# Patient Record
Sex: FEMALE | Race: WHITE | NOT HISPANIC OR LATINO | Employment: FULL TIME | URBAN - METROPOLITAN AREA
[De-identification: names, ages, dates, MRNs, and addresses within clinical notes are randomized per-mention and may not be internally consistent; named-entity substitution may affect disease eponyms.]

---

## 2018-02-14 ENCOUNTER — NURSE TRIAGE (OUTPATIENT)
Dept: ADMINISTRATIVE | Facility: CLINIC | Age: 58
End: 2018-02-14

## 2018-02-14 NOTE — TELEPHONE ENCOUNTER
Reason for Disposition   Chest pain present when not coughing    Protocols used: [facility] COUGH-A-[facility]    Pt states that she has a cough for approx 3 weeks that worsened yesterday morning.  Pt states cough is now productive and has nasal congestion and nasal drainage.  Pt states she had temp yesterday 100-101.  Pt advised to ED per protocol, verbalizes understanding, and refuses disposition.

## 2019-05-19 ENCOUNTER — HOSPITAL ENCOUNTER (EMERGENCY)
Facility: HOSPITAL | Age: 59
Discharge: HOME | End: 2019-05-19
Attending: EMERGENCY MEDICINE
Payer: COMMERCIAL

## 2019-05-19 VITALS
TEMPERATURE: 99 F | RESPIRATION RATE: 20 BRPM | DIASTOLIC BLOOD PRESSURE: 59 MMHG | HEART RATE: 72 BPM | SYSTOLIC BLOOD PRESSURE: 124 MMHG | WEIGHT: 200 LBS | BODY MASS INDEX: 33.32 KG/M2 | HEIGHT: 65 IN | OXYGEN SATURATION: 98 %

## 2019-05-19 DIAGNOSIS — M54.42 ACUTE RIGHT-SIDED LOW BACK PAIN WITH BILATERAL SCIATICA: Primary | ICD-10-CM

## 2019-05-19 DIAGNOSIS — M54.41 ACUTE RIGHT-SIDED LOW BACK PAIN WITH BILATERAL SCIATICA: Primary | ICD-10-CM

## 2019-05-19 DIAGNOSIS — S39.012A BACK STRAIN, INITIAL ENCOUNTER: ICD-10-CM

## 2019-05-19 PROCEDURE — 99284 EMERGENCY DEPT VISIT MOD MDM: CPT | Mod: 25

## 2019-05-19 PROCEDURE — 96372 THER/PROPH/DIAG INJ SC/IM: CPT

## 2019-05-19 PROCEDURE — 25000003 PHARM REV CODE 250: Performed by: PHYSICIAN ASSISTANT

## 2019-05-19 PROCEDURE — 63600175 PHARM REV CODE 636 W HCPCS: Performed by: PHYSICIAN ASSISTANT

## 2019-05-19 RX ORDER — METHOCARBAMOL 500 MG/1
1000 TABLET, FILM COATED ORAL
Status: COMPLETED | OUTPATIENT
Start: 2019-05-19 | End: 2019-05-19

## 2019-05-19 RX ORDER — MELOXICAM 15 MG/1
15 TABLET ORAL DAILY
Qty: 7 TABLET | Refills: 0 | Status: SHIPPED | OUTPATIENT
Start: 2019-05-19 | End: 2019-05-19 | Stop reason: SDUPTHER

## 2019-05-19 RX ORDER — KETOROLAC TROMETHAMINE 30 MG/ML
15 INJECTION, SOLUTION INTRAMUSCULAR; INTRAVENOUS
Status: COMPLETED | OUTPATIENT
Start: 2019-05-19 | End: 2019-05-19

## 2019-05-19 RX ORDER — MELOXICAM 15 MG/1
15 TABLET ORAL DAILY
Qty: 7 TABLET | Refills: 0 | Status: SHIPPED | OUTPATIENT
Start: 2019-05-19 | End: 2019-05-26

## 2019-05-19 RX ORDER — METHOCARBAMOL 500 MG/1
1000 TABLET, FILM COATED ORAL EVERY 8 HOURS PRN
Qty: 30 TABLET | Refills: 0 | Status: SHIPPED | OUTPATIENT
Start: 2019-05-19 | End: 2019-05-19 | Stop reason: SDUPTHER

## 2019-05-19 RX ORDER — ACETAMINOPHEN 500 MG
1000 TABLET ORAL
Status: COMPLETED | OUTPATIENT
Start: 2019-05-19 | End: 2019-05-19

## 2019-05-19 RX ORDER — METHOCARBAMOL 500 MG/1
1000 TABLET, FILM COATED ORAL EVERY 8 HOURS PRN
Qty: 30 TABLET | Refills: 0 | Status: SHIPPED | OUTPATIENT
Start: 2019-05-19 | End: 2019-05-24

## 2019-05-19 RX ADMIN — ACETAMINOPHEN 1000 MG: 500 TABLET ORAL at 09:05

## 2019-05-19 RX ADMIN — KETOROLAC TROMETHAMINE 15 MG: 30 INJECTION, SOLUTION INTRAMUSCULAR at 09:05

## 2019-05-19 RX ADMIN — METHOCARBAMOL TABLETS 1000 MG: 500 TABLET, COATED ORAL at 09:05

## 2019-05-19 NOTE — ED TRIAGE NOTES
Pt presents to ED with C/O 8/10 lower back pain with onset yesterday morning. 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 Pt denies any loss of bowel or bladder and ambulates with steady gait. Pt states she has taken Aleve with no relief. Comfort and safety measures provided. Will continue to monitor.

## 2019-05-19 NOTE — ED PROVIDER NOTES
Encounter Date: 5/19/2019       History     Chief Complaint   Patient presents with    Back Pain     pt states she picked up box at work and hurt back      58-year-old female presents to the ER with chief complaint of mid and right sided low back pain x2 days.  Patient says she was moving a box at work when the box fell off the shelf, then she picked it up and placed back on the shelf.  She reports developing mild back pain at that time.  Her pain has been intermittent and progressively worsening since then.  She took Aleve and ibuprofen yesterday, 3 leave and applied icy Hot this morning without significant improvement.  Her pain is rated 8/10.  She reports tingling numbness sensation in the toes of her right foot, but denies any other numbness or saddle paresthesias.  She has pain radiating into the back of her legs bilaterally with certain movements and walking.  She denies weakness of the extremities, bowel or bladder incontinence, fever, chills, nausea, vomiting, abdominal pain, flank pain, dysuria or hematuria.  She denies history of chronic back pain, or any other complaints at this time.        Review of patient's allergies indicates:   Allergen Reactions    Iodine and iodide containing products     Macrodantin [nitrofurantoin macrocrystalline]     Sulfa (sulfonamide antibiotics)      No past medical history on file.  No past surgical history on file.  No family history on file.  Social History     Tobacco Use    Smoking status: Not on file   Substance Use Topics    Alcohol use: Not on file    Drug use: Not on file     Review of Systems   Constitutional: Negative for chills and fever.   HENT: Negative for sore throat.    Respiratory: Negative for shortness of breath.    Cardiovascular: Negative for chest pain.   Gastrointestinal: Negative for abdominal pain, nausea and vomiting.   Genitourinary: Negative for dysuria.   Musculoskeletal: Positive for back pain and myalgias. Negative for neck pain.    Skin: Negative for rash.   Neurological: Negative for syncope and weakness.   Hematological: Does not bruise/bleed easily.       Physical Exam     Initial Vitals [05/19/19 0907]   BP Pulse Resp Temp SpO2   137/67 81 20 98.5 °F (36.9 °C) 97 %      MAP       --         Physical Exam    Nursing note and vitals reviewed.  Constitutional: She appears well-developed and well-nourished. No distress.   HENT:   Head: Atraumatic.   Eyes: Conjunctivae and EOM are normal. Pupils are equal, round, and reactive to light.   Neck: Normal range of motion. Neck supple. No spinous process tenderness present.   Cardiovascular: Normal rate and regular rhythm.   Pulmonary/Chest: Breath sounds normal. No respiratory distress. She has no wheezes. She has no rhonchi. She has no rales.   Abdominal: Soft. Bowel sounds are normal. She exhibits no distension and no pulsatile midline mass. There is no tenderness.   Musculoskeletal:        Lumbar back: She exhibits tenderness (right paraspinous) and pain. She exhibits normal range of motion, no bony tenderness, no swelling and no spasm.        Back:    Positive straight leg raise on right side.   Neurological: She is alert and oriented to person, place, and time. She has normal strength and normal reflexes. No cranial nerve deficit or sensory deficit. Gait normal.   Normal Gait.  Normal sensation, strength and reflexes of extremities.   Skin: No rash noted.   Psychiatric: She has a normal mood and affect.         ED Course   Procedures  Labs Reviewed - No data to display       Imaging Results    None                APC / Resident Notes:   The patient's back pain is likely a musculoskeletal strain.  There are no signs of saddle anesthesia, incontinence, neurologic deficits, fevers, trauma or midline tenderness on history or physical to suggest cauda equina, infectious process, fracture or subluxation.  I will treat with anti-inflammatories and muscle relaxers for relief.  Patient feels improved  with Toradol, Tylenol and Robaxin given in the ED.  I will send home with prescription for Robaxin, Mobic and plan to continue Tylenol as needed for pain. She is stable for discharge and is given ER return precautions.  I have advised close follow-up with her PCP within 1 week for ER follow-up exam.                   Clinical Impression:       ICD-10-CM ICD-9-CM   1. Acute right-sided low back pain with bilateral sciatica M54.42 724.2    M54.41 724.3   2. Back strain, initial encounter S39.012A 847.9                                KATELYN Posadas  05/19/19 1113

## 2021-03-29 ENCOUNTER — IMMUNIZATION (OUTPATIENT)
Dept: PRIMARY CARE CLINIC | Facility: CLINIC | Age: 61
End: 2021-03-29
Payer: COMMERCIAL

## 2021-03-29 DIAGNOSIS — Z23 NEED FOR VACCINATION: Primary | ICD-10-CM

## 2021-03-29 PROCEDURE — 91301 PR SARS-COV-2 COVID-19 VACCINE, NO PRSV, 100MCG/0.5ML, IM: CPT | Mod: S$GLB,,, | Performed by: INTERNAL MEDICINE

## 2021-03-29 PROCEDURE — 0011A PR IMMUNIZ ADMIN, SARS-COV-2 COVID-19 VACC, 100MCG/0.5ML, 1ST DOSE: CPT | Mod: CV19,S$GLB,, | Performed by: INTERNAL MEDICINE

## 2021-03-29 PROCEDURE — 0011A PR IMMUNIZ ADMIN, SARS-COV-2 COVID-19 VACC, 100MCG/0.5ML, 1ST DOSE: ICD-10-PCS | Mod: CV19,S$GLB,, | Performed by: INTERNAL MEDICINE

## 2021-03-29 PROCEDURE — 91301 PR SARS-COV-2 COVID-19 VACCINE, NO PRSV, 100MCG/0.5ML, IM: ICD-10-PCS | Mod: S$GLB,,, | Performed by: INTERNAL MEDICINE

## 2021-03-29 RX ADMIN — Medication 0.5 ML: at 06:03

## 2021-04-28 ENCOUNTER — IMMUNIZATION (OUTPATIENT)
Dept: PRIMARY CARE CLINIC | Facility: CLINIC | Age: 61
End: 2021-04-28
Payer: COMMERCIAL

## 2021-04-28 DIAGNOSIS — Z23 NEED FOR VACCINATION: Primary | ICD-10-CM

## 2021-04-28 PROCEDURE — 91301 PR SARS-COV-2 COVID-19 VACCINE, NO PRSV, 100MCG/0.5ML, IM: CPT | Mod: S$GLB,,, | Performed by: INTERNAL MEDICINE

## 2021-04-28 PROCEDURE — 91301 PR SARS-COV-2 COVID-19 VACCINE, NO PRSV, 100MCG/0.5ML, IM: ICD-10-PCS | Mod: S$GLB,,, | Performed by: INTERNAL MEDICINE

## 2021-04-28 PROCEDURE — 0012A PR IMMUNIZ ADMIN, SARS-COV-2 COVID-19 VACC, 100MCG/0.5ML, 2ND DOSE: ICD-10-PCS | Mod: CV19,S$GLB,, | Performed by: INTERNAL MEDICINE

## 2021-04-28 PROCEDURE — 0012A PR IMMUNIZ ADMIN, SARS-COV-2 COVID-19 VACC, 100MCG/0.5ML, 2ND DOSE: CPT | Mod: CV19,S$GLB,, | Performed by: INTERNAL MEDICINE

## 2021-04-28 RX ADMIN — Medication 0.5 ML: at 08:04

## 2021-11-12 ENCOUNTER — OFFICE VISIT (OUTPATIENT)
Dept: INTERNAL MEDICINE | Facility: CLINIC | Age: 61
End: 2021-11-12
Payer: COMMERCIAL

## 2021-11-12 ENCOUNTER — LAB VISIT (OUTPATIENT)
Dept: LAB | Facility: HOSPITAL | Age: 61
End: 2021-11-12
Attending: NURSE PRACTITIONER
Payer: COMMERCIAL

## 2021-11-12 VITALS
TEMPERATURE: 98 F | WEIGHT: 229.25 LBS | SYSTOLIC BLOOD PRESSURE: 120 MMHG | BODY MASS INDEX: 38.2 KG/M2 | HEART RATE: 88 BPM | HEIGHT: 65 IN | OXYGEN SATURATION: 97 % | DIASTOLIC BLOOD PRESSURE: 80 MMHG

## 2021-11-12 DIAGNOSIS — Z13.220 ENCOUNTER FOR LIPID SCREENING FOR CARDIOVASCULAR DISEASE: ICD-10-CM

## 2021-11-12 DIAGNOSIS — I10 PRIMARY HYPERTENSION: ICD-10-CM

## 2021-11-12 DIAGNOSIS — Z13.21 ENCOUNTER FOR VITAMIN DEFICIENCY SCREENING: ICD-10-CM

## 2021-11-12 DIAGNOSIS — Z13.6 ENCOUNTER FOR LIPID SCREENING FOR CARDIOVASCULAR DISEASE: ICD-10-CM

## 2021-11-12 DIAGNOSIS — K59.00 CONSTIPATION, UNSPECIFIED CONSTIPATION TYPE: ICD-10-CM

## 2021-11-12 DIAGNOSIS — Z00.00 ENCOUNTER FOR MEDICAL EXAMINATION TO ESTABLISH CARE: ICD-10-CM

## 2021-11-12 DIAGNOSIS — Z13.1 SCREENING FOR DIABETES MELLITUS: ICD-10-CM

## 2021-11-12 DIAGNOSIS — Z00.00 ENCOUNTER FOR MEDICAL EXAMINATION TO ESTABLISH CARE: Primary | ICD-10-CM

## 2021-11-12 DIAGNOSIS — Z12.31 ENCOUNTER FOR SCREENING MAMMOGRAM FOR MALIGNANT NEOPLASM OF BREAST: ICD-10-CM

## 2021-11-12 DIAGNOSIS — F41.9 ANXIETY: ICD-10-CM

## 2021-11-12 LAB
25(OH)D3+25(OH)D2 SERPL-MCNC: 15 NG/ML (ref 30–96)
ALBUMIN SERPL BCP-MCNC: 3.9 G/DL (ref 3.5–5.2)
ALP SERPL-CCNC: 133 U/L (ref 55–135)
ALT SERPL W/O P-5'-P-CCNC: 20 U/L (ref 10–44)
ANION GAP SERPL CALC-SCNC: 9 MMOL/L (ref 8–16)
AST SERPL-CCNC: 28 U/L (ref 10–40)
BACTERIA #/AREA URNS AUTO: ABNORMAL /HPF
BASOPHILS # BLD AUTO: 0.04 K/UL (ref 0–0.2)
BASOPHILS NFR BLD: 0.8 % (ref 0–1.9)
BILIRUB SERPL-MCNC: 0.5 MG/DL (ref 0.1–1)
BILIRUB UR QL STRIP: NEGATIVE
BUN SERPL-MCNC: 20 MG/DL (ref 6–20)
CALCIUM SERPL-MCNC: 10.2 MG/DL (ref 8.7–10.5)
CHLORIDE SERPL-SCNC: 103 MMOL/L (ref 95–110)
CHOLEST SERPL-MCNC: 246 MG/DL (ref 120–199)
CHOLEST/HDLC SERPL: 4 {RATIO} (ref 2–5)
CLARITY UR REFRACT.AUTO: ABNORMAL
CO2 SERPL-SCNC: 29 MMOL/L (ref 23–29)
COLOR UR AUTO: YELLOW
CREAT SERPL-MCNC: 1.1 MG/DL (ref 0.5–1.4)
DIFFERENTIAL METHOD: NORMAL
EOSINOPHIL # BLD AUTO: 0.2 K/UL (ref 0–0.5)
EOSINOPHIL NFR BLD: 3 % (ref 0–8)
ERYTHROCYTE [DISTWIDTH] IN BLOOD BY AUTOMATED COUNT: 13.7 % (ref 11.5–14.5)
EST. GFR  (AFRICAN AMERICAN): >60 ML/MIN/1.73 M^2
EST. GFR  (NON AFRICAN AMERICAN): 55.1 ML/MIN/1.73 M^2
ESTIMATED AVG GLUCOSE: 128 MG/DL (ref 68–131)
GLUCOSE SERPL-MCNC: 116 MG/DL (ref 70–110)
GLUCOSE UR QL STRIP: NEGATIVE
HBA1C MFR BLD: 6.1 % (ref 4–5.6)
HCT VFR BLD AUTO: 47.2 % (ref 37–48.5)
HDLC SERPL-MCNC: 62 MG/DL (ref 40–75)
HDLC SERPL: 25.2 % (ref 20–50)
HGB BLD-MCNC: 15.5 G/DL (ref 12–16)
HGB UR QL STRIP: ABNORMAL
IMM GRANULOCYTES # BLD AUTO: 0.01 K/UL (ref 0–0.04)
IMM GRANULOCYTES NFR BLD AUTO: 0.2 % (ref 0–0.5)
KETONES UR QL STRIP: NEGATIVE
LDLC SERPL CALC-MCNC: 163.8 MG/DL (ref 63–159)
LEUKOCYTE ESTERASE UR QL STRIP: NEGATIVE
LYMPHOCYTES # BLD AUTO: 1.6 K/UL (ref 1–4.8)
LYMPHOCYTES NFR BLD: 30.7 % (ref 18–48)
MCH RBC QN AUTO: 30.6 PG (ref 27–31)
MCHC RBC AUTO-ENTMCNC: 32.8 G/DL (ref 32–36)
MCV RBC AUTO: 93 FL (ref 82–98)
MICROSCOPIC COMMENT: ABNORMAL
MONOCYTES # BLD AUTO: 0.4 K/UL (ref 0.3–1)
MONOCYTES NFR BLD: 7 % (ref 4–15)
NEUTROPHILS # BLD AUTO: 3.1 K/UL (ref 1.8–7.7)
NEUTROPHILS NFR BLD: 58.3 % (ref 38–73)
NITRITE UR QL STRIP: NEGATIVE
NONHDLC SERPL-MCNC: 184 MG/DL
NRBC BLD-RTO: 0 /100 WBC
PH UR STRIP: 6 [PH] (ref 5–8)
PLATELET # BLD AUTO: 266 K/UL (ref 150–450)
PMV BLD AUTO: 11.2 FL (ref 9.2–12.9)
POTASSIUM SERPL-SCNC: 4 MMOL/L (ref 3.5–5.1)
PROT SERPL-MCNC: 8.2 G/DL (ref 6–8.4)
PROT UR QL STRIP: NEGATIVE
RBC # BLD AUTO: 5.06 M/UL (ref 4–5.4)
RBC #/AREA URNS AUTO: 6 /HPF (ref 0–4)
SODIUM SERPL-SCNC: 141 MMOL/L (ref 136–145)
SP GR UR STRIP: 1.01 (ref 1–1.03)
SQUAMOUS #/AREA URNS AUTO: 14 /HPF
T4 FREE SERPL-MCNC: 0.79 NG/DL (ref 0.71–1.51)
TRIGL SERPL-MCNC: 101 MG/DL (ref 30–150)
TSH SERPL DL<=0.005 MIU/L-ACNC: 2.28 UIU/ML (ref 0.4–4)
URN SPEC COLLECT METH UR: ABNORMAL
WBC # BLD AUTO: 5.25 K/UL (ref 3.9–12.7)
WBC #/AREA URNS AUTO: 12 /HPF (ref 0–5)

## 2021-11-12 PROCEDURE — 80061 LIPID PANEL: CPT | Performed by: NURSE PRACTITIONER

## 2021-11-12 PROCEDURE — 3079F PR MOST RECENT DIASTOLIC BLOOD PRESSURE 80-89 MM HG: ICD-10-PCS | Mod: CPTII,S$GLB,, | Performed by: NURSE PRACTITIONER

## 2021-11-12 PROCEDURE — 1159F MED LIST DOCD IN RCRD: CPT | Mod: CPTII,S$GLB,, | Performed by: NURSE PRACTITIONER

## 2021-11-12 PROCEDURE — 1160F RVW MEDS BY RX/DR IN RCRD: CPT | Mod: CPTII,S$GLB,, | Performed by: NURSE PRACTITIONER

## 2021-11-12 PROCEDURE — 3008F BODY MASS INDEX DOCD: CPT | Mod: CPTII,S$GLB,, | Performed by: NURSE PRACTITIONER

## 2021-11-12 PROCEDURE — 3079F DIAST BP 80-89 MM HG: CPT | Mod: CPTII,S$GLB,, | Performed by: NURSE PRACTITIONER

## 2021-11-12 PROCEDURE — 84439 ASSAY OF FREE THYROXINE: CPT | Performed by: NURSE PRACTITIONER

## 2021-11-12 PROCEDURE — 3074F PR MOST RECENT SYSTOLIC BLOOD PRESSURE < 130 MM HG: ICD-10-PCS | Mod: CPTII,S$GLB,, | Performed by: NURSE PRACTITIONER

## 2021-11-12 PROCEDURE — 3074F SYST BP LT 130 MM HG: CPT | Mod: CPTII,S$GLB,, | Performed by: NURSE PRACTITIONER

## 2021-11-12 PROCEDURE — 81001 URINALYSIS AUTO W/SCOPE: CPT | Performed by: NURSE PRACTITIONER

## 2021-11-12 PROCEDURE — 99386 PR PREVENTIVE VISIT,NEW,40-64: ICD-10-PCS | Mod: S$GLB,,, | Performed by: NURSE PRACTITIONER

## 2021-11-12 PROCEDURE — 36415 COLL VENOUS BLD VENIPUNCTURE: CPT | Mod: PO | Performed by: NURSE PRACTITIONER

## 2021-11-12 PROCEDURE — 82306 VITAMIN D 25 HYDROXY: CPT | Performed by: NURSE PRACTITIONER

## 2021-11-12 PROCEDURE — 99386 PREV VISIT NEW AGE 40-64: CPT | Mod: S$GLB,,, | Performed by: NURSE PRACTITIONER

## 2021-11-12 PROCEDURE — 85025 COMPLETE CBC W/AUTO DIFF WBC: CPT | Performed by: NURSE PRACTITIONER

## 2021-11-12 PROCEDURE — 1160F PR REVIEW ALL MEDS BY PRESCRIBER/CLIN PHARMACIST DOCUMENTED: ICD-10-PCS | Mod: CPTII,S$GLB,, | Performed by: NURSE PRACTITIONER

## 2021-11-12 PROCEDURE — 3008F PR BODY MASS INDEX (BMI) DOCUMENTED: ICD-10-PCS | Mod: CPTII,S$GLB,, | Performed by: NURSE PRACTITIONER

## 2021-11-12 PROCEDURE — 99999 PR PBB SHADOW E&M-EST. PATIENT-LVL III: ICD-10-PCS | Mod: PBBFAC,,, | Performed by: NURSE PRACTITIONER

## 2021-11-12 PROCEDURE — 83036 HEMOGLOBIN GLYCOSYLATED A1C: CPT | Performed by: NURSE PRACTITIONER

## 2021-11-12 PROCEDURE — 1159F PR MEDICATION LIST DOCUMENTED IN MEDICAL RECORD: ICD-10-PCS | Mod: CPTII,S$GLB,, | Performed by: NURSE PRACTITIONER

## 2021-11-12 PROCEDURE — 84443 ASSAY THYROID STIM HORMONE: CPT | Performed by: NURSE PRACTITIONER

## 2021-11-12 PROCEDURE — 99999 PR PBB SHADOW E&M-EST. PATIENT-LVL III: CPT | Mod: PBBFAC,,, | Performed by: NURSE PRACTITIONER

## 2021-11-12 PROCEDURE — 80053 COMPREHEN METABOLIC PANEL: CPT | Performed by: NURSE PRACTITIONER

## 2021-11-12 RX ORDER — DULOXETIN HYDROCHLORIDE 60 MG/1
60 CAPSULE, DELAYED RELEASE ORAL DAILY
COMMUNITY
Start: 2021-08-14 | End: 2021-11-12 | Stop reason: SDUPTHER

## 2021-11-12 RX ORDER — DULOXETIN HYDROCHLORIDE 60 MG/1
60 CAPSULE, DELAYED RELEASE ORAL DAILY
Qty: 90 CAPSULE | Refills: 1 | Status: SHIPPED | OUTPATIENT
Start: 2021-11-12 | End: 2022-05-24 | Stop reason: SDUPTHER

## 2021-11-12 RX ORDER — AMLODIPINE BESYLATE 10 MG/1
10 TABLET ORAL DAILY
Qty: 90 TABLET | Refills: 1 | Status: SHIPPED | OUTPATIENT
Start: 2021-11-12 | End: 2022-05-24 | Stop reason: SDUPTHER

## 2021-11-12 RX ORDER — HYDROCHLOROTHIAZIDE 12.5 MG/1
CAPSULE ORAL
Qty: 90 CAPSULE | Refills: 0 | Status: SHIPPED | OUTPATIENT
Start: 2021-11-12 | End: 2022-02-09

## 2021-11-12 RX ORDER — HYDROCHLOROTHIAZIDE 12.5 MG/1
CAPSULE ORAL
COMMUNITY
End: 2021-11-12 | Stop reason: SDUPTHER

## 2021-11-15 ENCOUNTER — HOSPITAL ENCOUNTER (OUTPATIENT)
Dept: RADIOLOGY | Facility: HOSPITAL | Age: 61
Discharge: HOME | End: 2021-11-15
Attending: NURSE PRACTITIONER
Payer: COMMERCIAL

## 2021-11-15 VITALS — BODY MASS INDEX: 37.44 KG/M2 | WEIGHT: 225 LBS

## 2021-11-15 DIAGNOSIS — Z12.31 ENCOUNTER FOR SCREENING MAMMOGRAM FOR MALIGNANT NEOPLASM OF BREAST: ICD-10-CM

## 2021-11-15 PROCEDURE — 77067 SCR MAMMO BI INCL CAD: CPT | Mod: TC,PO

## 2021-11-15 PROCEDURE — 77067 MAMMO DIGITAL SCREENING BILAT WITH TOMO: ICD-10-PCS | Mod: 26,,, | Performed by: RADIOLOGY

## 2021-11-15 PROCEDURE — 77063 BREAST TOMOSYNTHESIS BI: CPT | Mod: 26,,, | Performed by: RADIOLOGY

## 2021-11-15 PROCEDURE — 77063 MAMMO DIGITAL SCREENING BILAT WITH TOMO: ICD-10-PCS | Mod: 26,,, | Performed by: RADIOLOGY

## 2021-11-15 PROCEDURE — 77067 SCR MAMMO BI INCL CAD: CPT | Mod: 26,,, | Performed by: RADIOLOGY

## 2021-11-17 DIAGNOSIS — E55.9 VITAMIN D DEFICIENCY: Primary | ICD-10-CM

## 2021-11-17 RX ORDER — ERGOCALCIFEROL 1.25 MG/1
50000 CAPSULE ORAL
Qty: 12 CAPSULE | Refills: 0 | Status: SHIPPED | OUTPATIENT
Start: 2021-11-17 | End: 2022-02-09

## 2021-11-19 ENCOUNTER — PATIENT MESSAGE (OUTPATIENT)
Dept: INTERNAL MEDICINE | Facility: CLINIC | Age: 61
End: 2021-11-19
Payer: COMMERCIAL

## 2021-11-23 RX ORDER — CIPROFLOXACIN 500 MG/1
500 TABLET ORAL EVERY 12 HOURS
Qty: 10 TABLET | Refills: 0 | Status: SHIPPED | OUTPATIENT
Start: 2021-11-23 | End: 2021-11-28

## 2022-05-24 DIAGNOSIS — E55.9 VITAMIN D DEFICIENCY: ICD-10-CM

## 2022-05-24 RX ORDER — ERGOCALCIFEROL 1.25 MG/1
50000 CAPSULE ORAL
Qty: 12 CAPSULE | Refills: 0 | Status: SHIPPED | OUTPATIENT
Start: 2022-05-24 | End: 2022-12-08 | Stop reason: SDUPTHER

## 2022-05-24 RX ORDER — AMLODIPINE BESYLATE 10 MG/1
10 TABLET ORAL DAILY
Qty: 90 TABLET | Refills: 1 | Status: SHIPPED | OUTPATIENT
Start: 2022-05-24 | End: 2022-12-08 | Stop reason: SDUPTHER

## 2022-05-24 RX ORDER — DULOXETIN HYDROCHLORIDE 60 MG/1
60 CAPSULE, DELAYED RELEASE ORAL DAILY
Qty: 90 CAPSULE | Refills: 1 | Status: SHIPPED | OUTPATIENT
Start: 2022-05-24 | End: 2022-12-08 | Stop reason: SDUPTHER

## 2022-11-21 ENCOUNTER — OFFICE VISIT (OUTPATIENT)
Dept: INTERNAL MEDICINE | Facility: CLINIC | Age: 62
End: 2022-11-21
Payer: COMMERCIAL

## 2022-11-21 VITALS
OXYGEN SATURATION: 96 % | HEART RATE: 98 BPM | DIASTOLIC BLOOD PRESSURE: 80 MMHG | TEMPERATURE: 99 F | BODY MASS INDEX: 38.19 KG/M2 | WEIGHT: 229.5 LBS | SYSTOLIC BLOOD PRESSURE: 140 MMHG

## 2022-11-21 DIAGNOSIS — Z13.21 ENCOUNTER FOR VITAMIN DEFICIENCY SCREENING: ICD-10-CM

## 2022-11-21 DIAGNOSIS — Z00.00 ANNUAL PHYSICAL EXAM: Primary | ICD-10-CM

## 2022-11-21 DIAGNOSIS — Z13.6 ENCOUNTER FOR LIPID SCREENING FOR CARDIOVASCULAR DISEASE: ICD-10-CM

## 2022-11-21 DIAGNOSIS — I10 PRIMARY HYPERTENSION: ICD-10-CM

## 2022-11-21 DIAGNOSIS — E55.9 VITAMIN D DEFICIENCY: ICD-10-CM

## 2022-11-21 DIAGNOSIS — Z11.4 SCREENING FOR HIV (HUMAN IMMUNODEFICIENCY VIRUS): ICD-10-CM

## 2022-11-21 DIAGNOSIS — Z13.220 ENCOUNTER FOR LIPID SCREENING FOR CARDIOVASCULAR DISEASE: ICD-10-CM

## 2022-11-21 DIAGNOSIS — Z12.31 ENCOUNTER FOR SCREENING MAMMOGRAM FOR MALIGNANT NEOPLASM OF BREAST: ICD-10-CM

## 2022-11-21 DIAGNOSIS — F41.9 ANXIETY: ICD-10-CM

## 2022-11-21 DIAGNOSIS — Z13.1 SCREENING FOR DIABETES MELLITUS: ICD-10-CM

## 2022-11-21 DIAGNOSIS — Z11.59 ENCOUNTER FOR HEPATITIS C SCREENING TEST FOR LOW RISK PATIENT: ICD-10-CM

## 2022-11-21 DIAGNOSIS — K59.00 CONSTIPATION, UNSPECIFIED CONSTIPATION TYPE: ICD-10-CM

## 2022-11-21 PROCEDURE — 3079F DIAST BP 80-89 MM HG: CPT | Mod: CPTII,S$GLB,, | Performed by: NURSE PRACTITIONER

## 2022-11-21 PROCEDURE — 3077F SYST BP >= 140 MM HG: CPT | Mod: CPTII,S$GLB,, | Performed by: NURSE PRACTITIONER

## 2022-11-21 PROCEDURE — 3008F BODY MASS INDEX DOCD: CPT | Mod: CPTII,S$GLB,, | Performed by: NURSE PRACTITIONER

## 2022-11-21 PROCEDURE — 3077F PR MOST RECENT SYSTOLIC BLOOD PRESSURE >= 140 MM HG: ICD-10-PCS | Mod: CPTII,S$GLB,, | Performed by: NURSE PRACTITIONER

## 2022-11-21 PROCEDURE — 99999 PR PBB SHADOW E&M-EST. PATIENT-LVL III: ICD-10-PCS | Mod: PBBFAC,,, | Performed by: NURSE PRACTITIONER

## 2022-11-21 PROCEDURE — 3008F PR BODY MASS INDEX (BMI) DOCUMENTED: ICD-10-PCS | Mod: CPTII,S$GLB,, | Performed by: NURSE PRACTITIONER

## 2022-11-21 PROCEDURE — 99396 PR PREVENTIVE VISIT,EST,40-64: ICD-10-PCS | Mod: S$GLB,,, | Performed by: NURSE PRACTITIONER

## 2022-11-21 PROCEDURE — 1159F PR MEDICATION LIST DOCUMENTED IN MEDICAL RECORD: ICD-10-PCS | Mod: CPTII,S$GLB,, | Performed by: NURSE PRACTITIONER

## 2022-11-21 PROCEDURE — 99999 PR PBB SHADOW E&M-EST. PATIENT-LVL III: CPT | Mod: PBBFAC,,, | Performed by: NURSE PRACTITIONER

## 2022-11-21 PROCEDURE — 3079F PR MOST RECENT DIASTOLIC BLOOD PRESSURE 80-89 MM HG: ICD-10-PCS | Mod: CPTII,S$GLB,, | Performed by: NURSE PRACTITIONER

## 2022-11-21 PROCEDURE — 1159F MED LIST DOCD IN RCRD: CPT | Mod: CPTII,S$GLB,, | Performed by: NURSE PRACTITIONER

## 2022-11-21 PROCEDURE — 99396 PREV VISIT EST AGE 40-64: CPT | Mod: S$GLB,,, | Performed by: NURSE PRACTITIONER

## 2022-11-21 NOTE — PROGRESS NOTES
[facility] Note    Chief Complaint      Chief Complaint   Patient presents with    Annual Exam     History of Present Illness      Lisandra Toussaint is a 62 y.o. female patient with chronic conditions of anxiety, TMJ, HTN who presents today for annual visit exam.  Patient feeling well no complaints, denies shortness of breath or chest pain, reviewed meds and history patient.  No concerns of bladder function, still deals with constipation taking Benefiber and Dulcolax.  Tries to stay hydrated, and active, regular diet.    Does suffer with constipation, uses benefiber and ducolax as needed  Comes to appointment alone  Practices safety measures such as wearing her seatbelt    Has chronic right knee pain and swelling, has seen Dr. Mckinley and past, wants to be referred to an [facility] after the 1st of the year.      Labs- ordered  Eye exams- glasses  Gyn- Dr. Knowles- [facility]  Mammogram-will scheduled to do mammogram after the 1st of the year  Colonoscopy- Dr. Francois, done 5 years ago-1 polyp removed-will contact Dr. Francois for colonoscopy    Vaccines:  Covid- moderna x 2, had covid twice  Tdap- due  Flu shot- due      Health Maintenance   Topic Date Due    Hepatitis C Screening  Never done    TETANUS VACCINE  Never done    Mammogram  11/15/2022    Lipid Panel  11/12/2026       Past Medical History:   Diagnosis Date    Anxiety     HTN (hypertension)     TMJ (dislocation of temporomandibular joint)        Past Surgical History:   Procedure Laterality Date    EYE SURGERY      tarsel tunnel         family history includes Cancer in her maternal grandfather and mother; Heart disease in her maternal grandmother; Hypertension in her maternal grandfather and mother; Learning disabilities in her son and son; Stroke in her maternal grandfather.    Social History     Tobacco Use    Smoking status: Never    Smokeless tobacco: Never   Substance Use Topics    Alcohol use: Not Currently     Comment: rarely     Drug use: Never       Review of Systems   Constitutional:  Negative for chills and fever.   HENT:  Negative for congestion, sinus pain and sore throat.    Eyes:  Negative for blurred vision.   Respiratory:  Negative for cough, shortness of breath and wheezing.    Cardiovascular:  Negative for chest pain, palpitations and leg swelling.   Gastrointestinal:  Negative for abdominal pain, constipation, diarrhea, nausea and vomiting.   Genitourinary:  Negative for dysuria.   Musculoskeletal:  Negative for myalgias.   Skin:  Negative for rash.   Neurological:  Negative for dizziness, weakness and headaches.   Psychiatric/Behavioral:  Negative for depression. The patient is not nervous/anxious.       Outpatient Encounter Medications as of 11/21/2022   Medication Sig Dispense Refill    amLODIPine (NORVASC) 10 MG tablet Take 1 tablet (10 mg total) by mouth once daily. 90 tablet 1    DULoxetine (CYMBALTA) 60 MG capsule Take 1 capsule (60 mg total) by mouth once daily. 90 capsule 1    ergocalciferol (ERGOCALCIFEROL) 50,000 unit Cap Take 1 capsule (50,000 Units total) by mouth every 7 days. 12 capsule 0    hydroCHLOROthiazide (MICROZIDE) 12.5 mg capsule Take 1 capsule by mouth once daily 90 capsule 0     No facility-administered encounter medications on file as of 11/21/2022.        Review of patient's allergies indicates:   Allergen Reactions    Cinnamon Anaphylaxis    Iodine and iodide containing products     Macrodantin [nitrofurantoin macrocrystalline]     Nitrofurantoin macrocrystal     Povidone-iodine      Other reaction(s): Blister    Sulfa (sulfonamide antibiotics)        Physical Exam      Vital Signs  Temp: 98.5 °F (36.9 °C)  Pulse: 98  SpO2: 96 %  BP: (!) 140/80  Height and Weight  Weight: 104.1 kg (229 lb 8 oz)    Physical Exam  Vitals and nursing note reviewed.   Constitutional:       General: She is not in acute distress.     Appearance: Normal appearance. She is well-developed. She is not ill-appearing.   HENT:       Head: Normocephalic and atraumatic.      Right Ear: External ear normal.      Left Ear: External ear normal.   Eyes:      Conjunctiva/sclera: Conjunctivae normal.      Pupils: Pupils are equal, round, and reactive to light.   Neck:      Thyroid: No thyromegaly.      Vascular: No JVD.      Trachea: No tracheal deviation.   Cardiovascular:      Rate and Rhythm: Normal rate and regular rhythm.      Heart sounds: Normal heart sounds. No murmur heard.  Pulmonary:      Effort: Pulmonary effort is normal.      Breath sounds: Normal breath sounds.   Chest:      Chest wall: No tenderness.   Abdominal:      General: Bowel sounds are normal.      Palpations: Abdomen is soft.      Tenderness: There is no abdominal tenderness. There is no guarding.   Musculoskeletal:         General: Normal range of motion.      Cervical back: Normal range of motion and neck supple.   Lymphadenopathy:      Cervical: No cervical adenopathy.   Skin:     General: Skin is warm and dry.   Neurological:      General: No focal deficit present.      Mental Status: She is alert and oriented to person, place, and time.   Psychiatric:         Mood and Affect: Mood normal.         Behavior: Behavior normal.         Thought Content: Thought content normal.         Judgment: Judgment normal.        Laboratory:  CBC:  Lab Results   Component Value Date    WBC 5.25 11/12/2021    RBC 5.06 11/12/2021    HGB 15.5 11/12/2021    HCT 47.2 11/12/2021     11/12/2021    MCV 93 11/12/2021    MCH 30.6 11/12/2021    MCHC 32.8 11/12/2021     CMP:  Lab Results   Component Value Date     (H) 11/12/2021    CALCIUM 10.2 11/12/2021    ALBUMIN 3.9 11/12/2021    PROT 8.2 11/12/2021     11/12/2021    K 4.0 11/12/2021    CO2 29 11/12/2021     11/12/2021    BUN 20 11/12/2021    ALKPHOS 133 11/12/2021    ALT 20 11/12/2021    AST 28 11/12/2021    BILITOT 0.5 11/12/2021     URINALYSIS:  Lab Results   Component Value Date    COLORU Yellow 11/12/2021     SPECGRAV 1.015 11/12/2021    PHUR 6.0 11/12/2021    PROTEINUA Negative 11/12/2021    BACTERIA Rare 11/12/2021    NITRITE Negative 11/12/2021    LEUKOCYTESUR Negative 11/12/2021      LIPIDS:  Lab Results   Component Value Date    TSH 2.284 11/12/2021    HDL 62 11/12/2021    CHOL 246 (H) 11/12/2021    TRIG 101 11/12/2021    LDLCALC 163.8 (H) 11/12/2021    CHOLHDL 25.2 11/12/2021    NONHDLCHOL 184 11/12/2021    TOTALCHOLEST 4.0 11/12/2021     TSH:  Lab Results   Component Value Date    TSH 2.284 11/12/2021     A1C:  Lab Results   Component Value Date    HGBA1C 6.1 (H) 11/12/2021         Assessment/Plan     Lisandra Toussaint is a 62 y.o.female with:    Annual physical exam  -     CBC Auto Differential; Future; Expected date: 11/21/2022  -     Comprehensive Metabolic Panel; Future; Expected date: 11/21/2022  -     Hemoglobin A1C; Future; Expected date: 11/21/2022  -     Hepatitis C Antibody; Future; Expected date: 11/21/2022  -     HIV 1/2 Ag/Ab (4th Gen); Future; Expected date: 11/21/2022  -     Lipid Panel; Future; Expected date: 11/21/2022  -     T4, Free; Future; Expected date: 11/21/2022  -     TSH; Future; Expected date: 11/21/2022  -     Vitamin D; Future; Expected date: 11/21/2022    Constipation, unspecified constipation type  -     CBC Auto Differential; Future; Expected date: 11/21/2022  -     Comprehensive Metabolic Panel; Future; Expected date: 11/21/2022  -     Hemoglobin A1C; Future; Expected date: 11/21/2022  -     Hepatitis C Antibody; Future; Expected date: 11/21/2022  -     HIV 1/2 Ag/Ab (4th Gen); Future; Expected date: 11/21/2022  -     Lipid Panel; Future; Expected date: 11/21/2022  -     T4, Free; Future; Expected date: 11/21/2022  -     TSH; Future; Expected date: 11/21/2022  -     Vitamin D; Future; Expected date: 11/21/2022    Anxiety  -     CBC Auto Differential; Future; Expected date: 11/21/2022  -     Comprehensive Metabolic Panel; Future; Expected date: 11/21/2022  -     Hemoglobin A1C;  Future; Expected date: 11/21/2022  -     Hepatitis C Antibody; Future; Expected date: 11/21/2022  -     HIV 1/2 Ag/Ab (4th Gen); Future; Expected date: 11/21/2022  -     Lipid Panel; Future; Expected date: 11/21/2022  -     T4, Free; Future; Expected date: 11/21/2022  -     TSH; Future; Expected date: 11/21/2022  -     Vitamin D; Future; Expected date: 11/21/2022    Primary hypertension  -     CBC Auto Differential; Future; Expected date: 11/21/2022  -     Comprehensive Metabolic Panel; Future; Expected date: 11/21/2022  -     Hemoglobin A1C; Future; Expected date: 11/21/2022  -     Hepatitis C Antibody; Future; Expected date: 11/21/2022  -     HIV 1/2 Ag/Ab (4th Gen); Future; Expected date: 11/21/2022  -     Lipid Panel; Future; Expected date: 11/21/2022  -     T4, Free; Future; Expected date: 11/21/2022  -     TSH; Future; Expected date: 11/21/2022  -     Vitamin D; Future; Expected date: 11/21/2022    Screening for diabetes mellitus  -     CBC Auto Differential; Future; Expected date: 11/21/2022  -     Comprehensive Metabolic Panel; Future; Expected date: 11/21/2022  -     Hemoglobin A1C; Future; Expected date: 11/21/2022  -     Hepatitis C Antibody; Future; Expected date: 11/21/2022  -     HIV 1/2 Ag/Ab (4th Gen); Future; Expected date: 11/21/2022  -     Lipid Panel; Future; Expected date: 11/21/2022  -     T4, Free; Future; Expected date: 11/21/2022  -     TSH; Future; Expected date: 11/21/2022  -     Vitamin D; Future; Expected date: 11/21/2022    Encounter for lipid screening for cardiovascular disease  -     CBC Auto Differential; Future; Expected date: 11/21/2022  -     Comprehensive Metabolic Panel; Future; Expected date: 11/21/2022  -     Hemoglobin A1C; Future; Expected date: 11/21/2022  -     Hepatitis C Antibody; Future; Expected date: 11/21/2022  -     HIV 1/2 Ag/Ab (4th Gen); Future; Expected date: 11/21/2022  -     Lipid Panel; Future; Expected date: 11/21/2022  -     T4, Free; Future; Expected  date: 11/21/2022  -     TSH; Future; Expected date: 11/21/2022  -     Vitamin D; Future; Expected date: 11/21/2022    Encounter for vitamin deficiency screening  -     CBC Auto Differential; Future; Expected date: 11/21/2022  -     Comprehensive Metabolic Panel; Future; Expected date: 11/21/2022  -     Hemoglobin A1C; Future; Expected date: 11/21/2022  -     Hepatitis C Antibody; Future; Expected date: 11/21/2022  -     HIV 1/2 Ag/Ab (4th Gen); Future; Expected date: 11/21/2022  -     Lipid Panel; Future; Expected date: 11/21/2022  -     T4, Free; Future; Expected date: 11/21/2022  -     TSH; Future; Expected date: 11/21/2022  -     Vitamin D; Future; Expected date: 11/21/2022    Encounter for screening mammogram for malignant neoplasm of breast  -     CBC Auto Differential; Future; Expected date: 11/21/2022  -     Comprehensive Metabolic Panel; Future; Expected date: 11/21/2022  -     Hemoglobin A1C; Future; Expected date: 11/21/2022  -     Hepatitis C Antibody; Future; Expected date: 11/21/2022  -     HIV 1/2 Ag/Ab (4th Gen); Future; Expected date: 11/21/2022  -     Lipid Panel; Future; Expected date: 11/21/2022  -     T4, Free; Future; Expected date: 11/21/2022  -     TSH; Future; Expected date: 11/21/2022  -     Vitamin D; Future; Expected date: 11/21/2022    Encounter for hepatitis C screening test for low risk patient  -     CBC Auto Differential; Future; Expected date: 11/21/2022  -     Comprehensive Metabolic Panel; Future; Expected date: 11/21/2022  -     Hemoglobin A1C; Future; Expected date: 11/21/2022  -     Hepatitis C Antibody; Future; Expected date: 11/21/2022  -     HIV 1/2 Ag/Ab (4th Gen); Future; Expected date: 11/21/2022  -     Lipid Panel; Future; Expected date: 11/21/2022  -     T4, Free; Future; Expected date: 11/21/2022  -     TSH; Future; Expected date: 11/21/2022  -     Vitamin D; Future; Expected date: 11/21/2022    Screening for HIV (human immunodeficiency virus)  -     CBC Auto  Differential; Future; Expected date: 11/21/2022  -     Comprehensive Metabolic Panel; Future; Expected date: 11/21/2022  -     Hemoglobin A1C; Future; Expected date: 11/21/2022  -     Hepatitis C Antibody; Future; Expected date: 11/21/2022  -     HIV 1/2 Ag/Ab (4th Gen); Future; Expected date: 11/21/2022  -     Lipid Panel; Future; Expected date: 11/21/2022  -     T4, Free; Future; Expected date: 11/21/2022  -     TSH; Future; Expected date: 11/21/2022  -     Vitamin D; Future; Expected date: 11/21/2022    Vitamin D deficiency  -     CBC Auto Differential; Future; Expected date: 11/21/2022  -     Comprehensive Metabolic Panel; Future; Expected date: 11/21/2022  -     Hemoglobin A1C; Future; Expected date: 11/21/2022  -     Hepatitis C Antibody; Future; Expected date: 11/21/2022  -     HIV 1/2 Ag/Ab (4th Gen); Future; Expected date: 11/21/2022  -     Lipid Panel; Future; Expected date: 11/21/2022  -     T4, Free; Future; Expected date: 11/21/2022  -     TSH; Future; Expected date: 11/21/2022  -     Vitamin D; Future; Expected date: 11/21/2022      All stable  Continue current regimen    Health Maintenance Due   Topic Date Due    Hepatitis C Screening  Never done    Cervical Cancer Screening  Never done    HIV Screening  Never done    TETANUS VACCINE  Never done    Colorectal Cancer Screening  Never done    Shingles Vaccine (1 of 2) Never done    COVID-19 Vaccine (3 - Booster for Moderna series) 06/23/2021    Influenza Vaccine (1) 09/01/2022    Mammogram  11/15/2022        I spent 40 minutes on the day of this encounter for preparing for, evaluating, treating, and managing this patient.      -Continue current medications and maintain follow up with specialists.  Return to clinic in 1 year or sooner for any concerns   No follow-ups on file.       AGUS Kulkarni  [facility] [place]

## 2022-12-07 DIAGNOSIS — E55.9 VITAMIN D DEFICIENCY: ICD-10-CM

## 2022-12-07 RX ORDER — AMLODIPINE BESYLATE 10 MG/1
TABLET ORAL
Qty: 90 TABLET | Refills: 0 | OUTPATIENT
Start: 2022-12-07

## 2022-12-07 RX ORDER — ERGOCALCIFEROL 1.25 MG/1
CAPSULE ORAL
Qty: 12 CAPSULE | Refills: 0 | OUTPATIENT
Start: 2022-12-07

## 2022-12-07 RX ORDER — DULOXETIN HYDROCHLORIDE 60 MG/1
CAPSULE, DELAYED RELEASE ORAL
Qty: 90 CAPSULE | Refills: 0 | OUTPATIENT
Start: 2022-12-07

## 2022-12-07 RX ORDER — DULOXETIN HYDROCHLORIDE 60 MG/1
60 CAPSULE, DELAYED RELEASE ORAL DAILY
Qty: 90 CAPSULE | Refills: 1 | OUTPATIENT
Start: 2022-12-07

## 2022-12-07 RX ORDER — ERGOCALCIFEROL 1.25 MG/1
50000 CAPSULE ORAL
Qty: 12 CAPSULE | Refills: 0 | OUTPATIENT
Start: 2022-12-07

## 2022-12-07 RX ORDER — AMLODIPINE BESYLATE 10 MG/1
10 TABLET ORAL DAILY
Qty: 90 TABLET | Refills: 1 | OUTPATIENT
Start: 2022-12-07

## 2022-12-08 ENCOUNTER — PATIENT MESSAGE (OUTPATIENT)
Dept: INTERNAL MEDICINE | Facility: CLINIC | Age: 62
End: 2022-12-08
Payer: COMMERCIAL

## 2022-12-08 ENCOUNTER — PATIENT MESSAGE (OUTPATIENT)
Dept: ADMINISTRATIVE | Facility: HOSPITAL | Age: 62
End: 2022-12-08
Payer: COMMERCIAL

## 2022-12-08 ENCOUNTER — LAB VISIT (OUTPATIENT)
Dept: LAB | Facility: HOSPITAL | Age: 62
End: 2022-12-08
Attending: NURSE PRACTITIONER
Payer: COMMERCIAL

## 2022-12-08 DIAGNOSIS — E55.9 VITAMIN D DEFICIENCY: ICD-10-CM

## 2022-12-08 DIAGNOSIS — Z13.220 ENCOUNTER FOR LIPID SCREENING FOR CARDIOVASCULAR DISEASE: ICD-10-CM

## 2022-12-08 DIAGNOSIS — K59.00 CONSTIPATION, UNSPECIFIED CONSTIPATION TYPE: ICD-10-CM

## 2022-12-08 DIAGNOSIS — Z13.21 ENCOUNTER FOR VITAMIN DEFICIENCY SCREENING: ICD-10-CM

## 2022-12-08 DIAGNOSIS — Z13.6 ENCOUNTER FOR LIPID SCREENING FOR CARDIOVASCULAR DISEASE: ICD-10-CM

## 2022-12-08 DIAGNOSIS — Z11.4 SCREENING FOR HIV (HUMAN IMMUNODEFICIENCY VIRUS): ICD-10-CM

## 2022-12-08 DIAGNOSIS — Z13.1 SCREENING FOR DIABETES MELLITUS: ICD-10-CM

## 2022-12-08 DIAGNOSIS — Z00.00 ANNUAL PHYSICAL EXAM: ICD-10-CM

## 2022-12-08 DIAGNOSIS — Z12.31 ENCOUNTER FOR SCREENING MAMMOGRAM FOR MALIGNANT NEOPLASM OF BREAST: ICD-10-CM

## 2022-12-08 DIAGNOSIS — F41.9 ANXIETY: ICD-10-CM

## 2022-12-08 DIAGNOSIS — Z11.59 ENCOUNTER FOR HEPATITIS C SCREENING TEST FOR LOW RISK PATIENT: ICD-10-CM

## 2022-12-08 DIAGNOSIS — I10 PRIMARY HYPERTENSION: ICD-10-CM

## 2022-12-08 LAB
25(OH)D3+25(OH)D2 SERPL-MCNC: 22 NG/ML (ref 30–96)
ALBUMIN SERPL BCP-MCNC: 4 G/DL (ref 3.5–5.2)
ALP SERPL-CCNC: 126 U/L (ref 55–135)
ALT SERPL W/O P-5'-P-CCNC: 15 U/L (ref 10–44)
ANION GAP SERPL CALC-SCNC: 11 MMOL/L (ref 8–16)
AST SERPL-CCNC: 24 U/L (ref 10–40)
BASOPHILS # BLD AUTO: 0.06 K/UL (ref 0–0.2)
BASOPHILS NFR BLD: 1 % (ref 0–1.9)
BILIRUB SERPL-MCNC: 0.6 MG/DL (ref 0.1–1)
BUN SERPL-MCNC: 20 MG/DL (ref 6–20)
CALCIUM SERPL-MCNC: 10.3 MG/DL (ref 8.7–10.5)
CHLORIDE SERPL-SCNC: 104 MMOL/L (ref 95–110)
CHOLEST SERPL-MCNC: 223 MG/DL (ref 120–199)
CHOLEST/HDLC SERPL: 4 {RATIO} (ref 2–5)
CO2 SERPL-SCNC: 26 MMOL/L (ref 23–29)
CREAT SERPL-MCNC: 0.9 MG/DL (ref 0.5–1.4)
DIFFERENTIAL METHOD: NORMAL
EOSINOPHIL # BLD AUTO: 0.2 K/UL (ref 0–0.5)
EOSINOPHIL NFR BLD: 3 % (ref 0–8)
ERYTHROCYTE [DISTWIDTH] IN BLOOD BY AUTOMATED COUNT: 13.2 % (ref 11.5–14.5)
EST. GFR  (NO RACE VARIABLE): >60 ML/MIN/1.73 M^2
ESTIMATED AVG GLUCOSE: 123 MG/DL (ref 68–131)
GLUCOSE SERPL-MCNC: 113 MG/DL (ref 70–110)
HBA1C MFR BLD: 5.9 % (ref 4–5.6)
HCT VFR BLD AUTO: 47.2 % (ref 37–48.5)
HCV AB SERPL QL IA: NORMAL
HDLC SERPL-MCNC: 56 MG/DL (ref 40–75)
HDLC SERPL: 25.1 % (ref 20–50)
HGB BLD-MCNC: 15.4 G/DL (ref 12–16)
HIV 1+2 AB+HIV1 P24 AG SERPL QL IA: NORMAL
IMM GRANULOCYTES # BLD AUTO: 0.01 K/UL (ref 0–0.04)
IMM GRANULOCYTES NFR BLD AUTO: 0.2 % (ref 0–0.5)
LDLC SERPL CALC-MCNC: 147.4 MG/DL (ref 63–159)
LYMPHOCYTES # BLD AUTO: 1.9 K/UL (ref 1–4.8)
LYMPHOCYTES NFR BLD: 30.8 % (ref 18–48)
MCH RBC QN AUTO: 30.4 PG (ref 27–31)
MCHC RBC AUTO-ENTMCNC: 32.6 G/DL (ref 32–36)
MCV RBC AUTO: 93 FL (ref 82–98)
MONOCYTES # BLD AUTO: 0.5 K/UL (ref 0.3–1)
MONOCYTES NFR BLD: 7.6 % (ref 4–15)
NEUTROPHILS # BLD AUTO: 3.6 K/UL (ref 1.8–7.7)
NEUTROPHILS NFR BLD: 57.4 % (ref 38–73)
NONHDLC SERPL-MCNC: 167 MG/DL
NRBC BLD-RTO: 0 /100 WBC
PLATELET # BLD AUTO: 256 K/UL (ref 150–450)
PMV BLD AUTO: 12.1 FL (ref 9.2–12.9)
POTASSIUM SERPL-SCNC: 3.9 MMOL/L (ref 3.5–5.1)
PROT SERPL-MCNC: 7.8 G/DL (ref 6–8.4)
RBC # BLD AUTO: 5.06 M/UL (ref 4–5.4)
SODIUM SERPL-SCNC: 141 MMOL/L (ref 136–145)
T4 FREE SERPL-MCNC: 0.84 NG/DL (ref 0.71–1.51)
TRIGL SERPL-MCNC: 98 MG/DL (ref 30–150)
TSH SERPL DL<=0.005 MIU/L-ACNC: 4.17 UIU/ML (ref 0.4–4)
WBC # BLD AUTO: 6.29 K/UL (ref 3.9–12.7)

## 2022-12-08 PROCEDURE — 80053 COMPREHEN METABOLIC PANEL: CPT | Performed by: NURSE PRACTITIONER

## 2022-12-08 PROCEDURE — 80061 LIPID PANEL: CPT | Performed by: NURSE PRACTITIONER

## 2022-12-08 PROCEDURE — 87389 HIV-1 AG W/HIV-1&-2 AB AG IA: CPT | Performed by: NURSE PRACTITIONER

## 2022-12-08 PROCEDURE — 84439 ASSAY OF FREE THYROXINE: CPT | Performed by: NURSE PRACTITIONER

## 2022-12-08 PROCEDURE — 36415 COLL VENOUS BLD VENIPUNCTURE: CPT | Performed by: NURSE PRACTITIONER

## 2022-12-08 PROCEDURE — 84443 ASSAY THYROID STIM HORMONE: CPT | Performed by: NURSE PRACTITIONER

## 2022-12-08 PROCEDURE — 85025 COMPLETE CBC W/AUTO DIFF WBC: CPT | Performed by: NURSE PRACTITIONER

## 2022-12-08 PROCEDURE — 83036 HEMOGLOBIN GLYCOSYLATED A1C: CPT | Performed by: NURSE PRACTITIONER

## 2022-12-08 PROCEDURE — 86803 HEPATITIS C AB TEST: CPT | Performed by: NURSE PRACTITIONER

## 2022-12-08 PROCEDURE — 82306 VITAMIN D 25 HYDROXY: CPT | Performed by: NURSE PRACTITIONER

## 2022-12-08 RX ORDER — AMLODIPINE BESYLATE 10 MG/1
10 TABLET ORAL DAILY
Qty: 90 TABLET | Refills: 1 | OUTPATIENT
Start: 2022-12-08

## 2022-12-08 RX ORDER — AMLODIPINE BESYLATE 10 MG/1
10 TABLET ORAL DAILY
Qty: 90 TABLET | Refills: 1 | Status: SHIPPED | OUTPATIENT
Start: 2022-12-08 | End: 2023-07-31

## 2022-12-08 RX ORDER — DULOXETIN HYDROCHLORIDE 60 MG/1
60 CAPSULE, DELAYED RELEASE ORAL DAILY
Qty: 90 CAPSULE | Refills: 1 | OUTPATIENT
Start: 2022-12-08

## 2022-12-08 RX ORDER — DULOXETIN HYDROCHLORIDE 60 MG/1
60 CAPSULE, DELAYED RELEASE ORAL DAILY
Qty: 90 CAPSULE | Refills: 1 | Status: SHIPPED | OUTPATIENT
Start: 2022-12-08 | End: 2023-06-05

## 2022-12-08 RX ORDER — ERGOCALCIFEROL 1.25 MG/1
50000 CAPSULE ORAL
Qty: 12 CAPSULE | Refills: 0 | Status: SHIPPED | OUTPATIENT
Start: 2022-12-08 | End: 2022-12-13 | Stop reason: SDUPTHER

## 2022-12-08 NOTE — TELEPHONE ENCOUNTER
----- Message from Yadi Blandon sent at 12/8/2022  7:47 AM CST -----  Patient needs a refill on amLODIPine (NORVASC) 10 MG tablet and DULoxetine (CYMBALTA) 60 MG capsule.She stated the refill request was denied and she never got the rx.

## 2022-12-13 ENCOUNTER — PATIENT MESSAGE (OUTPATIENT)
Dept: INTERNAL MEDICINE | Facility: CLINIC | Age: 62
End: 2022-12-13
Payer: COMMERCIAL

## 2022-12-13 DIAGNOSIS — E55.9 VITAMIN D DEFICIENCY: ICD-10-CM

## 2022-12-13 DIAGNOSIS — R79.89 ABNORMAL TSH: Primary | ICD-10-CM

## 2022-12-13 DIAGNOSIS — K59.00 CONSTIPATION, UNSPECIFIED CONSTIPATION TYPE: ICD-10-CM

## 2022-12-13 RX ORDER — ERGOCALCIFEROL 1.25 MG/1
50000 CAPSULE ORAL
Qty: 12 CAPSULE | Refills: 0 | Status: SHIPPED | OUTPATIENT
Start: 2022-12-13 | End: 2023-11-14

## 2023-03-05 ENCOUNTER — PATIENT MESSAGE (OUTPATIENT)
Dept: PRIMARY CARE CLINIC | Facility: CLINIC | Age: 63
End: 2023-03-05
Payer: COMMERCIAL

## 2023-03-06 ENCOUNTER — PATIENT MESSAGE (OUTPATIENT)
Dept: PRIMARY CARE CLINIC | Facility: CLINIC | Age: 63
End: 2023-03-06
Payer: COMMERCIAL

## 2023-03-06 DIAGNOSIS — G89.29 CHRONIC PAIN OF RIGHT KNEE: Primary | ICD-10-CM

## 2023-03-06 DIAGNOSIS — M25.561 CHRONIC PAIN OF RIGHT KNEE: Primary | ICD-10-CM

## 2023-03-08 ENCOUNTER — HOSPITAL ENCOUNTER (OUTPATIENT)
Dept: RADIOLOGY | Facility: HOSPITAL | Age: 63
Discharge: HOME | End: 2023-03-08
Attending: PHYSICIAN ASSISTANT
Payer: COMMERCIAL

## 2023-03-08 ENCOUNTER — OFFICE VISIT (OUTPATIENT)
Dept: SPORTS MEDICINE | Facility: CLINIC | Age: 63
End: 2023-03-08
Payer: COMMERCIAL

## 2023-03-08 VITALS
HEIGHT: 65 IN | HEART RATE: 86 BPM | WEIGHT: 224 LBS | SYSTOLIC BLOOD PRESSURE: 127 MMHG | BODY MASS INDEX: 37.32 KG/M2 | DIASTOLIC BLOOD PRESSURE: 83 MMHG

## 2023-03-08 DIAGNOSIS — G89.29 CHRONIC PAIN OF LEFT KNEE: ICD-10-CM

## 2023-03-08 DIAGNOSIS — M25.562 PAIN IN BOTH KNEES, UNSPECIFIED CHRONICITY: ICD-10-CM

## 2023-03-08 DIAGNOSIS — M25.562 CHRONIC PAIN OF BOTH KNEES: Primary | ICD-10-CM

## 2023-03-08 DIAGNOSIS — M25.562 CHRONIC PAIN OF LEFT KNEE: ICD-10-CM

## 2023-03-08 DIAGNOSIS — M25.561 CHRONIC PAIN OF RIGHT KNEE: ICD-10-CM

## 2023-03-08 DIAGNOSIS — G89.29 CHRONIC PAIN OF RIGHT KNEE: ICD-10-CM

## 2023-03-08 DIAGNOSIS — G89.29 CHRONIC PAIN OF BOTH KNEES: Primary | ICD-10-CM

## 2023-03-08 DIAGNOSIS — M25.561 CHRONIC PAIN OF BOTH KNEES: Primary | ICD-10-CM

## 2023-03-08 DIAGNOSIS — M25.561 PAIN IN BOTH KNEES, UNSPECIFIED CHRONICITY: ICD-10-CM

## 2023-03-08 PROCEDURE — 3079F PR MOST RECENT DIASTOLIC BLOOD PRESSURE 80-89 MM HG: ICD-10-PCS | Mod: CPTII,S$GLB,, | Performed by: PHYSICIAN ASSISTANT

## 2023-03-08 PROCEDURE — 1160F PR REVIEW ALL MEDS BY PRESCRIBER/CLIN PHARMACIST DOCUMENTED: ICD-10-PCS | Mod: CPTII,S$GLB,, | Performed by: PHYSICIAN ASSISTANT

## 2023-03-08 PROCEDURE — 99999 PR PBB SHADOW E&M-EST. PATIENT-LVL IV: CPT | Mod: PBBFAC,,, | Performed by: PHYSICIAN ASSISTANT

## 2023-03-08 PROCEDURE — 73564 XR KNEE ORTHO BILAT WITH FLEXION: ICD-10-PCS | Mod: 26,,, | Performed by: RADIOLOGY

## 2023-03-08 PROCEDURE — 3008F PR BODY MASS INDEX (BMI) DOCUMENTED: ICD-10-PCS | Mod: CPTII,S$GLB,, | Performed by: PHYSICIAN ASSISTANT

## 2023-03-08 PROCEDURE — 3008F BODY MASS INDEX DOCD: CPT | Mod: CPTII,S$GLB,, | Performed by: PHYSICIAN ASSISTANT

## 2023-03-08 PROCEDURE — 73564 X-RAY EXAM KNEE 4 OR MORE: CPT | Mod: TC,50

## 2023-03-08 PROCEDURE — 3074F SYST BP LT 130 MM HG: CPT | Mod: CPTII,S$GLB,, | Performed by: PHYSICIAN ASSISTANT

## 2023-03-08 PROCEDURE — 3074F PR MOST RECENT SYSTOLIC BLOOD PRESSURE < 130 MM HG: ICD-10-PCS | Mod: CPTII,S$GLB,, | Performed by: PHYSICIAN ASSISTANT

## 2023-03-08 PROCEDURE — 3079F DIAST BP 80-89 MM HG: CPT | Mod: CPTII,S$GLB,, | Performed by: PHYSICIAN ASSISTANT

## 2023-03-08 PROCEDURE — 20610 DRAIN/INJ JOINT/BURSA W/O US: CPT | Mod: 50,S$GLB,, | Performed by: PHYSICIAN ASSISTANT

## 2023-03-08 PROCEDURE — 1159F MED LIST DOCD IN RCRD: CPT | Mod: CPTII,S$GLB,, | Performed by: PHYSICIAN ASSISTANT

## 2023-03-08 PROCEDURE — 1160F RVW MEDS BY RX/DR IN RCRD: CPT | Mod: CPTII,S$GLB,, | Performed by: PHYSICIAN ASSISTANT

## 2023-03-08 PROCEDURE — 99999 PR PBB SHADOW E&M-EST. PATIENT-LVL IV: ICD-10-PCS | Mod: PBBFAC,,, | Performed by: PHYSICIAN ASSISTANT

## 2023-03-08 PROCEDURE — 1159F PR MEDICATION LIST DOCUMENTED IN MEDICAL RECORD: ICD-10-PCS | Mod: CPTII,S$GLB,, | Performed by: PHYSICIAN ASSISTANT

## 2023-03-08 PROCEDURE — 99204 OFFICE O/P NEW MOD 45 MIN: CPT | Mod: 25,S$GLB,, | Performed by: PHYSICIAN ASSISTANT

## 2023-03-08 PROCEDURE — 99204 PR OFFICE/OUTPT VISIT, NEW, LEVL IV, 45-59 MIN: ICD-10-PCS | Mod: 25,S$GLB,, | Performed by: PHYSICIAN ASSISTANT

## 2023-03-08 PROCEDURE — 20610 PR DRAIN/INJECT LARGE JOINT/BURSA: ICD-10-PCS | Mod: 50,S$GLB,, | Performed by: PHYSICIAN ASSISTANT

## 2023-03-08 PROCEDURE — 73564 X-RAY EXAM KNEE 4 OR MORE: CPT | Mod: 26,,, | Performed by: RADIOLOGY

## 2023-03-08 RX ORDER — TRIAMCINOLONE ACETONIDE 40 MG/ML
40 INJECTION, SUSPENSION INTRA-ARTICULAR; INTRAMUSCULAR
Status: COMPLETED | OUTPATIENT
Start: 2023-03-08 | End: 2023-03-08

## 2023-03-08 RX ADMIN — TRIAMCINOLONE ACETONIDE 40 MG: 40 INJECTION, SUSPENSION INTRA-ARTICULAR; INTRAMUSCULAR at 11:03

## 2023-03-08 NOTE — PROGRESS NOTES
CC: bilateral knee pain    62 y.o. Female who reports anterior and medial knee pain refractory to conservative mgmt.    She reports years of knee pain. Pain is constant and radiates up and down from her knees. Right knee pain > left knee. She has had her knees drained and injected over the years.   She reports being told she had a meniscus tear in her right knee in 2009.     She reports that the pain is worse with steps.  It also bothers her at night.    Is affecting ADLs.     No mechanical symptoms, no instability    Review of Systems   Constitution: Negative. Negative for chills, fever and night sweats.   HENT: Negative for congestion and headaches.    Eyes: Negative for blurred vision, left vision loss and right vision loss.   Cardiovascular: Negative for chest pain and syncope.   Respiratory: Negative for cough and shortness of breath.    Endocrine: Negative for polydipsia, polyphagia and polyuria.   Hematologic/Lymphatic: Negative for bleeding problem. Does not bruise/bleed easily.   Skin: Negative for dry skin, itching and rash.   Musculoskeletal: Negative for falls and muscle weakness.   Gastrointestinal: Negative for abdominal pain and bowel incontinence.   Genitourinary: Negative for bladder incontinence and nocturia.   Neurological: Negative for disturbances in coordination, loss of balance and seizures.   Psychiatric/Behavioral: Negative for depression. The patient does not have insomnia.    Allergic/Immunologic: Negative for hives and persistent infections.   All other systems negative      PAST MEDICAL HISTORY:   Past Medical History:   Diagnosis Date    Anxiety     HTN (hypertension)     TMJ (dislocation of temporomandibular joint)      PAST SURGICAL HISTORY:   Past Surgical History:   Procedure Laterality Date    EYE SURGERY      tarsel tunnel       FAMILY HISTORY:   Family History   Problem Relation Age of Onset    Cancer Mother         Squamous Cell Skin Carcinoma on ear, aggressive went to lymph  "nodes facial structure 18mo later    Hypertension Mother     Cancer Maternal Grandfather         Lung cancer    Hypertension Maternal Grandfather     Stroke Maternal Grandfather         Stroke at 80    Heart disease Maternal Grandmother         Dementia and heart attack early 60s    Learning disabilities Son         Dyslexia    Learning disabilities Son         Dyslexia     SOCIAL HISTORY:   Social History     Socioeconomic History    Marital status: Single   Tobacco Use    Smoking status: Never    Smokeless tobacco: Never   Substance and Sexual Activity    Alcohol use: Not Currently     Comment: rarely    Drug use: Never    Sexual activity: Not Currently     Partners: Male     Birth control/protection: Post-menopausal, None     Comment: partner have ED       MEDICATIONS:   Current Outpatient Medications:     amLODIPine (NORVASC) 10 MG tablet, Take 1 tablet (10 mg total) by mouth once daily., Disp: 90 tablet, Rfl: 1    DULoxetine (CYMBALTA) 60 MG capsule, Take 1 capsule (60 mg total) by mouth once daily., Disp: 90 capsule, Rfl: 1    ergocalciferol (ERGOCALCIFEROL) 50,000 unit Cap, Take 1 capsule (50,000 Units total) by mouth every 7 days., Disp: 12 capsule, Rfl: 0    hydroCHLOROthiazide (MICROZIDE) 12.5 mg capsule, Take 1 capsule by mouth once daily, Disp: 90 capsule, Rfl: 0  ALLERGIES:   Review of patient's allergies indicates:   Allergen Reactions    Cinnamon Anaphylaxis    Iodine and iodide containing products     Macrodantin [nitrofurantoin macrocrystalline]     Nitrofurantoin macrocrystal     Povidone-iodine      Other reaction(s): Blister    Sulfa (sulfonamide antibiotics)        VITAL SIGNS: /83   Pulse 86   Ht 5' 5" (1.651 m)   Wt 101.6 kg (224 lb)   BMI 37.28 kg/m²      PHYSICAL EXAMINATION    General:  The patient is alert and oriented x 3.  Mood is pleasant.  Observation of ears, eyes and nose reveal no gross abnormalities.  HEENT: NCAT, sclera nonicteric  Lungs: Respirations are equal and " unlabored.    bilateral  KNEE EXAMINATION     OBSERVATION / INSPECTION   Gait:   Nonantalgic   Alignment:  Neutral   Scars:   None   Muscle atrophy: Mild  Effusion:  None   Warmth:  None   Discoloration:   none     TENDERNESS / CREPITUS (T / C):          T / C      T / C   Patella   - / -   Lateral joint line   + right knee/ -      Peripatellar medial  + left knee  Medial joint line    + bilateral / -   Peripatellar lateral -  Medial plica   - / -   Patellar tendon + bilateral   Popliteal fossa  - / -   Quad tendon   -   Gastrocnemius   -   Prepatellar Bursa - / -   Quadricep   -   Tibial tubercle  -  Thigh/hamstring  -   Pes anserine/HS -  Fibula    -   ITB   - / -  Tibia     -   Tib/fib joint  - / -  LCL    -     MFC   - / -   MCL: Proximal  -    LFC   - / -    Distal   -          ROM: (* = pain)  PASSIVE   ACTIVE    Left :   0 / 0 / 130   0 / 0 / 120   Right :    0 / 0 / 120   0 / 0 / 110    Patellofemoral examination:  See above noted areas of tenderness.   Patella position    Subluxation / dislocation: Centered           Sup. / Inf;   Normal   Crepitus (PF):    Absent   Patellar Mobility:       Medial-lateral:   Normal    Superior-inferior:  Normal    Inferior tilt   Normal    Patellar tendon:  Normal   Lateral tilt:    Normal   J-sign:     None   Patellofemoral grind:   +       MENISCAL SIGNS:     Pain on terminal extension:  -  Pain on terminal flexion:  +  Jesikas maneuver:  + left  Squat     NT    LIGAMENT EXAMINATION:  ACL / Lachman:  normal (-1 to 2mm)    PCL-Post.  drawer: normal 0 to 2mm  MCL- Valgus:  normal 0 to 2mm  LCL- Varus:  normal 0 to 2mm  Pivot shift: normal (Equal)   Dial Test: difference c/w other side   At 30° flexion: normal (< 5°)    At 90° flexion: normal (< 5°)   Reverse Pivot Shift:   normal (Equal)     STRENGTH: (* = with pain) PAINFUL SIDE   Quadricep   5/5   Hamstrin/5    EXTREMITY NEURO-VASCULAR EXAMINATION:   Sensation:  Grossly intact to light touch all dermatomal  regions.   Motor Function:  Fully intact motor function at hip, knee, foot and ankle    DTRs;  quadriceps and  achilles 2+.  No clonus and downgoing Babinski.    Vascular status:  DP and PT pulses 2+, brisk capillary refill, symmetric.     Other Findings:       Xrays:  Xrays of the bilateral knees with flexion were ordered and reviewed by me today. No fracture, subluxation, Bilateral knee DJD noted mostly to anterior and medial compartments to moderate degree on right knee and mild on left knee. Equivalent medial joint space narrowing noted bilaterally.       ASSESSMENT:    1. bilateral Knee pain, chronic  2. Bilateral knee osteoarthritis   Bilateral hip abd/core weakness    PLAN:    PROCEDURE NOTE: bilateral KNEE INJECTION  After time out was performed, including verification of patient ID, procedure, site and side, availability of information and equipment, review of safety issues, and agreement with consent, the procedure site was marked and the patient was prepped aseptically. A diagnostic and therapeutic injection of 1cc 40 mg kenalog and 2cc of 1% lidocaine and 2cc of 0.25% bupivacaine was given under sterile technique using a 22g x 1.5 needle into the anterior lateral aspect of the bilateral knees in seated position.   The patient had no adverse reactions to the medication. Pain decreased. The patient was instructed to apply ice to the joint for 20 minutes and avoid strenuous activities for 24-36 hours following the injection. Patient was warned of possible blood sugar and/or blood pressure changes during that time. Following that time, patient can resume regular activities.    1. PT for bilateral knee medial DJD. Hip, core, abd stretching and strengthening. With HEP. Eval and treat. Can do some at home if she wants  2. Ice compresses prn pain  RTC as needed.      All questions were answered, pt will contact us for questions or concerns in the interim.    I made the decision to obtain old records of the  patient including previous notes and imaging. New imaging was ordered today of the extremity or extremities evaluated. I independently reviewed and interpreted the radiographs and/or MRIs today as well as prior imaging.

## 2023-03-09 ENCOUNTER — CLINICAL SUPPORT (OUTPATIENT)
Dept: REHABILITATION | Facility: HOSPITAL | Age: 63
End: 2023-03-09
Payer: COMMERCIAL

## 2023-03-09 DIAGNOSIS — G89.29 CHRONIC PAIN OF RIGHT KNEE: ICD-10-CM

## 2023-03-09 DIAGNOSIS — M25.561 CHRONIC PAIN OF RIGHT KNEE: ICD-10-CM

## 2023-03-09 PROCEDURE — 97161 PT EVAL LOW COMPLEX 20 MIN: CPT

## 2023-03-15 PROBLEM — M25.561 CHRONIC PAIN OF RIGHT KNEE: Status: ACTIVE | Noted: 2023-03-15

## 2023-03-15 PROBLEM — G89.29 CHRONIC PAIN OF RIGHT KNEE: Status: ACTIVE | Noted: 2023-03-15

## 2023-03-16 NOTE — PLAN OF CARE
[facility]  Physical Therapy Initial Evaluation    Name: Lisandra Toussaint  Clinic Number: [id number]    Therapy Diagnosis:   Encounter Diagnosis   Name Primary?    Chronic pain of right knee      Physician: Trav Bland III, *    Physician Orders: PT Eval and Treat  Medical Diagnosis from Referral: Chronic pain of right knee [M25.561, G89.29  Evaluation Date: 3/9/2023  Authorization Period Expiration: 3/7/2024  Plan of Care Expiration: 5/9/2023  Visit # / Visits authorized: 1/ 1  FOTO: 1/10      Time In: 01:00  Time Out: 01:45  Total Billable Time: 45 minutes     Precautions: Standard, Standard    Subjective   Date of onset: Chronic  History of current condition - Lisandra reports:    62 y.o. Female who reports anterior and medial knee pain refractory to conservative mgmt.     She reports years of knee pain. Pain is constant and radiates up and down from her knees. Right knee pain > left knee. She has had her knees drained and injected over the years.   She reports being told she had a meniscus tear in her right knee in 2009.      She reports that the pain is worse with steps.  It also bothers her at night.     Is affecting ADLs.      No mechanical symptoms, no instability  Medical History:   Past Medical History:   Diagnosis Date    Anxiety     HTN (hypertension)     TMJ (dislocation of temporomandibular joint)        Surgical History:   Lisandra Toussaint  has a past surgical history that includes tarsel tunnel and Eye surgery.    Medications:   Lisandra has a current medication list which includes the following prescription(s): amlodipine, duloxetine, ergocalciferol, and hydrochlorothiazide.    Allergies:   Review of patient's allergies indicates:   Allergen Reactions    Cinnamon Anaphylaxis    Iodine and iodide containing products     Macrodantin [nitrofurantoin macrocrystalline]     Nitrofurantoin macrocrystal     Povidone-iodine      Other reaction(s): Blister    Sulfa (sulfonamide  antibiotics)         Imaging, none    Prior Therapy: No  Social History:  lives with their family  Occupation: Retired  Prior Level of Function: INDP  Current Level of Function: INDP    Pain:   Current 3/10, worst 6/10, best 3/10   Location: bilateral knee   Description: Aching, Dull, Throbbing, and Sharp  Aggravating Factors: Standing and Activity  Easing Factors: relaxation, lying down, and rest    Pts goals: To relieve my knee pain and become more active    Objective     Range of Motion/Strength:      Hip Right Left Pain/Dysfunction with Movement   AROM/PROM      flexion WNL WNL    extension WNL WNL    abduction WNL WNL    adduction WNL WNL    Internal rotation WNL WNL    External rotation WNL WNL      Knee Right Left Pain/Dysfunction with Movement   AROM/PROM      flexion WNL WNL    extension WNL WNL      Ankle Right Left Pain/Dysfunction with Movement   AROM/PROM      dorsiflexion WNL WNL    plantarflexion WNL WNL    inversion WNL WNL    eversion WNL WNL      L/E MMT Right Left Pain/Dysfunction with Movement   Hip Flexion 4/5 4/5    Hip Extension 4/5 4/5    Hip Abduction 4/5 4/5    Hip Adduction 4/5 4/5    Hip IR 4/5 4/5    Hip ER 4/5 4/5    Knee Flexion 4/5 4/5    Knee Extension 4/5 4/5    Ankle DF 4/5 4/5    Ankle PF 4/5 4/5    Ankle Inversion 4/5 4/5    Ankle Eversion 4/5 4/5    Big Toe Extension 4/5 4/5    Flexibility: Decreased Hamstring Flexibility    Gait Analysis:Without AD     CMS Impairment/Limitation/Restriction for FOTO Survey    Therapist reviewed FOTO scores for Lisandra Toussaint on 3/9/2023.   FOTO documents entered into [employer] - see Media section.    Limitation Score: TBD%  Category: Mobility       TREATMENT     Home Exercises Provided and Patient Education Provided     Education provided:   - HEP    Written Home Exercises Provided: yes.  Exercises were reviewed and Lisandra was able to demonstrate them prior to the end of the session.  Lisandra demonstrated good  understanding of the education provided.      See EMR under Patient Instructions for exercises provided 3/9/2023.      Assessment   Lisandra is a 62 y.o. female referred to outpatient Physical Therapy with a medical diagnosis of Chronic pain of right knee [M25.561, G89.29. Pt presents with decreased LE strength, decreased functional mobility, abnormal gait, and increased pain. Pt was educated on compliance with HEP and role of PT.    Pt prognosis is Good.   Pt will benefit from skilled outpatient Physical Therapy to address the deficits stated above and in the chart below, provide pt/family education, and to maximize pt's level of independence.     Plan of care discussed with patient: Yes  Pt's spiritual, cultural and educational needs considered and patient is agreeable to the plan of care and goals as stated below:     Anticipated Barriers for therapy: None    Medical Necessity is demonstrated by the following  History  Co-morbidities and personal factors that may impact the plan of care Co-morbidities:   See Medical Hx    Personal Factors:   no deficits     low   Examination  Body Structures and Functions, activity limitations and participation restrictions that may impact the plan of care Body Regions:   lower extremities    Body Systems:    gross symmetry  ROM  strength  balance  gait  transfers    Participation Restrictions:   None    Activity limitations:   Learning and applying knowledge  no deficits    General Tasks and Commands  no deficits    Communication  no deficits    Mobility  walking    Self care  no deficits    Domestic Life  no deficits    Interactions/Relationships  no deficits    Life Areas  no deficits    Community and Social Life  community life  recreation and leisure         moderate   Clinical Presentation stable and uncomplicated low   Decision Making/ Complexity Score: low       Goals:  Short Term Goals (4 Weeks):   1. Pt will be compliant with HEP to supplement PT in restoring pain free function.  2. Pt will improve impaired LE MMTs  by 1/2 grade  to improve strength for functional tasks  Long Term Goals (8 Weeks):  1. Pt will improve FOTO score to </= 40% limited to decrease perceived limitation with mobility  2. Pt will improve impaired LE MMTs by 1 grade to improve strength for functional tasks.  3. Pt will report pain </= 2/10 in impaired LE in all planes to improve mobility for normal movement patterns.    4.   Plan   Plan of care Certification: 3/9/2023 to 5/9/2023.    Outpatient Physical Therapy 1-2 times weekly for 8 weeks to include the following interventions: Aquatic Therapy, Cervical/Lumbar Traction, Gait Training, Manual Therapy, Moist Heat/ Ice, Neuromuscular Re-ed, Patient Education, Self Care, Therapeutic Activities, Therapeutic Exercise, and Ultrasound, ASTYM, Kinesiotaping PRN, Functional Dry Needling    Stas Duckworth, PT, DPT

## 2023-03-20 ENCOUNTER — CLINICAL SUPPORT (OUTPATIENT)
Dept: REHABILITATION | Facility: HOSPITAL | Age: 63
End: 2023-03-20
Payer: COMMERCIAL

## 2023-03-20 DIAGNOSIS — M25.561 RIGHT KNEE PAIN, UNSPECIFIED CHRONICITY: Primary | ICD-10-CM

## 2023-03-20 PROCEDURE — 97110 THERAPEUTIC EXERCISES: CPT

## 2023-03-21 NOTE — PROGRESS NOTES
[facility]  Physical Therapy Treatment Note     Name: Lisandra Toussaint  Clinic Number: [id number]     Therapy Diagnosis:        Encounter Diagnosis   Name Primary?    Chronic pain of right knee        Physician: Trav Bland III, *     Physician Orders: PT Eval and Treat  Medical Diagnosis from Referral: Chronic pain of right knee [M25.561, G89.29  Evaluation Date: 3/9/2023  Authorization Period Expiration: 3/7/2024  Plan of Care Expiration: 5/9/2023  Visit # / Visits authorized: 2/ ?  FOTO: 1/10        Time In: 1045  Time Out: 1145  Total Billable Time: 45 minutes      Precautions: Standard, Standard     Subjective   Date of onset: Chronic  Pt stated that th shots have helped with pain, still has mild pinching at the inside of her knee.  Has to help her boyfriend with transfers so not as free to do HEP as she had hoped, knows it is is important.    Objective      Range of Motion/Strength:      L/E MMT Right Left Pain/Dysfunction with Movement   Hip Flexion 4/5 4/5     Hip Extension 4/5 4/5     Hip Abduction 4/5 4/5     Hip Adduction 4/5 4/5     Hip IR 4/5 4/5     Hip ER 4/5 4/5     Knee Flexion 4/5 4/5     Knee Extension 4/5 4/5     Ankle DF 4/5 4/5     Ankle PF 4/5 4/5     Ankle Inversion 4/5 4/5     Ankle Eversion 4/5 4/5     Big Toe Extension 4/5 4/5     Flexibility: Decreased Hamstring Flexibility    TREATMENT      Home Exercises Provided and Patient Education Provided     Pt received TE to increase strength, improve mechanics, and reduce pain in the right LE or 45 min including:  - Nu-Step 5, towel under each foot for increased DF, subjective taken  - Lateral walk/monster walk no resistance, cues fo LE posture: 10 ft 4x ea direction  - Shuttle LE ext 1 red band 2x 10 ea LE  - Standing calf stretch 3x 30 sec ea  -Seated Piriformis stretch 3x30 sec ea  - Standing open books 1x12 ea direction  - standing march with UE support 2x10 ea LE  - Seated LAQ 0 wt with DF 3x 12 ea LE (no  pain reported)  - SLR 2x10 ea LE  - Roller to Quad  - Seated HS stretch 3x 30 sec ea     Education provided:   - HEP     Written Home Exercises Provided: yes.  Exercises were reviewed and Lisandra was able to demonstrate them prior to the end of the session.  Lisandra demonstrated good  understanding of the education provided.      See EMR under Patient Instructions for exercises provided 3/9/2023.        Assessment   Lisandra did well with PT today, did have some knee pain with increased flex of knee past 85 deg.  Cont to demonstrate decreased MMS in the hip, altered gait, and favoring of left LE with activity.  Pt will cont to benefit from skilled PT to address impairments and reduce pain    Goals:  Short Term Goals (4 Weeks):   1. Pt will be compliant with HEP to supplement PT in restoring pain free function.  2. Pt will improve impaired LE MMTs by 1/2 grade  to improve strength for functional tasks  Long Term Goals (8 Weeks):  1. Pt will improve FOTO score to </= 40% limited to decrease perceived limitation with mobility  2. Pt will improve impaired LE MMTs by 1 grade to improve strength for functional tasks.  3. Pt will report pain </= 2/10 in impaired LE in all planes to improve mobility for normal movement patterns.    4.   Plan   Plan of care Certification: 3/9/2023 to 5/9/2023    Cont with strengthening, teach transfer technigues with LE strengthening

## 2023-04-03 ENCOUNTER — CLINICAL SUPPORT (OUTPATIENT)
Dept: REHABILITATION | Facility: HOSPITAL | Age: 63
End: 2023-04-03
Payer: COMMERCIAL

## 2023-04-03 DIAGNOSIS — G89.29 CHRONIC PAIN OF RIGHT KNEE: Primary | ICD-10-CM

## 2023-04-03 DIAGNOSIS — M25.561 CHRONIC PAIN OF RIGHT KNEE: Primary | ICD-10-CM

## 2023-04-03 PROCEDURE — 97530 THERAPEUTIC ACTIVITIES: CPT

## 2023-04-03 PROCEDURE — 97140 MANUAL THERAPY 1/> REGIONS: CPT

## 2023-04-03 PROCEDURE — 97110 THERAPEUTIC EXERCISES: CPT

## 2023-04-03 NOTE — PROGRESS NOTES
[facility]   Physical Therapy Treatment Note     Name: Lisandra Toussaint  Clinic Number: [id number]    Therapy Diagnosis:   Encounter Diagnosis   Name Primary?    Chronic pain of right knee Yes     Physician: Trav Bland III, *    Visit Date: 4/3/2023    Physician Orders: PT Eval and Treat  Medical Diagnosis from Referral: Chronic pain of right knee [M25.561, G89.29]  Evaluation Date: 3/9/2023  Authorization Period Expiration: 3/7/2024  Plan of Care Expiration: 5/9/2023  Visit # / Visits authorized: 1/ 1  FOTO: 1/10       PTA Visit #: 0/5     Time In: 1100  Time Out: 1150  Total Billable Time: 50 minutes    SUBJECTIVE     Pt reports: had to help with her BF after his stroke a lot this weekend and had to get down to the ground that caused some tightness and soreness in the knee.  She  was somewhat  compliant with home exercise program.  Response to previous treatment: sore today  Functional change: in progress    Pain: 5/10  Location: right knee      OBJECTIVE     Objective Measures updated at progress report unless specified.     Treatment     Lisandra received the treatments listed below:      therapeutic exercises to develop strength, endurance, ROM, flexibility, posture, and core stabilization for 25 minutes including:  - Nu-Step 6 min L1   - quad set 20 X 3 second hold - cues to engage quad and not over compensate with the hips  - SLR 2x10 ea LE  - Bridges 2 X 10  - Seated LAQ 0 wt with DF 3x 10 ea LE   - Seated HS stretch 3x 30 sec ea  - Standing calf stretch 3x 30 sec ea  -Seated Piriformis stretch 3x30 sec ea  - Standing open books 1x12 ea direction    therapeutic activities to improve functional performance for 10  minutes, including:  - standing march with UE support 3x10 ea LE  - Lateral walk/monster walk no resistance, cues fo LE posture: 10 ft 4x ea direction  - Shuttle LE ext 1 red band 2x 10 ea LE      manual therapy techniques: Joint mobilizations, Manual traction, Myofacial  release, Soft tissue Mobilization, and Friction Massage were applied to the: R knee for 08 minutes, including:  CFM MCL  Roller to quad muscle 3 minutes  Post tibial glides grade 2-3  Patellar glides all directions    neuromuscular re-education activities to improve: Balance, Coordination, Kinesthetic, Sense, Proprioception, and Posture for 00 minutes. The following activities were included:  TBD      Patient Education and Home Exercises     Home Exercises Provided and Patient Education Provided     Education provided:   - educated on whole body exercise and preventing increased stress through the knees by increasing mobility of the upper body and balance    Written Home Exercises Provided: Patient instructed to cont prior HEP. Exercises were reviewed and Lisandra was able to demonstrate them prior to the end of the session.  Lisandra demonstrated good  understanding of the education provided. See EMR under Patient Instructions for exercises provided during therapy sessions    ASSESSMENT     Lisandra did well with exercises today and will benefit from continued strengthening program focusing on stability and balance training. She reported normal muscle fatigue after session and completed FOTO after session    Lisandra Is progressing well towards her goals.   Pt prognosis is Good.     Pt will continue to benefit from skilled outpatient physical therapy to address the deficits listed in the problem list box on initial evaluation, provide pt/family education and to maximize pt's level of independence in the home and community environment.     Pt's spiritual, cultural and educational needs considered and pt agreeable to plan of care and goals.     Anticipated barriers to physical therapy: none    Goals: Goals:  Short Term Goals (4 Weeks):   1. Pt will be compliant with HEP to supplement PT in restoring pain free function.  2. Pt will improve impaired LE MMTs by 1/2 grade  to improve strength for functional tasks  Long Term Goals (8  Weeks):  1. Pt will improve FOTO score to </= 40% limited to decrease perceived limitation with mobility  2. Pt will improve impaired LE MMTs by 1 grade to improve strength for functional tasks.  3. Pt will report pain </= 2/10 in impaired LE in all planes to improve mobility for normal movement patterns.    PLAN     Cont per POC    Erma Rosario, PT

## 2023-04-05 ENCOUNTER — CLINICAL SUPPORT (OUTPATIENT)
Dept: REHABILITATION | Facility: HOSPITAL | Age: 63
End: 2023-04-05
Payer: COMMERCIAL

## 2023-04-05 DIAGNOSIS — G89.29 CHRONIC PAIN OF RIGHT KNEE: Primary | ICD-10-CM

## 2023-04-05 DIAGNOSIS — M25.561 CHRONIC PAIN OF RIGHT KNEE: Primary | ICD-10-CM

## 2023-04-05 PROCEDURE — 97110 THERAPEUTIC EXERCISES: CPT

## 2023-04-05 PROCEDURE — 97530 THERAPEUTIC ACTIVITIES: CPT

## 2023-04-05 PROCEDURE — 97112 NEUROMUSCULAR REEDUCATION: CPT

## 2023-04-05 NOTE — PROGRESS NOTES
[facility] [place] [facility]   Physical Therapy Treatment Note     Name: Lisandra Toussaint  Clinic Number: [id number]    Therapy Diagnosis:   Encounter Diagnosis   Name Primary?    Chronic pain of right knee Yes     Physician: Trav Bland III, *    Visit Date: 4/5/2023    Physician Orders: PT Eval and Treat  Medical Diagnosis from Referral: Chronic pain of right knee [M25.561, G89.29]  Evaluation Date: 3/9/2023  Authorization Period Expiration: 3/7/2024  Plan of Care Expiration: 5/9/2023  Visit # / Visits authorized: 1/ 1  FOTO: 1/10       PTA Visit #: 0/5     Time In: 1045 am  Time Out: 1130 am  Total Billable Time: 50 minutes    SUBJECTIVE     Pt reports: knee is a little sore today - had to push her BF in his wheelchair to doctors appts and had some soreness afterwards  She  was somewhat  compliant with home exercise program.  Response to previous treatment: sore today  Functional change: in progress    Pain: 5/10  Location: right knee      OBJECTIVE     Objective Measures updated at progress report unless specified.     Treatment     Lisandra received the treatments listed below:      therapeutic exercises to develop strength, endurance, ROM, flexibility, posture, and core stabilization for 25 minutes including:  - Nu-Step 6 min L1.5  - quad set 20 X 3 second hold - cues to engage quad and not over compensate with the hips  - SLR 2x10 ea LE  - Bridges 2 X 10 RTB  - Seated LAQ 0 wt with DF 3x 10 ea LE   - Bent knee fall out 2 X 10 RTB  - standing heel raises X 20  - Seated HS stretch 3x 30 sec ea  - Standing calf stretch 3x 30 sec ea  -Seated Piriformis stretch 3x30 sec ea  - Standing open books 1x12 ea direction    therapeutic activities to improve functional performance for 10  minutes, including:  - standing abduction 2 X 10 each side   - Lateral walk/monster walk no resistance, cues fo LE posture: 10 ft 4x ea direction  - Shuttle LE ext 1 red band 2x 10 ea LE    NMR: 8 minutes  - walking march with UE  support 4 L with break and cues for uperight posture  - tandem balance 2 X 30 seconds      manual therapy techniques: Joint mobilizations, Manual traction, Myofacial release, Soft tissue Mobilization, and Friction Massage were applied to the: R knee for 08 minutes, including:  CFM MCL  Roller to quad muscle 3 minutes  Post tibial glides grade 2-3  Patellar glides all directions    neuromuscular re-education activities to improve: Balance, Coordination, Kinesthetic, Sense, Proprioception, and Posture for 00 minutes. The following activities were included:  TBD      Patient Education and Home Exercises     Home Exercises Provided and Patient Education Provided     Education provided:   - educated on whole body exercise and preventing increased stress through the knees by increasing mobility of the upper body and balance    Written Home Exercises Provided: Patient instructed to cont prior HEP. Exercises were reviewed and Lisandra was able to demonstrate them prior to the end of the session.  Lisandra demonstrated good  understanding of the education provided. See EMR under Patient Instructions for exercises provided during therapy sessions    ASSESSMENT     Lisandra did well with exercises today and reported that she needs the balance challenges as she feels off balance a good bit. She is progressing well with strength but will benefit from progress resistance exercises at next session.     Lisandra Is progressing well towards her goals.   Pt prognosis is Good.     Pt will continue to benefit from skilled outpatient physical therapy to address the deficits listed in the problem list box on initial evaluation, provide pt/family education and to maximize pt's level of independence in the home and community environment.     Pt's spiritual, cultural and educational needs considered and pt agreeable to plan of care and goals.     Anticipated barriers to physical therapy: none    Goals: Goals:  Short Term Goals (4 Weeks):   1. Pt will be  compliant with HEP to supplement PT in restoring pain free function.  2. Pt will improve impaired LE MMTs by 1/2 grade  to improve strength for functional tasks  Long Term Goals (8 Weeks):  1. Pt will improve FOTO score to </= 40% limited to decrease perceived limitation with mobility  2. Pt will improve impaired LE MMTs by 1 grade to improve strength for functional tasks.  3. Pt will report pain </= 2/10 in impaired LE in all planes to improve mobility for normal movement patterns.    PLAN     Cont per POC    Erma Rosario, PT

## 2023-04-10 ENCOUNTER — CLINICAL SUPPORT (OUTPATIENT)
Dept: REHABILITATION | Facility: HOSPITAL | Age: 63
End: 2023-04-10
Payer: COMMERCIAL

## 2023-04-10 DIAGNOSIS — M25.561 CHRONIC PAIN OF RIGHT KNEE: Primary | ICD-10-CM

## 2023-04-10 DIAGNOSIS — G89.29 CHRONIC PAIN OF RIGHT KNEE: Primary | ICD-10-CM

## 2023-04-10 PROCEDURE — 97110 THERAPEUTIC EXERCISES: CPT

## 2023-04-10 PROCEDURE — 97530 THERAPEUTIC ACTIVITIES: CPT

## 2023-04-10 NOTE — PROGRESS NOTES
[facility]   Physical Therapy Treatment Note     Name: Lisandra Toussaint  Clinic Number: [id number]    Therapy Diagnosis:   Encounter Diagnosis   Name Primary?    Chronic pain of right knee Yes     Physician: Trav Bland III, *    Visit Date: 4/10/2023    Physician Orders: PT Eval and Treat  Medical Diagnosis from Referral: Chronic pain of right knee [M25.561, G89.29]  Evaluation Date: 3/9/2023  Authorization Period Expiration: 3/7/2024  Plan of Care Expiration: 5/9/2023  Visit # / Visits authorized: 4/20 + eval  FOTO: 1/10       PTA Visit #: 0/5     Time In: 1100 am  Time Out: 1145 am  Total Billable Time: 45 minutes    SUBJECTIVE     Pt reports: she was moving her feet over and twisted while pushing and felt a sharp pain that lasted a few minutes- this only happened 1 other time.   She  was somewhat  compliant with home exercise program.  Response to previous treatment: sore today  Functional change: in progress    Pain: 2/10  Location: right knee      OBJECTIVE     Objective Measures updated at progress report unless specified.     Treatment     Lisandra received the treatments listed below:      therapeutic exercises to develop strength, endurance, ROM, flexibility, posture, and core stabilization for 35 minutes including:  - Nu-Step 7 min L1.5  - quad set 20 X 3 second hold - cues to engage quad and not over compensate with the hips  - SLR 2x10 ea LE - 1 # AW added today  - Bridges 2 X 10 GTB  - Seated LAQ 0 wt with DF 3x 10 ea LE   - Bent knee fall out 2 X 10 GTB  - standing heel raises X 30  - Seated HS stretch 3x 30 sec ea  - Standing calf stretch 3x 30 sec ea  -Seated Piriformis stretch 3x30 sec ea  - Standing open books 1x12 ea direction    therapeutic activities to improve functional performance for 08  minutes, including:  - standing abduction 2 X 10 each side   - Lateral walk/monster walk no resistance, cues fo LE posture: 10 ft 4x ea direction  - Shuttle LE ext 1 red band 2x  10 ea LE    NMR: 8 minutes  - walking march with UE support 4 L with break and cues for uperight posture  - tandem balance 2 X 30 seconds      manual therapy techniques: Joint mobilizations, Manual traction, Myofacial release, Soft tissue Mobilization, and Friction Massage were applied to the: R knee for 08 minutes, including:  CFM MCL  Roller to quad muscle 3 minutes  Post tibial glides grade 2-3  Patellar glides all directions    neuromuscular re-education activities to improve: Balance, Coordination, Kinesthetic, Sense, Proprioception, and Posture for 00 minutes. The following activities were included:  TBD      Patient Education and Home Exercises     Home Exercises Provided and Patient Education Provided     Education provided:   - educated on whole body exercise and preventing increased stress through the knees by increasing mobility of the upper body and balance    Written Home Exercises Provided: Patient instructed to cont prior HEP. Exercises were reviewed and Lisandra was able to demonstrate them prior to the end of the session.  Lisandra demonstrated good  understanding of the education provided. See EMR under Patient Instructions for exercises provided during therapy sessions    ASSESSMENT     Lisandra had an increase in fatigue after 30 reps today and increased resistance with ankle weight in a straight leg position. She continues to be able to push harder than she thinks she can and she is progressing well with standing exercises without rest needed. Continue to progress as able    Lisandra Is progressing well towards her goals.   Pt prognosis is Good.     Pt will continue to benefit from skilled outpatient physical therapy to address the deficits listed in the problem list box on initial evaluation, provide pt/family education and to maximize pt's level of independence in the home and community environment.     Pt's spiritual, cultural and educational needs considered and pt agreeable to plan of care and goals.      Anticipated barriers to physical therapy: none    Goals: Goals:  Short Term Goals (4 Weeks):   1. Pt will be compliant with HEP to supplement PT in restoring pain free function.  2. Pt will improve impaired LE MMTs by 1/2 grade  to improve strength for functional tasks  Long Term Goals (8 Weeks):  1. Pt will improve FOTO score to </= 40% limited to decrease perceived limitation with mobility  2. Pt will improve impaired LE MMTs by 1 grade to improve strength for functional tasks.  3. Pt will report pain </= 2/10 in impaired LE in all planes to improve mobility for normal movement patterns.    PLAN     Cont per POC    Erma Rosario, PT

## 2023-04-12 ENCOUNTER — CLINICAL SUPPORT (OUTPATIENT)
Dept: REHABILITATION | Facility: HOSPITAL | Age: 63
End: 2023-04-12
Payer: COMMERCIAL

## 2023-04-12 DIAGNOSIS — M25.561 CHRONIC PAIN OF RIGHT KNEE: Primary | ICD-10-CM

## 2023-04-12 DIAGNOSIS — G89.29 CHRONIC PAIN OF RIGHT KNEE: Primary | ICD-10-CM

## 2023-04-12 PROCEDURE — 97112 NEUROMUSCULAR REEDUCATION: CPT

## 2023-04-12 PROCEDURE — 97530 THERAPEUTIC ACTIVITIES: CPT

## 2023-04-12 PROCEDURE — 97110 THERAPEUTIC EXERCISES: CPT

## 2023-04-12 NOTE — PROGRESS NOTES
[facility]   Physical Therapy Treatment Note     Name: Lisandra Toussaint  Clinic Number: [id number]    Therapy Diagnosis:   Encounter Diagnosis   Name Primary?    Chronic pain of right knee Yes     Physician: Trav Bland III, *    Visit Date: 4/12/2023    Physician Orders: PT Eval and Treat  Medical Diagnosis from Referral: Chronic pain of right knee [M25.561, G89.29]  Evaluation Date: 3/9/2023  Authorization Period Expiration: 3/7/2024  Plan of Care Expiration: 5/9/2023  Visit # / Visits authorized: 5/20 + eval  FOTO: 1/3       PTA Visit #: 0/5     Time In: 1100 am  Time Out: 1145 am  Total Billable Time: 45 minutes    SUBJECTIVE     Pt reports: knee is doing much better today, left not bothering her at all, right knee is much more calm, maybe a 1   She  was somewhat  compliant with home exercise program.  Response to previous treatment: sore today  Functional change: in progress    Pain: 1/10  Location: right knee      OBJECTIVE     Objective Measures updated at progress report unless specified.     Treatment     Lisandra received the treatments listed below:      therapeutic exercises to develop strength, endurance, ROM, flexibility, posture, and core stabilization for 25 minutes including:  - Nu-Step 7 min L1.5  - quad set 20 X 3 second hold - cues to engage quad and not over compensate with the hips  - SLR 2x10 ea LE - 1 # AW added today  - Bridges 2 X 10 GTB  - Seated LAQ 0 wt with DF 3x 10 ea LE   - Bent knee fall out 2 X 10 GTB  - standing heel raises X 30  - Seated HS stretch 3x 30 sec ea  - Standing calf stretch 3x 30 sec ea  -Seated Piriformis stretch 3x30 sec ea  - Standing open books 1x12 ea direction    therapeutic activities to improve functional performance for 10  minutes, including:  - standing abduction 2 X 10 each side   - Lateral walk/monster walk no resistance, cues fo LE posture: 10 ft 4x ea direction  - Shuttle LE ext 1 red band 2x 10 ea LE    NMR: 8 minutes  -  walking march with UE support 4 L with break and cues for uperight posture  - tandem balance 2 X 30 seconds      manual therapy techniques: Joint mobilizations, Manual traction, Myofacial release, Soft tissue Mobilization, and Friction Massage were applied to the: R knee for 00 minutes, including:  CFM MCL  Roller to quad muscle 3 minutes  Post tibial glides grade 2-3  Patellar glides all directions    neuromuscular re-education activities to improve: Balance, Coordination, Kinesthetic, Sense, Proprioception, and Posture for 00 minutes. The following activities were included:  TBD      Patient Education and Home Exercises     Home Exercises Provided and Patient Education Provided     Education provided:   - educated on whole body exercise and preventing increased stress through the knees by increasing mobility of the upper body and balance    Written Home Exercises Provided: Patient instructed to cont prior HEP. Exercises were reviewed and Lisandra was able to demonstrate them prior to the end of the session.  Lisnadra demonstrated good  understanding of the education provided. See EMR under Patient Instructions for exercises provided during therapy sessions    ASSESSMENT     Lisandra did well today and was able to perform all activity with better control and she showed less endurance challenges with standing exercises. She is showing good overall progress and will benefit from continued strengthening program in order to maintain progress. Good overall progress today    Lisandra Is progressing well towards her goals.   Pt prognosis is Good.     Pt will continue to benefit from skilled outpatient physical therapy to address the deficits listed in the problem list box on initial evaluation, provide pt/family education and to maximize pt's level of independence in the home and community environment.     Pt's spiritual, cultural and educational needs considered and pt agreeable to plan of care and goals.     Anticipated barriers to  physical therapy: none    Goals: Goals:  Short Term Goals (4 Weeks):   1. Pt will be compliant with HEP to supplement PT in restoring pain free function.  2. Pt will improve impaired LE MMTs by 1/2 grade  to improve strength for functional tasks  Long Term Goals (8 Weeks):  1. Pt will improve FOTO score to </= 40% limited to decrease perceived limitation with mobility  2. Pt will improve impaired LE MMTs by 1 grade to improve strength for functional tasks.  3. Pt will report pain </= 2/10 in impaired LE in all planes to improve mobility for normal movement patterns.    PLAN     Cont per KARMA Rosario, PT

## 2023-04-17 ENCOUNTER — CLINICAL SUPPORT (OUTPATIENT)
Dept: REHABILITATION | Facility: HOSPITAL | Age: 63
End: 2023-04-17
Payer: COMMERCIAL

## 2023-04-17 DIAGNOSIS — G89.29 CHRONIC PAIN OF RIGHT KNEE: Primary | ICD-10-CM

## 2023-04-17 DIAGNOSIS — M25.561 CHRONIC PAIN OF RIGHT KNEE: Primary | ICD-10-CM

## 2023-04-17 PROCEDURE — 97110 THERAPEUTIC EXERCISES: CPT

## 2023-04-17 PROCEDURE — 97112 NEUROMUSCULAR REEDUCATION: CPT

## 2023-04-17 PROCEDURE — 97530 THERAPEUTIC ACTIVITIES: CPT

## 2023-04-17 NOTE — PROGRESS NOTES
[facility]   Physical Therapy Treatment Note     Name: Lisandra Toussaint  Clinic Number: [id number]    Therapy Diagnosis:   Encounter Diagnosis   Name Primary?    Chronic pain of right knee Yes     Physician: Trav Bland III, *    Visit Date: 4/17/2023    Physician Orders: PT Eval and Treat  Medical Diagnosis from Referral: Chronic pain of right knee [M25.561, G89.29]  Evaluation Date: 3/9/2023  Authorization Period Expiration: 3/7/2024  Plan of Care Expiration: 5/9/2023  Visit # / Visits authorized: 6/20 + eval  FOTO: 1/3       PTA Visit #: 0/5     Time In: 1100 am  Time Out: 1145 am  Total Billable Time: 45 minutes    SUBJECTIVE     Pt reports: did a lot of walking and lifting over the weekend so both legs are more sore today, has a lot of walking to do tomorrow also    She  was somewhat  compliant with home exercise program.  Response to previous treatment: sore today  Functional change: in progress    Pain: 1/10  Location: right knee      OBJECTIVE     Objective Measures updated at progress report unless specified.     Treatment     Lisandra received the treatments listed below:      therapeutic exercises to develop strength, endurance, ROM, flexibility, posture, and core stabilization for 25 minutes including:  - Nu-Step 7 min L1.5  - quad set 20 X 3 second hold bilaterally  - SLR 2x10 ea LE - 1 # AW added today  - Bridges 2 X 10 RTB  - Seated LAQ 0 wt with DF 3x 10 ea LE   - Bent knee fall out 3 X 10 RTB  - standing heel raises X 30  - Seated HS stretch 3x 30 sec ea  - Standing calf stretch 3x 30 sec ea  -Seated Piriformis stretch 3x30 sec ea  - Standing open books 1x12 ea direction    therapeutic activities to improve functional performance for 10  minutes, including:  - standing abduction 2 X 10 each side   - Lateral walk/monster walk no resistance, cues fo LE posture: 10 ft 4x ea direction  - Shuttle LE ext 1 red band 2x 10 ea LE    NMR: 8 minutes  - walking march with UE support  4 L with break and cues for upright posture  - tandem balance 2 X 30 seconds      manual therapy techniques: Joint mobilizations, Manual traction, Myofacial release, Soft tissue Mobilization, and Friction Massage were applied to the: R knee for 00 minutes, including:  CFM MCL  Roller to quad muscle 3 minutes  Post tibial glides grade 2-3  Patellar glides all directions    neuromuscular re-education activities to improve: Balance, Coordination, Kinesthetic, Sense, Proprioception, and Posture for 00 minutes. The following activities were included:  TBD      Patient Education and Home Exercises     Home Exercises Provided and Patient Education Provided     Education provided:   - educated on whole body exercise and preventing increased stress through the knees by increasing mobility of the upper body and balance    Written Home Exercises Provided: Patient instructed to cont prior HEP. Exercises were reviewed and Lisandra was able to demonstrate them prior to the end of the session.  Lisandra demonstrated good  understanding of the education provided. See EMR under Patient Instructions for exercises provided during therapy sessions    ASSESSMENT     Pt responded well today to decreased resistance with exercises and increased repetition as she did have an increase in soreness after an increase in activity last weekend. She is making very good overall progress and her stability with balance is improving as well. Continue to focus on strengthening and balance activity    Lisandra Is progressing well towards her goals.   Pt prognosis is Good.     Pt will continue to benefit from skilled outpatient physical therapy to address the deficits listed in the problem list box on initial evaluation, provide pt/family education and to maximize pt's level of independence in the home and community environment.     Pt's spiritual, cultural and educational needs considered and pt agreeable to plan of care and goals.     Anticipated barriers to  physical therapy: none    Goals: Goals:  Short Term Goals (4 Weeks):   1. Pt will be compliant with HEP to supplement PT in restoring pain free function.  2. Pt will improve impaired LE MMTs by 1/2 grade  to improve strength for functional tasks  Long Term Goals (8 Weeks):  1. Pt will improve FOTO score to </= 40% limited to decrease perceived limitation with mobility  2. Pt will improve impaired LE MMTs by 1 grade to improve strength for functional tasks.  3. Pt will report pain </= 2/10 in impaired LE in all planes to improve mobility for normal movement patterns.    PLAN     Cont per KARMA Rosario, PT

## 2023-04-19 ENCOUNTER — CLINICAL SUPPORT (OUTPATIENT)
Dept: REHABILITATION | Facility: HOSPITAL | Age: 63
End: 2023-04-19
Payer: COMMERCIAL

## 2023-04-19 DIAGNOSIS — G89.29 CHRONIC PAIN OF RIGHT KNEE: Primary | ICD-10-CM

## 2023-04-19 DIAGNOSIS — M25.561 CHRONIC PAIN OF RIGHT KNEE: Primary | ICD-10-CM

## 2023-04-19 PROCEDURE — 97112 NEUROMUSCULAR REEDUCATION: CPT | Mod: CQ

## 2023-04-19 PROCEDURE — 97530 THERAPEUTIC ACTIVITIES: CPT | Mod: CQ

## 2023-04-19 PROCEDURE — 97110 THERAPEUTIC EXERCISES: CPT | Mod: CQ

## 2023-04-19 NOTE — PROGRESS NOTES
[facility]   Physical Therapy Treatment Note     Name: Lisandra Toussaint  Clinic Number: [id number]    Therapy Diagnosis:   Encounter Diagnosis   Name Primary?    Chronic pain of right knee Yes       Physician: Trav Bland III, *    Visit Date: 4/19/2023    Physician Orders: PT Eval and Treat  Medical Diagnosis from Referral: Chronic pain of right knee [M25.561, G89.29]  Evaluation Date: 3/9/2023  Authorization Period Expiration: 3/7/2024  Plan of Care Expiration: 5/9/2023  Visit # / Visits authorized: 7/20 + eval  FOTO: 2/3       PTA Visit #: 1/5     Time In: 1030 am  Time Out: 1115 am  Total Billable Time: 45 minutes    SUBJECTIVE     Pt reports: she did great walking and pushing a w[facility] [place] [facility] [place] with no increased knee pain    She  was somewhat  compliant with home exercise program.  Response to previous treatment: sore today  Functional change: in progress    Pain: 1/10  Location: right knee      OBJECTIVE     Objective Measures updated at progress report unless specified.     Treatment     Lisandra received the treatments listed below:      therapeutic exercises to develop strength, endurance, ROM, flexibility, posture, and core stabilization for 25 minutes including:  - Nu-Step 7 min L1.5  - quad set 20 X 3 second hold bilaterally  - SLR 2x10 ea LE - 1 # AW added today  - Bridges 2 X 10 RTB  - Seated LAQ 0 wt with DF 3x 10 ea LE   - Bent knee fall out 3 X 10 RTB  - standing heel raises X 30  - Seated HS stretch 3x 30 sec ea  - Standing calf stretch 3x 30 sec ea  -Seated Piriformis stretch 3x30 sec ea  - Standing open books 1x12 ea direction    therapeutic activities to improve functional performance for 10  minutes, including:  - standing abduction 2 X 10 each side   - Lateral walk/monster walk no resistance, cues fo LE posture: 10 ft 4x ea direction  - Shuttle LE ext 1 red band 2x 10 ea LE    NMR: 8 minutes  - walking march with UE support 4 L with break and cues for  upright posture  - tandem balance 2 X 30 seconds      manual therapy techniques: Joint mobilizations, Manual traction, Myofacial release, Soft tissue Mobilization, and Friction Massage were applied to the: R knee for 00 minutes, including:  CFM MCL  Roller to quad muscle 3 minutes  Post tibial glides grade 2-3  Patellar glides all directions    neuromuscular re-education activities to improve: Balance, Coordination, Kinesthetic, Sense, Proprioception, and Posture for 00 minutes. The following activities were included:  TBD      Patient Education and Home Exercises     Home Exercises Provided and Patient Education Provided     Education provided:   - educated on whole body exercise and preventing increased stress through the knees by increasing mobility of the upper body and balance    Written Home Exercises Provided: Patient instructed to cont prior HEP. Exercises were reviewed and Lisandra was able to demonstrate them prior to the end of the session.  Lisandra demonstrated good  understanding of the education provided. See EMR under Patient Instructions for exercises provided during therapy sessions    ASSESSMENT     Pt tolerated today's treatment well with continued improved quad activation with isolation with quad set and SLR. She presents with good dynamic bal with dynamic marching with challenge to decrease speed. Continue to focus on strengthening and balance activity    Lisandra Is progressing well towards her goals.   Pt prognosis is Good.     Pt will continue to benefit from skilled outpatient physical therapy to address the deficits listed in the problem list box on initial evaluation, provide pt/family education and to maximize pt's level of independence in the home and community environment.     Pt's spiritual, cultural and educational needs considered and pt agreeable to plan of care and goals.     Anticipated barriers to physical therapy: none    Goals: Goals:  Short Term Goals (4 Weeks):   1. Pt will be compliant  with HEP to supplement PT in restoring pain free function.  2. Pt will improve impaired LE MMTs by 1/2 grade  to improve strength for functional tasks  Long Term Goals (8 Weeks):  1. Pt will improve FOTO score to </= 40% limited to decrease perceived limitation with mobility  2. Pt will improve impaired LE MMTs by 1 grade to improve strength for functional tasks.  3. Pt will report pain </= 2/10 in impaired LE in all planes to improve mobility for normal movement patterns.    PLAN     Cont per POC    NEVA MURPHY, PTA

## 2023-05-22 ENCOUNTER — CLINICAL SUPPORT (OUTPATIENT)
Dept: REHABILITATION | Facility: HOSPITAL | Age: 63
End: 2023-05-22
Payer: COMMERCIAL

## 2023-05-22 DIAGNOSIS — G89.29 CHRONIC PAIN OF RIGHT KNEE: Primary | ICD-10-CM

## 2023-05-22 DIAGNOSIS — M25.561 CHRONIC PAIN OF RIGHT KNEE: Primary | ICD-10-CM

## 2023-05-22 PROCEDURE — 97530 THERAPEUTIC ACTIVITIES: CPT

## 2023-05-22 PROCEDURE — 97110 THERAPEUTIC EXERCISES: CPT

## 2023-05-22 PROCEDURE — 97140 MANUAL THERAPY 1/> REGIONS: CPT

## 2023-05-22 NOTE — PROGRESS NOTES
[facility]   Physical Therapy Treatment Note / Reassessment    Name: Lisandra Toussaint  Clinic Number: [id number]    Therapy Diagnosis:   Encounter Diagnosis   Name Primary?    Chronic pain of right knee Yes       Physician: Trav Bland III, *    Visit Date: 5/22/2023    Physician Orders: PT Eval and Treat  Medical Diagnosis from Referral: Chronic pain of right knee [M25.561, G89.29]  Evaluation Date: 3/9/2023  Authorization Period Expiration: 3/7/2024  Plan of Care Expiration: 6/22/2023  Visit # / Visits authorized: 8/20 + eval  FOTO: 2/3       PTA Visit #: 1/5     Time In: 1100 am  Time Out: 1150 am  Total Billable Time: 50 minutes    SUBJECTIVE     Pt reports: she has tried to keep with with activity but has had to help her boyfriend more since hes in therapy. She still feels weak and challenged with weight bearing as he needs more assist at times with transfers into and out of the car    She  was somewhat  compliant with home exercise program.  Response to previous treatment: sore today  Functional change: in progress    Pain: 1/10  Location: right knee      OBJECTIVE     Knee flexion painful at 110 degrees   Full extension  Knee extension 4-/5  Knee flexion 4/5  Hip abd 4-/5    Treatment     Lisandra received the treatments listed below:      therapeutic exercises to develop strength, endurance, ROM, flexibility, posture, and core stabilization for 25 minutes including:  - Nu-Step 6 min L1.5  - quad set 20 X 3 second hold bilaterally with heel prop  - SLR 2x10 ea LE -  - Bridges 2 X 10 RTB  - Seated LAQ 0 wt with DF 3x 10 ea LE   - Bent knee fall out 3 X 10 RTB  - standing heel raises X 30  - Seated HS stretch 3x 30 sec ea  - Standing calf stretch 3x 30 sec ea  -Seated Piriformis stretch 3x30 sec ea  - Standing open books 1x12 ea direction    therapeutic activities to improve functional performance for 10  minutes, including:  - standing abduction 2 X 10 each side  RTB  Standing  extension 2 X 10 RTB  - Lateral walk/monster walk no resistance, cues fo LE posture: 10 ft 4x ea direction  - Shuttle LE ext 1 red band 2x 15 ea LE    manual therapy techniques: Joint mobilizations, Manual traction, Myofacial release, Soft tissue Mobilization, and Friction Massage were applied to the: R knee for 10 minutes, including:  Objective measures taken  CFM MCL  Roller to quad muscle 3 minutes  Post tibial glides grade 2-3  Patellar glides all directions    neuromuscular re-education activities to improve: Balance, Coordination, Kinesthetic, Sense, Proprioception, and Posture for 00 minutes. The following activities were included:  - walking march with UE support 4 L with break and cues for upright posture  - tandem balance 2 X 30 seconds      Patient Education and Home Exercises     Home Exercises Provided and Patient Education Provided     Education provided:   - educated on whole body exercise and preventing increased stress through the knees by increasing mobility of the upper body and balance    Written Home Exercises Provided: Patient instructed to cont prior HEP. Exercises were reviewed and Lisandra was able to demonstrate them prior to the end of the session.  Lisandra demonstrated good  understanding of the education provided. See EMR under Patient Instructions for exercises provided during therapy sessions    ASSESSMENT     Pt returned to therapy for reassessment with a slight loss of strengthen and knee flexion in the knee. She has a slight increase in her limp today and has shown increased left side compensations with an increase in knee pain. We will restart functional activity and stability / balance exercises.     Lisandra Is progressing well towards her goals.   Pt prognosis is Good.     Pt will continue to benefit from skilled outpatient physical therapy to address the deficits listed in the problem list box on initial evaluation, provide pt/family education and to maximize pt's level of independence in  the home and community environment.     Pt's spiritual, cultural and educational needs considered and pt agreeable to plan of care and goals.     Anticipated barriers to physical therapy: none    Goals: Goals:  Short Term Goals (4 Weeks):   1. Pt will be compliant with HEP to supplement PT in restoring pain free function.  2. Pt will improve impaired LE MMTs by 1/2 grade  to improve strength for functional tasks  Long Term Goals (8 Weeks):  1. Pt will improve FOTO score to </= 40% limited to decrease perceived limitation with mobility  2. Pt will improve impaired LE MMTs by 1 grade to improve strength for functional tasks.  3. Pt will report pain </= 2/10 in impaired LE in all planes to improve mobility for normal movement patterns.    PLAN     Cont per POC    Erma Rosario, PT

## 2023-05-24 ENCOUNTER — CLINICAL SUPPORT (OUTPATIENT)
Dept: REHABILITATION | Facility: HOSPITAL | Age: 63
End: 2023-05-24
Payer: COMMERCIAL

## 2023-05-24 DIAGNOSIS — M25.561 CHRONIC PAIN OF RIGHT KNEE: Primary | ICD-10-CM

## 2023-05-24 DIAGNOSIS — G89.29 CHRONIC PAIN OF RIGHT KNEE: Primary | ICD-10-CM

## 2023-05-24 PROCEDURE — 97140 MANUAL THERAPY 1/> REGIONS: CPT

## 2023-05-24 PROCEDURE — 97112 NEUROMUSCULAR REEDUCATION: CPT

## 2023-05-24 PROCEDURE — 97110 THERAPEUTIC EXERCISES: CPT

## 2023-05-24 NOTE — PROGRESS NOTES
[facility]   Physical Therapy Treatment Note   Name: Lisandra Toussaint  Clinic Number: [id number]    Therapy Diagnosis:   Encounter Diagnosis   Name Primary?    Chronic pain of right knee Yes       Physician: Trav Bland III, *    Visit Date: 5/24/2023    Physician Orders: PT Eval and Treat  Medical Diagnosis from Referral: Chronic pain of right knee [M25.561, G89.29]  Evaluation Date: 3/9/2023  Authorization Period Expiration: 3/7/2024  Plan of Care Expiration: 6/22/2023  Visit # / Visits authorized: 9/20 + eval  FOTO: 2/3       PTA Visit #: 1/5     Time In: 1100 am  Time Out: 1150 am  Total Billable Time: 50 minutes    SUBJECTIVE     Pt reports: hurting a little more over the underside than the inside now, still pops and clicks     She  was somewhat  compliant with home exercise program.  Response to previous treatment: sore today  Functional change: in progress    Pain: 4/10  Location: right knee      OBJECTIVE     Knee flexion painful at 110 degrees   Full extension  Knee extension 4-/5  Knee flexion 4/5  Hip abd 4-/5    Treatment     Lisandra received the treatments listed below:      therapeutic exercises to develop strength, endurance, ROM, flexibility, posture, and core stabilization for 25 minutes including:  - Nu-Step 6 min L1.5  - quad set 20 X 3 second hold bilaterally with heel prop  - SLR 2x10 ea LE -  - Bridges 3 X 10 RTB  - Seated LAQ 0 wt with DF 3x 10 ea LE   - Bent knee fall out 3 X 10 RTB  - standing heel raises X 30  - Seated HS stretch 3x 30 sec ea  - Standing calf stretch 3x 30 sec ea  - Seated hip external rotation bilaterally X 20 RTB  - LAQ 10 X each side with 3 second hold with 3 second eccentric lowering  -Seated Piriformis stretch 3x30 sec ea  - Standing open books 1x12 ea direction    therapeutic activities to improve functional performance for 10  minutes, including:  - standing abduction 2 X 10 each side  RTB  Standing extension 2 X 10 RTB  - Lateral  walk/monster walk no resistance, cues fo LE posture: 10 ft 4x ea direction  - Shuttle LE ext 1 red band 2x 15 ea LE    manual therapy techniques: Joint mobilizations, Manual traction, Myofacial release, Soft tissue Mobilization, and Friction Massage were applied to the: R knee for 10 minutes, including:  Objective measures taken  CFM MCL  Roller to quad muscle 3 minutes  Post tibial glides grade 2-3  Patellar glides all directions    neuromuscular re-education activities to improve: Balance, Coordination, Kinesthetic, Sense, Proprioception, and Posture for 00 minutes. The following activities were included:  - walking march with UE support 4 L with break and cues for upright posture  - tandem balance 2 X 30 seconds      Patient Education and Home Exercises     Home Exercises Provided and Patient Education Provided     Education provided:   - educated on whole body exercise and preventing increased stress through the knees by increasing mobility of the upper body and balance    Written Home Exercises Provided: Patient instructed to cont prior HEP. Exercises were reviewed and Lisandra was able to demonstrate them prior to the end of the session.  Lisandra demonstrated good  understanding of the education provided. See EMR under Patient Instructions for exercises provided during therapy sessions    ASSESSMENT     Pt was able to perform all activity with good control today and she showed very good control with resisted exercises. She is showing excellent overall carryover and good reduction in functional valgus stress in a weight bearing position. She is showing a good reduction in tension with her functional activity as well    Lisandra Is progressing well towards her goals.   Pt prognosis is Good.     Pt will continue to benefit from skilled outpatient physical therapy to address the deficits listed in the problem list box on initial evaluation, provide pt/family education and to maximize pt's level of independence in the home  and community environment.     Pt's spiritual, cultural and educational needs considered and pt agreeable to plan of care and goals.     Anticipated barriers to physical therapy: none    Goals: Goals:  Short Term Goals (4 Weeks):   1. Pt will be compliant with HEP to supplement PT in restoring pain free function.  2. Pt will improve impaired LE MMTs by 1/2 grade  to improve strength for functional tasks  Long Term Goals (8 Weeks):  1. Pt will improve FOTO score to </= 40% limited to decrease perceived limitation with mobility  2. Pt will improve impaired LE MMTs by 1 grade to improve strength for functional tasks.  3. Pt will report pain </= 2/10 in impaired LE in all planes to improve mobility for normal movement patterns.    PLAN     Cont per POC    Erma Rosario, PT

## 2023-05-29 ENCOUNTER — CLINICAL SUPPORT (OUTPATIENT)
Dept: REHABILITATION | Facility: HOSPITAL | Age: 63
End: 2023-05-29
Payer: COMMERCIAL

## 2023-05-29 DIAGNOSIS — G89.29 CHRONIC PAIN OF RIGHT KNEE: Primary | ICD-10-CM

## 2023-05-29 DIAGNOSIS — M25.561 CHRONIC PAIN OF RIGHT KNEE: Primary | ICD-10-CM

## 2023-05-29 PROCEDURE — 97110 THERAPEUTIC EXERCISES: CPT

## 2023-05-29 PROCEDURE — 97140 MANUAL THERAPY 1/> REGIONS: CPT

## 2023-05-29 PROCEDURE — 97530 THERAPEUTIC ACTIVITIES: CPT

## 2023-05-29 NOTE — PROGRESS NOTES
[facility]   Physical Therapy Treatment Note   Name: Lisandra Toussaint  Clinic Number: [id number]    Therapy Diagnosis:   Encounter Diagnosis   Name Primary?    Chronic pain of right knee Yes       Physician: Trav Bland III, *    Visit Date: 5/29/2023    Physician Orders: PT Eval and Treat  Medical Diagnosis from Referral: Chronic pain of right knee [M25.561, G89.29]  Evaluation Date: 3/9/2023  Authorization Period Expiration: 3/7/2024  Plan of Care Expiration: 6/22/2023  Visit # / Visits authorized: 10/20 + eval  FOTO: 2/3       PTA Visit #: 1/5     Time In: 1100 am  Time Out: 1150 am  Total Billable Time: 50 minutes    SUBJECTIVE     Pt reports: not hurting as much on the back side of the knee as much, pain is now more over the patellar tendon and the inside of the knee, she has been doing a lot more cleaning and things around the house that includes small lunges and quicker motions so maybe that could be the issue    She  was somewhat  compliant with home exercise program.  Response to previous treatment: sore today  Functional change: in progress    Pain: 5/10  Location: right knee      OBJECTIVE     Knee flexion painful at 110 degrees   Full extension  Knee extension 4-/5  Knee flexion 4/5  Hip abd 4-/5    Treatment     Lisandra received the treatments listed below:      therapeutic exercises to develop strength, endurance, ROM, flexibility, posture, and core stabilization for 25 minutes including:  - Nu-Step 6 min L1.5  - quad set 20 X 3 second hold bilaterally with heel prop  - SLR 2x10 ea LE -  - Bridges 3 X 10 GTB  - Seated LAQ 0 wt with DF 3x 10 ea LE   - Bent knee fall out 3 X 10 GTB  - standing heel raises X 30  - Seated HS stretch 3x 30 sec ea  - Standing calf stretch 3x 30 sec ea   - Seated hip external rotation bilaterally X 20 RTB  - LAQ 10 X each side with 3 second hold with 3 second eccentric lowering  -Seated Piriformis stretch 3x30 sec ea  - Standing open books 1x12  ea direction    therapeutic activities to improve functional performance for 10  minutes, including:  - standing abduction 2 X 10 each side  RTB  Standing extension 2 X 10 RTB  - Lateral walk/monster walk no resistance, cues fo LE posture: 10 ft 4x ea direction  - Shuttle LE ext 1 red band 2x 15 ea LE  Side stepping 2 L    manual therapy techniques: Joint mobilizations, Manual traction, Myofacial release, Soft tissue Mobilization, and Friction Massage were applied to the: R knee for 10 minutes, including:  Objective measures taken  CFM MCL  Roller to quad muscle 3 minutes  Post tibial glides grade 2-3  Patellar glides all directions    neuromuscular re-education activities to improve: Balance, Coordination, Kinesthetic, Sense, Proprioception, and Posture for 00 minutes. The following activities were included:  - walking march with UE support 4 L with break and cues for upright posture  - tandem balance 2 X 30 seconds      Patient Education and Home Exercises     Home Exercises Provided and Patient Education Provided     Education provided:   - educated on whole body exercise and preventing increased stress through the knees by increasing mobility of the upper body and balance    Written Home Exercises Provided: Patient instructed to cont prior HEP. Exercises were reviewed and Lisandra was able to demonstrate them prior to the end of the session.  Lisandra demonstrated good  understanding of the education provided. See EMR under Patient Instructions for exercises provided during therapy sessions    ASSESSMENT     Lisandra is showing good improvement in control with dynamic exercises but continues to have pain and tightness over the anterior and medial knee. She is showing good carryover of hip strength as well.     Lisandra Is progressing well towards her goals.   Pt prognosis is Good.     Pt will continue to benefit from skilled outpatient physical therapy to address the deficits listed in the problem list box on initial  evaluation, provide pt/family education and to maximize pt's level of independence in the home and community environment.     Pt's spiritual, cultural and educational needs considered and pt agreeable to plan of care and goals.     Anticipated barriers to physical therapy: none    Goals: Goals:  Short Term Goals (4 Weeks):   1. Pt will be compliant with HEP to supplement PT in restoring pain free function.  2. Pt will improve impaired LE MMTs by 1/2 grade  to improve strength for functional tasks  Long Term Goals (8 Weeks):  1. Pt will improve FOTO score to </= 40% limited to decrease perceived limitation with mobility  2. Pt will improve impaired LE MMTs by 1 grade to improve strength for functional tasks.  3. Pt will report pain </= 2/10 in impaired LE in all planes to improve mobility for normal movement patterns.    PLAN     Cont per POC    Erma Rosario, PT

## 2023-05-31 ENCOUNTER — CLINICAL SUPPORT (OUTPATIENT)
Dept: REHABILITATION | Facility: HOSPITAL | Age: 63
End: 2023-05-31
Payer: COMMERCIAL

## 2023-05-31 DIAGNOSIS — G89.29 CHRONIC PAIN OF RIGHT KNEE: Primary | ICD-10-CM

## 2023-05-31 DIAGNOSIS — M25.561 CHRONIC PAIN OF RIGHT KNEE: Primary | ICD-10-CM

## 2023-05-31 PROCEDURE — 97140 MANUAL THERAPY 1/> REGIONS: CPT

## 2023-05-31 PROCEDURE — 97530 THERAPEUTIC ACTIVITIES: CPT

## 2023-05-31 PROCEDURE — 97110 THERAPEUTIC EXERCISES: CPT

## 2023-05-31 PROCEDURE — 97112 NEUROMUSCULAR REEDUCATION: CPT

## 2023-05-31 NOTE — PROGRESS NOTES
[facility]   Physical Therapy Treatment Note   Name: Lisandra Toussaint  Clinic Number: [id number]    Therapy Diagnosis:   Encounter Diagnosis   Name Primary?    Chronic pain of right knee Yes       Physician: Trav Bland III, *    Visit Date: 5/31/2023    Physician Orders: PT Eval and Treat  Medical Diagnosis from Referral: Chronic pain of right knee [M25.561, G89.29]  Evaluation Date: 3/9/2023  Authorization Period Expiration: 3/7/2024  Plan of Care Expiration: 6/22/2023  Visit # / Visits authorized: 10/20 + eval  FOTO: 2/3       PTA Visit #: 0/5     Time In: 1010 am  Time Out: 1100 am  Total Billable Time: 50 minutes    SUBJECTIVE     Pt reports: woke up feeling good today, decreased tightness over the back of the knee and still has pain at times over the front of the knee    She  was somewhat  compliant with home exercise program.  Response to previous treatment: sore today  Functional change: in progress    Pain: 5/10  Location: right knee      OBJECTIVE     Knee flexion painful at 110 degrees   Full extension  Knee extension 4-/5  Knee flexion 4/5  Hip abd 4-/5    Treatment     Lisandra received the treatments listed below:      therapeutic exercises to develop strength, endurance, ROM, flexibility, posture, and core stabilization for 20 minutes including:  - Nu-Step 8 min L1.5  - quad set 20 X 3 second hold bilaterally with heel prop  - SLR 2x10 ea LE - added 3 second hold  - Bridges 3 X 10 GTB  - Seated LAQ 0 wt with DF 3x 10 ea LE   - Bent knee fall out 3 X 10 GTB  - standing heel raises X 30  - Seated HS stretch 3x 30 sec ea  - Standing calf stretch 3x 30 sec ea   - Seated hip external rotation bilaterally X 20 RTB  - LAQ 10 X each side with 3 second hold with 3 second eccentric lowering  -Seated Piriformis stretch 3x30 sec ea  - Standing open books 1x12 ea direction    therapeutic activities to improve functional performance for 10  minutes, including:  - standing abduction 2  X 10 each side  RTB  Standing extension 2 X 10 RTB  - Lateral walk/monster walk no resistance, cues fo LE posture: 10 ft 4x ea direction  - Shuttle LE ext 1 red band 2x 15 ea LE  - Side stepping 2 L    manual therapy techniques: Joint mobilizations, Manual traction, Myofacial release, Soft tissue Mobilization, and Friction Massage were applied to the: R knee for 10 minutes, including:  Objective measures taken  CFM MCL  Roller to quad muscle 3 minutes  Post tibial glides grade 2-3  Patellar glides all directions    neuromuscular re-education activities to improve: Balance, Coordination, Kinesthetic, Sense, Proprioception, and Posture for 08 minutes. The following activities were included:  - walking march with UE support 4 L with break and cues for upright posture  - tandem balance 2 X 30 seconds  Tandem walk 2 laps on turn X 5 yards each direction      Patient Education and Home Exercises     Home Exercises Provided and Patient Education Provided     Education provided:   - educated on whole body exercise and preventing increased stress through the knees by increasing mobility of the upper body and balance    Written Home Exercises Provided: Patient instructed to cont prior HEP. Exercises were reviewed and Lisandra was able to demonstrate them prior to the end of the session.  Lisandra demonstrated good  understanding of the education provided. See EMR under Patient Instructions for exercises provided during therapy sessions    ASSESSMENT     Lisandra is progressing well overall with stability exercises although she continues to have challenges with balance and tandem stance and balance. Functionally, she is able to lift and bend better and with less pain after activity.     Lisandra Is progressing well towards her goals.   Pt prognosis is Good.     Pt will continue to benefit from skilled outpatient physical therapy to address the deficits listed in the problem list box on initial evaluation, provide pt/family education and to  maximize pt's level of independence in the home and community environment.     Pt's spiritual, cultural and educational needs considered and pt agreeable to plan of care and goals.     Anticipated barriers to physical therapy: none    Goals: Goals:  Short Term Goals (4 Weeks):   1. Pt will be compliant with HEP to supplement PT in restoring pain free function.  2. Pt will improve impaired LE MMTs by 1/2 grade  to improve strength for functional tasks  Long Term Goals (8 Weeks):  1. Pt will improve FOTO score to </= 40% limited to decrease perceived limitation with mobility  2. Pt will improve impaired LE MMTs by 1 grade to improve strength for functional tasks.  3. Pt will report pain </= 2/10 in impaired LE in all planes to improve mobility for normal movement patterns.    PLAN     Cont per POC    Erma Rosario, PT

## 2023-06-05 ENCOUNTER — CLINICAL SUPPORT (OUTPATIENT)
Dept: REHABILITATION | Facility: HOSPITAL | Age: 63
End: 2023-06-05
Payer: COMMERCIAL

## 2023-06-05 DIAGNOSIS — G89.29 CHRONIC PAIN OF RIGHT KNEE: Primary | ICD-10-CM

## 2023-06-05 DIAGNOSIS — M25.561 CHRONIC PAIN OF RIGHT KNEE: Primary | ICD-10-CM

## 2023-06-05 PROCEDURE — 97110 THERAPEUTIC EXERCISES: CPT

## 2023-06-05 PROCEDURE — 97112 NEUROMUSCULAR REEDUCATION: CPT

## 2023-06-05 PROCEDURE — 97140 MANUAL THERAPY 1/> REGIONS: CPT

## 2023-06-05 PROCEDURE — 97530 THERAPEUTIC ACTIVITIES: CPT

## 2023-06-05 RX ORDER — DULOXETIN HYDROCHLORIDE 60 MG/1
CAPSULE, DELAYED RELEASE ORAL
Qty: 90 CAPSULE | Refills: 0 | Status: SHIPPED | OUTPATIENT
Start: 2023-06-05 | End: 2023-08-29

## 2023-06-05 NOTE — PROGRESS NOTES
[facility]   Physical Therapy Treatment Note   Name: Lisandra Toussaint  Clinic Number: [id number]    Therapy Diagnosis:   Encounter Diagnosis   Name Primary?    Chronic pain of right knee Yes       Physician: Trav Bland III, *    Visit Date: 6/5/2023    Physician Orders: PT Eval and Treat  Medical Diagnosis from Referral: Chronic pain of right knee [M25.561, G89.29]  Evaluation Date: 3/9/2023  Authorization Period Expiration: 3/7/2024  Plan of Care Expiration: 6/22/2023  Visit # / Visits authorized: 12/20 + eval  FOTO: 2/3       PTA Visit #: 0/5     Time In: 1145 am  Time Out: 1230 am  Total Billable Time: 50 minutes    SUBJECTIVE     Pt reports: feeling good this morning, just had to take 1 aleve and the knee is feeling good, did a lot this weekend so glad she did not have any issues    She  was somewhat  compliant with home exercise program.  Response to previous treatment: sore today  Functional change: in progress    Pain: 1/10  Location: right knee      OBJECTIVE     Knee flexion painful at 110 degrees   Full extension  Knee extension 4-/5  Knee flexion 4/5  Hip abd 4-/5    Treatment     Lisandra received the treatments listed below:      therapeutic exercises to develop strength, endurance, ROM, flexibility, posture, and core stabilization for 20 minutes including:  - Nu-Step 8 min L1.5  - quad set 20 X 3 second hold bilaterally   - SLR 2x10 ea LE - added 3 second hold  - Bridges 3 X 10 GTB  - Seated LAQ 0 wt with DF 3x 10 ea LE   - Bent knee fall out 3 X 10 GTB  - standing heel raises X 30  - Seated HS stretch 3x 30 sec ea  - Standing calf stretch 3x 30 sec ea   - Seated hip external rotation bilaterally X 20 RTB  - LAQ 10 X each side with 3 second hold with 3 second eccentric lowering  -Seated Piriformis stretch 3x30 sec ea  - Standing open books 1x12 ea direction    therapeutic activities to improve functional performance for 10  minutes, including:  - standing abduction 2 X  10 each side  RTB  Standing extension 2 X 10 RTB  - Lateral walk/monster walk no resistance, cues fo LE posture: 10 ft 4x ea direction  - Shuttle LE ext 1 red band 2x 15 ea LE  - Side stepping 2 L    manual therapy techniques: Joint mobilizations, Manual traction, Myofacial release, Soft tissue Mobilization, and Friction Massage were applied to the: R knee for 10 minutes, including:  Objective measures taken  CFM MCL  Roller to quad muscle 3 minutes  Post tibial glides grade 2-3  Patellar glides all directions    neuromuscular re-education activities to improve: Balance, Coordination, Kinesthetic, Sense, Proprioception, and Posture for 08 minutes. The following activities were included:  - walking march with UE support 4 L with break and cues for upright posture  - tandem balance 2 X 30 seconds  Tandem walk 2 laps on turn X 5 yards each direction      Patient Education and Home Exercises     Home Exercises Provided and Patient Education Provided     Education provided:   - educated on whole body exercise and preventing increased stress through the knees by increasing mobility of the upper body and balance    Written Home Exercises Provided: Patient instructed to cont prior HEP. Exercises were reviewed and Lisandra was able to demonstrate them prior to the end of the session.  Lisandra demonstrated good  understanding of the education provided. See EMR under Patient Instructions for exercises provided during therapy sessions    ASSESSMENT     Lisandra continues to make very good overall progress in her strength and balance training. She is showing excellent overall carryover and will benefit from increased balance and functional lifting training    Lisandra Is progressing well towards her goals.   Pt prognosis is Good.     Pt will continue to benefit from skilled outpatient physical therapy to address the deficits listed in the problem list box on initial evaluation, provide pt/family education and to maximize pt's level of  independence in the home and community environment.     Pt's spiritual, cultural and educational needs considered and pt agreeable to plan of care and goals.     Anticipated barriers to physical therapy: none    Goals: Goals:  Short Term Goals (4 Weeks):   1. Pt will be compliant with HEP to supplement PT in restoring pain free function.  2. Pt will improve impaired LE MMTs by 1/2 grade  to improve strength for functional tasks  Long Term Goals (8 Weeks):  1. Pt will improve FOTO score to </= 40% limited to decrease perceived limitation with mobility  2. Pt will improve impaired LE MMTs by 1 grade to improve strength for functional tasks.  3. Pt will report pain </= 2/10 in impaired LE in all planes to improve mobility for normal movement patterns.    PLAN     Cont per POC    Erma Rosario, PT

## 2023-06-07 ENCOUNTER — CLINICAL SUPPORT (OUTPATIENT)
Dept: REHABILITATION | Facility: HOSPITAL | Age: 63
End: 2023-06-07
Payer: COMMERCIAL

## 2023-06-07 DIAGNOSIS — G89.29 CHRONIC PAIN OF RIGHT KNEE: Primary | ICD-10-CM

## 2023-06-07 DIAGNOSIS — M25.561 CHRONIC PAIN OF RIGHT KNEE: Primary | ICD-10-CM

## 2023-06-07 PROCEDURE — 97112 NEUROMUSCULAR REEDUCATION: CPT

## 2023-06-07 PROCEDURE — 97530 THERAPEUTIC ACTIVITIES: CPT

## 2023-06-07 PROCEDURE — 97110 THERAPEUTIC EXERCISES: CPT

## 2023-06-07 NOTE — PROGRESS NOTES
[facility]   Physical Therapy Treatment Note   Name: Lisandra Toussaint  Clinic Number: [id number]    Therapy Diagnosis:   No diagnosis found.    Physician: Trav Bland III, *    Visit Date: 6/7/2023    Physician Orders: PT Eval and Treat  Medical Diagnosis from Referral: Chronic pain of right knee [M25.561, G89.29]  Evaluation Date: 3/9/2023  Authorization Period Expiration: 3/7/2024  Plan of Care Expiration: 6/22/2023  Visit # / Visits authorized: 13/20 + eval  FOTO: 2/3       PTA Visit #: 0/5     Time In: 10:20 am  Time Out: 11:00 am  Total Billable Time: 40 minutes    SUBJECTIVE   Pt reports: her knee is a little sore from helping her  transfer from the floor on Monday.     She  was somewhat  compliant with home exercise program.  Response to previous treatment: sore today  Functional change: in progress    Pain: 2/10  Location: right knee      OBJECTIVE     Knee flexion painful at 110 degrees   Full extension  Knee extension 4-/5  Knee flexion 4/5  Hip abd 4-/5    Treatment     Lisandra received the treatments listed below:      therapeutic exercises to develop strength, endurance, ROM, flexibility, posture, and core stabilization for 20 minutes including:  - Nu-Step 8 min L1.5  - quad set 20 X 3 second hold bilaterally   - SLR 2x10 ea LE - 3 second hold  - Bridges 3 X 10 GTB  - Seated LAQ 0 wt with DF 3x 10 ea LE   - Bent knee fall out 3 X 10 GTB  - standing heel raises X 30  - Seated HS stretch 3x 30 sec ea  - Standing calf stretch 3x 30 sec ea   - Seated hip external rotation bilaterally X 20 RTB  - LAQ 10 X each side with 3 second hold with 3 second eccentric lowering  -Seated Piriformis stretch 3x30 sec ea  - Standing open books 1x12 ea direction    therapeutic activities to improve functional performance for 10  minutes, including:  - standing abduction 3 X 10 each side  RTB  Standing extension 3 X 10 RTB  - Lateral walk/monster walk no resistance, cues fo LE posture: 10  ft 4x ea direction  - Shuttle LE ext 1 red band 2x 15 ea LE  - Side stepping 3 L    manual therapy techniques: Joint mobilizations, Manual traction, Myofacial release, Soft tissue Mobilization, and Friction Massage were applied to the: R knee for 00 minutes, including:  Objective measures taken  CFM MCL  Roller to quad muscle 3 minutes  Post tibial glides grade 2-3  Patellar glides all directions    neuromuscular re-education activities to improve: Balance, Coordination, Kinesthetic, Sense, Proprioception, and Posture for 08 minutes. The following activities were included:  - walking march with UE support 4 L with break and cues for upright posture  - tandem balance 2 X 30 seconds  Tandem walk 3 laps on turf X 5 yards each direction      Patient Education and Home Exercises     Home Exercises Provided and Patient Education Provided     Education provided:   - educated on whole body exercise and preventing increased stress through the knees by increasing mobility of the upper body and balance    Written Home Exercises Provided: Patient instructed to cont prior HEP. Exercises were reviewed and Lisandra was able to demonstrate them prior to the end of the session.  Lisandra demonstrated good  understanding of the education provided. See EMR under Patient Instructions for exercises provided during therapy sessions    ASSESSMENT   Lisandra continues to make very good overall progress in her strength and balance training. She tolerated few progressions in therex well without c/o increased knee symptoms. She is showing excellent overall carryover and will benefit from increased balance and functional lifting training.     Lisandra Is progressing well towards her goals.   Pt prognosis is Good.     Pt will continue to benefit from skilled outpatient physical therapy to address the deficits listed in the problem list box on initial evaluation, provide pt/family education and to maximize pt's level of independence in the home and community  environment.     Pt's spiritual, cultural and educational needs considered and pt agreeable to plan of care and goals.     Anticipated barriers to physical therapy: none    Goals: Goals:  Short Term Goals (4 Weeks):   1. Pt will be compliant with HEP to supplement PT in restoring pain free function.  2. Pt will improve impaired LE MMTs by 1/2 grade  to improve strength for functional tasks  Long Term Goals (8 Weeks):  1. Pt will improve FOTO score to </= 40% limited to decrease perceived limitation with mobility  2. Pt will improve impaired LE MMTs by 1 grade to improve strength for functional tasks.  3. Pt will report pain </= 2/10 in impaired LE in all planes to improve mobility for normal movement patterns.    PLAN     Cont per KARMA Vines, PT

## 2023-06-12 ENCOUNTER — CLINICAL SUPPORT (OUTPATIENT)
Dept: REHABILITATION | Facility: HOSPITAL | Age: 63
End: 2023-06-12
Payer: COMMERCIAL

## 2023-06-12 DIAGNOSIS — G89.29 CHRONIC PAIN OF RIGHT KNEE: Primary | ICD-10-CM

## 2023-06-12 DIAGNOSIS — M25.561 CHRONIC PAIN OF RIGHT KNEE: Primary | ICD-10-CM

## 2023-06-12 PROCEDURE — 97110 THERAPEUTIC EXERCISES: CPT

## 2023-06-12 PROCEDURE — 97112 NEUROMUSCULAR REEDUCATION: CPT

## 2023-06-12 PROCEDURE — 97530 THERAPEUTIC ACTIVITIES: CPT

## 2023-06-12 NOTE — PROGRESS NOTES
[facility]   Physical Therapy Treatment Note   Name: Lisandra Toussaint  Clinic Number: [id number]    Therapy Diagnosis:   Encounter Diagnosis   Name Primary?    Chronic pain of right knee Yes       Physician: Trav Bland III, *    Visit Date: 6/12/2023    Physician Orders: PT Eval and Treat  Medical Diagnosis from Referral: Chronic pain of right knee [M25.561, G89.29]  Evaluation Date: 3/9/2023  Authorization Period Expiration: 3/7/2024  Plan of Care Expiration: 6/22/2023  Visit # / Visits authorized: 13/20 + eval  FOTO: 2/3       PTA Visit #: 0/5     Time In: 1100 am  Time Out: 11:50 am  Total Billable Time: 50 minutes    SUBJECTIVE   Pt reports: she was doing a squatting motion and felt some pain this weekend, decreased overall soreness present but after Saturday she feltlike she needed to rest since it was around 7/10    She  was somewhat  compliant with home exercise program.  Response to previous treatment: sore today  Functional change: in progress    Pain: 2/10  Location: right knee      OBJECTIVE     Knee flexion painful at 110 degrees   Full extension  Knee extension 4-/5  Knee flexion 4/5  Hip abd 4-/5    Treatment     Lisandra received the treatments listed below:      therapeutic exercises to develop strength, endurance, ROM, flexibility, posture, and core stabilization for 20 minutes including:  - Nu-Step 6 min L1.5  - quad set 20 X 3 second hold bilaterally   - SLR 2x10 ea LE - 3 second hold  - Bridges 3 X 10 GTB  - Seated LAQ 0 wt with DF 3x 10 ea LE   - Bent knee fall out 3 X 10 GTB  - standing heel raises X 30  - Seated HS stretch 3x 30 sec ea  - Standing calf stretch 3x 30 sec ea   - Seated hip external rotation bilaterally X 20 RTB  - LAQ 10 X each side with 3 second hold with 3 second eccentric lowering  -Seated Piriformis stretch 3x30 sec ea  - Standing open books 1x12 ea direction    therapeutic activities to improve functional performance for 10  minutes,  including:  - standing abduction 3 X 10 each side  GTB  Standing extension 3 X 10 GTB  - Lateral walk/monster walk no resistance, cues fo LE posture: 10 ft 4x ea direction  - Shuttle LE ext 1 red band 2x 15 ea LE  - Side stepping 3 L YTB  Shuttle squats 2.5 cords X 30- double leg    manual therapy techniques: Joint mobilizations, Manual traction, Myofacial release, Soft tissue Mobilization, and Friction Massage were applied to the: R knee for 00 minutes, including:  Objective measures taken  CFM MCL  Roller to quad muscle 3 minutes  Post tibial glides grade 2-3  Patellar glides all directions    neuromuscular re-education activities to improve: Balance, Coordination, Kinesthetic, Sense, Proprioception, and Posture for 08 minutes. The following activities were included:  - walking march with UE support 4 L with break and cues for upright posture  - tandem balance 2 X 30 seconds  Tandem walk 3 laps on turf X 5 yards each direction      Patient Education and Home Exercises     Home Exercises Provided and Patient Education Provided     Education provided:   - educated on whole body exercise and preventing increased stress through the knees by increasing mobility of the upper body and balance    Written Home Exercises Provided: Patient instructed to cont prior HEP. Exercises were reviewed and Lisandra was able to demonstrate them prior to the end of the session.  Lisandra demonstrated good  understanding of the education provided. See EMR under Patient Instructions for exercises provided during therapy sessions    ASSESSMENT   Lisandra responded well to squatting activity today after lateral hip muscle engagement exercises. She continues to show better knee extension at baseline and improvement noted in functional quadriceps muscle strengthening.     Lisandra Is progressing well towards her goals.   Pt prognosis is Good.     Pt will continue to benefit from skilled outpatient physical therapy to address the deficits listed in the  problem list box on initial evaluation, provide pt/family education and to maximize pt's level of independence in the home and community environment.     Pt's spiritual, cultural and educational needs considered and pt agreeable to plan of care and goals.     Anticipated barriers to physical therapy: none    Goals: Goals:  Short Term Goals (4 Weeks):   1. Pt will be compliant with HEP to supplement PT in restoring pain free function.  2. Pt will improve impaired LE MMTs by 1/2 grade  to improve strength for functional tasks  Long Term Goals (8 Weeks):  1. Pt will improve FOTO score to </= 40% limited to decrease perceived limitation with mobility  2. Pt will improve impaired LE MMTs by 1 grade to improve strength for functional tasks.  3. Pt will report pain </= 2/10 in impaired LE in all planes to improve mobility for normal movement patterns.    PLAN     Cont per POC    Erma Rosario, PT

## 2023-06-14 ENCOUNTER — CLINICAL SUPPORT (OUTPATIENT)
Dept: REHABILITATION | Facility: HOSPITAL | Age: 63
End: 2023-06-14
Payer: COMMERCIAL

## 2023-06-14 DIAGNOSIS — G89.29 CHRONIC PAIN OF RIGHT KNEE: Primary | ICD-10-CM

## 2023-06-14 DIAGNOSIS — M25.561 CHRONIC PAIN OF RIGHT KNEE: Primary | ICD-10-CM

## 2023-06-14 PROCEDURE — 97110 THERAPEUTIC EXERCISES: CPT

## 2023-06-14 PROCEDURE — 97112 NEUROMUSCULAR REEDUCATION: CPT

## 2023-06-14 PROCEDURE — 97530 THERAPEUTIC ACTIVITIES: CPT

## 2023-06-14 NOTE — PROGRESS NOTES
[facility]   Physical Therapy Treatment Note   Name: Lisandra Toussaint  Clinic Number: [id number]    Therapy Diagnosis:   Encounter Diagnosis   Name Primary?    Chronic pain of right knee Yes       Physician: Trav Bland III, *    Visit Date: 6/14/2023    Physician Orders: PT Eval and Treat  Medical Diagnosis from Referral: Chronic pain of right knee [M25.561, G89.29]  Evaluation Date: 3/9/2023  Authorization Period Expiration: 3/7/2024  Plan of Care Expiration: 6/22/2023  Visit # / Visits authorized: 13/20 + eval  FOTO: 2/3       PTA Visit #: 0/5     Time In: 1015 am  Time Out: 11:05 am  Total Billable Time: 50 minutes    SUBJECTIVE   Pt reports: improving well with stability and was able to squat without pain increasing as much, feels sore but more like she is just doing something than painful    She  was somewhat  compliant with home exercise program.  Response to previous treatment: sore today  Functional change: in progress    Pain: 2/10  Location: right knee      OBJECTIVE     Knee flexion painful at 110 degrees   Full extension  Knee extension 4-/5  Knee flexion 4/5  Hip abd 4-/5    Treatment     Lisandra received the treatments listed below:      therapeutic exercises to develop strength, endurance, ROM, flexibility, posture, and core stabilization for 20 minutes including:  - Nu-Step 6 min L1.5  - quad set 20 X 3 second hold bilaterally   - SLR 2x10 ea LE - 3 second hold  - Bridges 3 X 10 GTB  - Seated LAQ 0 wt with DF 3x 10 ea LE   - Bent knee fall out 3 X 10 GTB  - standing heel raises X 30  - Seated HS stretch 3x 30 sec ea  - Standing calf stretch 3x 30 sec ea   - Seated hip external rotation bilaterally X 20 RTB  - LAQ 10 X each side with 3 second hold with 3 second eccentric lowering  -Seated Piriformis stretch 3x30 sec ea  - Standing open books 1x12 ea direction    therapeutic activities to improve functional performance for 10  minutes, including:  - standing abduction 3 X  10 each side  GTB  Standing extension 3 X 10 GTB  - Lateral walk/monster walk no resistance, cues fo LE posture: 10 ft 4x ea direction  - Shuttle LE ext 1 red band 2x 15 ea LE  - Side stepping 3 L YTB  Shuttle squats 2.5 cords X 30- double leg    manual therapy techniques: Joint mobilizations, Manual traction, Myofacial release, Soft tissue Mobilization, and Friction Massage were applied to the: R knee for 00 minutes, including:  Objective measures taken  CFM MCL  Roller to quad muscle 3 minutes  Post tibial glides grade 2-3  Patellar glides all directions    neuromuscular re-education activities to improve: Balance, Coordination, Kinesthetic, Sense, Proprioception, and Posture for 08 minutes. The following activities were included:  - walking march with UE support 4 L with break and cues for upright posture  - tandem balance 2 X 30 seconds  Tandem walk 3 laps on turf X 5 yards each direction      Patient Education and Home Exercises     Home Exercises Provided and Patient Education Provided     Education provided:   - educated on whole body exercise and preventing increased stress through the knees by increasing mobility of the upper body and balance    Written Home Exercises Provided: Patient instructed to cont prior HEP. Exercises were reviewed and Lisandra was able to demonstrate them prior to the end of the session.  Lisandra demonstrated good  understanding of the education provided. See EMR under Patient Instructions for exercises provided during therapy sessions    ASSESSMENT   Lisandra continues to show good progress in her strengthening program and will benefit from decreasing to 1 X a week therapy. She is able to perform more functional activity without pain increasing over a normal soreness. Continue to focus on strengthening     Lisandra Is progressing well towards her goals.   Pt prognosis is Good.     Pt will continue to benefit from skilled outpatient physical therapy to address the deficits listed in the problem  list box on initial evaluation, provide pt/family education and to maximize pt's level of independence in the home and community environment.     Pt's spiritual, cultural and educational needs considered and pt agreeable to plan of care and goals.     Anticipated barriers to physical therapy: none    Goals: Goals:  Short Term Goals (4 Weeks):   1. Pt will be compliant with HEP to supplement PT in restoring pain free function.  2. Pt will improve impaired LE MMTs by 1/2 grade  to improve strength for functional tasks  Long Term Goals (8 Weeks):  1. Pt will improve FOTO score to </= 40% limited to decrease perceived limitation with mobility  2. Pt will improve impaired LE MMTs by 1 grade to improve strength for functional tasks.  3. Pt will report pain </= 2/10 in impaired LE in all planes to improve mobility for normal movement patterns.    PLAN     Cont per POC    Erma Rosario, PT

## 2023-06-26 ENCOUNTER — CLINICAL SUPPORT (OUTPATIENT)
Dept: REHABILITATION | Facility: HOSPITAL | Age: 63
End: 2023-06-26
Payer: COMMERCIAL

## 2023-06-26 DIAGNOSIS — M25.561 CHRONIC PAIN OF RIGHT KNEE: Primary | ICD-10-CM

## 2023-06-26 DIAGNOSIS — G89.29 CHRONIC PAIN OF RIGHT KNEE: Primary | ICD-10-CM

## 2023-06-26 PROCEDURE — 97530 THERAPEUTIC ACTIVITIES: CPT

## 2023-06-26 PROCEDURE — 97140 MANUAL THERAPY 1/> REGIONS: CPT

## 2023-06-26 PROCEDURE — 97110 THERAPEUTIC EXERCISES: CPT

## 2023-06-26 NOTE — PROGRESS NOTES
[facility]   Physical Therapy Treatment Note   Name: Lisandra Toussaint  Clinic Number: [id number]    Therapy Diagnosis:   Encounter Diagnosis   Name Primary?    Chronic pain of right knee Yes       Physician: Trav Bland III, *    Visit Date: 6/26/2023    Physician Orders: PT Eval and Treat  Medical Diagnosis from Referral: Chronic pain of right knee [M25.561, G89.29]  Evaluation Date: 3/9/2023  Authorization Period Expiration: 3/7/2024  Plan of Care Expiration: 6/22/2023  Visit # / Visits authorized: 13/20 + eval  FOTO: 2/3       PTA Visit #: 0/5     Time In: 1100 am  Time Out: 11:45 am  Total Billable Time: 45 minutes    SUBJECTIVE   Pt reports: continues to have off and on pain level in the knee, stood a lot and did a lot of bending and the pain got up to an 8 on Saturday, she was able to get some rest in Sunday and feels a litle better, feels swollen today     She  was somewhat  compliant with home exercise program.  Response to previous treatment: sore today  Functional change: in progress    Pain: 3/10  Location: right knee      OBJECTIVE     Knee flexion painful at 110 degrees   Full extension  Knee extension 4-/5  Knee flexion 4/5  Hip abd 4-/5    Treatment     Lisandra received the treatments listed below:      therapeutic exercises to develop strength, endurance, ROM, flexibility, posture, and core stabilization for 25 minutes including:  - Nu-Step 8 min L1.5  - quad set 20 X 3 second hold bilaterally   - SLR 2x10 ea LE - 3 second hold  - Bridges 3 X 10 GTB  - Seated LAQ 0 wt with DF 3x 10 ea LE   - Bent knee fall out 3 X 10 GTB  - standing heel raises X 30  - Seated HS stretch 3x 30 sec ea  - Standing calf stretch 3x 30 sec ea   - Seated hip external rotation bilaterally X 20 RTB  - LAQ 10 X each side with 3 second hold with 3 second eccentric lowering  -Seated Piriformis stretch 3x30 sec ea  - Standing open books 1x12 ea direction    therapeutic activities to improve  functional performance for 10  minutes, including:  - standing abduction 3 X 10 each side  RTB  -Standing extension 3 X 10 RTB  - deadlift with 10# weight to small step height focusing on hip hinge  - Lateral walk/monster walk no resistance, cues fo LE posture: 10 ft 4x ea direction  - Shuttle LE ext 1 red band 2x 15 ea LE  - Side stepping 3 L YTB  Shuttle squats 2.5 cords X 30- double leg    manual therapy techniques: Joint mobilizations, Manual traction, Myofacial release, Soft tissue Mobilization, and Friction Massage were applied to the: R knee for 10 minutes, including:  Objective measures taken  CFM MCL, medial quad and hamstring  Roller to quad muscle 3 minutes  Post tibial glides grade 2-3  Patellar glides all directions    neuromuscular re-education activities to improve: Balance, Coordination, Kinesthetic, Sense, Proprioception, and Posture for 08 minutes. The following activities were included:  - walking march with UE support 4 L with break and cues for upright posture  - tandem balance 2 X 30 seconds  Tandem walk 3 laps on turf X 5 yards each direction      Patient Education and Home Exercises     Home Exercises Provided and Patient Education Provided     Education provided:   - educated on whole body exercise and preventing increased stress through the knees by increasing mobility of the upper body and balance    Written Home Exercises Provided: Patient instructed to cont prior HEP. Exercises were reviewed and Lisandra was able to demonstrate them prior to the end of the session.  Lisandra demonstrated good  understanding of the education provided. See EMR under Patient Instructions for exercises provided during therapy sessions    ASSESSMENT   Lisandra presented today with a medial knee flare up with an increase in tightness and soreness over medial quad and medial HS along with medial joint line tenderness. She responded well to hip hinging activity to prevent overstress of her lumbar spine while reaching and  lifting her partner at home    Lisandra Is progressing well towards her goals.   Pt prognosis is Good.     Pt will continue to benefit from skilled outpatient physical therapy to address the deficits listed in the problem list box on initial evaluation, provide pt/family education and to maximize pt's level of independence in the home and community environment.     Pt's spiritual, cultural and educational needs considered and pt agreeable to plan of care and goals.     Anticipated barriers to physical therapy: none    Goals: Goals:  Short Term Goals (4 Weeks):   1. Pt will be compliant with HEP to supplement PT in restoring pain free function.  2. Pt will improve impaired LE MMTs by 1/2 grade  to improve strength for functional tasks  Long Term Goals (8 Weeks):  1. Pt will improve FOTO score to </= 40% limited to decrease perceived limitation with mobility  2. Pt will improve impaired LE MMTs by 1 grade to improve strength for functional tasks.  3. Pt will report pain </= 2/10 in impaired LE in all planes to improve mobility for normal movement patterns.    PLAN     Cont per POC    Erma Rosario, PT

## 2023-06-29 ENCOUNTER — PATIENT MESSAGE (OUTPATIENT)
Dept: PRIMARY CARE CLINIC | Facility: CLINIC | Age: 63
End: 2023-06-29
Payer: COMMERCIAL

## 2023-06-29 ENCOUNTER — PATIENT MESSAGE (OUTPATIENT)
Dept: SPORTS MEDICINE | Facility: CLINIC | Age: 63
End: 2023-06-29
Payer: COMMERCIAL

## 2023-06-29 DIAGNOSIS — Z63.8 CAREGIVER ROLE STRAIN: Primary | ICD-10-CM

## 2023-06-29 SDOH — SOCIAL DETERMINANTS OF HEALTH (SDOH): OTHER SPECIFIED PROBLEMS RELATED TO PRIMARY SUPPORT GROUP: Z63.8

## 2023-07-05 ENCOUNTER — CLINICAL SUPPORT (OUTPATIENT)
Dept: REHABILITATION | Facility: HOSPITAL | Age: 63
End: 2023-07-05
Payer: COMMERCIAL

## 2023-07-05 ENCOUNTER — OFFICE VISIT (OUTPATIENT)
Dept: SPORTS MEDICINE | Facility: CLINIC | Age: 63
End: 2023-07-05
Payer: COMMERCIAL

## 2023-07-05 VITALS
WEIGHT: 221.31 LBS | SYSTOLIC BLOOD PRESSURE: 150 MMHG | BODY MASS INDEX: 36.83 KG/M2 | DIASTOLIC BLOOD PRESSURE: 86 MMHG | HEART RATE: 99 BPM

## 2023-07-05 DIAGNOSIS — E66.9 CLASS 2 OBESITY WITH BODY MASS INDEX (BMI) OF 36.0 TO 36.9 IN ADULT, UNSPECIFIED OBESITY TYPE, UNSPECIFIED WHETHER SERIOUS COMORBIDITY PRESENT: ICD-10-CM

## 2023-07-05 DIAGNOSIS — G89.29 CHRONIC PAIN OF BOTH KNEES: ICD-10-CM

## 2023-07-05 DIAGNOSIS — M25.562 CHRONIC PAIN OF LEFT KNEE: ICD-10-CM

## 2023-07-05 DIAGNOSIS — G89.29 CHRONIC PAIN OF RIGHT KNEE: Primary | ICD-10-CM

## 2023-07-05 DIAGNOSIS — M25.561 CHRONIC PAIN OF RIGHT KNEE: Primary | ICD-10-CM

## 2023-07-05 DIAGNOSIS — M25.561 CHRONIC PAIN OF BOTH KNEES: ICD-10-CM

## 2023-07-05 DIAGNOSIS — M17.11 OSTEOARTHRITIS OF RIGHT KNEE, UNSPECIFIED OSTEOARTHRITIS TYPE: ICD-10-CM

## 2023-07-05 DIAGNOSIS — M25.562 CHRONIC PAIN OF BOTH KNEES: ICD-10-CM

## 2023-07-05 DIAGNOSIS — M17.12 OSTEOARTHRITIS OF LEFT KNEE, UNSPECIFIED OSTEOARTHRITIS TYPE: ICD-10-CM

## 2023-07-05 DIAGNOSIS — G89.29 CHRONIC PAIN OF LEFT KNEE: ICD-10-CM

## 2023-07-05 PROCEDURE — 99214 PR OFFICE/OUTPT VISIT, EST, LEVL IV, 30-39 MIN: ICD-10-PCS | Mod: 25,S$GLB,, | Performed by: PHYSICIAN ASSISTANT

## 2023-07-05 PROCEDURE — 97110 THERAPEUTIC EXERCISES: CPT

## 2023-07-05 PROCEDURE — 3079F PR MOST RECENT DIASTOLIC BLOOD PRESSURE 80-89 MM HG: ICD-10-PCS | Mod: CPTII,S$GLB,, | Performed by: PHYSICIAN ASSISTANT

## 2023-07-05 PROCEDURE — 1160F PR REVIEW ALL MEDS BY PRESCRIBER/CLIN PHARMACIST DOCUMENTED: ICD-10-PCS | Mod: CPTII,S$GLB,, | Performed by: PHYSICIAN ASSISTANT

## 2023-07-05 PROCEDURE — 99214 OFFICE O/P EST MOD 30 MIN: CPT | Mod: 25,S$GLB,, | Performed by: PHYSICIAN ASSISTANT

## 2023-07-05 PROCEDURE — 3008F BODY MASS INDEX DOCD: CPT | Mod: CPTII,S$GLB,, | Performed by: PHYSICIAN ASSISTANT

## 2023-07-05 PROCEDURE — 3077F SYST BP >= 140 MM HG: CPT | Mod: CPTII,S$GLB,, | Performed by: PHYSICIAN ASSISTANT

## 2023-07-05 PROCEDURE — 1159F MED LIST DOCD IN RCRD: CPT | Mod: CPTII,S$GLB,, | Performed by: PHYSICIAN ASSISTANT

## 2023-07-05 PROCEDURE — 97530 THERAPEUTIC ACTIVITIES: CPT

## 2023-07-05 PROCEDURE — 1159F PR MEDICATION LIST DOCUMENTED IN MEDICAL RECORD: ICD-10-PCS | Mod: CPTII,S$GLB,, | Performed by: PHYSICIAN ASSISTANT

## 2023-07-05 PROCEDURE — 99999 PR PBB SHADOW E&M-EST. PATIENT-LVL III: ICD-10-PCS | Mod: PBBFAC,,, | Performed by: PHYSICIAN ASSISTANT

## 2023-07-05 PROCEDURE — 3077F PR MOST RECENT SYSTOLIC BLOOD PRESSURE >= 140 MM HG: ICD-10-PCS | Mod: CPTII,S$GLB,, | Performed by: PHYSICIAN ASSISTANT

## 2023-07-05 PROCEDURE — 20610 PR DRAIN/INJECT LARGE JOINT/BURSA: ICD-10-PCS | Mod: RT,S$GLB,, | Performed by: PHYSICIAN ASSISTANT

## 2023-07-05 PROCEDURE — 3079F DIAST BP 80-89 MM HG: CPT | Mod: CPTII,S$GLB,, | Performed by: PHYSICIAN ASSISTANT

## 2023-07-05 PROCEDURE — 1160F RVW MEDS BY RX/DR IN RCRD: CPT | Mod: CPTII,S$GLB,, | Performed by: PHYSICIAN ASSISTANT

## 2023-07-05 PROCEDURE — 99999 PR PBB SHADOW E&M-EST. PATIENT-LVL III: CPT | Mod: PBBFAC,,, | Performed by: PHYSICIAN ASSISTANT

## 2023-07-05 PROCEDURE — 20610 DRAIN/INJ JOINT/BURSA W/O US: CPT | Mod: RT,S$GLB,, | Performed by: PHYSICIAN ASSISTANT

## 2023-07-05 PROCEDURE — 3008F PR BODY MASS INDEX (BMI) DOCUMENTED: ICD-10-PCS | Mod: CPTII,S$GLB,, | Performed by: PHYSICIAN ASSISTANT

## 2023-07-05 RX ORDER — TRIAMCINOLONE ACETONIDE 40 MG/ML
40 INJECTION, SUSPENSION INTRA-ARTICULAR; INTRAMUSCULAR
Status: COMPLETED | OUTPATIENT
Start: 2023-07-05 | End: 2023-07-05

## 2023-07-05 RX ADMIN — TRIAMCINOLONE ACETONIDE 40 MG: 40 INJECTION, SUSPENSION INTRA-ARTICULAR; INTRAMUSCULAR at 11:07

## 2023-07-05 NOTE — PROGRESS NOTES
[facility]   Physical Therapy Treatment Note   Name: Lisandra Toussaint  Clinic Number: [id number]    Therapy Diagnosis:   Encounter Diagnosis   Name Primary?    Chronic pain of right knee Yes     Physician: Trav Bland III, *    Visit Date: 7/5/2023    Physician Orders: PT Eval and Treat  Medical Diagnosis from Referral: Chronic pain of right knee [M25.561, G89.29]  Evaluation Date: 3/9/2023  Authorization Period Expiration: 3/7/2024  Plan of Care Expiration: 6/26/2023  Visit # / Visits authorized: 17/20 + eval  FOTO: 2/3    PTA Visit #: 0/5     Time In: 11:45 am  Time Out: 12:30 am  Total Billable Time: 45 minutes    SUBJECTIVE   Pt reports:   That her knee pain has improved since beginning Physical Therapy. Patient reports her knee bothers her the most after transferring her boyfriend who is not able due to recent stroke. Patient reports her knee lock outs and gets stiff.    She  was somewhat  compliant with home exercise program.  Response to previous treatment: sore today  Functional change: in progress    Pain: 3/10  Location: right knee      OBJECTIVE     Knee flexion painful at 110 degrees   Full extension  Knee extension 4-/5  Knee flexion 4/5  Hip abd 4-/5    Treatment     Lisandra received the treatments listed below:      therapeutic exercises to develop strength, endurance, ROM, flexibility, posture, and core stabilization for 25 minutes including:    - Nu-Step 7 min L1.5  - SLR 2x10 3# ea LE - 3 second hold  - Bridges 3 X 10 green thera band  - shuttle squats 3b 3x10    NT:  - Seated LAQ 0 wt with DF 3x 10 ea LE   - Bent knee fall out 3 X 10 GTB  - standing heel raises X 30  - Seated HS stretch 3x 30 sec ea  - Standing calf stretch 3x 30 sec ea   - Seated hip external rotation bilaterally X 20 RTB  - LAQ 10 X each side with 3 second hold with 3 second eccentric lowering  -Seated Piriformis stretch 3x30 sec ea  - Standing open books 1x12 ea direction    therapeutic activities  to improve functional performance for 15 minutes, including:    - standing abduction 3 X 10 each side  BTB  -Standing extension 3 X 10 BTB  - deadlift with cable bar at 1 lock with 10lb weight 1x10, 30lb 3x5 (focusing on hip hinge)    NT:  - Lateral walk/monster walk no resistance, cues fo LE posture: 10 ft 4x ea direction  - Shuttle LE ext 1 red band 2x 15 ea LE  - Side stepping 3 L YTB  Shuttle squats 2.5 cords X 30- double leg    manual therapy techniques: Joint mobilizations, Manual traction, Myofacial release, Soft tissue Mobilization, and Friction Massage were applied to the: R knee for 5 minutes, including:    Post tibial glides grade 2-3  Patellar glides all directions    NT:   Objective measures taken  CFM MCL, medial quad and hamstring  Roller to quad muscle 3 minutes    neuromuscular re-education activities to improve: Balance, Coordination, Kinesthetic, Sense, Proprioception, and Posture for 00 minutes. The following activities were included:    - walking march with UE support 4 L with break and cues for upright posture  - tandem balance 2 X 30 seconds  Tandem walk 3 laps on turf X 5 yards each direction      Patient Education and Home Exercises     Home Exercises Provided and Patient Education Provided     Education provided:   - educated on whole body exercise and preventing increased stress through the knees by increasing mobility of the upper body and balance    Written Home Exercises Provided: Patient instructed to cont prior HEP. Exercises were reviewed and Lisandra was able to demonstrate them prior to the end of the session.  Lisandra demonstrated good  understanding of the education provided. See EMR under Patient Instructions for exercises provided during therapy sessions    ASSESSMENT   Lisandra tolerated progressions today for quad and hip strengthening. Patient responded well to hip hinge education utilizing cable bar deadlift. Patient was able to tolerate increased load with movement and no increase in  symptoms. Plan to continue to progress load of hinge pattern and body mechanics with lifting to reduce stress to knee.    Lisandra Is progressing well towards her goals.   Pt prognosis is Good.     Pt will continue to benefit from skilled outpatient physical therapy to address the deficits listed in the problem list box on initial evaluation, provide pt/family education and to maximize pt's level of independence in the home and community environment.     Pt's spiritual, cultural and educational needs considered and pt agreeable to plan of care and goals.     Anticipated barriers to physical therapy: none    Goals: Goals:  Short Term Goals (4 Weeks):   1. Pt will be compliant with HEP to supplement PT in restoring pain free function.  2. Pt will improve impaired LE MMTs by 1/2 grade  to improve strength for functional tasks  Long Term Goals (8 Weeks):  1. Pt will improve FOTO score to </= 40% limited to decrease perceived limitation with mobility  2. Pt will improve impaired LE MMTs by 1 grade to improve strength for functional tasks.  3. Pt will report pain </= 2/10 in impaired LE in all planes to improve mobility for normal movement patterns.    PLAN     Cont per POC    Jean Cohn, PT

## 2023-07-06 ENCOUNTER — PATIENT MESSAGE (OUTPATIENT)
Dept: ADMINISTRATIVE | Facility: OTHER | Age: 63
End: 2023-07-06
Payer: COMMERCIAL

## 2023-07-06 NOTE — PROGRESS NOTES
CC: bilateral knee pain    62 y.o. Female who reports anterior and medial knee pain refractory to conservative mgmt.    She reports years of knee pain. Pain is constant and radiates up and down from her knees. Right knee pain > left knee. She has had her knees drained and injected over the years.   She reports being told she had a meniscus tear in her right knee in 2009.     She last had bilateral 40mg of kenalog intra-articular knee injections from me 4 months ago with great pain relief for 2 months and then pain gradually returned. She now reports moderate right knee pain and mild left knee pain. She has does a significant amount of PT which is over all helping her knees but she recently had a flare up of knee pain.     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     She reports that the pain is worse with steps.  It also bothers her at night.    Is affecting ADLs.     No mechanical symptoms, no instability    Review of Systems   Constitution: Negative. Negative for chills, fever and night sweats.   HENT: Negative for congestion and headaches.    Eyes: Negative for blurred vision, left vision loss and right vision loss.   Cardiovascular: Negative for chest pain and syncope.   Respiratory: Negative for cough and shortness of breath.    Endocrine: Negative for polydipsia, polyphagia and polyuria.   Hematologic/Lymphatic: Negative for bleeding problem. Does not bruise/bleed easily.   Skin: Negative for dry skin, itching and rash.   Musculoskeletal: Negative for falls and muscle weakness.   Gastrointestinal: Negative for abdominal pain and bowel incontinence.   Genitourinary: Negative for bladder incontinence and nocturia.   Neurological: Negative for disturbances in coordination, loss of balance and seizures.   Psychiatric/Behavioral: Negative for depression. The patient does not have insomnia.    Allergic/Immunologic: Negative for hives and persistent  infections.   All other systems negative      PAST MEDICAL HISTORY:   Past Medical History:   Diagnosis Date    Anxiety     HTN (hypertension)     TMJ (dislocation of temporomandibular joint)      PAST SURGICAL HISTORY:   Past Surgical History:   Procedure Laterality Date    EYE SURGERY      tarsel tunnel       FAMILY HISTORY:   Family History   Problem Relation Age of Onset    Cancer Mother         Squamous Cell Skin Carcinoma on ear, aggressive went to lymph nodes facial structure 18mo later    Hypertension Mother     Cancer Maternal Grandfather         Lung cancer    Hypertension Maternal Grandfather     Stroke Maternal Grandfather         Stroke at 80    Heart disease Maternal Grandmother         Dementia and heart attack early 60s    Learning disabilities Son         Dyslexia    Learning disabilities Son         Dyslexia     SOCIAL HISTORY:   Social History     Socioeconomic History    Marital status: Single   Tobacco Use    Smoking status: Never    Smokeless tobacco: Never   Substance and Sexual Activity    Alcohol use: Not Currently     Comment: rarely    Drug use: Never    Sexual activity: Not Currently     Partners: Male     Birth control/protection: Post-menopausal, None     Comment: partner have ED       MEDICATIONS:   Current Outpatient Medications:     amLODIPine (NORVASC) 10 MG tablet, Take 1 tablet (10 mg total) by mouth once daily., Disp: 90 tablet, Rfl: 1    DULoxetine (CYMBALTA) 60 MG capsule, Take 1 capsule by mouth once daily, Disp: 90 capsule, Rfl: 0    ergocalciferol (ERGOCALCIFEROL) 50,000 unit Cap, Take 1 capsule (50,000 Units total) by mouth every 7 days., Disp: 12 capsule, Rfl: 0    hydroCHLOROthiazide (MICROZIDE) 12.5 mg capsule, Take 1 capsule by mouth once daily, Disp: 90 capsule, Rfl: 0  No current facility-administered medications for this visit.  ALLERGIES:   Review of patient's allergies indicates:   Allergen Reactions    Cinnamon Anaphylaxis    Iodine and iodide containing products      Macrodantin [nitrofurantoin macrocrystalline]     Nitrofurantoin macrocrystal     Povidone-iodine      Other reaction(s): Blister    Sulfa (sulfonamide antibiotics)        VITAL SIGNS: BP (!) 150/86   Pulse 99   Wt 100.4 kg (221 lb 5.5 oz)   BMI 36.83 kg/m²      PHYSICAL EXAMINATION    General:  The patient is alert and oriented x 3.  Mood is pleasant.  Observation of ears, eyes and nose reveal no gross abnormalities.  HEENT: NCAT, sclera nonicteric  Lungs: Respirations are equal and unlabored.    bilateral  KNEE EXAMINATION     OBSERVATION / INSPECTION   Gait:   Nonantalgic   Alignment:  Neutral   Scars:   None   Muscle atrophy: Mild  Effusion:  None   Warmth:  None   Discoloration:   none     TENDERNESS / CREPITUS (T / C):          T / C      T / C   Patella   - / -   Lateral joint line   + right knee/ -      Peripatellar medial  + bilateral  Medial joint line    + bilateral / -   Peripatellar lateral -  Medial plica   - / -   Patellar tendon -  Popliteal fossa  - / -   Quad tendon   -   Gastrocnemius   -   Prepatellar Bursa - / -   Quadricep   -   Tibial tubercle  -  Thigh/hamstring  -   Pes anserine/HS -  Fibula    -   ITB   - / -  Tibia     -   Tib/fib joint  - / -  LCL    -     MFC   - / -   MCL: Proximal  -    LFC   - / -    Distal   -          ROM: (* = pain)  PASSIVE   ACTIVE    Left :   0 / 0 / 130   0 / 0 / 120   Right :    0 / 0 / 115   0 / 0 / 110    Patellofemoral examination:  See above noted areas of tenderness.   Patella position    Subluxation / dislocation: Centered           Sup. / Inf;   Normal   Crepitus (PF):    Absent   Patellar Mobility:       Medial-lateral:   Normal    Superior-inferior:  Normal    Inferior tilt   Normal    Patellar tendon:  Normal   Lateral tilt:    Normal   J-sign:     None   Patellofemoral grind:   + bilateral      MENISCAL SIGNS:     Pain on terminal extension:  -  Pain on terminal flexion:  + bilateral  Jesikas maneuver:  negative  Squat     NT    LIGAMENT  EXAMINATION:  ACL / Lachman:  normal (-1 to 2mm)    PCL-Post.  drawer: normal 0 to 2mm  MCL- Valgus:  normal 0 to 2mm  LCL- Varus:  normal 0 to 2mm  Pivot shift: normal (Equal)   Dial Test: difference c/w other side   At 30° flexion: normal (< 5°)    At 90° flexion: normal (< 5°)       STRENGTH: (* = with pain) PAINFUL SIDE   Quadricep   5/5   Hamstrin/5    EXTREMITY NEURO-VASCULAR EXAMINATION:   Sensation:  Grossly intact to light touch all dermatomal regions.   Motor Function:  Fully intact motor function at hip, knee, foot and ankle    DTRs;  quadriceps and  achilles 2+.  No clonus and downgoing Babinski.    Vascular status:  DP and PT pulses 2+, brisk capillary refill, symmetric.     Other Findings:       Xrays:  Xrays of the bilateral knees with flexion were ordered and reviewed by me today. No fracture, subluxation, Bilateral knee DJD noted mostly to anterior and medial compartments to moderate degree on right knee and mild on left knee. Equivalent medial joint space narrowing noted bilaterally.       ASSESSMENT:    1. bilateral Knee pain, chronic  2. Bilateral knee osteoarthritis   Bilateral hip abd/core weakness    PLAN:    She wishes to hold off on left knee injection at this time.   Euflexxa injections discussed today which I recommended to start 2 months from today.   Continue PT and HEP.   Continue weight loss efforts  Medical fitness program referral placed.     PROCEDURE NOTE: right KNEE INJECTION  After time out was performed, including verification of patient ID, procedure, site and side, availability of information and equipment, review of safety issues, and agreement with consent, the procedure site was marked and the patient was prepped aseptically. A diagnostic and therapeutic injection of 1cc 40 mg kenalog and 2cc of 1% lidocaine and 2cc of 0.25% bupivacaine was given under sterile technique using a 22g x 1.5 needle into the anterior lateral aspect of the right knees in seated position.    The patient had no adverse reactions to the medication. Pain decreased. The patient was instructed to apply ice to the joint for 20 minutes and avoid strenuous activities for 24-36 hours following the injection. Patient was warned of possible blood sugar and/or blood pressure changes during that time. Following that time, patient can resume regular activities.       Ice compresses prn pain  RTC as needed.      All questions were answered, pt will contact us for questions or concerns in the interim.    I made the decision to obtain old records of the patient including previous notes and imaging. New imaging was ordered today of the extremity or extremities evaluated. I independently reviewed and interpreted the radiographs and/or MRIs today as well as prior imaging.

## 2023-07-19 ENCOUNTER — CLINICAL SUPPORT (OUTPATIENT)
Dept: REHABILITATION | Facility: HOSPITAL | Age: 63
End: 2023-07-19
Payer: COMMERCIAL

## 2023-07-19 DIAGNOSIS — M25.561 CHRONIC PAIN OF RIGHT KNEE: Primary | ICD-10-CM

## 2023-07-19 DIAGNOSIS — G89.29 CHRONIC PAIN OF RIGHT KNEE: Primary | ICD-10-CM

## 2023-07-19 PROCEDURE — 97530 THERAPEUTIC ACTIVITIES: CPT

## 2023-07-19 PROCEDURE — 97110 THERAPEUTIC EXERCISES: CPT

## 2023-07-19 NOTE — PROGRESS NOTES
[facility]   Physical Therapy Treatment Note   Name: Lisandra Toussaint  Clinic Number: [id number]    Therapy Diagnosis:   Encounter Diagnosis   Name Primary?    Chronic pain of right knee Yes       Physician: Trav Bland III, *    Visit Date: 7/19/2023    Physician Orders: PT Eval and Treat  Medical Diagnosis from Referral: Chronic pain of right knee [M25.561, G89.29]  Evaluation Date: 3/9/2023  Authorization Period Expiration: 3/7/2024  Plan of Care Expiration: 6/26/2023  Visit # / Visits authorized: 18/20 + eval  FOTO: 2/3    PTA Visit #: 0/5     Time In: 11:50 am  Time Out: 12:30 am  Total Billable Time: 40 minutes    SUBJECTIVE   Pt reports that she received a CSI last week on the 5th. Patient reports her pain has improved since. Patient reports that she began a gym membership and wishes to pursue a gym routine on her own to continue her rehabilitation.    She  was somewhat  compliant with home exercise program.  Response to previous treatment: sore today  Functional change: in progress    Pain: 3/10  Location: right knee      OBJECTIVE     Knee flexion painful at 110 degrees   Full extension  Knee extension 4-/5  Knee flexion 4/5  Hip abd 4-/5    Treatment     Lisandra received the treatments listed below:      therapeutic exercises to develop strength, endurance, ROM, flexibility, posture, and core stabilization for 25 minutes including:    - SLR 2x10 3# - 3 second hold  - Bridges 3 X 10 green thera band  - Leg Press 40lbs 3x8  - Seated Leg extension mach 10lbs 2x10  - Seated Hamstring curl mach 20lbs 2x10    NT:  - Seated LAQ 0 wt with DF 3x 10 ea LE   - Bent knee fall out 3 X 10 GTB  - standing heel raises X 30  - Seated HS stretch 3x 30 sec ea  - Standing calf stretch 3x 30 sec ea   - Seated hip external rotation bilaterally X 20 RTB  - LAQ 10 X each side with 3 second hold with 3 second eccentric lowering  -Seated Piriformis stretch 3x30 sec ea  - Standing open books 1x12 ea  direction    therapeutic activities to improve functional performance for 15 minutes, including:    - deadlift with cable bar at 1 lock with 30lb 5x5 (focusing on hip hinge)    NT:  - Lateral walk/monster walk no resistance, cues fo LE posture: 10 ft 4x ea direction  - Shuttle LE ext 1 red band 2x 15 ea LE  - Side stepping 3 L YTB  Shuttle squats 2.5 cords X 30- double leg    manual therapy techniques: Joint mobilizations, Manual traction, Myofacial release, Soft tissue Mobilization, and Friction Massage were applied to the: R knee for 00 minutes, including:    Post tibial glides grade 2-3  Patellar glides all directions    NT:   Objective measures taken  CFM MCL, medial quad and hamstring  Roller to quad muscle 3 minutes    neuromuscular re-education activities to improve: Balance, Coordination, Kinesthetic, Sense, Proprioception, and Posture for 00 minutes. The following activities were included:    - walking march with UE support 4 L with break and cues for upright posture  - tandem balance 2 X 30 seconds  Tandem walk 3 laps on turf X 5 yards each direction      Patient Education and Home Exercises     Home Exercises Provided and Patient Education Provided     Education provided:   - educated on whole body exercise and preventing increased stress through the knees by increasing mobility of the upper body and balance    Written Home Exercises Provided: Patient instructed to cont prior HEP. Exercises were reviewed and Lisandra was able to demonstrate them prior to the end of the session.  Lisandra demonstrated good  understanding of the education provided. See EMR under Patient Instructions for exercises provided during therapy sessions    ASSESSMENT   Lisandra presenting with improvements in symptoms and questions regarding continuing further strengthening on own at gym. Patient educated today on introduction gym routine to further strengthening on own. Patient tolerated introductory gym routine today that she was instructed  to perform 2x a week. Plan to assess how patient did with routine next session and discharge if Patient symptoms are minimal. Patient demonstrated improvements in hip hinge pattern today with less quad dominant form.    Lisandra Is progressing well towards her goals.   Pt prognosis is Good.     Pt will continue to benefit from skilled outpatient physical therapy to address the deficits listed in the problem list box on initial evaluation, provide pt/family education and to maximize pt's level of independence in the home and community environment.     Pt's spiritual, cultural and educational needs considered and pt agreeable to plan of care and goals.     Anticipated barriers to physical therapy: none    Goals: Goals:  Short Term Goals (4 Weeks):   1. Pt will be compliant with HEP to supplement PT in restoring pain free function.  2. Pt will improve impaired LE MMTs by 1/2 grade  to improve strength for functional tasks  Long Term Goals (8 Weeks):  1. Pt will improve FOTO score to </= 40% limited to decrease perceived limitation with mobility  2. Pt will improve impaired LE MMTs by 1 grade to improve strength for functional tasks.  3. Pt will report pain </= 2/10 in impaired LE in all planes to improve mobility for normal movement patterns.    PLAN     Cont per POC    Jean Cohn, PT

## 2023-07-31 RX ORDER — AMLODIPINE BESYLATE 10 MG/1
10 TABLET ORAL
Qty: 90 TABLET | Refills: 0 | Status: SHIPPED | OUTPATIENT
Start: 2023-07-31 | End: 2023-10-20

## 2023-08-08 ENCOUNTER — OFFICE VISIT (OUTPATIENT)
Dept: PSYCHIATRY | Facility: CLINIC | Age: 63
End: 2023-08-08
Payer: COMMERCIAL

## 2023-08-08 VITALS
HEART RATE: 87 BPM | SYSTOLIC BLOOD PRESSURE: 146 MMHG | BODY MASS INDEX: 36.3 KG/M2 | DIASTOLIC BLOOD PRESSURE: 74 MMHG | WEIGHT: 218.13 LBS

## 2023-08-08 DIAGNOSIS — F43.22 ADJUSTMENT DISORDER WITH ANXIETY: Primary | ICD-10-CM

## 2023-08-08 PROCEDURE — 3008F BODY MASS INDEX DOCD: CPT | Mod: CPTII,S$GLB,,

## 2023-08-08 PROCEDURE — 99999 PR PBB SHADOW E&M-EST. PATIENT-LVL II: CPT | Mod: PBBFAC,,,

## 2023-08-08 PROCEDURE — 3008F PR BODY MASS INDEX (BMI) DOCUMENTED: ICD-10-PCS | Mod: CPTII,S$GLB,,

## 2023-08-08 PROCEDURE — 3078F PR MOST RECENT DIASTOLIC BLOOD PRESSURE < 80 MM HG: ICD-10-PCS | Mod: CPTII,S$GLB,,

## 2023-08-08 PROCEDURE — 3078F DIAST BP <80 MM HG: CPT | Mod: CPTII,S$GLB,,

## 2023-08-08 PROCEDURE — 99999 PR PBB SHADOW E&M-EST. PATIENT-LVL II: ICD-10-PCS | Mod: PBBFAC,,,

## 2023-08-08 PROCEDURE — 99204 PR OFFICE/OUTPT VISIT, NEW, LEVL IV, 45-59 MIN: ICD-10-PCS | Mod: S$GLB,,,

## 2023-08-08 PROCEDURE — 99204 OFFICE O/P NEW MOD 45 MIN: CPT | Mod: S$GLB,,,

## 2023-08-08 PROCEDURE — 3077F SYST BP >= 140 MM HG: CPT | Mod: CPTII,S$GLB,,

## 2023-08-08 PROCEDURE — 3077F PR MOST RECENT SYSTOLIC BLOOD PRESSURE >= 140 MM HG: ICD-10-PCS | Mod: CPTII,S$GLB,,

## 2023-08-08 RX ORDER — BUSPIRONE HYDROCHLORIDE 10 MG/1
10 TABLET ORAL 2 TIMES DAILY
Qty: 60 TABLET | Refills: 0 | Status: SHIPPED | OUTPATIENT
Start: 2023-08-08 | End: 2023-09-13 | Stop reason: SDUPTHER
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
## 2023-08-29 RX ORDER — DULOXETIN HYDROCHLORIDE 60 MG/1
CAPSULE, DELAYED RELEASE ORAL
Qty: 90 CAPSULE | Refills: 0 | Status: SHIPPED | OUTPATIENT
Start: 2023-08-29 | End: 2023-11-27

## 2023-09-13 ENCOUNTER — OFFICE VISIT (OUTPATIENT)
Dept: PSYCHIATRY | Facility: CLINIC | Age: 63
End: 2023-09-13
Payer: COMMERCIAL

## 2023-09-13 VITALS — SYSTOLIC BLOOD PRESSURE: 121 MMHG | HEART RATE: 80 BPM | DIASTOLIC BLOOD PRESSURE: 74 MMHG

## 2023-09-13 DIAGNOSIS — F43.22 ADJUSTMENT DISORDER WITH ANXIETY: Primary | ICD-10-CM

## 2023-09-13 PROCEDURE — 99214 OFFICE O/P EST MOD 30 MIN: CPT | Mod: S$GLB,,,

## 2023-09-13 PROCEDURE — 99999 PR PBB SHADOW E&M-EST. PATIENT-LVL II: ICD-10-PCS | Mod: PBBFAC,,,

## 2023-09-13 PROCEDURE — 3074F SYST BP LT 130 MM HG: CPT | Mod: CPTII,S$GLB,,

## 2023-09-13 PROCEDURE — 3074F PR MOST RECENT SYSTOLIC BLOOD PRESSURE < 130 MM HG: ICD-10-PCS | Mod: CPTII,S$GLB,,

## 2023-09-13 PROCEDURE — 3078F DIAST BP <80 MM HG: CPT | Mod: CPTII,S$GLB,,

## 2023-09-13 PROCEDURE — 3078F PR MOST RECENT DIASTOLIC BLOOD PRESSURE < 80 MM HG: ICD-10-PCS | Mod: CPTII,S$GLB,,

## 2023-09-13 PROCEDURE — 99999 PR PBB SHADOW E&M-EST. PATIENT-LVL II: CPT | Mod: PBBFAC,,,

## 2023-09-13 PROCEDURE — 99214 PR OFFICE/OUTPT VISIT, EST, LEVL IV, 30-39 MIN: ICD-10-PCS | Mod: S$GLB,,,

## 2023-09-13 RX ORDER — BUSPIRONE HYDROCHLORIDE 10 MG/1
10 TABLET ORAL 2 TIMES DAILY
Qty: 60 TABLET | Refills: 2 | Status: SHIPPED | OUTPATIENT
Start: 2023-09-13 | End: 2023-10-25

## 2023-09-13 NOTE — PROGRESS NOTES
"Outpatient Psychiatry Follow-Up Visit ([facility])    09/13/2023    Clinical Status of Patient:  Outpatient (Ambulatory)    Chief Complaint:  Lisandra Toussaint is a 62 y.o. female who presents today for follow-up of anxiety and adjustment problems.  Met with patient.     Current Psych Medications:   Cymbalta 60 mg PO daily  Buspar 10 mg PO BID    Interval History and Content of Current Session:  Patient seen and chart reviewed. Last seen on 8/8/2023    Patient has a psychiatric history of: anxiety and adjustment     Pt reports for follow up today stating that she has been exercising going [facility] [employer]. Patient states that she is still helping out her boyfriend, but no longer feels overwhelmed with him.     Mood: Overall mood is "pretty good", "I think that I have been able to think more clearly"    Anxiety: 6 or 7/10 with 10/10 being the most severe    Pt appears happy and in good spirits    Denies adverse effects from medication - dizziness initially    Sleep: Sleep has been good, more long bouts of sleep, when she wakes up in the middle of the night she is able to go right back to sleep    Appetite: Appetite is normal.    Denies SI/HI/AVH.     Pt reports taking medications as prescribed and behaving appropriately during interview today.      Psychotherapy:  Target symptoms: anxiety   Why chosen therapy is appropriate versus another modality: relevant to diagnosis  Outcome monitoring methods: self-report, observation  Therapeutic intervention type: insight oriented psychotherapy, supportive psychotherapy  Topics discussed/themes: relationships difficulties, illness/death of a loved one  The patient's response to the intervention is accepting. The patient's progress toward treatment goals is good.   Duration of intervention: 10 minutes.    Review of Systems   PSYCHIATRIC: Pertinant items are noted in the narrative.  CONSTITUTIONAL: No weight gain or loss.   MUSCULOSKELETAL: No pain or " stiffness of the joints.  NEUROLOGIC: No weakness, sensory changes, seizures, confusion, memory loss, tremor or other abnormal movements.  ENDOCRINE: No polydipsia or polyuria.  INTEGUMENTARY: No rashes or lacerations.  EYES: No exophthalmos, jaundice or blindness.  ENT: No dizziness, tinnitus or hearing loss.  RESPIRATORY: No shortness of breath.  CARDIOVASCULAR: No tachycardia or chest pain.  GASTROINTESTINAL: No nausea, vomiting, pain, constipation or diarrhea.  GENITOURINARY: No frequency, dysuria or sexual dysfunction.  HEMATOLOGIC/LYMPHATIC: No excessive bleeding, prolonged or excessive bleeding after dental extraction/injury.    Past Medication Trials:  NONE    Past Medical, Family and Social History: The patient's past medical, family and social history have been reviewed and updated as appropriate within the electronic medical record - see encounter notes.    Compliance: yes    Side effects: None    Risk Parameters:  Patient reports no suicidal ideation  Patient reports no homicidal ideation  Patient reports no self-injurious behavior  Patient reports no violent behavior    Exam (detailed: at least 9 elements; comprehensive: all 15 elements)   Constitutional  Vitals:  Most recent vital signs, dated less than 90 days prior to this appointment, were reviewed.   Vitals:    09/13/23 1029   BP: 121/74   Pulse: 80        General:  unremarkable, age appropriate, casually dressed     Musculoskeletal  Muscle Strength/Tone:  no spasicity, no rigidity, no cogwheeling, no flaccidity   Gait & Station:  non-ataxic     Psychiatric  Speech:  no latency; no press   Mood & Affect:  steady, happy  congruent and appropriate   Thought Process:  normal and logical   Associations:  intact   Thought Content:  normal, no suicidality, no homicidality, delusions, or paranoia   Insight:  has awareness of illness   Judgement: behavior is adequate to circumstances   Orientation:  person, place, situation, time/date   Memory: intact for  content of interview   Language: grossly intact   Attention Span & Concentration:  able to focus   Fund of Knowledge:  intact and appropriate to age and level of education     Assessment and Diagnosis   Status/Progress: Based on the examination today, the patient's problem(s) is/are improved.  New problems have not been presented today.   Co-morbidities are not complicating management of the primary condition.  There are no active rule-out diagnoses for this patient at this time.     General Impression: Lisandra Toussaint is a 61 yo female who presents for follow up stating that her anxiety is so much better. Patient will continue current medications.       ICD-10-CM ICD-9-CM    1. Adjustment disorder with anxiety  F43.22 309.24 busPIRone (BUSPAR) 10 MG tablet          Intervention/Counseling/Treatment Plan   Medication Management: Continue current medications.  Continue Cymbalta 60 mg PO daily  Continue Buspar 10 mg PO BID for anxiety  Advised to see therapist soon   Discussed diagnosis, risk and benefits of proposed treatment above vs alternative treatment vs no treatment, and potential side effects of these treatments, and the inherent unpredictability of individual responses to these treatments. The patient expresses understanding and gives informed consent to pursue treatment at this time, believing that the potential benefits outweigh the potential risks. Patient has no other questions. Risks/adverse effects at this time include but are not limited to: GI side effects, sexual dysfunction, activation vs sedation, triggering of suicidal ideation, and serotonin syndrome.   Patient voices understanding and agreement with this plan  Provided crisis numbers  Encouraged patient to keep future appointments  Instruct patient to call or message with questions  In the event of an emergency, including suicidal ideation, patient was advised to go to the emergency room      Return to Clinic: 3 months    Total time: 20 minutes  (which included pts differential diagnosis and prognosis for psychiatric conditions, risks, benefits of treatments, instructions and adherence to treatment plan, risk reduction, reviewing current psychiatric medication regimen, medical problems and social stressors. In addtion to possible discussion with other healthcare provider/s)    Add on Psychotherapy time: 10 minutes    Sabrina Brian PA-C

## 2023-10-20 RX ORDER — AMLODIPINE BESYLATE 10 MG/1
10 TABLET ORAL
Qty: 90 TABLET | Refills: 0 | Status: SHIPPED | OUTPATIENT
Start: 2023-10-20 | End: 2024-01-19

## 2023-10-24 ENCOUNTER — PATIENT MESSAGE (OUTPATIENT)
Dept: PSYCHIATRY | Facility: CLINIC | Age: 63
End: 2023-10-24
Payer: COMMERCIAL

## 2023-10-24 DIAGNOSIS — F43.22 ADJUSTMENT DISORDER WITH ANXIETY: ICD-10-CM

## 2023-10-25 RX ORDER — BUSPIRONE HYDROCHLORIDE 10 MG/1
10 TABLET ORAL 3 TIMES DAILY
Qty: 90 TABLET | Refills: 1 | Status: SHIPPED | OUTPATIENT
Start: 2023-10-25 | End: 2024-02-27

## 2023-11-06 ENCOUNTER — HOSPITAL ENCOUNTER (OUTPATIENT)
Dept: RADIOLOGY | Facility: HOSPITAL | Age: 63
Discharge: HOME | End: 2023-11-06
Attending: NURSE PRACTITIONER
Payer: COMMERCIAL

## 2023-11-06 VITALS — BODY MASS INDEX: 36.32 KG/M2 | WEIGHT: 218 LBS | HEIGHT: 65 IN

## 2023-11-06 DIAGNOSIS — Z12.31 ENCOUNTER FOR SCREENING MAMMOGRAM FOR BREAST CANCER: ICD-10-CM

## 2023-11-06 PROCEDURE — 77067 SCR MAMMO BI INCL CAD: CPT | Mod: TC

## 2023-11-06 PROCEDURE — 77067 SCR MAMMO BI INCL CAD: CPT | Mod: 26,,, | Performed by: RADIOLOGY

## 2023-11-06 PROCEDURE — 77063 MAMMO DIGITAL SCREENING BILAT WITH TOMO: ICD-10-PCS | Mod: 26,,, | Performed by: RADIOLOGY

## 2023-11-06 PROCEDURE — 77063 BREAST TOMOSYNTHESIS BI: CPT | Mod: 26,,, | Performed by: RADIOLOGY

## 2023-11-06 PROCEDURE — 77067 MAMMO DIGITAL SCREENING BILAT WITH TOMO: ICD-10-PCS | Mod: 26,,, | Performed by: RADIOLOGY

## 2023-11-14 ENCOUNTER — OFFICE VISIT (OUTPATIENT)
Dept: PRIMARY CARE CLINIC | Facility: CLINIC | Age: 63
End: 2023-11-14
Payer: COMMERCIAL

## 2023-11-14 VITALS
OXYGEN SATURATION: 97 % | HEART RATE: 78 BPM | DIASTOLIC BLOOD PRESSURE: 74 MMHG | RESPIRATION RATE: 16 BRPM | BODY MASS INDEX: 35.33 KG/M2 | WEIGHT: 212.31 LBS | SYSTOLIC BLOOD PRESSURE: 122 MMHG

## 2023-11-14 DIAGNOSIS — R42 DIZZINESS AND GIDDINESS: ICD-10-CM

## 2023-11-14 DIAGNOSIS — Z91.81 HISTORY OF FALL: ICD-10-CM

## 2023-11-14 DIAGNOSIS — G89.29 CHRONIC NONINTRACTABLE HEADACHE, UNSPECIFIED HEADACHE TYPE: Primary | ICD-10-CM

## 2023-11-14 DIAGNOSIS — E66.01 SEVERE OBESITY (BMI 35.0-39.9) WITH COMORBIDITY: ICD-10-CM

## 2023-11-14 DIAGNOSIS — J06.9 URTI (ACUTE UPPER RESPIRATORY INFECTION): ICD-10-CM

## 2023-11-14 DIAGNOSIS — J32.0 CHRONIC MAXILLARY SINUSITIS: ICD-10-CM

## 2023-11-14 DIAGNOSIS — F33.9 DEPRESSION, RECURRENT: ICD-10-CM

## 2023-11-14 DIAGNOSIS — H93.13 TINNITUS OF BOTH EARS: ICD-10-CM

## 2023-11-14 DIAGNOSIS — R51.9 CHRONIC NONINTRACTABLE HEADACHE, UNSPECIFIED HEADACHE TYPE: Primary | ICD-10-CM

## 2023-11-14 PROCEDURE — 3078F PR MOST RECENT DIASTOLIC BLOOD PRESSURE < 80 MM HG: ICD-10-PCS | Mod: CPTII,S$GLB,, | Performed by: NURSE PRACTITIONER

## 2023-11-14 PROCEDURE — 1160F PR REVIEW ALL MEDS BY PRESCRIBER/CLIN PHARMACIST DOCUMENTED: ICD-10-PCS | Mod: CPTII,S$GLB,, | Performed by: NURSE PRACTITIONER

## 2023-11-14 PROCEDURE — 3078F DIAST BP <80 MM HG: CPT | Mod: CPTII,S$GLB,, | Performed by: NURSE PRACTITIONER

## 2023-11-14 PROCEDURE — 3008F BODY MASS INDEX DOCD: CPT | Mod: CPTII,S$GLB,, | Performed by: NURSE PRACTITIONER

## 2023-11-14 PROCEDURE — 1159F PR MEDICATION LIST DOCUMENTED IN MEDICAL RECORD: ICD-10-PCS | Mod: CPTII,S$GLB,, | Performed by: NURSE PRACTITIONER

## 2023-11-14 PROCEDURE — 99999 PR PBB SHADOW E&M-EST. PATIENT-LVL IV: CPT | Mod: PBBFAC,,, | Performed by: NURSE PRACTITIONER

## 2023-11-14 PROCEDURE — 99214 OFFICE O/P EST MOD 30 MIN: CPT | Mod: S$GLB,,, | Performed by: NURSE PRACTITIONER

## 2023-11-14 PROCEDURE — 3074F SYST BP LT 130 MM HG: CPT | Mod: CPTII,S$GLB,, | Performed by: NURSE PRACTITIONER

## 2023-11-14 PROCEDURE — 1159F MED LIST DOCD IN RCRD: CPT | Mod: CPTII,S$GLB,, | Performed by: NURSE PRACTITIONER

## 2023-11-14 PROCEDURE — 3008F PR BODY MASS INDEX (BMI) DOCUMENTED: ICD-10-PCS | Mod: CPTII,S$GLB,, | Performed by: NURSE PRACTITIONER

## 2023-11-14 PROCEDURE — 3074F PR MOST RECENT SYSTOLIC BLOOD PRESSURE < 130 MM HG: ICD-10-PCS | Mod: CPTII,S$GLB,, | Performed by: NURSE PRACTITIONER

## 2023-11-14 PROCEDURE — 99214 PR OFFICE/OUTPT VISIT, EST, LEVL IV, 30-39 MIN: ICD-10-PCS | Mod: S$GLB,,, | Performed by: NURSE PRACTITIONER

## 2023-11-14 PROCEDURE — 99999 PR PBB SHADOW E&M-EST. PATIENT-LVL IV: ICD-10-PCS | Mod: PBBFAC,,, | Performed by: NURSE PRACTITIONER

## 2023-11-14 PROCEDURE — 1160F RVW MEDS BY RX/DR IN RCRD: CPT | Mod: CPTII,S$GLB,, | Performed by: NURSE PRACTITIONER

## 2023-11-14 RX ORDER — AZELASTINE 1 MG/ML
1 SPRAY, METERED NASAL 2 TIMES DAILY
Qty: 30 ML | Refills: 0 | Status: SHIPPED | OUTPATIENT
Start: 2023-11-14 | End: 2024-01-08

## 2023-11-14 RX ORDER — MUPIROCIN 20 MG/G
OINTMENT TOPICAL 3 TIMES DAILY
Qty: 22 G | Refills: 0 | Status: SHIPPED | OUTPATIENT
Start: 2023-11-14 | End: 2024-01-08
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
## 2023-11-16 ENCOUNTER — PATIENT MESSAGE (OUTPATIENT)
Dept: PRIMARY CARE CLINIC | Facility: CLINIC | Age: 63
End: 2023-11-16
Payer: COMMERCIAL

## 2023-11-20 ENCOUNTER — HOSPITAL ENCOUNTER (OUTPATIENT)
Dept: RADIOLOGY | Facility: HOSPITAL | Age: 63
Discharge: HOME | End: 2023-11-20
Attending: NURSE PRACTITIONER
Payer: COMMERCIAL

## 2023-11-20 DIAGNOSIS — G89.29 CHRONIC NONINTRACTABLE HEADACHE, UNSPECIFIED HEADACHE TYPE: ICD-10-CM

## 2023-11-20 DIAGNOSIS — Z91.81 HISTORY OF FALL: ICD-10-CM

## 2023-11-20 DIAGNOSIS — H93.13 TINNITUS OF BOTH EARS: ICD-10-CM

## 2023-11-20 DIAGNOSIS — R51.9 CHRONIC NONINTRACTABLE HEADACHE, UNSPECIFIED HEADACHE TYPE: ICD-10-CM

## 2023-11-20 DIAGNOSIS — R42 DIZZINESS AND GIDDINESS: ICD-10-CM

## 2023-11-20 PROCEDURE — 70450 CT HEAD/BRAIN W/O DYE: CPT | Mod: TC

## 2023-11-20 PROCEDURE — 70450 CT HEAD WITHOUT CONTRAST: ICD-10-PCS | Mod: 26,,, | Performed by: RADIOLOGY

## 2023-11-20 PROCEDURE — 70450 CT HEAD/BRAIN W/O DYE: CPT | Mod: 26,,, | Performed by: RADIOLOGY

## 2023-11-21 ENCOUNTER — PATIENT MESSAGE (OUTPATIENT)
Dept: PRIMARY CARE CLINIC | Facility: CLINIC | Age: 63
End: 2023-11-21
Payer: COMMERCIAL

## 2023-11-21 DIAGNOSIS — R42 DIZZINESS AND GIDDINESS: ICD-10-CM

## 2023-11-21 DIAGNOSIS — I72.9 ANEURYSM: Primary | ICD-10-CM

## 2023-11-21 DIAGNOSIS — G89.29 CHRONIC NONINTRACTABLE HEADACHE, UNSPECIFIED HEADACHE TYPE: ICD-10-CM

## 2023-11-21 DIAGNOSIS — R51.9 CHRONIC NONINTRACTABLE HEADACHE, UNSPECIFIED HEADACHE TYPE: ICD-10-CM

## 2023-11-21 DIAGNOSIS — I67.1 CEREBRAL ANEURYSM, NONRUPTURED: Primary | ICD-10-CM

## 2023-11-21 DIAGNOSIS — H93.A9 PULSATILE TINNITUS, UNSPECIFIED EAR: ICD-10-CM

## 2023-11-21 DIAGNOSIS — Z91.81 HISTORY OF FALL: ICD-10-CM

## 2023-11-22 ENCOUNTER — HOSPITAL ENCOUNTER (OUTPATIENT)
Dept: RADIOLOGY | Facility: HOSPITAL | Age: 63
Discharge: HOME | End: 2023-11-22
Attending: NURSE PRACTITIONER
Payer: COMMERCIAL

## 2023-11-22 ENCOUNTER — TELEPHONE (OUTPATIENT)
Dept: PRIMARY CARE CLINIC | Facility: CLINIC | Age: 63
End: 2023-11-22
Payer: COMMERCIAL

## 2023-11-22 DIAGNOSIS — R42 DIZZINESS AND GIDDINESS: ICD-10-CM

## 2023-11-22 DIAGNOSIS — Z91.81 HISTORY OF FALL: ICD-10-CM

## 2023-11-22 DIAGNOSIS — H93.A9 PULSATILE TINNITUS, UNSPECIFIED EAR: ICD-10-CM

## 2023-11-22 DIAGNOSIS — G89.29 CHRONIC NONINTRACTABLE HEADACHE, UNSPECIFIED HEADACHE TYPE: ICD-10-CM

## 2023-11-22 DIAGNOSIS — R51.9 CHRONIC NONINTRACTABLE HEADACHE, UNSPECIFIED HEADACHE TYPE: ICD-10-CM

## 2023-11-22 DIAGNOSIS — I67.1 CEREBRAL ANEURYSM, NONRUPTURED: ICD-10-CM

## 2023-11-22 PROCEDURE — A9585 GADOBUTROL INJECTION: HCPCS | Performed by: NURSE PRACTITIONER

## 2023-11-22 PROCEDURE — 70546 MRA BRAIN WITH AND WITHOUT: ICD-10-PCS | Mod: 26,,, | Performed by: RADIOLOGY

## 2023-11-22 PROCEDURE — 25500020 PHARM REV CODE 255: Performed by: NURSE PRACTITIONER

## 2023-11-22 PROCEDURE — 70546 MR ANGIOGRAPH HEAD W/O&W/DYE: CPT | Mod: TC

## 2023-11-22 PROCEDURE — 70546 MR ANGIOGRAPH HEAD W/O&W/DYE: CPT | Mod: 26,,, | Performed by: RADIOLOGY

## 2023-11-22 RX ORDER — GADOBUTROL 604.72 MG/ML
10 INJECTION INTRAVENOUS
Status: COMPLETED | OUTPATIENT
Start: 2023-11-22 | End: 2023-11-22

## 2023-11-22 RX ADMIN — GADOBUTROL 10 ML: 604.72 INJECTION INTRAVENOUS at 04:11

## 2023-11-22 NOTE — TELEPHONE ENCOUNTER
Please try to have pt get her MRA asap.   I discussed with her that she has an aneurysm and this is required to see the severity of this.

## 2023-11-27 RX ORDER — DULOXETIN HYDROCHLORIDE 60 MG/1
CAPSULE, DELAYED RELEASE ORAL
Qty: 90 CAPSULE | Refills: 0 | Status: SHIPPED | OUTPATIENT
Start: 2023-11-27 | End: 2024-02-26

## 2023-11-27 NOTE — TELEPHONE ENCOUNTER
I ordered a referral to neurosurg  Please help her schedule, pt will be back in town on Wednesday evening.

## 2023-12-04 ENCOUNTER — OFFICE VISIT (OUTPATIENT)
Dept: NEUROSURGERY | Facility: CLINIC | Age: 63
End: 2023-12-04
Payer: COMMERCIAL

## 2023-12-04 VITALS — SYSTOLIC BLOOD PRESSURE: 129 MMHG | HEART RATE: 79 BPM | DIASTOLIC BLOOD PRESSURE: 75 MMHG

## 2023-12-04 DIAGNOSIS — I67.1 CEREBRAL ANEURYSM, NONRUPTURED: ICD-10-CM

## 2023-12-04 DIAGNOSIS — I72.9 ANEURYSM: ICD-10-CM

## 2023-12-04 DIAGNOSIS — Q28.2 ARTERIOVENOUS MALFORMATION OF CEREBRAL VESSELS: Primary | ICD-10-CM

## 2023-12-04 PROCEDURE — 3078F DIAST BP <80 MM HG: CPT | Mod: CPTII,S$GLB,, | Performed by: NEUROLOGICAL SURGERY

## 2023-12-04 PROCEDURE — 99205 OFFICE O/P NEW HI 60 MIN: CPT | Mod: S$GLB,,, | Performed by: NEUROLOGICAL SURGERY

## 2023-12-04 PROCEDURE — 99999 PR PBB SHADOW E&M-EST. PATIENT-LVL II: CPT | Mod: PBBFAC,,, | Performed by: NEUROLOGICAL SURGERY

## 2023-12-04 PROCEDURE — 3074F SYST BP LT 130 MM HG: CPT | Mod: CPTII,S$GLB,, | Performed by: NEUROLOGICAL SURGERY

## 2023-12-04 RX ORDER — PREDNISONE 50 MG/1
TABLET ORAL
Qty: 3 TABLET | Refills: 0 | Status: CANCELLED | OUTPATIENT
Start: 2023-12-04

## 2023-12-04 RX ORDER — DIPHENHYDRAMINE HCL 50 MG
CAPSULE ORAL
Qty: 1 CAPSULE | Refills: 0 | Status: CANCELLED | OUTPATIENT
Start: 2023-12-04
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
## 2023-12-04 NOTE — TELEPHONE ENCOUNTER
Consent signed for angiogram.   Reviewed instructions for angiogram   Nothing to eat or drink after 12 midnight   Do not take any medications for sleep or anxiety night before procedure.  Do not take any diabetic oral medications the night before or morning of procedure  Take blood pressure medication with a small sip of water morning of procedure  Labs to be done prior to procedure  Remove any nail polish or gel nail from one finger of each hand  Morning of procedure shower with antibacterial soap (dial, dove). Do not use hair spray, hair pins/clips or other hair products.Do not use perfume, powder, deodorant, after shave, makeup, mascara or false eyelashes or lotions after shower.  May wear glasses, contact lens, dentures or hearing aids but bring case to put them in when procedure started  Do not lift anything heavier than 10 pounds  Avoid strenuous activity for 2 weeks  Will need someone to drive home after procedure and for about 3 days after procedure  Need to be monitor for about 8 hours after home. Observe for shortness of breath, numbness/tingling in any extremity,difficulty speaking (report to nearest emergency room)  Keep incision clian and dry for 24 hrs. Remove bandage after 24 hrs and shower. Do not bathe or submerge in water until site is completely healed. Do not allow force of water to hit site until healed.   Pt verbalized understanding.

## 2023-12-05 RX ORDER — DIPHENHYDRAMINE HCL 50 MG
CAPSULE ORAL
Qty: 1 CAPSULE | Refills: 0 | Status: SHIPPED | OUTPATIENT
Start: 2023-12-05 | End: 2024-01-08

## 2023-12-05 RX ORDER — PREDNISONE 50 MG/1
TABLET ORAL
Qty: 3 TABLET | Refills: 0 | Status: SHIPPED | OUTPATIENT
Start: 2023-12-05 | End: 2024-01-08 | Stop reason: ALTCHOICE

## 2023-12-06 ENCOUNTER — PATIENT MESSAGE (OUTPATIENT)
Dept: NEUROSURGERY | Facility: CLINIC | Age: 63
End: 2023-12-06
Payer: COMMERCIAL

## 2023-12-28 ENCOUNTER — HOSPITAL ENCOUNTER (OUTPATIENT)
Dept: RADIOLOGY | Facility: HOSPITAL | Age: 63
Discharge: HOME | End: 2023-12-28
Attending: NEUROLOGICAL SURGERY
Payer: COMMERCIAL

## 2023-12-28 DIAGNOSIS — I72.9 ANEURYSM: ICD-10-CM

## 2023-12-28 DIAGNOSIS — Q28.2 ARTERIOVENOUS MALFORMATION OF CEREBRAL VESSELS: ICD-10-CM

## 2023-12-28 DIAGNOSIS — I67.1 CEREBRAL ANEURYSM, NONRUPTURED: ICD-10-CM

## 2023-12-28 PROCEDURE — 70553 MRI BRAIN W WO CONTRAST: ICD-10-PCS | Mod: 26,,, | Performed by: RADIOLOGY

## 2023-12-28 PROCEDURE — 70546 MR ANGIOGRAPH HEAD W/O&W/DYE: CPT | Mod: 59,TC

## 2023-12-28 PROCEDURE — 25500020 PHARM REV CODE 255: Performed by: NEUROLOGICAL SURGERY

## 2023-12-28 PROCEDURE — 70496 CTA HEAD AND NECK (XPD): ICD-10-PCS | Mod: 26,,, | Performed by: RADIOLOGY

## 2023-12-28 PROCEDURE — 70498 CTA HEAD AND NECK (XPD): ICD-10-PCS | Mod: 26,,, | Performed by: RADIOLOGY

## 2023-12-28 PROCEDURE — 70553 MRI BRAIN STEM W/O & W/DYE: CPT | Mod: 26,,, | Performed by: RADIOLOGY

## 2023-12-28 PROCEDURE — 70496 CT ANGIOGRAPHY HEAD: CPT | Mod: 26,,, | Performed by: RADIOLOGY

## 2023-12-28 PROCEDURE — 70498 CT ANGIOGRAPHY NECK: CPT | Mod: 26,,, | Performed by: RADIOLOGY

## 2023-12-28 PROCEDURE — 70496 CT ANGIOGRAPHY HEAD: CPT | Mod: TC

## 2023-12-28 PROCEDURE — A9585 GADOBUTROL INJECTION: HCPCS | Performed by: NEUROLOGICAL SURGERY

## 2023-12-28 PROCEDURE — 70553 MRI BRAIN STEM W/O & W/DYE: CPT | Mod: TC

## 2023-12-28 RX ORDER — GADOBUTROL 604.72 MG/ML
10 INJECTION INTRAVENOUS
Status: COMPLETED | OUTPATIENT
Start: 2023-12-28 | End: 2023-12-28

## 2023-12-28 RX ADMIN — IOHEXOL 100 ML: 350 INJECTION, SOLUTION INTRAVENOUS at 06:12

## 2023-12-28 RX ADMIN — GADOBUTROL 10 ML: 604.72 INJECTION INTRAVENOUS at 07:12

## 2023-12-29 NOTE — PLAN OF CARE
Pt evaluated for contrast extravasation in the right arm, almost 75 cc.     Pt feels some pressure in the injection site but not pain. Pt is able to move all her fingers.    On examination:  Almost 10 cm swelling  mild tenderness. No erythema  Pulses intact   Motor intact  Sensation intact       Recommendation:  -Pt informed that it would take few days for the swelling to resolve. Arm raising, alternating cold and warm pads, might help.  -Pt instructed to come to the ED for any change in hand sensation or pain to evaluate for compartment syndrome.      Dion Abdalla MD  Radiology Resident PGY- 4  [facility] [address]   Pager: [phone]

## 2024-01-07 ENCOUNTER — PATIENT MESSAGE (OUTPATIENT)
Dept: NEUROSURGERY | Facility: CLINIC | Age: 64
End: 2024-01-07
Payer: COMMERCIAL

## 2024-01-07 DIAGNOSIS — I72.9 ANEURYSM: Primary | ICD-10-CM

## 2024-01-08 ENCOUNTER — OFFICE VISIT (OUTPATIENT)
Dept: OTOLARYNGOLOGY | Facility: CLINIC | Age: 64
End: 2024-01-08
Payer: COMMERCIAL

## 2024-01-08 ENCOUNTER — CLINICAL SUPPORT (OUTPATIENT)
Dept: AUDIOLOGY | Facility: CLINIC | Age: 64
End: 2024-01-08
Payer: COMMERCIAL

## 2024-01-08 DIAGNOSIS — H61.22 EXCESSIVE CERUMEN IN EAR CANAL, LEFT: Primary | ICD-10-CM

## 2024-01-08 DIAGNOSIS — R42 DIZZINESS AND GIDDINESS: ICD-10-CM

## 2024-01-08 DIAGNOSIS — H93.13 TINNITUS, BILATERAL: Primary | ICD-10-CM

## 2024-01-08 DIAGNOSIS — H93.13 TINNITUS OF BOTH EARS: ICD-10-CM

## 2024-01-08 DIAGNOSIS — J32.0 CHRONIC MAXILLARY SINUSITIS: ICD-10-CM

## 2024-01-08 PROCEDURE — 1160F RVW MEDS BY RX/DR IN RCRD: CPT | Mod: CPTII,S$GLB,, | Performed by: OTOLARYNGOLOGY

## 2024-01-08 PROCEDURE — 99204 OFFICE O/P NEW MOD 45 MIN: CPT | Mod: 25,S$GLB,, | Performed by: OTOLARYNGOLOGY

## 2024-01-08 PROCEDURE — 92567 TYMPANOMETRY: CPT | Mod: S$GLB,,, | Performed by: AUDIOLOGIST

## 2024-01-08 PROCEDURE — 69210 REMOVE IMPACTED EAR WAX UNI: CPT | Mod: S$GLB,,, | Performed by: OTOLARYNGOLOGY

## 2024-01-08 PROCEDURE — 99999 PR PBB SHADOW E&M-EST. PATIENT-LVL III: CPT | Mod: PBBFAC,,, | Performed by: OTOLARYNGOLOGY

## 2024-01-08 PROCEDURE — 99999 PR PBB SHADOW E&M-EST. PATIENT-LVL I: CPT | Mod: PBBFAC,,, | Performed by: AUDIOLOGIST

## 2024-01-08 PROCEDURE — 92557 COMPREHENSIVE HEARING TEST: CPT | Mod: S$GLB,,, | Performed by: AUDIOLOGIST

## 2024-01-08 PROCEDURE — 1159F MED LIST DOCD IN RCRD: CPT | Mod: CPTII,S$GLB,, | Performed by: OTOLARYNGOLOGY

## 2024-01-08 NOTE — PROGRESS NOTES
[facility]    Subjective:      Patient: Lisandra Toussaint Patient PCP: Sandra Shetty NP         :  1960     Sex:  female      MRN:  [id number]          Date of Visit: 2024      Chief Complaint: Sinus Problem (PND, chest congestion. Started in 2023. Had a CT scan. MRI, MRA in 23. Sees vascular neurosurgeon. ) and Tinnitus (Bilateral, but mainly Left ear. )      Patient ID: Lisandra Toussaint is a 63 y.o. female     Patient is a  lifelong NON-smoker with a past medical history of hypertension on amlodipine and hydrochlorothiazide as well as depression on Cymbalta referred to me by Sandra Shetty in consultation for postnasal drip and chest congestion beginning 10 months ago.  CT angiogram head and neck 2023 images reviewed shows significant left-sided deviation, non opacifying significant mucosal thickening of bilateral ethmoids and a small fluid level of the left maxillary sinus with obstruction of the ostiomeatal complex bilaterally and right-greater-than-left mucoperiosteal thickening of the maxillary sinuses.  Patient has been treated with Astelin without benefit which she no longer takes.  Treated with prednisone orally in December.  MRI completed 2023 shows similar findings in the ethmoid sinuses and left maxillary sinus.      Labs:  WBC   Date Value Ref Range Status   2022 6.29 3.90 - 12.70 K/uL Final     Hemoglobin   Date Value Ref Range Status   2022 15.4 12.0 - 16.0 g/dL Final     Platelets   Date Value Ref Range Status   2022 256 150 - 450 K/uL Final     Creatinine   Date Value Ref Range Status   2022 0.9 0.5 - 1.4 mg/dL Final     TSH   Date Value Ref Range Status   2022 4.173 (H) 0.400 - 4.000 uIU/mL Final     Hemoglobin A1C   Date Value Ref Range Status   2022 5.9 (H) 4.0 - 5.6 % Final     Comment:     ADA Screening Guidelines:  5.7-6.4%  Consistent with prediabetes  >or=6.5%  Consistent with diabetes    High levels of fetal  hemoglobin interfere with the HbA1C  assay. Heterozygous hemoglobin variants (HbS, HgC, etc)do  not significantly interfere with this assay.   However, presence of multiple variants may affect accuracy.         Past Medical History  She has a past medical history of Anxiety, HTN (hypertension), and TMJ (dislocation of temporomandibular joint).    Family / Surgical / Social History  Her family history includes Cancer in her maternal grandfather and mother; Heart disease in her maternal grandmother; Hypertension in her maternal grandfather and mother; Learning disabilities in her son and son; Stroke in her maternal grandfather.    Past Surgical History:   Procedure Laterality Date    EYE SURGERY      tarsel tunnel         Social History     Tobacco Use    Smoking status: Never    Smokeless tobacco: Never   Substance and Sexual Activity    Alcohol use: Not Currently     Comment: rarely    Drug use: Never    Sexual activity: Not Currently     Partners: Male     Birth control/protection: Post-menopausal, None     Comment: partner have ED       Medications  She has a current medication list which includes the following prescription(s): amlodipine, multivitamin, duloxetine, hydrochlorothiazide, and buspirone.      Allergies  Review of patient's allergies indicates:   Allergen Reactions    Cinnamon Anaphylaxis    Iodine and iodide containing products     Macrodantin [nitrofurantoin macrocrystalline]     Povidone-iodine      Other reaction(s): Blister    Sulfa (sulfonamide antibiotics)        All medications, allergies, and past history have been reviewed.    Objective:      Vitals:      11/14/2023     8:35 AM 12/4/2023     1:33 PM 1/8/2024    10:15 AM   Vitals - 1 value per visit   SYSTOLIC 122 129    DIASTOLIC 74 75    Pulse 78 79    Resp 16     SPO2 97 %     Weight (lb) 212.3     Weight (kg) 96.3     Pain Score  Five Zero       There is no height or weight on file to calculate BSA.    Physical Exam:    GENERAL  APPEARANCE  -  alert, appears stated age, cooperative, and mildly obese  BARRIER(S) TO COMMUNICATION -  none VOICE - appropriate for age and gender    INTEGUMENTARY  no suspicious head and neck lesions    HEENT  HEAD: Normocephalic, without obvious abnormality, atraumatic  FACE: INSPECTION - Symmetric, no signs of trauma, no suspicious lesion(s)      STRENGTH - facial symmetry intact     PALPATION -  No masses     SALIVARY GLANDS - non-tender with no appreciable mass    NECK/THYROID: normal atraumatic, no neck masses, normal thyroid, no jvd    EYES  Normal occular alignment and mobility with no visible nystagmus at rest    EARS/NOSE/MOUTH/THROAT  EARS  PINNAE AND EXTERNAL EARS - no suspicious lesion OTOSCOPIC EXAM (surgical microscopy was not used for visualization/instrumentation): EAR EXAM - excessive wax preventing exam cleared with portable micro and instruments without trauma to reveal a normal EAC, TM and ME exam bilaterally.  HEARING - grossly intact to voice/finger rub    NOSE AND SINUSES  EXTERNAL NOSE - Grossly normal for age/sex  SEPTUM - Deviated left > 75%, dry TURBINATES - within normal limits MUCOSA - dry,no drainage or edeam     MOUTH AND THROAT   ORAL CAVITY, LIPS, TEETH, GUMS & TONGUE - moist, no suspicious lesions  OROPHARYNX /TONSILS/PHARYNGEAL WALLS/HYPOPHARYNX - no erythema or exudates  NASOPHARYNX - limited mirror exam - unable to visualize due to anatomy/gag  LARYNX -  - limited mirror exam - unable to visualize due to anatomy/gag      CHEST AND LUNG   INSPECTION & AUSCULTATION - normal effort, no stridor    CARDIOVASCULAR  AUSCULTATION & PERIPHERAL VASCULAR - regular rate and rhythm.    NEUROLOGIC  MENTAL STATUS - alert, interactive CRANIAL NERVES - normal    LYMPHATIC  HEAD AND NECK - non-palpable; SUPRACLAVICULAR - deferred      Procedure(s):  Cerumen removal performed.  See procedure note.          Assessment:      Problem List Items Addressed This Visit    None  Visit Diagnoses       Tinnitus of  both ears        Chronic maxillary sinusitis                     Plan:      Management of sinusitis saline rinses and regular nasal steroids protecting the septum which is quite deviated with mupirocin ointment especially at nighttime is recommended as discussed.  General information on the management of sinusitis and rinses and how they are used is outlined.      With respect to tinnitus.  We had a conversation today regarding tinnitus which was quite frustrating for the patient.  I do not have a specific cure for tinnitus.  I did recommend neuropsychiatric evaluation to discover if the Cymbalta maybe related or if alternate therapy may provide better benefit.  The use of antidepressants for tinnitus is not recommended but if there is worsening anxiety or stress or poor sleep associated with the tinnitus these medications should be considered by an appropriate provider.  Information on neuropsychiatric consultation with Dr. Griffiths who specializes in tinnitus retraining therapy at [facility] provided.    General information on tinnitus and its management including masking and hearing aids provided.      Excess wax on the left side which is not contributing to tinnitus cleared to a normal exam.      Patient to work with Neurosurgery regarding the discovery of the aneurysm whether this requires intervention.      The limits of sinus surgery with respect to ongoing ethmoid and sinus symptoms discussed.  Patient is not interested in considering surgical treatment which is specifically not recommended at this time.      Return with any worsening of symptoms, failure to improve, or any other concerns for further evaluation and treatment.          Voice recognition software was used in the creation of this note/communication and any sound-alike errors which may have occurred from its use should be taken in context when interpreting.  If such errors prevent a clear understanding of the note/communication, please contact the office  for clarification.

## 2024-01-08 NOTE — PATIENT INSTRUCTIONS
Management of sinusitis saline rinses and regular nasal steroids protecting the septum which is quite deviated with mupirocin ointment especially at nighttime is recommended as discussed.  General information on the management of sinusitis and rinses and how they are used is outlined.      With respect to tinnitus.  We had a conversation today regarding tinnitus which was quite frustrating for the patient.  I do not have a specific cure for tinnitus.  I did recommend neuropsychiatric evaluation to discover if the Cymbalta maybe related or if alternate therapy may provide better benefit.  The use of antidepressants for tinnitus is not recommended but if there is worsening anxiety or stress or poor sleep associated with the tinnitus these medications should be considered by an appropriate provider.  Information on neuropsychiatric consultation with Dr. Griffiths who specializes in tinnitus retraining therapy at [facility] provided.    General information on tinnitus and its management including masking and hearing aids provided.      Excess wax on the left side which is not contributing to tinnitus cleared to a normal exam.      Patient to work with Neurosurgery regarding the discovery of the aneurysm whether this requires intervention.      The limits of sinus surgery with respect to ongoing ethmoid and sinus symptoms discussed.  Patient is not interested in considering surgical treatment which is specifically not recommended at this time.      Return with any worsening of symptoms, failure to improve, or any other concerns for further evaluation and treatment.          Voice recognition software was used in the creation of this note/communication and any sound-alike errors which may have occurred from its use should be taken in context when interpreting.  If such errors prevent a clear understanding of the note/communication, please contact the office for clarification.      Consider neuropsychiatric consultation regarding  tinnitus with Dr. Tunde Griffiths M.D. -  of Clinical Psychiatry   [facility]   [facility]   [address] [place] [address]   Telephone and Fax: [phone]      ROUTINE EAR CARE    Keep the ears dry in general.  Water and soap dry the ears increases scaling by stripping away the natural oils of the ears.    A twisted single ply of facial tissue can be used to wick out moisture on the rare occasion when water gets stuck in the ear; or the water can be displaced with concentrated alcohol like OTC SwimEar or a few drops of 72-95% isopropyl alcohol to fill the ear canal.  Regular use will cause excess drying of the ears.    The ear should be kept moist in general with mineral oil. Three to four drops into the ear canal 2 to 3 times a week or even every night.    If the ears need to be irrigated use either a 50 50 mixture of white vinegar and distilled water or 50 50 mixture of alcohol and white vinegar.    Painful draining ears that do not resolve with conservative care could represent infection and may need microscopic office debridement/clearing of the wax, and topical antibiotic drops with or without steroids may need to be prescribed.        NASAL SALINE    Still saline comes in many preparations including sprays/mists, gels, and rinses.  Different preparations served different purposes.  Saline spray helps to briefly moisturize the nose and help clear mucus.  Saline gels coat the nose for longer protective benefit of keeping the linings the nose moist.  Saline rinses clear the nose and sinuses and a more thorough way in her best used for significant postnasal drip and sinus complaints.  A combination of saline sprays/mists, gels and rinses should be used to address routine nasal clearing and dryness issues as well as flushing for better control of  allergy and postnasal drip symptoms.  There is no real risk of over use of nasal saline products.  Saline sprays do not have any of the potential rebound or addiction of nasal decongestant sprays.  Nasal saline sprays and rinses should be used prior to the application of any medicated nasal sprays such as nasal steroids or nasal antihistamine sprays.        INTRANASAL STEROID SPRAYS      Intranasal steroid sprays are available both by prescription and over-the-counter both in generic and brand name preparations.  They are all very similar in efficacy and side effect profiles.  Over-the-counter and prescription intranasal steroids include fluticasone propionate (Flonase), fluticasone furoate (Sensimist), triamcinolone (Nasacort), and budesonide (Rhinocort).  While these medications are available as prescriptions as well there are few nasal steroids in her available by prescription only and include mometasone (Nasonex), flunisolide (Nasarel), and beclomethasone (QNASL).    Nasal steroids or the foundation of treatment of both allergy and other inflammatory conditions of the nose and sinuses.  They are safe for regular use and while there are many side effects listed most of these are steroid class effects and not typically encountered.  Typical side effects include dryness and even ulceration and bleeding of the nose.  These side effects can be minimized by proper application and proper moisturization with saline and saline gel.    Sometimes changing between 1 brand of nasal steroid and another can result in improved control of symptoms especially after long term use of one particular nasal steroid.    Proper application of the nasal steroid spray is accomplished by spraying towards the I/ear on the same side with the tip of the superior just barely inside the nostril with the chin slightly downward.  Any dripping should be gently inhaled not sniff test backwards into the throat.  Labeled instructions should be  followed.        ASTELIN (Azelastine) nasal spray    Astelin is a topical nasal antihistamine which can be of additional benefit in controlling nasal allergy and postnasal drip.  Typically is recommended on an as-needed basis 1-2 sprays each nostril twice daily.  People often find it beneficial at night.  This typically added to her regimen of saline and nasal steroids as a 3rd line agent for as needed use.  Excessive use can cause excessive dryness and even nose bleeds.  It has a very strong taste which many people find intolerable.  Astelin needs to be stopped 5-7 days prior to any skin allergy testing just like oral antihistamines as it will inhibit the skin response.      SINUS RINSE INSTRUCTIONS    Nasal Saline Irrigation Instructions  You can wash your nasal and sinus passages using nasal saline (salt water) irrigation. This   is simple and effective. Follow the instructions below, as well as the ones provided by your   physician.  Supplies  First, you will need a nasal saline irrigation bottle and rinse solution.   You can purchase nasal rinse kits that include these items (such as   NeilMed®, Ayr®, Simply Saline®, Ocean Complete®) at most drug   stores. You can also make your own saline irrigation solution by   adding kosher (non-iodine) salt and baking soda to distilled water.   Your physician may tell you to add medications like a steroid or   antibiotic to the rinse as needed.  Steps for nasal irrigation  Step 1. Fill the bottle  ? Wash your hands.  ? Fill the irrigation bottle with lukewarm distilled water or boiled water that has cooled.  Step 2. Mix the solution  ? Put the saline and salt packet contents into the bottle.  ? Tighten the top of the bottle and shake it gently to dissolve the mixture.  ? If you are making your own solution:   - Add 1/4 to 1/2 teaspoon of baking soda and 1/8 teaspoon of kosher (non-iodine) salt   into the bottle.   - Tighten the top of the bottle.   - Shake the bottle  gently to dissolve the mixture.  Step 3. Get into position  ?  front of the sink.  ? Unless you were instructed to use another position, bend forward.   Then tilt your face down about 45 degrees so that you are looking   down into the sink.  ? Gently place the spout of the saline bottle against 1 of your nostrils.  [facility]  CARE AND TREATMENT  Patient Education  ©2018 [employer]  ©2018 [employer]  Step 4. Rinse  ? Breathing through your mouth, gently squeeze the   bottle. This will squirt the solution into your nostril. The   solution will start to drain from the other nostril. Some   may drain from your mouth. This is normal.  ? Use 2 ounces (half of the bottle) on each nostril.  ? Afterwards, you may need to blow your nose gently to   help drain any solution that is left behind.  Step 5. Repeat  ? Repeat steps 3 and 4 with the other nostril.  You can watch a video to learn how to do nasal saline irrigation. Go to [email or web address] and   search for NeilMed Sinus Rinse.  Step 6.  Clean the bottle and cap. Air dry the Sinus Rinse bottle, cap, and tube on a clean paper towel, a lint free towel, or use NeilMed® NasaDOCK® or NasaDOCK plus (sold separately) to store the bottle, cap and tube.  Please read Warnings before using.  Our recommendation is to replace the bottle every three months.      NEILMED EDILBERTO INSTRUCTIONS    It is very important to keep these devices clean and free from any contamination. Replace the bottle every 3 months.  NasaDock Plus  NasaDock NeilMed® SINUS RINSE Squeeze Bottle: Please perform routine inspections of the bottle and tube for any discolorations and cracks. If there are any visual signs of deterioration or permanent color changes, please clean thoroughly. If the discolorations remain after cleansing, discard the items and purchase new ones. Please follow these instructions after each use of the product. Be sure to  replace your product after three months.  Step 1: Rinse the cap, tube and bottle using running water. Fill the bottle with distilled, micro-filtered (through 0.2 micron), reverse osmosis filtered, commercially bottled or previously boiled and cooled down water at lukewarm or body temperature..  Step 2: Add a few drops of dish washing liquid or baby shampoo.  Step 3: Attach the cap and tube to the bottle; hold your finger over the opening in the cap and shake the bottle vigorously.  Step 4: Squeeze the bottle hard to allow the soapy solution to clean the interior of the tube and the cap. Empty out the bottle completely.  Step 5: Rinse the soap from the bottle, cap and tube thoroughly and place the items on a clean paper towel to dry or use the preferred NasaDOCK® or NasaDOCK plus.    The NasaDOCK® is a simple, hygienic way to dry and store the SINUS RINSE bottle, cap and tube. NasaDOCK® comes with various hanging options and is available in different colors. Our newest model also offers storage for our SINUS RINSE mixture packets. We strongly suggest using NasaDOCK® as an inexpensive, easy way to dry the cap, tube and SINUS RINSE bottle.        Cleaning:  Do not use a  to clean the inside of a bottle. While our bottle is  safe, a  will not adequately clean the SINUS RINSE bottle. The water jets in dishwashers cannot enter the narrow neck of the bottle, and portions of the bottles interior will not be cleaned thoroughly. Additional methods of cleaning the bottle include the use of concentrated white vinegar or isopropyl alcohol (70% concentration), followed by scrubbing and rinsing as described above.       Microwave Disinfection  Clean the device with soap and water as mentioned above and shake off the excess water. Now place the bottle, cap and tube in the microwave for 40 seconds. This will disinfect the bottle, cap and tube. If the microwave has been used recently,  please make sure that the inside of the microwave has cooled back down to room temperature before using it to disinfect the bottle.    NeilMed NasaFlo® Neti Pot Users:  Use the same procedure as above.    Sinugator® Cleaning Directions:  Clean the Sinugator® by running plain water and dry with a clean lint free towel and then air dry the unit by keeping it open to the air. The nasal  tip, blue reservoir and white soft tube can be disinfected by cleaning with soap and water and shaking off the excess water before placing in the home microwave for 60 seconds. Clean the entire unit with a few drops of dishwashing liquid and water every three days to keep the unit clean. As a fi nal rinse to wash off any residual soap or tap water, use either distilled, micro-filtered (through 0.2 micron filter), commercially bottled or previously boiled & cooled down water. Please make sure to rinse thoroughly during each wash so no soap is left behind. DO NOT place the white motor unit in microwave for disinfection. Because of the units stainless steel components, this can cause damage or fire hazards.    General Principles of Maintenance & Storage:  When permissible use a microwave periodically to disinfect devices. Always store NeilMed® products in a cool and dry place with adequate ventilation. NasaDOCK® or NasaDOCK plus offer a simple hygienic way to air dry & neatly store the bottle, cap, tube and NasaFlo. Do not store the bottle with the cap screwed on, unless both are dry. Do not store the wet parts in a sealed plastic bag. If you travel before they are dry, wrap parts separately in paper towels. Hand soap or shampoo can be used for cleaning parts while away from home.        USE ONLY AS DIRECTED, IF SYMPTOMS PERSIST SEE YOUR DOCTOR/HEALTHCARE PROFESSIONAL. ALWAYS READ THE LABEL.

## 2024-01-08 NOTE — PROGRESS NOTES
Audiologic Evaluation 1/8/2024:       Lisandra Toussaint, a 63 y.o. female, was seen today in the clinic for an audiologic evaluation for bilateral tinnitus.  Ms. Toussaint reported she has been experiencing tinnitus for approximately the last 5 years, but has noticed an increase in tinnitus loudness since about September. She noted she now has to play sounds at night when going to sleep. Ms. Toussaint reported occasional difficulty hearing and left-sided otalgia. She also noted dizziness and imbalance.       Tympanometry revealed Type A tympanogram in the right ear and Type A tympanogram in the left ear. Audiogram results revealed normal hearing sensitivity in the right ear and normal hearing to slight sensorineural hearing loss in the left ear.  Speech reception thresholds were noted at 15 dB in the right ear and 10 dB in the left ear.  Speech discrimination scores were 96% in the right ear and 100% in the left ear.    Recommendations:  Otologic evaluation  Annual audiogram  Hearing protection when in noise

## 2024-01-19 RX ORDER — AMLODIPINE BESYLATE 10 MG/1
10 TABLET ORAL
Qty: 90 TABLET | Refills: 0 | Status: SHIPPED | OUTPATIENT
Start: 2024-01-19 | End: 2024-04-17

## 2024-01-23 ENCOUNTER — PATIENT MESSAGE (OUTPATIENT)
Dept: NEUROLOGY | Facility: CLINIC | Age: 64
End: 2024-01-23

## 2024-01-23 ENCOUNTER — LAB VISIT (OUTPATIENT)
Dept: LAB | Facility: HOSPITAL | Age: 64
End: 2024-01-23
Payer: COMMERCIAL

## 2024-01-23 ENCOUNTER — OFFICE VISIT (OUTPATIENT)
Dept: NEUROLOGY | Facility: CLINIC | Age: 64
End: 2024-01-23
Payer: COMMERCIAL

## 2024-01-23 VITALS — DIASTOLIC BLOOD PRESSURE: 84 MMHG | HEART RATE: 89 BPM | SYSTOLIC BLOOD PRESSURE: 157 MMHG

## 2024-01-23 DIAGNOSIS — I67.1 UNRUPTURED CEREBRAL ANEURYSM: ICD-10-CM

## 2024-01-23 DIAGNOSIS — E66.01 SEVERE OBESITY (BMI 35.0-39.9) WITH COMORBIDITY: ICD-10-CM

## 2024-01-23 DIAGNOSIS — G44.86 HEADACHE, CERVICOGENIC: Primary | ICD-10-CM

## 2024-01-23 DIAGNOSIS — I72.9 ANEURYSM: ICD-10-CM

## 2024-01-23 DIAGNOSIS — M79.18 CERVICAL MYOFASCIAL PAIN SYNDROME: ICD-10-CM

## 2024-01-23 DIAGNOSIS — G44.86 HEADACHE, CERVICOGENIC: ICD-10-CM

## 2024-01-23 LAB
CRP SERPL-MCNC: 2.8 MG/L (ref 0–8.2)
ERYTHROCYTE [SEDIMENTATION RATE] IN BLOOD BY PHOTOMETRIC METHOD: 6 MM/HR (ref 0–36)

## 2024-01-23 PROCEDURE — 85652 RBC SED RATE AUTOMATED: CPT | Performed by: PSYCHIATRY & NEUROLOGY

## 2024-01-23 PROCEDURE — 86140 C-REACTIVE PROTEIN: CPT | Performed by: PSYCHIATRY & NEUROLOGY

## 2024-01-23 PROCEDURE — 3079F DIAST BP 80-89 MM HG: CPT | Mod: CPTII,S$GLB,, | Performed by: PSYCHIATRY & NEUROLOGY

## 2024-01-23 PROCEDURE — 99205 OFFICE O/P NEW HI 60 MIN: CPT | Mod: S$GLB,,, | Performed by: PSYCHIATRY & NEUROLOGY

## 2024-01-23 PROCEDURE — 3077F SYST BP >= 140 MM HG: CPT | Mod: CPTII,S$GLB,, | Performed by: PSYCHIATRY & NEUROLOGY

## 2024-01-23 PROCEDURE — 99999 PR PBB SHADOW E&M-EST. PATIENT-LVL III: CPT | Mod: PBBFAC,,, | Performed by: PSYCHIATRY & NEUROLOGY

## 2024-01-23 PROCEDURE — 1160F RVW MEDS BY RX/DR IN RCRD: CPT | Mod: CPTII,S$GLB,, | Performed by: PSYCHIATRY & NEUROLOGY

## 2024-01-23 PROCEDURE — 1159F MED LIST DOCD IN RCRD: CPT | Mod: CPTII,S$GLB,, | Performed by: PSYCHIATRY & NEUROLOGY

## 2024-01-23 PROCEDURE — 36415 COLL VENOUS BLD VENIPUNCTURE: CPT | Performed by: PSYCHIATRY & NEUROLOGY

## 2024-01-23 RX ORDER — TIZANIDINE 4 MG/1
4 TABLET ORAL NIGHTLY
Qty: 30 TABLET | Refills: 3 | Status: SHIPPED | OUTPATIENT
Start: 2024-01-23 | End: 2024-04-02

## 2024-01-23 NOTE — PROGRESS NOTES
Outpatient Neurology Consultation    Requesting physician: Dr. Green    Impression:  L cervico-occipital headaches:  I suspect cervicogenic due to cervical myofascial syndrome.  Unlikely GCA but will screen with ESR and cRP.  Phenotype not typical of L occipital neuralgia but this is in the differential.   Incidental, unruptured R A1 aneurysm  Obesity  HTN: above target (reports nl home BPs)    Plan:  cRP and ESR  Tizanidine 4mg qhs  Cervical PT  Exercise/weight loss discussed  Consider L GONB pending course  F/U with Dr. Green re aneurysm  Home BPs and f/u with Dr. Shetty  RTC 6-8 weeks.  She will update me via [employer] prior, as needed      Problem List Items Addressed This Visit          Unprioritized    Severe obesity (BMI 35.0-39.9) with comorbidity     Other Visit Diagnoses       Headache, cervicogenic    -  Primary    Relevant Medications    tiZANidine (ZANAFLEX) 4 MG tablet    Other Relevant Orders    Sedimentation rate    C-REACTIVE PROTEIN    Ambulatory referral/consult to Physical/Occupational Therapy    Cervical myofascial pain syndrome        Relevant Medications    tiZANidine (ZANAFLEX) 4 MG tablet    Other Relevant Orders    Ambulatory referral/consult to Physical/Occupational Therapy    Unruptured cerebral aneurysm                CC:  headache    HPI:  62 y/o WF referred by Dr. Green for evaluation of headaches.  She developed headaches in Oct.  Pain is L cervico-occipital with radiation anteriorly.  Pain is constant and waxes and wanes.  Described as blunt pain. When bad, chronic tinnitus increases.  Tylenol is partially effective.  Changing pillows ineffective. Sleeping sitting up helps.  No fever. Has chronic TMJ with no change. + occ scalp tenderness. No visual loss. + associated cervical pain.    Dr. Shetty ordered CT 11/20/23 suggesting an aneurysm that was confirmed with MRI and subsequent vascular imaging.  Imaging was reviewed personally.    She saw Dr. Sterling (ENT) on 1/8/24.   Sinus irrigation helps minimally.  She has reduced buspirone with only mild effect.     Abortives   Tylenol (taking daily)    Prophylactics   None    Past Medical History:   Diagnosis Date    Anxiety     HTN (hypertension)     TMJ (dislocation of temporomandibular joint)       Past Surgical History:   Procedure Laterality Date    EYE SURGERY      tarsel tunnel        No outpatient medications have been marked as taking for the 1/23/24 encounter (Office Visit) with Demetrius Shukla MD.      Review of patient's allergies indicates:   Allergen Reactions    Cinnamon Anaphylaxis    Iodine and iodide containing products     Macrodantin [nitrofurantoin macrocrystalline]     Povidone-iodine      Other reaction(s): Blister    Sulfa (sulfonamide antibiotics)       Family History   Problem Relation Age of Onset    Cancer Mother         Squamous Cell Skin Carcinoma on ear, aggressive went to lymph nodes facial structure 18mo later    Hypertension Mother     Cancer Maternal Grandfather         Lung cancer    Hypertension Maternal Grandfather     Stroke Maternal Grandfather         Stroke at 80    Heart disease Maternal Grandmother         Dementia and heart attack early 60s    Learning disabilities Son         Dyslexia    Learning disabilities Son         Dyslexia      Social History     Socioeconomic History    Marital status: Single   Tobacco Use    Smoking status: Never    Smokeless tobacco: Never   Substance and Sexual Activity    Alcohol use: Not Currently     Comment: rarely    Drug use: Never    Sexual activity: Not Currently     Partners: Male     Birth control/protection: Post-menopausal, None     Comment: partner have ED     Social Determinants of Health     Financial Resource Strain: High Risk (1/7/2024)    Overall Financial Resource Strain (CARDIA)     Difficulty of Paying Living Expenses: Hard   Food Insecurity: Food Insecurity Present (1/7/2024)    Hunger Vital Sign     Worried About Running Out of Food in the  Last Year: Sometimes true     Ran Out of Food in the Last Year: Sometimes true   Transportation Needs: No Transportation Needs (1/7/2024)    PRAPARE - Transportation     Lack of Transportation (Medical): No     Lack of Transportation (Non-Medical): No   Physical Activity: Insufficiently Active (1/7/2024)    Exercise Vital Sign     Days of Exercise per Week: 2 days     Minutes of Exercise per Session: 40 min   Stress: No Stress Concern Present (1/7/2024)    [employer] [place] [employer] - Occupational Stress Questionnaire     Feeling of Stress : Only a little   Social Connections: Unknown (1/7/2024)    Social Connection and Isolation Panel [NHANES]     Frequency of Communication with Friends and Family: More than three times a week     Frequency of Social Gatherings with Friends and Family: Twice a week     Active Member of Clubs or Organizations: Yes     Attends Club or Organization Meetings: More than 4 times per year     Marital Status: Living with partner   Housing Stability: Low Risk  (1/7/2024)    Housing Stability Vital Sign     Unable to Pay for Housing in the Last Year: No     Number of Places Lived in the Last Year: 1     Unstable Housing in the Last Year: No       BP (!) 157/84   Pulse 89    Overweight female  Extremities: no edema  Cervical: decreased lat flex;  tender B traps and L C-O junction  Nl TA pulses    Mental status:   Awake, alert and appropriately oriented   Normal recent and remote memory   Normal attention and concentration   Normal speech and language   Normal fund of knowledge   No extinction  Cranial nerves:   Normal funduscopic - discs sharp   PERRLA   EOMF without nystagmus; mild L exo (hx strabismus)   VFF   Normal facial sensation   Normal facial movements; mildly widened L palpebral fissure   Intact hearing bilaterally   Palate elevates symmetrically   Normal SCM and trapezius strength   Tongue midline  Motor:   No pronator drift   Normal FF movements bilaterally   Normal  muscle tone, bulk and power   No abnormal movements  Sensory   Intact to LT, temperature, position; no gradient  DTRs   2+ and symmetric   Plantar responses are flexor bilaterally  Coordination   Intact to FNF, RAH, and HTS  Gait   Normal base and gait   No Romberg    Data Reviewed:  CMP  Sodium   Date Value Ref Range Status   12/08/2022 141 136 - 145 mmol/L Final     Potassium   Date Value Ref Range Status   12/08/2022 3.9 3.5 - 5.1 mmol/L Final     Chloride   Date Value Ref Range Status   12/08/2022 104 95 - 110 mmol/L Final     CO2   Date Value Ref Range Status   12/08/2022 26 23 - 29 mmol/L Final     Glucose   Date Value Ref Range Status   12/08/2022 113 (H) 70 - 110 mg/dL Final     BUN   Date Value Ref Range Status   12/08/2022 20 6 - 20 mg/dL Final     Creatinine   Date Value Ref Range Status   12/08/2022 0.9 0.5 - 1.4 mg/dL Final     Calcium   Date Value Ref Range Status   12/08/2022 10.3 8.7 - 10.5 mg/dL Final     Total Protein   Date Value Ref Range Status   12/08/2022 7.8 6.0 - 8.4 g/dL Final     Albumin   Date Value Ref Range Status   12/08/2022 4.0 3.5 - 5.2 g/dL Final     Total Bilirubin   Date Value Ref Range Status   12/08/2022 0.6 0.1 - 1.0 mg/dL Final     Comment:     For infants and newborns, interpretation of results should be based  on gestational age, weight and in agreement with clinical  observations.    Premature Infant recommended reference ranges:  Up to 24 hours.............<8.0 mg/dL  Up to 48 hours............<12.0 mg/dL  3-5 days..................<15.0 mg/dL  6-29 days.................<15.0 mg/dL       Alkaline Phosphatase   Date Value Ref Range Status   12/08/2022 126 55 - 135 U/L Final     AST   Date Value Ref Range Status   12/08/2022 24 10 - 40 U/L Final     ALT   Date Value Ref Range Status   12/08/2022 15 10 - 44 U/L Final     Anion Gap   Date Value Ref Range Status   12/08/2022 11 8 - 16 mmol/L Final     eGFR   Date Value Ref Range Status   12/08/2022 >60.0 >60 mL/min/1.73 m^2  "Final     Lab Results   Component Value Date    WBC 6.29 12/08/2022    HGB 15.4 12/08/2022    HCT 47.2 12/08/2022    MCV 93 12/08/2022     12/08/2022       No results found for: "CRP"  No results found for: "SEDRATE"      Demetrius Shukla MD    "

## 2024-01-26 ENCOUNTER — CLINICAL SUPPORT (OUTPATIENT)
Dept: REHABILITATION | Facility: HOSPITAL | Age: 64
End: 2024-01-26
Attending: PSYCHIATRY & NEUROLOGY
Payer: COMMERCIAL

## 2024-01-26 DIAGNOSIS — R29.898 DECREASED ROM OF NECK: Primary | ICD-10-CM

## 2024-01-26 DIAGNOSIS — G44.86 HEADACHE, CERVICOGENIC: ICD-10-CM

## 2024-01-26 DIAGNOSIS — M79.18 CERVICAL MYOFASCIAL PAIN SYNDROME: ICD-10-CM

## 2024-01-26 DIAGNOSIS — G44.86 CERVICOGENIC HEADACHE: ICD-10-CM

## 2024-01-26 PROCEDURE — 97162 PT EVAL MOD COMPLEX 30 MIN: CPT | Mod: PN

## 2024-01-26 PROCEDURE — 97110 THERAPEUTIC EXERCISES: CPT | Mod: PN

## 2024-01-26 NOTE — PLAN OF CARE
[facility]  Physical Therapy Neurological Rehabilitation Initial Evaluation     Name: Lisandra Toussaint  Clinic Number: [id number]    Therapy Diagnosis:   Encounter Diagnoses   Name Primary?    Headache, cervicogenic     Cervical myofascial pain syndrome     Decreased ROM of neck Yes    Cervicogenic headache      Physician: Demetrius Shukla MD    Physician Orders: PT Eval and Treat   Medical Diagnosis from Referral: Headache, cervicogenic [G44.86], Cervical myofascial pain syndrome [M79.18]   Evaluation Date: 1/26/2024  Authorization Period Expiration: 1/22/2025  Plan of Care Expiration: 3/22/2024  Progress Note Due: 2/26/2024  Date of Surgery: N/A  Visit # / Visits authorized: 1/1  FOTO: 1/3    Precautions: Standard    Time In: 8:05AM  Time Out: 8:45AM  Total Billable Time: 40 minutes (1 mod eval; 1 TE)    Subjective      Date of onset: October 2023    History of current condition - Lisandra reports: She was having sinus issues since March of 2023 and then headaches began in October 2023. Headaches accompanied by blurred vision. Imaging performed at the end of last year and discovered aneurysm. Neurology still suspecting headaches are cervicogenic in nature however as palpation during exam recreated symptoms. Symptoms are all left sided. Dizziness will only occasionally accompany headaches but blurriness is constant. Cleared for any opthalamic issues. Headaches are constant in nature but will wax and wane in terms of intensity; Tylenol will blunt the pain. Follows nisa's horn distribution when headaches are full blown, but often pain will stay isolated to occipital and suboccipital regions. 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     Imaging  - CT Head (11/20/2023): 7 mm rounded soft tissue fullness at the level of the anterior communicating artery, highly suggestive of an aneurysm.  Suggest MRA with vessel wall imaging for  further characterization. Mild chronic small vessel ischemic change and generalized cerebral volume loss. This report was flagged in Epic as abnormal.  - MRA Brain (11/22/2023): MRA confirms a  0.9 x 0.7 cm anterior communicating artery aneurysm with small excrescence.  No abnormal vessel wall enhancement. This report was flagged in Epic as abnormal.  - CTA Head and Neck (12/28/2023): No acute intracranial process. Limited CTA due to contrast extravasation at the time of the study.  The 8 mm aneurysm at the junction of the right A1 and bilateral A2 segments with a an aplastic left A1 segment is again identified, better characterized on the recent MRA. Nonspecific mildly enlarged scattered lymph nodes within the jugular/spinal accessory chains bilaterally.  Follow-up as clinically warranted.  - MRI Brain (12/28/2023): No acute intracranial process. 8 mm aneurysm at the junction of a dominant right A1 segment (developmentally aplastic left A1 segment) and bilateral DERIAN with small excrescences protruding inferiorly and inferiorly to the left adjacent to areas of vessel wall enhancement on vessel wall imaging.  - MRA Brain (12/28/2023): No acute intracranial process. 8 mm aneurysm at the junction of a dominant right A1 segment (developmentally aplastic left A1 segment) and bilateral DERIAN with small excrescences protruding inferiorly and inferiorly to the left adjacent to areas of vessel wall enhancement on vessel wall imaging.     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  Prior Level of Function: independent  Current Level of Function:  "independent but with daily headaches impacting quality of life    Pain:  Current 5/10, worst 8/10, best 4/10   Location: left posterior cervical, occipital, and suboccipital regions and occasionally into temporal-frontal region of head  Description: Aching and Shooting occasionally  Aggravating Factors: extreme neck flexion will aggravate but symptoms wax and wane independently of this as well  Easing Factors: Tylenol will blunt pain    Patient's goals: "to get rid of my headache and to be able to think"    Medical History:   Past Medical History:   Diagnosis Date    Anxiety     HTN (hypertension)     TMJ (dislocation of temporomandibular joint)      Surgical History:   Lisandra Toussaint  has a past surgical history that includes tarsel tunnel and Eye surgery.    Medications:   Lisandra has a current medication list which includes the following prescription(s): amlodipine, buspirone, multivitamin, duloxetine, hydrochlorothiazide, and tizanidine.    Allergies:   Review of patient's allergies indicates:   Allergen Reactions    Cinnamon Anaphylaxis    Iodine and iodide containing products     Macrodantin [nitrofurantoin macrocrystalline]     Povidone-iodine      Other reaction(s): Blister    Sulfa (sulfonamide antibiotics)         Objective      Headache Pattern: most often nisa's horn, left     Posture: increased thoracic kyphosis; forward head; rounded shoulders bilaterally    Dominant Hand: right     Cervical Range of Motion:    Degrees Pain/Dysfunction   Flexion 60 Recreated suboccipital pain     Extension 48 Reports difficulty returning to neutral position from extended position and also suboccipital pain      Right Rotation 55 Greater pull on left lateral neck      Left Rotation 62 No issues     Right Side Bending 23 Greater pull on left lateral neck   Left Side Bending 30 No issues       Special Tests:  Cervical Flexion-Rotation Test: + left for limited rotation; no pain in nisa's horn but recreated pain radiation into " occipital region  Deep Neck Flexor Endurance Test: loses chin tuck at 8 seconds and complete failure at 12 seconds    Oculomotor Screen  - Smooth Pursuits: Mild dizziness; mild saccadic intrusion  - Saccades: Increased dizziness; mild decrease in speed (likely age-related)   - Eye surgery history: Strabismus correction in childhood  - Head Impulse: Corrective saccade bilaterally     Joint Mobility:  Unilateral Cervical PA: hypomobile left > right with pain radiation into occipital region   Central Cervical PA: hypomobile in upper cervical spine  Cervical Downglides: NT  Thoracic Mobility: NT    Palpation: Increased tenderness to palpation + increased tissue density in L suboccipital region as well as bilateral upper trap and cervical paraspinals L > R      FOTO not collected today; please collect at follow up       Treatment     Total Treatment time separate from Evaluation: 9 minutes    Lisandra received the treatments listed below:      therapeutic exercises to develop strength, endurance, ROM, flexibility, and posture for 9 minutes including:  - Upper trap and levator scap stretches with return demonstration bilaterally  - Scapular retraction and chin tucks with return demonstration    Patient Education and Home Exercises     Education provided:   - PT plan of care  - HEP instruction    Written Home Exercises Provided: yes.  Exercises were reviewed and Lisandra was able to demonstrate them prior to the end of the session.  Lisandra demonstrated good  understanding of the education provided.     See EMR under Patient Instructions for exercises provided 1/26/2024.    Assessment     Lisandra is a 63 y.o. female referred to outpatient Physical Therapy with a medical diagnosis of Headache, cervicogenic [G44.86], Cervical myofascial pain syndrome [M79.18] . Patient presents with decreased cervical range of motion; decreased upper cervical mobility; pain recreation will palpation of upper cervical region; decreased postural muscle  endurance; and decreased quality of life secondary to impairments. She also presents with mild oculomotor dysfunction, but cervicogenic symptoms are chief complaint. Clinical presentation and headache pattern is consistent with medical diagnosis. She would benefit from skilled physical therapy services to address listed impairments and improve overall quality of life.    Patient prognosis is Good.   Patient will benefit from skilled outpatient Physical Therapy to address the deficits stated above and in the chart below, provide patient /family education, and to maximize patient's level of independence.     Plan of care discussed with patient: Yes  Patient's spiritual, cultural and educational needs considered and patient is agreeable to the plan of care and goals as stated below:     Anticipated Barriers for therapy: chronicity    Medical Necessity is demonstrated by the following  History  Co-morbidities and personal factors that may impact the plan of care [] LOW: no personal factors / co-morbidities  [] MODERATE: 1-2 personal factors / co-morbidities  [] HIGH: 3+ personal factors / co-morbidities    Moderate / High Support Documentation:   Co-morbidities affecting plan of care:   Anxiety   HTN (hypertension)   TMJ (dislocation of temporomandibular joint)     Personal Factors:   no deficits     Examination  Body Structures and Functions, activity limitations and participation restrictions that may impact the plan of care [] LOW: addressing 1-2 elements  [] MODERATE: 3+ elements  [x] HIGH: 4+ elements (please support below)    Moderate / High Support Documentation: see above     Clinical Presentation [] LOW: stable  [x] MODERATE: Evolving  [] HIGH: Unstable     Decision Making/ Complexity Score: moderate       Goals:  Short Term Goals: 4 weeks   Patient will be 100% compliant with original HEP in order to maximize PT benefits  Patient will improve cervical rotation AROM to >/=65 degrees bilaterally in order to  improve functional mobility for tasks such as driving   Patient will report headache at worst over 3-week period as </=6/10 in order to improve quality of life and activity tolerance  Patient will score >/=15 seconds on DNF Endurance Test in order to improve postural endurance for standing ADLs    Long Term Goals: 8 weeks   Patient will be 100% compliant with updated HEP in order to sustain PT benefits post discharge  Patient will improve cervical rotation AROM to >/=75 degrees bilaterally in order to improve functional mobility for tasks such as driving   Patient will report headache at worst over 3-week period as </=3/10 in order to improve quality of life and activity tolerance  Patient will score >/=20 seconds on DNF Endurance Test in order to improve postural endurance for standing ADLs  Plan     Plan of care Certification: 1/26/2024 to 3/22/2024.    Outpatient Physical Therapy 2 times weekly for 8 weeks to include the following interventions: Manual Therapy, Moist Heat/ Ice, Neuromuscular Re-ed, Patient Education, Self Care, Therapeutic Activities, Therapeutic Exercise, and Modalities PRN; E-Stim PRN; Functional Dry Needling PRN .     ROXANNE MONSALVE, PT    Physician's Signature: _________________________________________ Date: ________________

## 2024-01-29 PROBLEM — R29.898 DECREASED ROM OF NECK: Status: ACTIVE | Noted: 2024-01-29

## 2024-01-29 PROBLEM — G44.86 CERVICOGENIC HEADACHE: Status: ACTIVE | Noted: 2024-01-29

## 2024-02-02 ENCOUNTER — CLINICAL SUPPORT (OUTPATIENT)
Dept: REHABILITATION | Facility: HOSPITAL | Age: 64
End: 2024-02-02
Payer: COMMERCIAL

## 2024-02-02 DIAGNOSIS — R29.898 DECREASED ROM OF NECK: Primary | ICD-10-CM

## 2024-02-02 DIAGNOSIS — G44.86 CERVICOGENIC HEADACHE: ICD-10-CM

## 2024-02-02 PROBLEM — G89.29 CHRONIC PAIN OF RIGHT KNEE: Status: RESOLVED | Noted: 2023-03-15 | Resolved: 2024-02-02

## 2024-02-02 PROBLEM — M25.561 CHRONIC PAIN OF RIGHT KNEE: Status: RESOLVED | Noted: 2023-03-15 | Resolved: 2024-02-02

## 2024-02-02 PROCEDURE — 97140 MANUAL THERAPY 1/> REGIONS: CPT | Mod: PN

## 2024-02-02 PROCEDURE — 97112 NEUROMUSCULAR REEDUCATION: CPT | Mod: PN

## 2024-02-02 NOTE — PROGRESS NOTES
"[facility]   Physical Therapy Treatment Note      Name: Lisandra Toussaint  Clinic Number: [id number]    Therapy Diagnosis:   Encounter Diagnoses   Name Primary?    Decreased ROM of neck Yes    Cervicogenic headache      Physician: Demetrius Shukla MD    Visit Date: 2/2/2024    Physician Orders: PT Eval and Treat   Medical Diagnosis from Referral: Headache, cervicogenic [G44.86], Cervical myofascial pain syndrome [M79.18]   Evaluation Date: 1/26/2024  Authorization Period Expiration: 1/22/2025  Plan of Care Expiration: 3/22/2024  Progress Note Due: 2/26/2024  Date of Surgery: N/A  Visit # / Visits authorized: 1/20 + 1   FOTO: 2/5  PTA Visit #: 0/5      Precautions: Standard     Time In: 0800  Time Out: 0849  Total Billable Time: 49 minutes     Subjective     Patient reports: her headache is bad this morning. Patient is interested in dry needling today.   She was compliant with home exercise program.  Response to previous treatment: similar headaches  Functional change: none - patient reports she has to take medication before bed and in the middle of the night to ensure AM headaches are not intense    Pain: 8/10  Location: Bilateral suboccipital area.     Objective      2/2/2024:  Palpation: Tenderness to bilateral upper trapezius, rectus capitus posterior major and minor, and obliquus capitus superior and inferior, splenius and semispinalis capitus   Cervical joint mobility: Central posterior to anterior mobilizations and side and down glides to articular pillars, grade II: guarded    Treatment     Lisandra received the treatments listed below:      therapeutic exercises to develop ROM and flexibility for 1 minutes including:    Upper trapezius stretch: Attempted bilateral for ~10" with increased headache    manual therapy techniques: were applied to the: neck for 38 minutes, including:    Objective  Suboccipital release - terminated secondary to increased pain  Functional dry needling to " bilateral upper trapezius and rectus capitus posterior major and minor and obliquus capitus superior    neuromuscular re-education activities to improve: Kinesthetic and Posture for 10 minutes. The following activities were included:    Straight arm pull downs: red theraband 2x10  Rows: green theraband 2x10    Patient Education and Home Exercises       Education provided:   - continue home exercise program    Written Home Exercises Provided: Continue home exercise program from 1/26/24. Exercises were reviewed and Lisandra was able to demonstrate them prior to the end of the session.  Lisandra demonstrated good  understanding of the education provided. See Electronic Medical Record under Patient Instructions for exercises provided during therapy sessions    Assessment     Lisandra is a 63 y.o. female referred to outpatient Physical Therapy with a medical diagnosis of cervicogenic headache and myofascial pain. Patient with high intensity headache today that completely resolved following dry needling. Palpation and attempted suboccipital release prior to needling with increased pain. Initiated low level postural kinesthetic after needling without symptom provocation.     Lisandra Is progressing well towards her goals.   Patient prognosis is Good.   Patient will continue to benefit from skilled outpatient physical therapy to address the deficits listed in the problem list box on initial evaluation, provide pt/family education and to maximize pt's level of independence in the home and community environment.      Patient's spiritual, cultural and educational needs considered and pt agreeable to plan of care and goals.  Anticipated barriers to physical therapy: Chronicity and intensity of headaches     Short Term Goals: 4 weeks   Patient will be 100% compliant with original HEP in order to maximize PT benefits. Met 2/2  Patient will improve cervical rotation AROM to >/=65 degrees bilaterally in order to improve functional mobility for  tasks such as driving. Progressing, not met   Patient will report headache at worst over 3-week period as </=6/10 in order to improve quality of life and activity tolerance. Progressing, not met   Patient will score >/=15 seconds on DNF Endurance Test in order to improve postural endurance for standing ADLs. Progressing, not met      Long Term Goals: 8 weeks   Patient will be 100% compliant with updated HEP in order to sustain PT benefits post discharge. Progressing, not met   Patient will improve cervical rotation AROM to >/=75 degrees bilaterally in order to improve functional mobility for tasks such as driving. Progressing, not met   Patient will report headache at worst over 3-week period as </=3/10 in order to improve quality of life and activity tolerance. Progressing, not met   Patient will score >/=20 seconds on DNF Endurance Test in order to improve postural endurance for standing ADLs. Progressing, not met     Plan     Monitor response to Functional Dry Needling. Continue with Functional Dry Needling in POC as appropriate.    Postural kinesthetic.    Becca Griffiths, PT, DPT, OCS

## 2024-02-05 ENCOUNTER — CLINICAL SUPPORT (OUTPATIENT)
Dept: REHABILITATION | Facility: HOSPITAL | Age: 64
End: 2024-02-05
Payer: COMMERCIAL

## 2024-02-05 DIAGNOSIS — G44.86 CERVICOGENIC HEADACHE: ICD-10-CM

## 2024-02-05 DIAGNOSIS — R29.898 DECREASED ROM OF NECK: Primary | ICD-10-CM

## 2024-02-05 PROCEDURE — 97140 MANUAL THERAPY 1/> REGIONS: CPT | Mod: PN

## 2024-02-05 PROCEDURE — 97112 NEUROMUSCULAR REEDUCATION: CPT | Mod: PN

## 2024-02-05 PROCEDURE — 97110 THERAPEUTIC EXERCISES: CPT | Mod: PN

## 2024-02-05 NOTE — PROGRESS NOTES
[facility]   Physical Therapy Treatment Note      Name: Lisandra Toussaint  Clinic Number: [id number]    Therapy Diagnosis:   Encounter Diagnoses   Name Primary?    Decreased ROM of neck Yes    Cervicogenic headache      Physician: Demetrius Shukla MD    Visit Date: 2/5/2024    Physician Orders: PT Eval and Treat   Medical Diagnosis from Referral: Headache, cervicogenic [G44.86], Cervical myofascial pain syndrome [M79.18]   Evaluation Date: 1/26/2024  Authorization Period Expiration: 1/22/2025  Plan of Care Expiration: 3/22/2024  Progress Note Due: 2/26/2024  Date of Surgery: N/A  Visit # / Visits authorized: 2/20 + 1   FOTO: 3/5  PTA Visit #: 0/5      Precautions: Standard     Time In: 0805  Time Out: 0900  Total Billable Time: 55 minutes     Subjective     Patient reports: her headache improved. Patient notices dizziness with supine>sit transfers and with head turns. Some times patient feels lightheadedness as well.   She was compliant with home exercise program.  Response to previous treatment: decreased headache intensity (8/10 -> 6/10 at worst) and vision  Functional change: decreased medication consumption (skipped 2/3 AM NSAID)    Pain: 4/10 headache; 2/10 at end of session  Location: Bilateral suboccipital area    Objective      2/5/2024:  Cervical active range of motion:   Flexion: 36 degrees   Extension: 50 degrees   Side bend: 45 degrees right, 39 degrees left    Rotation: 68 degrees right, 69 degrees left     Treatment     Lisandra received the treatments listed below:      therapeutic exercises to develop ROM and flexibility for 14 minutes including objective:    Seated:  Cervical extension SNAG: x5  Cervical rotation SNAG: x5 bilateral     Supine:   Shoulder flexion: 2 lb wand, 2x10 *    manual therapy techniques: were applied to the: neck for 18 minutes, including:    Functional dry needling to bilateral rectus capitus posterior major and minor, obliquus capitus superior and  "upper trapezius    neuromuscular re-education activities to improve: Coordination, Kinesthetic, and Posture for 23 minutes. The following activities were included:    Cervical extension isometric: 5"x10  VOR 1: 2x30" horizontal and vertical  Rows: green theraband x30 *   Straight arm pull downs: green theraband 2x10 *    *=verbal cues to relax neck    Patient Education and Home Exercises       Education provided:   - Continue home exercise program     Written Home Exercises Provided: Continue those from 1/26/2024. Exercises were reviewed and Lisandra was able to demonstrate them prior to the end of the session.  Lisandra demonstrated good  understanding of the education provided. See Electronic Medical Record under Patient Instructions for exercises provided during therapy sessions    Assessment     Lisandra is a 63 y.o. female referred to outpatient Physical Therapy with a medical diagnosis of cervicogenic headache and myofascial pain syndrome. Reduction in headache intensity since needling performed last visit and immediately after performance today. Cues throughout visit to relax neck. Initiated vestibular exercises secondary to impairment noted at evaluation and dizziness noted today with head turns in standing. Cervical rotation short term goal met.     Lisandra Is progressing well towards her goals.   Patient prognosis is Good.     Patient will continue to benefit from skilled outpatient physical therapy to address the deficits listed in the problem list box on initial evaluation, provide pt/family education and to maximize pt's level of independence in the home and community environment.     Patient's spiritual, cultural and educational needs considered and pt agreeable to plan of care and goals.  Anticipated barriers to physical therapy: Chronicity and intensity of headaches    Short Term Goals: 4 weeks   Patient will be 100% compliant with original HEP in order to maximize PT benefits. Met 2/5  Patient will improve " cervical rotation AROM to >/=65 degrees bilaterally in order to improve functional mobility for tasks such as driving. Met 2/5  Patient will report headache at worst over 3-week period as </=6/10 in order to improve quality of life and activity tolerance. Progressing, not met   Patient will score >/=15 seconds on DNF Endurance Test in order to improve postural endurance for standing ADLs. Progressing, not met      Long Term Goals: 8 weeks   Patient will be 100% compliant with updated HEP in order to sustain PT benefits post discharge. Progressing, not met   Patient will improve cervical rotation AROM to >/=75 degrees bilaterally in order to improve functional mobility for tasks such as driving. Progressing, not met   Patient will report headache at worst over 3-week period as </=3/10 in order to improve quality of life and activity tolerance. Progressing, not met   Patient will score >/=20 seconds on DNF Endurance Test in order to improve postural endurance for standing ADLs. Progressing, not met     Plan     Monitor headaches - functional dry needling as appropriate.   Progress cervical and periscapular strengthening/kinesthetic as able.    Becca Griffiths, PT, DPT, OCS

## 2024-02-07 ENCOUNTER — CLINICAL SUPPORT (OUTPATIENT)
Dept: REHABILITATION | Facility: HOSPITAL | Age: 64
End: 2024-02-07
Payer: COMMERCIAL

## 2024-02-07 DIAGNOSIS — G44.86 CERVICOGENIC HEADACHE: ICD-10-CM

## 2024-02-07 DIAGNOSIS — R29.898 DECREASED ROM OF NECK: Primary | ICD-10-CM

## 2024-02-07 PROCEDURE — 97530 THERAPEUTIC ACTIVITIES: CPT | Mod: PN

## 2024-02-07 PROCEDURE — 97110 THERAPEUTIC EXERCISES: CPT | Mod: PN

## 2024-02-07 PROCEDURE — 97112 NEUROMUSCULAR REEDUCATION: CPT | Mod: PN

## 2024-02-07 NOTE — PROGRESS NOTES
[facility]   Physical Therapy Treatment Note      Name: Lisandra Toussaint  Clinic Number: [id number]    Therapy Diagnosis:   Encounter Diagnoses   Name Primary?    Decreased ROM of neck Yes    Cervicogenic headache      Physician: Demetrius Shukla MD    Visit Date: 2/7/2024    Physician Orders: PT Eval and Treat   Medical Diagnosis from Referral: Headache, cervicogenic [G44.86], Cervical myofascial pain syndrome [M79.18]   Evaluation Date: 1/26/2024  Authorization Period Expiration: 1/22/2025  Plan of Care Expiration: 3/22/2024  Progress Note Due: 2/26/2024  Date of Surgery: N/A  Visit # / Visits authorized: 3/20 + 1   FOTO: 4/5  PTA Visit #: 0/5      Precautions: Standard     Time In: 0701  Time Out: 0803  Total Billable Time: 62 minutes     Subjective     Patient reports: increased left sided neck pain from leaning onto left upper extremity while looking at co-worker's computer. Patient requests dry needling at end of session.  She was compliant with home exercise program.  Response to previous treatment: fatigue  Functional change: decreased headache     Pain: 3/10 at arrival, 0/10 at end of session  Location: Left side of neck    Objective      Objective Measures updated at progress report unless specified.      Treatment     Lisandra received the treatments listed below:      therapeutic exercises to develop ROM and flexibility for 8 minutes including objective:    Shoulder flexion/thoracic extension wall slides: x10. Additional 3 reps   Shoulder flexion/thoracic extension walkouts: x10    manual therapy techniques: were applied to the: neck for 12 minutes, including:    Functional dry needling to bilateral rectus capitus posterior major and minor, obliquus capitus superior and upper trapezius    neuromuscular re-education activities to improve: Coordination, Kinesthetic, and Posture for 42 minutes. The following activities were included:    Seated:  VOR 1 - horizontal and vertical:  "3x30"   No money: red theraband 2x15  Internal rotation isometric against pilates ball: 5"x10    Standing:   Rows: green theraband 2x15   Straight arm pull downs: green theraband 2x10   Serratus wall slides with pillowcase: x10. Verbal cues to pull hands apart *    *=verbal cues to relax neck    Patient Education and Home Exercises       Education provided:   - Continue home exercise program     Written Home Exercises Provided: Continue those from 1/26/2024. Exercises were reviewed and Lisandra was able to demonstrate them prior to the end of the session.  Lisandra demonstrated good  understanding of the education provided. See Electronic Medical Record under Patient Instructions for exercises provided during therapy sessions    Assessment     Lisandra is a 63 y.o. female referred to outpatient Physical Therapy with a medical diagnosis of cervicogenic headache and myofascial pain syndrome. Decreased cues for postural kinesthetic and cervical relaxation today. Relief of pain after needling; repeated wall slides also alleviating to pain.     Lisandra Is progressing well towards her goals.   Patient prognosis is Good.     Patient will continue to benefit from skilled outpatient physical therapy to address the deficits listed in the problem list box on initial evaluation, provide pt/family education and to maximize pt's level of independence in the home and community environment.     Patient's spiritual, cultural and educational needs considered and pt agreeable to plan of care and goals.  Anticipated barriers to physical therapy: Chronicity and intensity of headaches    Short Term Goals: 4 weeks   Patient will be 100% compliant with original HEP in order to maximize PT benefits. Met 2/5  Patient will improve cervical rotation AROM to >/=65 degrees bilaterally in order to improve functional mobility for tasks such as driving. Met 2/5  Patient will report headache at worst over 3-week period as </=6/10 in order to improve quality of " life and activity tolerance. Progressing, not met   Patient will score >/=15 seconds on DNF Endurance Test in order to improve postural endurance for standing ADLs. Progressing, not met      Long Term Goals: 8 weeks   Patient will be 100% compliant with updated HEP in order to sustain PT benefits post discharge. Progressing, not met   Patient will improve cervical rotation AROM to >/=75 degrees bilaterally in order to improve functional mobility for tasks such as driving. Progressing, not met   Patient will report headache at worst over 3-week period as </=3/10 in order to improve quality of life and activity tolerance. Progressing, not met   Patient will score >/=20 seconds on DNF Endurance Test in order to improve postural endurance for standing ADLs. Progressing, not met     Plan     Monitor headaches - functional dry needling as appropriate.   Progress cervical and periscapular strengthening/kinesthetic as able.    Becca Griffiths, PT, DPT, OCS

## 2024-02-14 ENCOUNTER — CLINICAL SUPPORT (OUTPATIENT)
Dept: REHABILITATION | Facility: HOSPITAL | Age: 64
End: 2024-02-14
Payer: COMMERCIAL

## 2024-02-14 DIAGNOSIS — R29.898 DECREASED ROM OF NECK: Primary | ICD-10-CM

## 2024-02-14 DIAGNOSIS — G44.86 CERVICOGENIC HEADACHE: ICD-10-CM

## 2024-02-14 PROCEDURE — 97140 MANUAL THERAPY 1/> REGIONS: CPT | Mod: PN

## 2024-02-14 PROCEDURE — 97112 NEUROMUSCULAR REEDUCATION: CPT | Mod: PN

## 2024-02-14 NOTE — PROGRESS NOTES
[facility]   Physical Therapy Treatment Note      Name: Lisandra Toussaint  Clinic Number: [id number]    Therapy Diagnosis:   Encounter Diagnoses   Name Primary?    Decreased ROM of neck Yes    Cervicogenic headache        Physician: Demetrius Shukla MD    Visit Date: 2/14/2024    Physician Orders: PT Eval and Treat   Medical Diagnosis from Referral: Headache, cervicogenic [G44.86], Cervical myofascial pain syndrome [M79.18]   Evaluation Date: 1/26/2024  Authorization Period Expiration: 1/22/2025  Plan of Care Expiration: 3/22/2024  Date of Surgery: N/A  Visit # / Visits authorized: 5/20 + 1   FOTO: 4/5  PTA Visit #: 0/5      Precautions: Standard     Time In: 8:05 am  Time Out: 9:00 am   Total Billable Time: 55 minutes     Subjective     Patient reports: her neck is feeling stiff this morning and rates her pain level as a 7/10. Patient reports relief following physical therapy and dry needling sessions.   She was compliant with home exercise program.  Response to previous treatment: fatigue  Functional change: decreased headache     Pain: 7/10 at arrival, 0/10 at end of session  Location: Left side of neck    Objective      Objective Measures updated at progress report unless specified.      Treatment     Lisandra received the treatments listed below:      manual therapy techniques: were applied to the: neck for 15 minutes, including:  Functional dry needling to bilateral rectus capitus posterior major and minor, obliquus capitus superior and upper trapezius    neuromuscular re-education activities to improve: Coordination, Kinesthetic, and Posture for 40 minutes. The following activities were included:  Patient education on postural awareness and chin tucks  Seated cervical extension SNAGs: 20x   Supine chin tuck with rotation into 1/2 bolster: 20x   Supine shoulder flexion AAROM: 2x10   Straight arm pull downs: green theraband 2x10   Serratus wall slides with pillowcase: x10. Verbal cues  "to pull hands apart  Seated thoracic extension: 15x     Home exercise program: VOR 1 - horizontal and vertical: 3x30"   No money: red theraband 2x15  Internal rotation isometric against pilates ball: 5"x10      Patient Education and Home Exercises       Education provided:   - Continue home exercise program     Written Home Exercises Provided: Continue those from 1/26/2024. Exercises were reviewed and Lisandra was able to demonstrate them prior to the end of the session.  Lisandra demonstrated good  understanding of the education provided. See Electronic Medical Record under Patient Instructions for exercises provided during therapy sessions    Assessment     Lisandra is a 63 y.o. female referred to outpatient Physical Therapy with a medical diagnosis of cervicogenic headache and myofascial pain syndrome. Decreased cues for postural kinesthetic and cervical relaxation today. Relief of pain after needling; repeated wall slides also alleviating to pain.     Lisandra Is progressing well towards her goals.   Patient prognosis is Good.     Patient will continue to benefit from skilled outpatient physical therapy to address the deficits listed in the problem list box on initial evaluation, provide pt/family education and to maximize pt's level of independence in the home and community environment.     Patient's spiritual, cultural and educational needs considered and pt agreeable to plan of care and goals.  Anticipated barriers to physical therapy: Chronicity and intensity of headaches    Short Term Goals: 4 weeks   Patient will be 100% compliant with original HEP in order to maximize PT benefits. Met 2/5  Patient will improve cervical rotation AROM to >/=65 degrees bilaterally in order to improve functional mobility for tasks such as driving. Met 2/5  Patient will report headache at worst over 3-week period as </=6/10 in order to improve quality of life and activity tolerance. Progressing, not met   Patient will score >/=15 seconds on " DNF Endurance Test in order to improve postural endurance for standing ADLs. Progressing, not met      Long Term Goals: 8 weeks   Patient will be 100% compliant with updated HEP in order to sustain PT benefits post discharge. Progressing, not met   Patient will improve cervical rotation AROM to >/=75 degrees bilaterally in order to improve functional mobility for tasks such as driving. Progressing, not met   Patient will report headache at worst over 3-week period as </=3/10 in order to improve quality of life and activity tolerance. Progressing, not met   Patient will score >/=20 seconds on DNF Endurance Test in order to improve postural endurance for standing ADLs. Progressing, not met     Plan     Monitor headaches - functional dry needling as appropriate.   Progress cervical and periscapular strengthening/kinesthetic as able.    Ziggy Mcdonald, PT, DPT

## 2024-02-19 ENCOUNTER — CLINICAL SUPPORT (OUTPATIENT)
Dept: REHABILITATION | Facility: HOSPITAL | Age: 64
End: 2024-02-19
Payer: COMMERCIAL

## 2024-02-19 DIAGNOSIS — R29.898 DECREASED ROM OF NECK: Primary | ICD-10-CM

## 2024-02-19 DIAGNOSIS — G44.86 CERVICOGENIC HEADACHE: ICD-10-CM

## 2024-02-19 PROCEDURE — 97112 NEUROMUSCULAR REEDUCATION: CPT | Mod: PN

## 2024-02-19 PROCEDURE — 97140 MANUAL THERAPY 1/> REGIONS: CPT | Mod: PN

## 2024-02-19 PROCEDURE — 97110 THERAPEUTIC EXERCISES: CPT | Mod: PN

## 2024-02-19 NOTE — PROGRESS NOTES
"[facility]   Physical Therapy Treatment Note      Name: Lisandra Toussaint  Clinic Number: [id number]    Therapy Diagnosis:   Encounter Diagnoses   Name Primary?    Decreased ROM of neck Yes    Cervicogenic headache        Physician: Demetrius Shukla MD    Visit Date: 2/19/2024    Physician Orders: PT Eval and Treat   Medical Diagnosis from Referral: Headache, cervicogenic [G44.86], Cervical myofascial pain syndrome [M79.18]   Evaluation Date: 1/26/2024  Authorization Period Expiration: 1/22/2025  Plan of Care Expiration: 3/22/2024  Date of Surgery: N/A  Visit # / Visits authorized: 5/20 + 1   FOTO: 5/6  PTA Visit #: 0/5      Precautions: Standard     Time In: 0810 (late arrival)  Time Out: 0855  Total Billable Time: 45 minutes     Subjective     Patient reports: swam on Thursday and noticed difficulty coordinating breast stroke vs back stroke  She was compliant with home exercise program.  Response to previous treatment: decreased pain  Functional change: understands cervical retraction/chin tuck exercise. Reports relief with this    Pain: 5/10 at arrival, 0/10 at end of session  Location: Left side of neck    Objective      Objective Measures updated at progress report unless specified.      Treatment     Lisandra received the treatments listed below:      therapeutic exercises to develop ROM and flexibility for 10 minutes including:    Seated cervical extension SNAGs: 2x10  Sink stretch for thoracic extension: 5"x10    manual therapy techniques: were applied to the: neck for 12 minutes, including:    Soft tissue mobilization to bilateral rectus capitus posterior major and minor, obliquus capitus superior and inferior, spenius capitus and cervicis    neuromuscular re-education activities to improve: Coordination, Kinesthetic, and Posture for 23 minutes. The following activities were included:    Supine chin tuck: 5"x10 with therapist -> patient tactile cues for deep neck flexor " "isolation    Seated:  Cervical extension/retraction isometric into towel: 5"x10  No money: red theraband 3x10    + flexion: red theraband 2x10    Straight arm pull downs: Out of time, next  Serratus wall slides with pillowcase: Out of time, next    Patient Education and Home Exercises       Education provided:   - Continue home exercise program   - Will see how pain fares before resuming needling    Written Home Exercises Provided: Continue those from 1/26/2024. Exercises were reviewed and Lisandra was able to demonstrate them prior to the end of the session.  Lisandra demonstrated good  understanding of the education provided. See Electronic Medical Record under Patient Instructions for exercises provided during therapy sessions    Assessment     Lisandra is a 63 y.o. female referred to outpatient Physical Therapy with a medical diagnosis of cervicogenic headache and myofascial pain syndrome. Increased awareness of body positions to decrease cervical tension. No pain with mobility exercises or suboccipital release which were provocative earlier in plan of care. Progressed periscapular kinesthetic with decreased cues and less fatigue. Absent pain at the end of session without the use of needling.     Lisandra Is progressing well towards her goals.   Patient prognosis is Good.   Patient will continue to benefit from skilled outpatient physical therapy to address the deficits listed in the problem list box on initial evaluation, provide pt/family education and to maximize pt's level of independence in the home and community environment.     Patient's spiritual, cultural and educational needs considered and pt agreeable to plan of care and goals.  Anticipated barriers to physical therapy: Chronicity and intensity of headaches    Short Term Goals: 4 weeks   Patient will be 100% compliant with original HEP in order to maximize PT benefits. Met 2/5  Patient will improve cervical rotation AROM to >/=65 degrees bilaterally in order to " improve functional mobility for tasks such as driving. Met 2/5  Patient will report headache at worst over 3-week period as </=6/10 in order to improve quality of life and activity tolerance. Progressing, not met   Patient will score >/=15 seconds on DNF Endurance Test in order to improve postural endurance for standing ADLs. Progressing, not met      Long Term Goals: 8 weeks   Patient will be 100% compliant with updated HEP in order to sustain PT benefits post discharge. Progressing, not met   Patient will improve cervical rotation AROM to >/=75 degrees bilaterally in order to improve functional mobility for tasks such as driving. Progressing, not met   Patient will report headache at worst over 3-week period as </=3/10 in order to improve quality of life and activity tolerance. Progressing, not met   Patient will score >/=20 seconds on DNF Endurance Test in order to improve postural endurance for standing ADLs. Progressing, not met     Plan     Monitor headaches - functional dry needling as appropriate.   Progress cervical and periscapular strengthening/kinesthetic as able.    Becca Griffiths, PT, DPT, OCS

## 2024-02-21 ENCOUNTER — CLINICAL SUPPORT (OUTPATIENT)
Dept: REHABILITATION | Facility: HOSPITAL | Age: 64
End: 2024-02-21
Payer: COMMERCIAL

## 2024-02-21 DIAGNOSIS — R29.898 DECREASED ROM OF NECK: Primary | ICD-10-CM

## 2024-02-21 DIAGNOSIS — G44.86 CERVICOGENIC HEADACHE: ICD-10-CM

## 2024-02-21 PROCEDURE — 97110 THERAPEUTIC EXERCISES: CPT | Mod: PN

## 2024-02-21 PROCEDURE — 97140 MANUAL THERAPY 1/> REGIONS: CPT | Mod: PN

## 2024-02-21 PROCEDURE — 97112 NEUROMUSCULAR REEDUCATION: CPT | Mod: PN

## 2024-02-21 NOTE — PROGRESS NOTES
"[facility]   Physical Therapy Treatment Note      Name: Lisandra Toussaint  Clinic Number: [id number]    Therapy Diagnosis:   Encounter Diagnoses   Name Primary?    Decreased ROM of neck Yes    Cervicogenic headache        Physician: Demetrius Shukla MD    Visit Date: 2/21/2024    Physician Orders: PT Eval and Treat   Medical Diagnosis from Referral: Headache, cervicogenic [G44.86], Cervical myofascial pain syndrome [M79.18]   Evaluation Date: 1/26/2024  Authorization Period Expiration: 1/22/2025  Plan of Care Expiration: 3/22/2024  Date of Surgery: N/A  Visit # / Visits authorized: 6/20 + 1   FOTO: 2nd performed 2/21  PTA Visit #: 0/5      Precautions: Standard     Time In: 0804  Time Out: 0857  Total Billable Time: 53 minutes     Subjective     Patient reports: increased pain 1-2 hours after last session. Patient wants to hold off needling this week.  She was compliant with home exercise program.  Response to previous treatment: short term pain relief  Functional change: takes tylenol at 3/4 AM half of the time (at evaluation was every day)    Pain: 4/10 at arrival, <3/10 at end of session  7/10 at worst since last visit    Location: Superior/inferior nuchal line    Objective      Objective Measures updated at progress report unless specified.      Treatment     Lisandra received the treatments listed below:      therapeutic exercises to develop ROM and flexibility for 8 minutes including:    Seated cervical extension SNAGs: 2x10  Sink stretch for thoracic extension: 5"x5    manual therapy techniques: were applied to the: neck for 10 minutes, including:    Soft tissue mobilization to bilateral rectus capitus posterior major and minor, obliquus capitus superior and inferior, spenius capitus and cervicis  **Patient reports 6-7/10 pain after performance of this and supine exercises    neuromuscular re-education activities to improve: Coordination, Kinesthetic, and Posture for 35 minutes. The " "following activities were included:    Supine chin tuck: 5"x10 with tactile cues for deep neck flexor isolation   + lift: x10    Seated:  Cervical extension/retraction isometric into towel: 5"x20. 2 rounds  No money: red theraband 3x10    + flexion: red theraband 2x10    Free Motion straight arm pull downs: 3 lbs 2x5. Tactile cues to prevent scapular elevation  Serratus wall slides with pillowcase: 2x10. Verbal cues to pull pillow case apart with hands   Cervical flexion and side bend walkouts: yellow theraband x10 each. Pillow case around head to prevent hair pulling    Patient Education and Home Exercises       Education provided:   - Continue home exercise program   - Will see how pain fares before resuming needling    Written Home Exercises Provided: Continue those from 1/26/2024. Exercises were reviewed and Lisandra was able to demonstrate them prior to the end of the session.  Lisandra demonstrated good  understanding of the education provided. See Electronic Medical Record under Patient Instructions for exercises provided during therapy sessions    Assessment     Lisandra is a 63 y.o. female referred to outpatient Physical Therapy with a medical diagnosis of cervicogenic headache and myofascial pain syndrome. Increased pain with suboccipital soft tissue and supine positioning today. Pain continues to reduce with seated cervical retraction and gradually throughout remainder of session. Increased fatigue with periscapular strengthening. Progressed cervical kinesthetic/endurance training with maintenance of neutral positioning throughout.     Lisandra Is progressing well towards her goals.   Patient prognosis is Good.   Patient will continue to benefit from skilled outpatient physical therapy to address the deficits listed in the problem list box on initial evaluation, provide pt/family education and to maximize pt's level of independence in the home and community environment.     Patient's spiritual, cultural and educational " needs considered and pt agreeable to plan of care and goals.  Anticipated barriers to physical therapy: Chronicity and intensity of headaches    Short Term Goals: 4 weeks   Patient will be 100% compliant with original HEP in order to maximize PT benefits. Met 2/5  Patient will improve cervical rotation AROM to >/=65 degrees bilaterally in order to improve functional mobility for tasks such as driving. Met 2/5  Patient will report headache at worst over 3-week period as </=6/10 in order to improve quality of life and activity tolerance. Progressing, not met   Patient will score >/=15 seconds on DNF Endurance Test in order to improve postural endurance for standing ADLs. Progressing, not met      Long Term Goals: 8 weeks   Patient will be 100% compliant with updated HEP in order to sustain PT benefits post discharge. Progressing, not met   Patient will improve cervical rotation AROM to >/=75 degrees bilaterally in order to improve functional mobility for tasks such as driving. Progressing, not met   Patient will report headache at worst over 3-week period as </=3/10 in order to improve quality of life and activity tolerance. Progressing, not met   Patient will score >/=20 seconds on DNF Endurance Test in order to improve postural endurance for standing ADLs. Progressing, not met     Plan     Monitor headaches - functional dry needling as appropriate.   Hold soft tissue mobilization/suboccipital release  Progress cervical and periscapular strengthening/kinesthetic in upright positions as able.     Becca Griffiths, PT, DPT, OCS

## 2024-02-25 DIAGNOSIS — F43.22 ADJUSTMENT DISORDER WITH ANXIETY: ICD-10-CM

## 2024-02-26 ENCOUNTER — CLINICAL SUPPORT (OUTPATIENT)
Dept: REHABILITATION | Facility: HOSPITAL | Age: 64
End: 2024-02-26
Payer: COMMERCIAL

## 2024-02-26 DIAGNOSIS — R29.898 DECREASED ROM OF NECK: Primary | ICD-10-CM

## 2024-02-26 DIAGNOSIS — G44.86 CERVICOGENIC HEADACHE: ICD-10-CM

## 2024-02-26 DIAGNOSIS — F33.9 DEPRESSION, RECURRENT: Primary | ICD-10-CM

## 2024-02-26 PROCEDURE — 97110 THERAPEUTIC EXERCISES: CPT | Mod: PN

## 2024-02-26 PROCEDURE — 97112 NEUROMUSCULAR REEDUCATION: CPT | Mod: PN

## 2024-02-26 PROCEDURE — 97140 MANUAL THERAPY 1/> REGIONS: CPT | Mod: PN

## 2024-02-26 RX ORDER — DULOXETIN HYDROCHLORIDE 30 MG/1
30 CAPSULE, DELAYED RELEASE ORAL DAILY
Qty: 30 CAPSULE | Refills: 11 | Status: SHIPPED | OUTPATIENT
Start: 2024-02-26 | End: 2024-03-13 | Stop reason: CLARIF

## 2024-02-26 NOTE — PROGRESS NOTES
"[facility]   Physical Therapy Treatment Note      Name: Lisandra Toussaint  Clinic Number: [id number]    Therapy Diagnosis:   Encounter Diagnoses   Name Primary?    Decreased ROM of neck Yes    Cervicogenic headache      Physician: Demetrius Shukla MD    Visit Date: 2/26/2024    Physician Orders: PT Eval and Treat   Medical Diagnosis from Referral: Headache, cervicogenic [G44.86], Cervical myofascial pain syndrome [M79.18]   Evaluation Date: 1/26/2024  Authorization Period Expiration: 1/22/2025  Plan of Care Expiration: 3/22/2024  Date of Surgery: N/A  Visit # / Visits authorized: 7/20 + 1   FOTO: 2nd performed 2/21  PTA Visit #: 0/5      Precautions: Standard     Time In: 0810  Time Out: 0905  Total Billable Time: 55 minutes     Subjective     Patient reports: she might want to hold off with needling but later agrees. Patient feels like her shoulders are tight.  She was compliant with home exercise program.  Response to previous treatment: decreased pain  Functional change: notes standing flexion and side bending walkouts reduce pain    Pain: 5/10 at arrival, 2-3/10 at end of session    Location: Superior/inferior nuchal line    Objective      2/26:  Palpation: Tenderness rectus capitus posterior major and minor, obliquus capitus superior    Treatment     Lisandra received the treatments listed below:      therapeutic exercises to develop ROM and flexibility for 16 minutes including:    Seated cervical extension SNAGs: 5" 2x10  Pulley shoulder flexion: 5'    Sink stretch for thoracic extension: 5"x10    manual therapy techniques: were applied to the: neck for 10 minutes, including:     Functional dry needling to bilateral rectus capitus posterior major and minor, obliquus capitus superior    neuromuscular re-education activities to improve: Coordination, Kinesthetic, and Posture for 29 minutes. The following activities were included:    Seated:  Cervical extension/retraction isometric into " "towel: 5"x20. 2 rounds  No money: red theraband 3x10    Standing:  Serratus wall slides with pillowcase: 2x10. Verbal cues to pull pillow case apart with hands   Cervical flexion and side bend walkouts: Red sport's cord x20 each. Pillow case around head to prevent hair pulling    Patient Education and Home Exercises       Education provided:   - Continue home exercise program. Provided yellow theraband for cervical walkouts and demonstrated set up in door slit  - Needling as appropriate    Written Home Exercises Provided: Continue those from 1/26/2024. Exercises were reviewed and Lisandra was able to demonstrate them prior to the end of the session.  Lisandra demonstrated good  understanding of the education provided. See Electronic Medical Record under Patient Instructions for exercises provided during therapy sessions    Assessment     Lisandra is a 63 y.o. female referred to outpatient Physical Therapy with a medical diagnosis of cervicogenic headache and myofascial pain syndrome. Mild increase in suboccipital pain after pulley flexion. Reduction in pain with cervical isometrics and needling. Improved cervical tone during periscapular exercises.     Lisandra Is progressing well towards her goals.   Patient prognosis is Good.   Patient will continue to benefit from skilled outpatient physical therapy to address the deficits listed in the problem list box on initial evaluation, provide pt/family education and to maximize pt's level of independence in the home and community environment.     Patient's spiritual, cultural and educational needs considered and pt agreeable to plan of care and goals.  Anticipated barriers to physical therapy: Chronicity and intensity of headaches    Short Term Goals: 4 weeks   Patient will be 100% compliant with original HEP in order to maximize PT benefits. Met 2/5  Patient will improve cervical rotation AROM to >/=65 degrees bilaterally in order to improve functional mobility for tasks such as " driving. Met 2/5  Patient will report headache at worst over 3-week period as </=6/10 in order to improve quality of life and activity tolerance. Progressing, not met   Patient will score >/=15 seconds on DNF Endurance Test in order to improve postural endurance for standing ADLs. Progressing, not met      Long Term Goals: 8 weeks   Patient will be 100% compliant with updated HEP in order to sustain PT benefits post discharge. Progressing, not met   Patient will improve cervical rotation AROM to >/=75 degrees bilaterally in order to improve functional mobility for tasks such as driving. Progressing, not met   Patient will report headache at worst over 3-week period as </=3/10 in order to improve quality of life and activity tolerance. Progressing, not met   Patient will score >/=20 seconds on DNF Endurance Test in order to improve postural endurance for standing ADLs. Progressing, not met     Plan     Monitor headaches - functional dry needling as appropriate.   Progress cervical and periscapular strengthening/kinesthetic in upright positions as able.     Becca Griffiths, PT, DPT, OCS

## 2024-02-27 RX ORDER — BUSPIRONE HYDROCHLORIDE 10 MG/1
10 TABLET ORAL 3 TIMES DAILY
Qty: 90 TABLET | Refills: 0 | Status: SHIPPED | OUTPATIENT
Start: 2024-02-27 | End: 2024-06-03 | Stop reason: DRUGHIGH

## 2024-02-28 ENCOUNTER — CLINICAL SUPPORT (OUTPATIENT)
Dept: REHABILITATION | Facility: HOSPITAL | Age: 64
End: 2024-02-28
Payer: COMMERCIAL

## 2024-02-28 DIAGNOSIS — G44.86 CERVICOGENIC HEADACHE: ICD-10-CM

## 2024-02-28 DIAGNOSIS — R29.898 DECREASED ROM OF NECK: Primary | ICD-10-CM

## 2024-02-28 PROCEDURE — 97112 NEUROMUSCULAR REEDUCATION: CPT | Mod: PN

## 2024-02-28 PROCEDURE — 97140 MANUAL THERAPY 1/> REGIONS: CPT | Mod: PN

## 2024-02-28 PROCEDURE — 97110 THERAPEUTIC EXERCISES: CPT | Mod: PN

## 2024-02-28 NOTE — PROGRESS NOTES
"[facility]   Physical Therapy Treatment Note      Name: Lisandra Toussaint  Clinic Number: [id number]    Therapy Diagnosis:   Encounter Diagnoses   Name Primary?    Decreased ROM of neck Yes    Cervicogenic headache      Physician: Demetrius Shukla MD    Visit Date: 2/28/2024    Physician Orders: PT Eval and Treat   Medical Diagnosis from Referral: Headache, cervicogenic [G44.86], Cervical myofascial pain syndrome [M79.18]   Evaluation Date: 1/26/2024  Authorization Period Expiration: 1/22/2025  Plan of Care Expiration: 3/22/2024  Date of Surgery: N/A  Visit # / Visits authorized: 8/20 + 1   FOTO: 2nd performed 2/21  PTA Visit #: 0/5      Precautions: Standard     Time In: 0806  Time Out: 0902  Total Billable Time: 56 minutes     Subjective     Patient reports: she feels that needling at the end vs beginning of session makes a bigger difference in her pain. Patient woke up at 2/3 am on Tuesday and took medicine secondary to pain, but did not have to do this this morning. Patient took Tylenol at 7 am this morning  She was compliant with home exercise program.  Response to previous treatment: decreased pain  Functional change: slept through the night last night      Pain: 3/10 at arrival, 2-3/10 after exercise, 0/10 after needling    Location: Global suboccipital area    Objective      2/28:  Palpation: Tenderness rectus capitus posterior major and minor, obliquus capitus superior    Treatment     Lisandra received the treatments listed below:      therapeutic exercises to develop ROM and flexibility for 9 minutes including:    Seated cervical extension SNAGs: 5"x10  Seated cervical rotation SNAGs: 5"x10 bilateral     Sink stretch for thoracic extension: 5"x10    manual therapy techniques: were applied to the: neck for 12 minutes, including:     Functional dry needling to bilateral rectus capitus posterior major and minor, obliquus capitus superior    neuromuscular re-education activities to " "improve: Coordination, Kinesthetic, and Posture for 34 minutes. The following activities were included:    Seated:  Cervical extension/retraction isometric into towel: 5"x20  No money: green theraband 2x10   + shoulder flexion: green theraband 3x10    Standing:  Serratus wall slides with pillowcase: 2x10, x4.   Cervical flexion, extension, and side bend walkouts: Red sport's cord x20 each. Pillow case around head to prevent hair pulling    Patient Education and Home Exercises       Education provided:   - Continue home exercise program  - Needling as appropriate at end of sessions    Written Home Exercises Provided: Continue those from 1/26/2024. Exercises were reviewed and Lisandra was able to demonstrate them prior to the end of the session.  Lisandra demonstrated good  understanding of the education provided. See Electronic Medical Record under Patient Instructions for exercises provided during therapy sessions    Assessment     Lisandra is a 63 y.o. female referred to outpatient Physical Therapy with a medical diagnosis of cervicogenic headache and myofascial pain syndrome. One of the few sessions with pain reduction after exercise, despite some progressions; full resolution after needling.     Lisandra Is progressing well towards her goals.   Patient prognosis is Good.   Patient will continue to benefit from skilled outpatient physical therapy to address the deficits listed in the problem list box on initial evaluation, provide pt/family education and to maximize pt's level of independence in the home and community environment.     Patient's spiritual, cultural and educational needs considered and pt agreeable to plan of care and goals.  Anticipated barriers to physical therapy: Chronicity and intensity of headaches    Short Term Goals: 4 weeks   Patient will be 100% compliant with original HEP in order to maximize PT benefits. Met 2/5  Patient will improve cervical rotation AROM to >/=65 degrees bilaterally in order to " improve functional mobility for tasks such as driving. Met 2/5  Patient will report headache at worst over 3-week period as </=6/10 in order to improve quality of life and activity tolerance. Progressing, not met   Patient will score >/=15 seconds on DNF Endurance Test in order to improve postural endurance for standing ADLs. Progressing, not met      Long Term Goals: 8 weeks   Patient will be 100% compliant with updated HEP in order to sustain PT benefits post discharge. Progressing, not met   Patient will improve cervical rotation AROM to >/=75 degrees bilaterally in order to improve functional mobility for tasks such as driving. Progressing, not met   Patient will report headache at worst over 3-week period as </=3/10 in order to improve quality of life and activity tolerance. Progressing, not met   Patient will score >/=20 seconds on DNF Endurance Test in order to improve postural endurance for standing ADLs. Progressing, not met     Plan     Monitor headaches - functional dry needling as appropriate.   Progress cervical and periscapular strengthening/kinesthetic in upright positions as able.     Becca Griffiths, PT, DPT, OCS

## 2024-02-29 ENCOUNTER — PATIENT MESSAGE (OUTPATIENT)
Dept: NEUROSURGERY | Facility: CLINIC | Age: 64
End: 2024-02-29
Payer: COMMERCIAL

## 2024-02-29 ENCOUNTER — TELEPHONE (OUTPATIENT)
Dept: NEUROSURGERY | Facility: CLINIC | Age: 64
End: 2024-02-29
Payer: COMMERCIAL

## 2024-02-29 DIAGNOSIS — I72.9 ANEURYSM: Primary | ICD-10-CM

## 2024-03-01 ENCOUNTER — PATIENT MESSAGE (OUTPATIENT)
Dept: NEUROSURGERY | Facility: CLINIC | Age: 64
End: 2024-03-01
Payer: COMMERCIAL

## 2024-03-01 RX ORDER — PREDNISONE 10 MG/1
TABLET ORAL
Qty: 10 TABLET | Refills: 0 | Status: SHIPPED | OUTPATIENT
Start: 2024-03-01 | End: 2024-03-08 | Stop reason: SDUPTHER

## 2024-03-04 ENCOUNTER — LAB VISIT (OUTPATIENT)
Dept: LAB | Facility: HOSPITAL | Age: 64
End: 2024-03-04
Attending: NEUROLOGICAL SURGERY
Payer: COMMERCIAL

## 2024-03-04 ENCOUNTER — CLINICAL SUPPORT (OUTPATIENT)
Dept: REHABILITATION | Facility: HOSPITAL | Age: 64
End: 2024-03-04
Payer: COMMERCIAL

## 2024-03-04 DIAGNOSIS — I72.9 ANEURYSM: ICD-10-CM

## 2024-03-04 DIAGNOSIS — R29.898 DECREASED ROM OF NECK: Primary | ICD-10-CM

## 2024-03-04 DIAGNOSIS — G44.86 CERVICOGENIC HEADACHE: ICD-10-CM

## 2024-03-04 LAB
ANION GAP SERPL CALC-SCNC: 10 MMOL/L (ref 8–16)
BUN SERPL-MCNC: 25 MG/DL (ref 8–23)
CALCIUM SERPL-MCNC: 10.1 MG/DL (ref 8.7–10.5)
CHLORIDE SERPL-SCNC: 105 MMOL/L (ref 95–110)
CO2 SERPL-SCNC: 26 MMOL/L (ref 23–29)
CREAT SERPL-MCNC: 1 MG/DL (ref 0.5–1.4)
EST. GFR  (NO RACE VARIABLE): >60 ML/MIN/1.73 M^2
GLUCOSE SERPL-MCNC: 106 MG/DL (ref 70–110)
POTASSIUM SERPL-SCNC: 4.4 MMOL/L (ref 3.5–5.1)
SODIUM SERPL-SCNC: 141 MMOL/L (ref 136–145)

## 2024-03-04 PROCEDURE — 36415 COLL VENOUS BLD VENIPUNCTURE: CPT | Performed by: NEUROLOGICAL SURGERY

## 2024-03-04 PROCEDURE — 80048 BASIC METABOLIC PNL TOTAL CA: CPT | Performed by: NEUROLOGICAL SURGERY

## 2024-03-04 PROCEDURE — 97112 NEUROMUSCULAR REEDUCATION: CPT | Mod: PN

## 2024-03-04 PROCEDURE — 97140 MANUAL THERAPY 1/> REGIONS: CPT | Mod: PN

## 2024-03-04 PROCEDURE — 97110 THERAPEUTIC EXERCISES: CPT | Mod: PN

## 2024-03-04 NOTE — PROGRESS NOTES
"[facility]   Physical Therapy Treatment Note      Name: Lisandra Toussaint  Clinic Number: [id number]    Therapy Diagnosis:   Encounter Diagnoses   Name Primary?    Decreased ROM of neck Yes    Cervicogenic headache      Physician: Demetrius Shukla MD    Visit Date: 3/4/2024    Physician Orders: PT Eval and Treat   Medical Diagnosis from Referral: Headache, cervicogenic [G44.86], Cervical myofascial pain syndrome [M79.18]   Evaluation Date: 1/26/2024  Authorization Period Expiration: 1/22/2025  Plan of Care Expiration: 3/22/2024  Date of Surgery: N/A  Visit # / Visits authorized: 9/20 + 1   FOTO: 2nd performed 2/21  PTA Visit #: 0/5      Precautions: Standard     Time In: 0808  Time Out: 0908  Total Billable Time: 60 minutes     Subjective     Patient reports: she has an angiogram this Thursday. Patient would like to be needled at the end of today's visit and hold session on Wednesday.  She was compliant with home exercise program.  Response to previous treatment: decreased pain  Functional change: has only taken medicine at 2/3 AM once since last visit    Pain: 3/10 at arrival, 0/10 after exercise, 0/10 after needling    Location: Global suboccipital area    Objective      3/4: Cervical rotation active range of motion: 70 degrees, 72 degrees left     Treatment     Lisandra received the treatments listed below:      therapeutic exercises to develop ROM and flexibility for 11 minutes including objective:    Seated cervical extension SNAGs: 5"x10  Seated cervical rotation SNAGs: 5"x10 bilateral     Sink stretch for thoracic extension: 5"x10    manual therapy techniques: were applied to the: neck for 14 minutes, including:     Functional dry needling to bilateral rectus capitus posterior major and minor, obliquus capitus superior    neuromuscular re-education activities to improve: Coordination, Kinesthetic, and Posture for 35 minutes. The following activities were included:    Seated:  Cervical " "extension/retraction isometric into towel: 5"x20  No money: green theraband x15   + shoulder flexion: green theraband 3x10    Standing:  Serratus wall slides with pillowcase: 3x10  Cervical flexion, extension, and side bend walkouts: Green sport's cord x20 each. Pillow case around head to prevent hair pulling    Patient Education and Home Exercises       Education provided:   - Continue home exercise program  - Prior to angiogram, ask what activities are contraindicated afterwards and for how long    Written Home Exercises Provided: Continue those from 1/26/2024. Exercises were reviewed and Lisandra was able to demonstrate them prior to the end of the session.  Lisandra demonstrated good  understanding of the education provided. See Electronic Medical Record under Patient Instructions for exercises provided during therapy sessions    Assessment     Lisandra is a 63 y.o. female referred to outpatient Physical Therapy with a medical diagnosis of cervicogenic headache and myofascial pain syndrome. Pain and frequency of medication consumption continue to improve. Increased fatigue following serratus wall slides and no money + flexion exercises. Active cervical rotation nearing long term goal set.     Lisandra Is progressing well towards her goals.   Patient prognosis is Good.   Patient will continue to benefit from skilled outpatient physical therapy to address the deficits listed in the problem list box on initial evaluation, provide pt/family education and to maximize pt's level of independence in the home and community environment.     Patient's spiritual, cultural and educational needs considered and pt agreeable to plan of care and goals.  Anticipated barriers to physical therapy: Chronicity and intensity of headaches    Short Term Goals: 4 weeks   Patient will be 100% compliant with original HEP in order to maximize PT benefits. Met 2/5  Patient will improve cervical rotation AROM to >/=65 degrees bilaterally in order to improve " functional mobility for tasks such as driving. Met 2/5  Patient will report headache at worst over 3-week period as </=6/10 in order to improve quality of life and activity tolerance. Adjust to one week period 3/4  Patient will score >/=15 seconds on DNF Endurance Test in order to improve postural endurance for standing ADLs. Discharge 3/4     Long Term Goals: 8 weeks   Patient will be 100% compliant with updated HEP in order to sustain PT benefits post discharge. Progressing, not met   Patient will improve cervical rotation AROM to >/=75 degrees bilaterally in order to improve functional mobility for tasks such as driving. Progressing, not met   Patient will report headache at worst over 3-week period as </=3/10 in order to improve quality of life and activity tolerance. Adjust to one wee period 3/4  Patient will score >/=20 seconds on DNF Endurance Test in order to improve postural endurance for standing ADLs. Discharge 3/4    Plan     Monitor headaches - functional dry needling as appropriate.   Progress cervical and periscapular strengthening/kinesthetic in upright positions as able.     Becca Griffiths, PT, DPT, OCS

## 2024-03-06 ENCOUNTER — TELEPHONE (OUTPATIENT)
Dept: INTERVENTIONAL RADIOLOGY/VASCULAR | Facility: HOSPITAL | Age: 64
End: 2024-03-06
Payer: COMMERCIAL

## 2024-03-06 NOTE — NURSING
Pre-procedure call complete.  Pt instructed not to eat or drink anything after midnight the night before procedure.  Pt aware will need someone to provide transport home and monitor pt 8 hours post procedure.  No driving for at least 24 hours after procedure.   Patient advised to take blood pressure medications with a sip of water morning of procedure.  Patient verbalized aware of which medications to take.  Allergy prep discussed.  Do not take sleep medication (including OTC) the night before procedure.  Arrival time and location given.  Expected length of stay reviewed.  Covid screening completed.  Pt verbalized understanding of all pre-procedure instructions.

## 2024-03-07 ENCOUNTER — HOSPITAL ENCOUNTER (OUTPATIENT)
Dept: INTERVENTIONAL RADIOLOGY/VASCULAR | Facility: HOSPITAL | Age: 64
Discharge: HOME | End: 2024-03-07
Attending: NEUROLOGICAL SURGERY | Admitting: NEUROLOGICAL SURGERY
Payer: COMMERCIAL

## 2024-03-07 VITALS
TEMPERATURE: 99 F | SYSTOLIC BLOOD PRESSURE: 150 MMHG | DIASTOLIC BLOOD PRESSURE: 80 MMHG | BODY MASS INDEX: 34.99 KG/M2 | HEART RATE: 108 BPM | RESPIRATION RATE: 18 BRPM | HEIGHT: 65 IN | OXYGEN SATURATION: 96 % | WEIGHT: 210 LBS

## 2024-03-07 DIAGNOSIS — I72.9 ANEURYSM: ICD-10-CM

## 2024-03-07 PROCEDURE — 25000003 PHARM REV CODE 250: Performed by: STUDENT IN AN ORGANIZED HEALTH CARE EDUCATION/TRAINING PROGRAM

## 2024-03-07 RX ORDER — SODIUM CHLORIDE 9 MG/ML
INJECTION, SOLUTION INTRAVENOUS CONTINUOUS
Status: DISCONTINUED | OUTPATIENT
Start: 2024-03-07 | End: 2024-03-08 | Stop reason: HOSPADM

## 2024-03-07 RX ORDER — LIDOCAINE HYDROCHLORIDE 10 MG/ML
1 INJECTION, SOLUTION EPIDURAL; INFILTRATION; INTRACAUDAL; PERINEURAL ONCE
Status: DISCONTINUED | OUTPATIENT
Start: 2024-03-07 | End: 2024-03-08 | Stop reason: HOSPADM

## 2024-03-07 RX ORDER — LIDOCAINE AND PRILOCAINE 25; 25 MG/G; MG/G
CREAM TOPICAL ONCE
Status: COMPLETED | OUTPATIENT
Start: 2024-03-07 | End: 2024-03-07

## 2024-03-07 RX ADMIN — LIDOCAINE AND PRILOCAINE: 25; 25 CREAM TOPICAL at 12:03

## 2024-03-07 NOTE — H&P
Interventional Neuroradiology Pre-procedure Note    Procedure: Diagnostic cerebral angiogram    History of Present Illness:  Lisandra Toussaint is a 63 y.o. female who presents for diagnotic angiogram for recently discovered unruptured R A1 aneurysm .    ROS:   Hematological: no known coagulopathies  Respiratory: no shortness of breath  Cardiovascular: no chest pain  Gastrointestinal: no abdominal pain  Genito-Urinary: no dysuria  Musculoskeletal: negative  Neurological: no TIA or stroke symptoms     Scheduled Meds:    LIDOcaine (PF) 10 mg/ml (1%)  1 mL Other Once     Current Meds:   Current Outpatient Medications:     amLODIPine (NORVASC) 10 MG tablet, Take 1 tablet by mouth once daily, Disp: 90 tablet, Rfl: 0    busPIRone (BUSPAR) 10 MG tablet, TAKE 1 TABLET BY MOUTH THREE TIMES DAILY, Disp: 90 tablet, Rfl: 0    DULoxetine (CYMBALTA) 30 MG capsule, Take 1 capsule (30 mg total) by mouth once daily., Disp: 30 capsule, Rfl: 11    predniSONE (DELTASONE) 10 MG tablet, Pt to take 5 tabs (50mg total) 13 hours, 7 hours, 1 hour before procedure, Disp: 10 tablet, Rfl: 0    ranitidine (ZANTAC) 75 MG tablet, Take 75 mg by mouth once daily., Disp: , Rfl:     DAILY MULTI-VITAMIN ORAL, Take by mouth., Disp: , Rfl:     hydroCHLOROthiazide (MICROZIDE) 12.5 mg capsule, Take 1 capsule by mouth once daily, Disp: 90 capsule, Rfl: 0    tiZANidine (ZANAFLEX) 4 MG tablet, Take 1 tablet (4 mg total) by mouth every evening., Disp: 30 tablet, Rfl: 3    Current Facility-Administered Medications:     0.9%  NaCl infusion, , Intravenous, Continuous, Judith Berg PA-C    LIDOcaine (PF) 10 mg/ml (1%) injection 10 mg, 1 mL, Other, Once, Judith Berg PA-C   Continuous Infusions:    sodium chloride 0.9%       PRN Meds:    Allergies:   Review of patient's allergies indicates:   Allergen Reactions    Cinnamon Anaphylaxis    Iodine and iodide containing products     Macrodantin [nitrofurantoin macrocrystalline]     Povidone-iodine      Other  "reaction(s): Blister    Sulfa (sulfonamide antibiotics)      Sedation Hx: No adverse events.    Labs:              Objective:  Vitals: Blood pressure (!) 150/80, pulse 108, temperature 99 °F (37.2 °C), temperature source Temporal, resp. rate 18, height 5' 5" (1.651 m), weight 95.3 kg (210 lb), SpO2 96 %, not currently breastfeeding.     Physical Exam:  General: well developed, well nourished, no distress.   Head: normocephalic, atraumatic  Neck: No tracheal deviation. No palpable masses. Full ROM.   Neurologic: Alert and oriented. Thought content appropriate.  GCS: E4 V5 M6; Total: 15  Language: No aphasia  Speech: No dysarthria  Cranial nerves: face symmetric, tongue midline, CN II-XII grossly intact.   Eyes: pupils equal, round, reactive to light with accomodation, EOMI.   Pulmonary: normal respirations, no signs of respiratory distress  Abdomen: soft, non-distended, not tender to palpation  Vascular: Pulses 2+ and symmetric radial and dorsalis pedis. No LE edema.   Skin: Skin is warm, dry and intact.  Sensory: intact to light touch throughout  Motor Strength: Moves all extremities spontaneously with good tone.  Full strength upper and lower extremities. No abnormal movements seen.     ASA: 2  MAL: 2    Plan:  -Plan for cerebral angiogram   -Sedation Plan: moderate sedation   -All diagnostics and imaging reviewed  -Patient NPO since MN  -Risks & benefits of procedure explained in detail; patient consented and all questions answered  -Further reccs to follow procedure          Marcello Taylor MD, MHA  Fellow, NeuroEndovascular Surgery, [facility] [place]  Neurologist, [facility] [place]    "

## 2024-03-08 ENCOUNTER — TELEPHONE (OUTPATIENT)
Dept: NEUROSURGERY | Facility: CLINIC | Age: 64
End: 2024-03-08
Payer: COMMERCIAL

## 2024-03-08 DIAGNOSIS — I72.9 ANEURYSM: Primary | ICD-10-CM

## 2024-03-08 RX ORDER — PREDNISONE 50 MG/1
TABLET ORAL
Qty: 3 TABLET | Refills: 0 | Status: SHIPPED | OUTPATIENT
Start: 2024-03-08 | End: 2024-04-03

## 2024-03-08 NOTE — TELEPHONE ENCOUNTER
Called to follow up angiogram. Pt stated she did not have due to two emergent cases yesterday. Rescheduled for 03/21/24. Pt verbalized understanding.

## 2024-03-11 ENCOUNTER — CLINICAL SUPPORT (OUTPATIENT)
Dept: REHABILITATION | Facility: HOSPITAL | Age: 64
End: 2024-03-11
Payer: COMMERCIAL

## 2024-03-11 DIAGNOSIS — G44.86 CERVICOGENIC HEADACHE: ICD-10-CM

## 2024-03-11 DIAGNOSIS — R29.898 DECREASED ROM OF NECK: Primary | ICD-10-CM

## 2024-03-11 PROCEDURE — 97110 THERAPEUTIC EXERCISES: CPT | Mod: PN

## 2024-03-11 PROCEDURE — 97140 MANUAL THERAPY 1/> REGIONS: CPT | Mod: PN

## 2024-03-11 PROCEDURE — 97112 NEUROMUSCULAR REEDUCATION: CPT | Mod: PN

## 2024-03-11 NOTE — DISCHARGE SUMMARY
Hospital Course: No complications    Admit Date: 3/7/2024  Discharge Date: 03/11/2024     Instructions Given to Patient: Yes  Diet: regular diet and Resume prior diet  Activity: activity as tolerated    Description of Condition on Discharge: Stable  Vital Signs (Most Recent): Temp: 99 °F (37.2 °C) (03/07/24 0840)  Pulse: 108 (03/07/24 0840)  Resp: 18 (03/07/24 0840)  BP: (!) 150/80 (03/07/24 0843)  SpO2: 96 % (03/07/24 0840)    Discharge Disposition: Home    Discharge Diagnosis: Unruptured R A1 aneurysm .      Follow-up: Rescheduling for the DSA      Marcello Taylor MD, MHA  Fellow, NeuroEndovascular Surgery, [facility] [place]  Neurologist, [facility] [place], [place]

## 2024-03-11 NOTE — PROGRESS NOTES
"[facility]   Physical Therapy Treatment Note      Name: Lisandra Toussaint  Clinic Number: [id number]    Therapy Diagnosis:   Encounter Diagnoses   Name Primary?    Decreased ROM of neck Yes    Cervicogenic headache      Physician: Demetrius Shukla MD    Visit Date: 3/11/2024    Physician Orders: PT Eval and Treat   Medical Diagnosis from Referral: Headache, cervicogenic [G44.86], Cervical myofascial pain syndrome [M79.18]   Evaluation Date: 1/26/2024  Authorization Period Expiration: 1/22/2025  Plan of Care Expiration: 3/22/2024  Date of Surgery: N/A  Visit # / Visits authorized: 10/20 + 1   FOTO: 2nd performed 2/21  PTA Visit #: 0/5      Precautions: Standard     Time In: 0806  Time Out: 0900  Total Billable Time: 54 minutes     Subjective     Patient reports: angiogram was rescheduled secondary to emergency stroke patients. Patient reports increased headache that evening secondary to wait time without water and prolonged sitting in chair and hospital bed during wait.  She was compliant with home exercise program.  Response to previous treatment: decreased pain  Functional change: same - only took medicine at 2/3 AM once since last visit    Pain: 4/10 at arrival, 0/10 after needling    Location: Global suboccipital area    Objective      Objective Measures updated at progress report unless specified.      Treatment     Lisandra received the treatments listed below:      therapeutic exercises to develop ROM and flexibility for 8 minutes including objective:    Seated cervical extension SNAGs: 5"x10  Seated cervical rotation SNAGs: 5"x10 bilateral     Sink stretch for thoracic extension: 5"x10    manual therapy techniques: were applied to the: neck for 18 minutes, including:     Functional dry needling to bilateral rectus capitus posterior major and minor, obliquus capitus superior    neuromuscular re-education activities to improve: Coordination, Kinesthetic, and Posture for 30 minutes. The " "following activities were included:    Seated:  Cervical extension/retraction isometric into towel: 5"x20. 2 rounds.  No money + shoulder flexion: green theraband 3x10    Standing:  Serratus wall slides with pillowcase: 3x10  Cervical flexion, extension, and side bend walkouts: Green sport's cord x30 each. Pillow case around head to prevent hair pulling    Patient Education and Home Exercises       Education provided:   - Continue home exercise program    Written Home Exercises Provided: Continue those from 1/26/2024. Exercises were reviewed and Lisnadra was able to demonstrate them prior to the end of the session.  Lisandra demonstrated good  understanding of the education provided. See Electronic Medical Record under Patient Instructions for exercises provided during therapy sessions    Assessment     Lisandra is a 63 y.o. female referred to outpatient Physical Therapy with a medical diagnosis of cervicogenic headache and myofascial pain syndrome. Pain increased today however frequency of medication consumption did not increase. Similar exercise prescription secondary to increased pain. Greater guarding during initial dry needle insertion however pain nearly resolved afterwards.     Lisandra Is progressing well towards her goals.   Patient prognosis is Good.   Patient will continue to benefit from skilled outpatient physical therapy to address the deficits listed in the problem list box on initial evaluation, provide pt/family education and to maximize pt's level of independence in the home and community environment.     Patient's spiritual, cultural and educational needs considered and pt agreeable to plan of care and goals.  Anticipated barriers to physical therapy: Chronicity and intensity of headaches    Short Term Goals: 4 weeks   Patient will be 100% compliant with original HEP in order to maximize PT benefits. Met 2/5  Patient will improve cervical rotation AROM to >/=65 degrees bilaterally in order to improve functional " mobility for tasks such as driving. Met 2/5  Patient will report headache at worst over 3-week period as </=6/10 in order to improve quality of life and activity tolerance. Adjust to one week period 3/4  Patient will score >/=15 seconds on DNF Endurance Test in order to improve postural endurance for standing ADLs. Discharge 3/4     Long Term Goals: 8 weeks   Patient will be 100% compliant with updated HEP in order to sustain PT benefits post discharge. Progressing, not met   Patient will improve cervical rotation AROM to >/=75 degrees bilaterally in order to improve functional mobility for tasks such as driving. Progressing, not met   Patient will report headache at worst over 3-week period as </=3/10 in order to improve quality of life and activity tolerance. Adjust to one wee period 3/4  Patient will score >/=20 seconds on DNF Endurance Test in order to improve postural endurance for standing ADLs. Discharge 3/4    Plan     Monitor headaches - functional dry needling as appropriate.   Progress cervical and periscapular strengthening/kinesthetic in upright positions as able.     Becca Griffiths, PT, DPT, OCS

## 2024-03-13 ENCOUNTER — PATIENT MESSAGE (OUTPATIENT)
Dept: PRIMARY CARE CLINIC | Facility: CLINIC | Age: 64
End: 2024-03-13
Payer: COMMERCIAL

## 2024-03-13 ENCOUNTER — CLINICAL SUPPORT (OUTPATIENT)
Dept: REHABILITATION | Facility: HOSPITAL | Age: 64
End: 2024-03-13
Payer: COMMERCIAL

## 2024-03-13 ENCOUNTER — TELEPHONE (OUTPATIENT)
Dept: NEUROSURGERY | Facility: CLINIC | Age: 64
End: 2024-03-13
Payer: COMMERCIAL

## 2024-03-13 DIAGNOSIS — R29.898 DECREASED ROM OF NECK: Primary | ICD-10-CM

## 2024-03-13 DIAGNOSIS — G44.86 CERVICOGENIC HEADACHE: ICD-10-CM

## 2024-03-13 DIAGNOSIS — F33.9 DEPRESSION, RECURRENT: Primary | ICD-10-CM

## 2024-03-13 PROCEDURE — 97112 NEUROMUSCULAR REEDUCATION: CPT | Mod: PN

## 2024-03-13 PROCEDURE — 97110 THERAPEUTIC EXERCISES: CPT | Mod: PN

## 2024-03-13 PROCEDURE — 97530 THERAPEUTIC ACTIVITIES: CPT | Mod: PN

## 2024-03-13 RX ORDER — DULOXETIN HYDROCHLORIDE 60 MG/1
60 CAPSULE, DELAYED RELEASE ORAL DAILY
Qty: 90 CAPSULE | Refills: 3 | Status: SHIPPED | OUTPATIENT
Start: 2024-03-13 | End: 2025-03-13

## 2024-03-13 NOTE — TELEPHONE ENCOUNTER
Called and spoke with pt. Instructed to arrive to [facility] for 10am on 03/21/24. Pt verbalized understanding.

## 2024-03-13 NOTE — TELEPHONE ENCOUNTER
Spoke with patient about decrease in Cymbalta, do not know why it was decreased.  Did resend the Cymbalta 60 mg dose to her pharmacy.  Also discussed her elevated blood pressure I think this is a combination of the prednisone, only getting half the dose of the Cymbalta and her every day continue with stress with taking care of her fiance.  She will send me some blood pressure readings over the next few days and will decide if we need to add something to her regimen

## 2024-03-14 ENCOUNTER — PATIENT MESSAGE (OUTPATIENT)
Dept: NEUROLOGY | Facility: CLINIC | Age: 64
End: 2024-03-14
Payer: COMMERCIAL

## 2024-03-15 NOTE — PROGRESS NOTES
"[facility]   Physical Therapy Treatment Note      Name: Liasndra Toussaint  Clinic Number: [id number]    Therapy Diagnosis:   Encounter Diagnoses   Name Primary?    Decreased ROM of neck Yes    Cervicogenic headache      Physician: Demetrius Shukla MD    Visit Date: 3/13/2024    Physician Orders: PT Eval and Treat   Medical Diagnosis from Referral: Headache, cervicogenic [G44.86], Cervical myofascial pain syndrome [M79.18]   Evaluation Date: 1/26/2024  Authorization Period Expiration: 1/22/2025  Plan of Care Expiration: 3/22/2024  Date of Surgery: N/A  Visit # / Visits authorized: 11/20 + 1   FOTO: 2nd performed 2/21  PTA Visit #: 0/5      Precautions: Standard     Time In: 0815  Time Out: 0905  Total Billable Time: 40 minutes     Subjective     Patient reports: pain is better since Monday's visit. Patient has felt rushed this morning.  She was compliant with home exercise program.  Response to previous treatment: decreased pain  Functional change: pain at worst a 3/10 since last visit. Took medicine at 2/3 AM this morning but not Tuesday    Pain: 2/10 at arrival, 1/10 at end    Location: Global suboccipital area    Objective      Objective Measures updated at progress report unless specified.      Treatment     Lisandra received the treatments listed below:      therapeutic exercises to develop ROM and flexibility for 8 minutes including:    Seated cervical extension SNAGs: 5"x10  Seated cervical rotation SNAGs: 5"x10 bilateral     Sink stretch for thoracic extension: 5"x10    neuromuscular re-education activities to improve: Coordination, Kinesthetic, and Posture for 19 minutes. The following activities were included:    Seated:  Cervical extension/retraction isometric into towel: 5"x10. 2 rounds    Standing:  Serratus wall slides with pillowcase: 3x10  Cervical flexion, extension, and side bend walkouts: Blue sport's cord x20 each. Pillow case around head to prevent hair " pulling    therapeutic activities to improve functional performance for 13 minutes, including:    Chops: green theraband x10 bilateral  Lifts: green theraband x10 bilateral   Unilateral farmer's carry: 12 lbs, 2x160 feet bilateral     Patient Education and Home Exercises       Education provided:   - Continue home exercise program  - Reassured patient that late arrival is no issue at all. Patient has excellent attendance. Things happen and therapist is aware of that.    Written Home Exercises Provided: Continue those from 1/26/2024. Exercises were reviewed and Lisandra was able to demonstrate them prior to the end of the session.  Lisandra demonstrated good  understanding of the education provided. See Electronic Medical Record under Patient Instructions for exercises provided during therapy sessions    Assessment     Lisandra is a 63 y.o. female referred to outpatient Physical Therapy with a medical diagnosis of cervicogenic headache and myofascial pain syndrome. Decided to trial no needling at end of visit secondary to only mild pain since last visit. Fatigue with introduction of functional activities.     Lisandra Is progressing well towards her goals.   Patient prognosis is Good.   Patient will continue to benefit from skilled outpatient physical therapy to address the deficits listed in the problem list box on initial evaluation, provide pt/family education and to maximize pt's level of independence in the home and community environment.     Patient's spiritual, cultural and educational needs considered and pt agreeable to plan of care and goals.  Anticipated barriers to physical therapy: Chronicity and intensity of headaches    Short Term Goals: 4 weeks   Patient will be 100% compliant with original HEP in order to maximize PT benefits. Met 2/5  Patient will improve cervical rotation AROM to >/=65 degrees bilaterally in order to improve functional mobility for tasks such as driving. Met 2/5  Patient will report headache at  worst over 1-week period as </=6/10 in order to improve quality of life and activity tolerance. Adjust to one week period 3/4  Patient will score >/=15 seconds on DNF Endurance Test in order to improve postural endurance for standing ADLs. Discharge 3/4     Long Term Goals: 8 weeks   Patient will be 100% compliant with updated HEP in order to sustain PT benefits post discharge. Progressing, not met   Patient will improve cervical rotation AROM to >/=75 degrees bilaterally in order to improve functional mobility for tasks such as driving. Progressing, not met   Patient will report headache at worst over 3-week period as </=3/10 in order to improve quality of life and activity tolerance. Adjust to one wee period 3/4  Patient will score >/=20 seconds on DNF Endurance Test in order to improve postural endurance for standing ADLs. Discharge 3/4    Plan     Monitor headaches - functional dry needling as appropriate.   Progress cervical and periscapular strengthening/kinesthetic and upper extremity functional activities as tolerated.      Becca Griffiths, PT, DPT, OCS

## 2024-03-18 ENCOUNTER — CLINICAL SUPPORT (OUTPATIENT)
Dept: REHABILITATION | Facility: HOSPITAL | Age: 64
End: 2024-03-18
Payer: COMMERCIAL

## 2024-03-18 DIAGNOSIS — G44.86 CERVICOGENIC HEADACHE: ICD-10-CM

## 2024-03-18 DIAGNOSIS — R29.898 DECREASED ROM OF NECK: Primary | ICD-10-CM

## 2024-03-18 PROCEDURE — 97112 NEUROMUSCULAR REEDUCATION: CPT | Mod: PN

## 2024-03-18 PROCEDURE — 97140 MANUAL THERAPY 1/> REGIONS: CPT | Mod: PN

## 2024-03-18 NOTE — PROGRESS NOTES
"[facility]   Physical Therapy Treatment Note      Name: Lisandra Toussaint  Clinic Number: [id number]    Therapy Diagnosis:   Encounter Diagnoses   Name Primary?    Decreased ROM of neck Yes    Cervicogenic headache      Physician: Demetrius Shukla MD    Visit Date: 3/18/2024    Physician Orders: PT Eval and Treat   Medical Diagnosis from Referral: Headache, cervicogenic [G44.86], Cervical myofascial pain syndrome [M79.18]   Evaluation Date: 1/26/2024  Authorization Period Expiration: 1/22/2025  Plan of Care Expiration: 3/22/2024  Date of Surgery: N/A  Visit # / Visits authorized: 12/20 + 1   FOTO: 2nd performed 2/21  PTA Visit #: 0/5      Precautions: Standard     Time In: 0801  Time Out: 0902  Total Billable Time: 61 minutes     Subjective     Patient reports: she had to take medicine at 2/3 AM two times since last visit  She was compliant with home exercise program.  Response to previous treatment: decreased pain  Functional change: improved neck and shoulder strength making it easier to maintain upright posture and carry items    Pain: 3/10 at arrival, 0/10 at end    Location: Global suboccipital area    Objective      Objective Measures updated at progress report unless specified.      Treatment     Lisandra received the treatments listed below:      therapeutic exercises to develop ROM and flexibility for 5 minutes including:    Seated cervical extension SNAGs: 5"x10  Seated cervical rotation SNAGs: 5"x10 bilateral     neuromuscular re-education activities to improve: Coordination, Kinesthetic, and Posture for 41 minutes. The following activities were included:    Prone:  Cervical retraction: 2x10  T's with thumbs up: x8 bilateral  Y's with thumbs up: x8 bilateral    Sidelying cervical side bend: 2x10 bilateral   Seated cervical extension/retraction isometric into towel: 5"x10. 2 rounds    Standing:  Serratus wall slides with pillowcase: 3x12  Cervical flexion, extension, and side bend " walkouts: Blue sport's cord x30 each. Pillow case around head to prevent hair pulling    manual therapy techniques: were applied to the: neck for 15 minutes, including:     Functional dry needling to bilateral rectus capitus posterior major and minor, obliquus capitus superior. Patient was agreeable to dry needling by a certified dry needling PT offered verbal consent prior to treatment after being made aware of risks. 25 mm needles were inserted with periosteal pecking and winding for grasp. Afterwards, needles were removed and placed into a sharps container. Patient reported a good response to treatment.    Patient Education and Home Exercises       Education provided:   - Continue home exercise program    Written Home Exercises Provided: Continue those from 1/26/2024. Exercises were reviewed and Lisandra was able to demonstrate them prior to the end of the session.  Lisandra demonstrated good  understanding of the education provided. See Electronic Medical Record under Patient Instructions for exercises provided during therapy sessions    Assessment     Lisandra is a 63 y.o. female referred to outpatient Physical Therapy with a medical diagnosis of cervicogenic headache and myofascial pain syndrome. Progressed periscapular kinesthetic activities in prone and cervical strengthening to isotonic against gravity with increased fatigue. Resumed needling secondary to mild increase in sleep interruption frequency secondary to pain. Absent pain afterwards.    Lisandra Is progressing well towards her goals.   Patient prognosis is Good.   Patient will continue to benefit from skilled outpatient physical therapy to address the deficits listed in the problem list box on initial evaluation, provide pt/family education and to maximize pt's level of independence in the home and community environment.     Patient's spiritual, cultural and educational needs considered and pt agreeable to plan of care and goals.  Anticipated barriers to physical  therapy: Chronicity and intensity of headaches    Short Term Goals: 4 weeks   Patient will be 100% compliant with original HEP in order to maximize PT benefits. Met 2/5  Patient will improve cervical rotation AROM to >/=65 degrees bilaterally in order to improve functional mobility for tasks such as driving. Met 2/5  Patient will report headache at worst over 1-week period as </=6/10 in order to improve quality of life and activity tolerance. Adjust to one week period 3/4  Patient will score >/=15 seconds on DNF Endurance Test in order to improve postural endurance for standing ADLs. Discharge 3/4     Long Term Goals: 8 weeks   Patient will be 100% compliant with updated HEP in order to sustain PT benefits post discharge. Progressing, not met   Patient will improve cervical rotation AROM to >/=75 degrees bilaterally in order to improve functional mobility for tasks such as driving. Progressing, not met   Patient will report headache at worst over 3-week period as </=3/10 in order to improve quality of life and activity tolerance. Adjust to one wee period 3/4  Patient will score >/=20 seconds on DNF Endurance Test in order to improve postural endurance for standing ADLs. Discharge 3/4    Plan     Plan of care update next visit.    Monitor headaches - functional dry needling as appropriate.   Progress cervical and periscapular strengthening/kinesthetic as tolerated.    Resume/progress functional activities next.    Becca Griffiths, PT, DPT, OCS

## 2024-03-20 ENCOUNTER — TELEPHONE (OUTPATIENT)
Dept: INTERVENTIONAL RADIOLOGY/VASCULAR | Facility: HOSPITAL | Age: 64
End: 2024-03-20
Payer: COMMERCIAL

## 2024-03-20 ENCOUNTER — CLINICAL SUPPORT (OUTPATIENT)
Dept: REHABILITATION | Facility: HOSPITAL | Age: 64
End: 2024-03-20
Payer: COMMERCIAL

## 2024-03-20 DIAGNOSIS — R29.898 DECREASED ROM OF NECK: Primary | ICD-10-CM

## 2024-03-20 DIAGNOSIS — G44.86 CERVICOGENIC HEADACHE: ICD-10-CM

## 2024-03-20 PROCEDURE — 97110 THERAPEUTIC EXERCISES: CPT | Mod: PN

## 2024-03-20 PROCEDURE — 97140 MANUAL THERAPY 1/> REGIONS: CPT | Mod: PN

## 2024-03-20 PROCEDURE — 97112 NEUROMUSCULAR REEDUCATION: CPT | Mod: PN

## 2024-03-20 NOTE — NURSING
Pre-procedure call complete.  Pt instructed not to eat or drink anything after midnight the night before procedure.  Pt aware will need someone to provide transport home and monitor pt 8 hours post procedure.  No driving for 3 days after procedure.   Patient advised to take blood pressure, heart medications, with a sip of water morning of procedure.  Patient verbalized aware of which medications to take.  Do not take sleep medication (including OTC) and anxiety medication the night before procedure.  Arrival time and location given.  Expected length of stay reviewed.  Covid screening completed.  Pt verbalized understanding of all pre-procedure instructions.

## 2024-03-20 NOTE — PLAN OF CARE
[facility]  Physical Therapy Plan of Care Note     Name: Lisandra Toussaint  Clinic Number: [id number]    Therapy Diagnosis:   Encounter Diagnoses   Name Primary?    Decreased ROM of neck Yes    Cervicogenic headache      Physician: Demetrius Shukla MD    Visit Date: 3/20/2024    Physician Orders: PT Eval and Treat   Medical Diagnosis from Referral: Headache, cervicogenic [G44.86], Cervical myofascial pain syndrome [M79.18]   Evaluation Date: 1/26/2024  Authorization Period Expiration: 1/22/2025  Plan of Care Expiration: 3/22/2024  Date of Surgery: N/A  Visit # / Visits authorized: 13/20 + 1   FOTO: 2nd performed 2/21  PTA Visit #: 0/5      Time In: 0804  Time Out: 0902  Total Billable Time: 58 minutes     Precautions: Standard  Functional Level Prior to Evaluation:  No headaches    SUBJECTIVE     Update: 65-70% improvement in neck pain and headaches since evaluation. Patient would like to continue to resolve fully    OBJECTIVE     Update:   Cervical rotation: 73 degrees right, 71 degrees left     ASSESSMENT     Update: Lisandra is a 63 y.o. female referred to outpatient Physical Therapy with a medical diagnosis of cervicogenic headache and myofascial pain syndrome. Pain improving; reduction medication consumption more gradual as patient usually requires it before bed and upon waking. Cervical rotation nearing long term goal. Successful transition from cervical isometrics to isotonics against gravity with reduction in pain. Dry needling consistently alleviates pain; removed needle in left rectus capitus posterior major secondary to irritation with immediate relief.     Lisandra Is progressing well towards her goals.   Patient prognosis is Good.   Patient will continue to benefit from skilled outpatient physical therapy to address the deficits listed in the problem list box on initial evaluation, provide pt/family education and to maximize pt's level of independence in the home and community  environment.     Patient's spiritual, cultural and educational needs considered and pt agreeable to plan of care and goals.  Anticipated barriers to physical therapy: Chronicity and intensity of headaches    Short Term Goals: 4 weeks   Patient will be 100% compliant with original HEP in order to maximize PT benefits. Met 2/5  Patient will improve cervical rotation AROM to >/=65 degrees bilaterally in order to improve functional mobility for tasks such as driving. Met 2/5  Patient will report headache at worst over 1-week period as </=6/10 in order to improve quality of life and activity tolerance. Met 3/20  Patient will score >/=15 seconds on DNF Endurance Test in order to improve postural endurance for standing ADLs. Discharge 3/4     Long Term Goals: 8 weeks   Patient will be 100% compliant with updated HEP in order to sustain PT benefits post discharge. Met 3/20  Patient will improve cervical rotation AROM to >/=75 degrees bilaterally in order to improve functional mobility for tasks such as driving. Progressing, not met   Patient will report headache at worst over 1-week period as </=3/10 in order to improve quality of life and activity tolerance. Met 3/20  Patient will score >/=20 seconds on DNF Endurance Test in order to improve postural endurance for standing ADLs. Discharge 3/4  Patient will deny pain/headaches for 2 consecutive sessions to promote quality of life       New Short Term Goals Status: Met/discharged  Long Term Goal Status: Continue goals 6 and 9  Reasons for Recertification of Therapy: Plan of care expires Friday    PLAN     Updated Certification Period: 3/20/2024 to 4/19/2024   Recommended Treatment Plan: 2 times per week for 4 weeks:  Cervical/Lumbar Traction, Electrical Stimulation -, Manual Therapy, Moist Heat/ Ice, Neuromuscular Re-ed, Patient Education, Self Care, Therapeutic Activities, and Therapeutic Exercise  Other Recommendations: Dry needling as appropriate    Becca Griffiths, PT,  DPT, OCS

## 2024-03-20 NOTE — PROGRESS NOTES
"[facility]   Physical Therapy Treatment Note      Name: Lisandra Toussaint  Clinic Number: [id number]    Therapy Diagnosis:   Encounter Diagnoses   Name Primary?    Decreased ROM of neck Yes    Cervicogenic headache      Physician: Demetrius Shukla MD    Visit Date: 3/20/2024    Physician Orders: PT Eval and Treat   Medical Diagnosis from Referral: Headache, cervicogenic [G44.86], Cervical myofascial pain syndrome [M79.18]   Evaluation Date: 1/26/2024  Authorization Period Expiration: 1/22/2025  Plan of Care Expiration: 3/22/2024  Date of Surgery: N/A  Visit # / Visits authorized: 13/20 + 1   FOTO: 2nd performed 2/21  PTA Visit #: 0/5      Precautions: Standard     Time In: 0804  Time Out: 0902  Total Billable Time: 58 minutes     Subjective     Patient reports: did not have to take medicine at 2/3 AM since Monday. Patient reports blister on left hip from wearing   She was compliant with home exercise program.  Response to previous treatment: felt really good Monday and Tuesday  Functional change: increased cervical range of motion     Pain: 4/10 at arrival, 0/10 at end    Location: Global suboccipital area    Objective      See updated plan of care in Treatment section.     Treatment     Lisandra received the treatments listed below:      therapeutic exercises to develop ROM and flexibility for 12 minutes including objective:    Seated cervical extension SNAGs: 5"x10. 2 rounds. Verbal cues to increase range  Seated cervical rotation SNAGs: 5"x10 bilateral. 2 rounds. Verbal cues to increase range    Seated thoracic extension over teal chair back: 5"x20 with hands laced behind neck    neuromuscular re-education activities to improve: Coordination, Kinesthetic, and Posture for 32 minutes. The following activities were included:    Prone:  Cervical retraction: 2x10  T's with thumbs up: 2x10 bilateral  Y's with thumbs up: 2x10 bilateral    Sidelying cervical side bend: 2x10 bilateral   Seated " "cervical extension/retraction isometric into towel: 5"x10. 2 rounds    Standing:  Serratus wall slides: yellow theraband 3x12; pillowcase used to increase ease of slide    manual therapy techniques: were applied to the: neck for 14 minutes, including:     Functional dry needling to bilateral rectus capitus posterior major and minor, obliquus capitus superior. Patient was agreeable to dry needling by a certified dry needling PT offered verbal consent prior to treatment after being made aware of risks. 25 mm needles were inserted with periosteal pecking and winding for grasp. Afterwards, needles were removed and placed into a sharps container. Patient reported a good response to treatment.    Patient Education and Home Exercises       Education provided:   - Continue home exercise program    Written Home Exercises Provided: Continue those from 1/26/2024. Exercises were reviewed and Lisandra was able to demonstrate them prior to the end of the session.  Lisandra demonstrated good  understanding of the education provided. See Electronic Medical Record under Patient Instructions for exercises provided during therapy sessions    Assessment     See updated plan of care in Treatment section.     Plan     See updated plan of care in Treatment section.     Monitor headaches - functional dry needling as appropriate.   Progress cervical and periscapular strengthening/kinesthetic as tolerated.    Resume/progress functional activities next.    Becca Griffiths, PT, DPT, OCS    "

## 2024-03-21 ENCOUNTER — HOSPITAL ENCOUNTER (OUTPATIENT)
Dept: INTERVENTIONAL RADIOLOGY/VASCULAR | Facility: HOSPITAL | Age: 64
Discharge: HOME | End: 2024-03-21
Attending: NEUROLOGICAL SURGERY | Admitting: NEUROLOGICAL SURGERY
Payer: COMMERCIAL

## 2024-03-21 ENCOUNTER — PATIENT MESSAGE (OUTPATIENT)
Dept: PRIMARY CARE CLINIC | Facility: CLINIC | Age: 64
End: 2024-03-21
Payer: COMMERCIAL

## 2024-03-21 VITALS
TEMPERATURE: 97 F | WEIGHT: 210 LBS | RESPIRATION RATE: 18 BRPM | OXYGEN SATURATION: 95 % | HEART RATE: 102 BPM | SYSTOLIC BLOOD PRESSURE: 126 MMHG | HEIGHT: 65 IN | DIASTOLIC BLOOD PRESSURE: 63 MMHG | BODY MASS INDEX: 34.99 KG/M2

## 2024-03-21 DIAGNOSIS — I72.9 ANEURYSM: ICD-10-CM

## 2024-03-21 PROCEDURE — 76377 3D RENDER W/INTRP POSTPROCES: CPT | Mod: 26,,, | Performed by: NEUROLOGICAL SURGERY

## 2024-03-21 PROCEDURE — 63600175 PHARM REV CODE 636 W HCPCS: Performed by: PSYCHIATRY & NEUROLOGY

## 2024-03-21 PROCEDURE — 76377 3D RENDER W/INTRP POSTPROCES: CPT | Mod: TC | Performed by: NEUROLOGICAL SURGERY

## 2024-03-21 PROCEDURE — 25500020 PHARM REV CODE 255: Performed by: NEUROLOGICAL SURGERY

## 2024-03-21 PROCEDURE — 63600175 PHARM REV CODE 636 W HCPCS: Performed by: STUDENT IN AN ORGANIZED HEALTH CARE EDUCATION/TRAINING PROGRAM

## 2024-03-21 PROCEDURE — 36226 PLACE CATH VERTEBRAL ART: CPT | Mod: LT | Performed by: NEUROLOGICAL SURGERY

## 2024-03-21 PROCEDURE — 27201076 IR ANGIOGRAM CAROTID INTERNAL INC ARCH AND CEREBRAL BILAT

## 2024-03-21 PROCEDURE — 25000003 PHARM REV CODE 250: Performed by: STUDENT IN AN ORGANIZED HEALTH CARE EDUCATION/TRAINING PROGRAM

## 2024-03-21 PROCEDURE — 25000003 PHARM REV CODE 250

## 2024-03-21 PROCEDURE — 36224 PLACE CATH CAROTD ART: CPT | Mod: 50,,, | Performed by: NEUROLOGICAL SURGERY

## 2024-03-21 PROCEDURE — 36224 PLACE CATH CAROTD ART: CPT | Mod: 50 | Performed by: NEUROLOGICAL SURGERY

## 2024-03-21 PROCEDURE — 36226 PLACE CATH VERTEBRAL ART: CPT | Mod: 51,LT,, | Performed by: NEUROLOGICAL SURGERY

## 2024-03-21 RX ORDER — SALIVA STIMULANT COMB. NO.4
1 SPRAY, NON-AEROSOL (ML) MUCOUS MEMBRANE DAILY
COMMUNITY

## 2024-03-21 RX ORDER — SODIUM CHLORIDE 9 MG/ML
INJECTION, SOLUTION INTRAVENOUS CONTINUOUS
Status: DISCONTINUED | OUTPATIENT
Start: 2024-03-21 | End: 2024-03-22 | Stop reason: HOSPADM

## 2024-03-21 RX ORDER — LIDOCAINE HYDROCHLORIDE 10 MG/ML
INJECTION INFILTRATION; PERINEURAL
Status: COMPLETED | OUTPATIENT
Start: 2024-03-21 | End: 2024-03-21

## 2024-03-21 RX ORDER — FENTANYL CITRATE 50 UG/ML
INJECTION, SOLUTION INTRAMUSCULAR; INTRAVENOUS
Status: COMPLETED | OUTPATIENT
Start: 2024-03-21 | End: 2024-03-21

## 2024-03-21 RX ORDER — MIDAZOLAM HYDROCHLORIDE 1 MG/ML
INJECTION, SOLUTION INTRAMUSCULAR; INTRAVENOUS
Status: COMPLETED | OUTPATIENT
Start: 2024-03-21 | End: 2024-03-21

## 2024-03-21 RX ORDER — IODIXANOL 270 MG/ML
100 INJECTION, SOLUTION INTRAVASCULAR
Status: COMPLETED | OUTPATIENT
Start: 2024-03-21 | End: 2024-03-21

## 2024-03-21 RX ORDER — LIDOCAINE HYDROCHLORIDE 10 MG/ML
1 INJECTION, SOLUTION EPIDURAL; INFILTRATION; INTRACAUDAL; PERINEURAL ONCE
Status: DISCONTINUED | OUTPATIENT
Start: 2024-03-21 | End: 2024-03-22 | Stop reason: HOSPADM

## 2024-03-21 RX ORDER — LIDOCAINE AND PRILOCAINE 25; 25 MG/G; MG/G
CREAM TOPICAL ONCE
Status: COMPLETED | OUTPATIENT
Start: 2024-03-21 | End: 2024-03-21

## 2024-03-21 RX ORDER — SODIUM CHLORIDE 0.9 % (FLUSH) 0.9 %
10 SYRINGE (ML) INJECTION
Status: DISCONTINUED | OUTPATIENT
Start: 2024-03-21 | End: 2024-03-22 | Stop reason: HOSPADM

## 2024-03-21 RX ADMIN — FENTANYL CITRATE 25 MCG: 50 INJECTION, SOLUTION INTRAMUSCULAR; INTRAVENOUS at 02:03

## 2024-03-21 RX ADMIN — MIDAZOLAM HYDROCHLORIDE 1 MG: 1 INJECTION, SOLUTION INTRAMUSCULAR; INTRAVENOUS at 01:03

## 2024-03-21 RX ADMIN — FENTANYL CITRATE 50 MCG: 50 INJECTION, SOLUTION INTRAMUSCULAR; INTRAVENOUS at 02:03

## 2024-03-21 RX ADMIN — LIDOCAINE AND PRILOCAINE: 25; 25 CREAM TOPICAL at 12:03

## 2024-03-21 RX ADMIN — IODIXANOL 70 ML: 270 INJECTION, SOLUTION INTRAVASCULAR at 04:03

## 2024-03-21 RX ADMIN — FENTANYL CITRATE 50 MCG: 50 INJECTION, SOLUTION INTRAMUSCULAR; INTRAVENOUS at 01:03

## 2024-03-21 RX ADMIN — FENTANYL CITRATE 25 MCG: 50 INJECTION, SOLUTION INTRAMUSCULAR; INTRAVENOUS at 01:03

## 2024-03-21 RX ADMIN — MIDAZOLAM HYDROCHLORIDE 0.5 MG: 1 INJECTION, SOLUTION INTRAMUSCULAR; INTRAVENOUS at 01:03

## 2024-03-21 RX ADMIN — MIDAZOLAM HYDROCHLORIDE 0.5 MG: 1 INJECTION, SOLUTION INTRAMUSCULAR; INTRAVENOUS at 02:03

## 2024-03-21 RX ADMIN — LIDOCAINE HYDROCHLORIDE 5 ML: 10 INJECTION, SOLUTION INFILTRATION; PERINEURAL at 01:03

## 2024-03-21 RX ADMIN — HEPARIN SODIUM 3000 ML: 1000 INJECTION, SOLUTION INTRAVENOUS; SUBCUTANEOUS at 01:03

## 2024-03-21 NOTE — DISCHARGE INSTRUCTIONS
Please call with any questions or concerns.      Monday thru Friday 8:00 am - 4:30 pm    Interventional Radiology   [phone]    After Hours    Ask for the Radiology Resident on call  [phone]      Post-Angiogram instructions:    Dont drive for 1 day.  Avoid walking, bending, and taking stairs for 24 hours.  No heavy lifting anything 10 lbs or heavier (gallon of milk) or strenuous exercise for 10 days.  Keep your dressing clean and dry for 24 hours. Do not submerge insertion site in water (bath tub, swimming pool) until fully healed.  Be sure to follow any other instructions from your doctor.      Call your doctor if you have any of the following:    Fever of 100.4 (38C) or higher lasting for 24 to 48 hours  Bleeding, swelling, or a large lump at the insertion site  Sharp or increasing pain at the insertion site  Leg pain, numbness, or a cold leg or foot  Any other symptoms your provider instructed you to report based on your medical condition.    Contact information:    For immediate concerns that are not emergent, you may call our interventional radiology clinic at [phone] or [phone]    ** After hours and weekends: Call the paging  at [phone] and ask for the Radiology Resident on call**

## 2024-03-21 NOTE — Clinical Note
Right: Groin and Wrist.   Hair: N/A.  Skin prep dry before draping.  Prepped by: Geovanni Arthur, RT.

## 2024-03-21 NOTE — CARE UPDATE
Pt fully recovered and states full understanding of discharge. R wrist CDI and cleansed with NS and dressed with sterile pressure dressing. Pt ambulated with son to garage.

## 2024-03-21 NOTE — H&P
Interventional Neuroradiology Pre-procedure Note    Procedure: Diagnostic cerebral angiogram    History of Present Illness:  63 y.o. female who presents for diagnotic angiogram for recently discovered unruptured R A1 aneurysm.     ROS:   Hematological: no known coagulopathies  Respiratory: no shortness of breath  Cardiovascular: no chest pain  Gastrointestinal: no abdominal pain  Genito-Urinary: no dysuria  Musculoskeletal: negative  Neurological: no TIA or stroke symptoms     Scheduled Meds:    LIDOcaine (PF) 10 mg/ml (1%)  1 mL Other Once    LIDOcaine-prilocaine   Topical (Top) Once     Current Meds:   Current Outpatient Medications:     amLODIPine (NORVASC) 10 MG tablet, Take 1 tablet by mouth once daily, Disp: 90 tablet, Rfl: 0    busPIRone (BUSPAR) 10 MG tablet, TAKE 1 TABLET BY MOUTH THREE TIMES DAILY, Disp: 90 tablet, Rfl: 0    cartilage/collagen/bor/hyalur (JOINT HEALTH ORAL), Take 1 tablet by mouth once daily., Disp: , Rfl:     cranberry extract (CRANBERRY CONCENTRATE) 500 mg Cap, Take 1 tablet by mouth once daily., Disp: , Rfl:     DAILY MULTI-VITAMIN ORAL, Take by mouth., Disp: , Rfl:     DULoxetine (CYMBALTA) 60 MG capsule, Take 1 capsule (60 mg total) by mouth once daily., Disp: 90 capsule, Rfl: 3    hydroCHLOROthiazide (MICROZIDE) 12.5 mg capsule, Take 1 capsule by mouth once daily, Disp: 90 capsule, Rfl: 0    predniSONE (DELTASONE) 50 MG Tab, Pt to take 1 tab (50mg total) 13 hours, 7 hours, 1 hour before procedure, Disp: 3 tablet, Rfl: 0    ranitidine (ZANTAC) 75 MG tablet, Take 75 mg by mouth once daily., Disp: , Rfl:     tiZANidine (ZANAFLEX) 4 MG tablet, Take 1 tablet (4 mg total) by mouth every evening., Disp: 30 tablet, Rfl: 3    Current Facility-Administered Medications:     0.9%  NaCl infusion, , Intravenous, Continuous, Judith Berg PA-C    LIDOcaine (PF) 10 mg/ml (1%) injection 10 mg, 1 mL, Other, Once, Judith Berg PA-C    LIDOcaine-prilocaine cream, , Topical (Top), Once, Otis  "Judith HARDING PA-C   Continuous Infusions:    sodium chloride 0.9%       PRN Meds:    Allergies:   Review of patient's allergies indicates:   Allergen Reactions    Cinnamon Anaphylaxis    Iodine and iodide containing products Other (See Comments)     Tears up skin    Macrodantin [nitrofurantoin macrocrystalline] Other (See Comments)     Knee and elbow swelling    Povidone-iodine      Other reaction(s): Blister    Sulfa (sulfonamide antibiotics) Hives     Sedation Hx: No adverse events.    Labs:              Objective:  Vitals: Blood pressure (!) 142/77, pulse 105, temperature 98.7 °F (37.1 °C), temperature source Oral, resp. rate 18, height 5' 5" (1.651 m), weight 95.3 kg (210 lb), SpO2 (!) 93 %, not currently breastfeeding.     Physical Exam:  General: well developed, well nourished, no distress.   Head: normocephalic, atraumatic  Neck: No tracheal deviation. No palpable masses. Full ROM.   Neurologic: Alert and oriented. Thought content appropriate.  GCS: E4 V5 M6; Total: 15  Language: No aphasia  Speech: No dysarthria  Cranial nerves: face symmetric, tongue midline, CN II-XII grossly intact.   Eyes: pupils equal, round, reactive to light with accomodation, EOMI.   Pulmonary: normal respirations, no signs of respiratory distress  Abdomen: soft, non-distended, not tender to palpation  Vascular: Pulses 2+ and symmetric radial and dorsalis pedis. No LE edema.   Skin: Skin is warm, dry and intact.  Sensory: intact to light touch throughout  Motor Strength: Moves all extremities spontaneously with good tone.  Full strength upper and lower extremities. No abnormal movements seen.     ASA: 2  MAL: 2    Plan:  -Plan for cerebral angiogram with possible intervention  -Sedation Plan: moderate sedation  -All diagnostics and imaging reviewed  -Patient NPO since MN  -Risks & benefits of procedure explained in detail; patient consented and all questions answered  -Further reccs to follow procedure          Marcello Taylor MD, " STEFFEN  Fellow, NeuroEndovascular Surgery, [facility] [place]  Neurologist, [facility] [place], [place]

## 2024-03-21 NOTE — CARE UPDATE
Pt arrived to MPU 5 for recovery of a cerebral angiogram. TRB to start release at 15:40 and to complete 16:10pm. See assessment in computer.

## 2024-03-21 NOTE — PLAN OF CARE
Pt tolerated cerebral angiogram well. VSS. Dr. Taylor used a TR band for radial closure. Used 13 mL of air to obtain hemostasis. Maintains good waveform on pulse oximetry that is applied to finger on the right hand. 2000 units of Heparin were used during the case. May deflate the TR band in 90 minutes per Dr. Green order in the recovery area. Patient to be transported to MPU for 2 hour sedation recovery prior to being discharge in the care of family.

## 2024-03-21 NOTE — PROCEDURES
Interventional Neuroradiology Post-Procedure Note    Pre Op Diagnosis: Right A2 aneurysm    Post Op Diagnosis: A. Comm Aneurysm    Procedure: Diagnostic cerebral angiogram    Procedure performed by: Yoandy BALL, Vlad; Claudia BALL, Marcello; Tayo BALL, Preethi    Written Informed Consent Obtained: Yes    Specimen Removed: NO    Estimated Blood Loss: Minimal    Procedure report:     A 5F slender sheath was placed into the right radial artery and a 5F Redmond 2 catheter was advanced over 0.035 glidewire into the aortic arch.  The right and left vertebral artery, right and left ICA were subselected and angiography of the brain was performed after injection into each of these vessels. Also 3D angiogram with reconstruction was performed at separate work station that demonstrated saccular A.Comm aneurysm projecting anteriorly and laterally towards the right side measuring 5.88 mm in height, 9.37 in width  and 6.81 mm of neck. There is inferiorly projecting sister lobule of the aneurysm measuring 2.92mm height, 2.95 mm width and 3.15 mm neck.     Preliminary interpretation: Normal cerebral angiogram without any evidence of aneurysm or AVM.  Please see Imaging report for full details.    A right radial artery angiogram was performed, the sheath removed and hemostasis achieved using transradial bend with closing pressure of 13cc.  No hematoma was present at the time of hemostasis.    The patient tolerated the procedure well.     Plan:  -To recovery for 2h  -Avoid checking blood pressure in right arm   -Avoid carrying heavy weights with right arm > 10 lbs x 24 hrs   -Remove groin dressing tomorrow                     Marcello Taylor MD, MHA  Fellow, NeuroEndovascular Surgery, [facility] [place]  Neurologist, [facility] [place] [facility], [place]

## 2024-03-25 ENCOUNTER — PATIENT MESSAGE (OUTPATIENT)
Dept: REHABILITATION | Facility: HOSPITAL | Age: 64
End: 2024-03-25
Payer: COMMERCIAL

## 2024-03-28 ENCOUNTER — CLINICAL SUPPORT (OUTPATIENT)
Dept: REHABILITATION | Facility: HOSPITAL | Age: 64
End: 2024-03-28
Payer: COMMERCIAL

## 2024-03-28 DIAGNOSIS — G44.86 CERVICOGENIC HEADACHE: ICD-10-CM

## 2024-03-28 DIAGNOSIS — R29.898 DECREASED ROM OF NECK: Primary | ICD-10-CM

## 2024-03-28 PROCEDURE — 97112 NEUROMUSCULAR REEDUCATION: CPT | Mod: PN

## 2024-03-28 NOTE — PROGRESS NOTES
"[facility]   Physical Therapy Treatment Note      Name: Lisandra Toussaint  Clinic Number: [id number]    Therapy Diagnosis:   No diagnosis found.    Physician: Demetrius Shukla MD    Visit Date: 3/28/2024    Physician Orders: PT Eval and Treat   Medical Diagnosis from Referral: Headache, cervicogenic [G44.86], Cervical myofascial pain syndrome [M79.18]   Evaluation Date: 1/26/2024  Authorization Period Expiration: 1/22/2025  Plan of Care Expiration: 3/22/2024  Date of Surgery: N/A  Visit # / Visits authorized: 14/20 + 1   FOTO: 2nd performed 2/21  PTA Visit #: 0/5      Precautions: Standard     Time In: 8:15 am   Time Out: 8:12 am  Total Billable Time: 57 minutes     Subjective     Patient reports: Had angiogram on Thursday for aneurysm and headaches went away for the whole weekend. On Monday, headaches started to return at 1/10 and 2/10 Tuesday/Wednesday. Took some tylenol each day which helped. Headache rated 3/10 this morning at left suboccipital and temporal region.     She was compliant with home exercise program.  Response to previous treatment: relief after last session, but return of headache next morning.   Functional change: relief from headaches for 4 days following angiogram    Pain: 3/10 at arrival, 0/10 at end    Location: Global suboccipital area    Objective      See updated plan of care in Treatment section.     Treatment     Lisandra received the treatments listed below:      therapeutic exercises to develop ROM and flexibility for 3 minutes including objective:    Seated cervical rotation SNAGs: 5"x10 bilateral. 2 rounds. Verbal cues to increase range    Seated cervical extension SNAGs: 5"x10. 2 rounds. Verbal cues to increase range. - out of time  Seated thoracic extension over teal chair back: 5"x20 with hands laced behind neck. - out of time.    neuromuscular re-education activities to improve: Coordination, Kinesthetic, and Posture for 54 minutes. The following " "activities were included:    Prone:  Cervical retraction: 2x10  T's with thumbs up: 2x12 bilateral  Y's with thumbs up: 2x12 bilateral    Sidelying cervical side bend: 2x12 bilateral     Seated cervical extension/retraction isometric into towel: 5"x10. 3 rounds  Cervical flexion, extension, and side bend walkouts: Blue sport's cord x10 each. Pillow case around head to prevent hair pulling.    Standing:  Serratus wall slides: yellow theraband 3x15; pillowcase used to increase ease of slide  Wall slides with lift off: 1 lb dumbbells 2x12     manual therapy techniques: were applied to the: neck for 00 minutes, including:     Functional dry needling to bilateral rectus capitus posterior major and minor, obliquus capitus superior. Patient was agreeable to dry needling by a certified dry needling PT offered verbal consent prior to treatment after being made aware of risks. 25 mm needles were inserted with periosteal pecking and winding for grasp. Afterwards, needles were removed and placed into a sharps container. Patient reported a good response to treatment.    Patient Education and Home Exercises       Education provided:   - Continue home exercise program    Written Home Exercises Provided: Continue those from 1/26/2024. Exercises were reviewed and Lisandra was able to demonstrate them prior to the end of the session.  Lisandra demonstrated good  understanding of the education provided. See Electronic Medical Record under Patient Instructions for exercises provided during therapy sessions    Assessment   Lisandra is a 63 y.o. female referred to outpatient Physical Therapy with a medical diagnosis of cervicogenic headache and myofascial pain syndrome. Complete relief from headaches for 4 days following angiogram. Slight increase in left sided suboccipital headache following prone periscapular strengthening that resolved with third round of cervical retraction/extension isometrics. Progressed repetitions with periscapular " strengthening and cervical isotonics against gravity.  Quick serratus and lower trap fatigue. Ended session with no headache.    Plan     See updated plan of care in Treatment section.     Monitor headaches - functional dry needling as appropriate.   Progress cervical and periscapular strengthening/kinesthetic as tolerated.    Resume/progress functional activities next.    Sravani Ro, PT, DPT

## 2024-04-01 NOTE — PROGRESS NOTES
Neurology Clinic Follow-Up Note    Impression:  L cervico-occipital headaches:  Suspect cervicogenic due to cervical myofascial syndrome.  There may be a component of MOH.  Improving with PT and tizanidine.  Incidental, unruptured ACOM aneurysm  Obesity  HTN: controlled    Plan:  Increase tizanidine to 4mg bid  Continue cervical PT  We discussed MOH and limiting abortives to <= 3 days/week.  Alternate NSAID with acetaminophen prn only  Exercise/weight loss   F/U with Dr. Green re aneurysm  RTC 3 months or sooner, prn.  She will update me via [employer] prior, as needed      Problem List Items Addressed This Visit    None        Patient returns for follow-up of above.  She is doing much better with PT and tizanidine 4mg qhs.  Still with daily HA, however.  Taking acetaminophen daily. Angio confirmed acom aneurysm and she has a f/u appt scheduled with Dr. Green.  No symptoms to suggest SAH. BP controlled.           3/26/2024     9:31 AM 1/22/2024    10:48 PM   Headache Questionnaire Answers   In the past 30 days, how many days did you suffer from a headache? 30 30   In the past 30 days, how many days did you have headache that caused you to stay in bed, miss a social gathering and/or disappoint a close family member/friend? 0 4   In the past 30 days, how many times did you miss work/school and/or seek care at an urgent care or emergency department for the headache? 0 0       Running history:  cRP and ESR were nl      CC:  headache    HPI:  64 y/o WF referred by Dr. Green for evaluation of headaches.  She developed headaches in Oct.  Pain is L cervico-occipital with radiation anteriorly.  Pain is constant and waxes and wanes.  Described as blunt pain. When bad, chronic tinnitus increases.  Tylenol is partially effective.  Changing pillows ineffective. Sleeping sitting up helps.  No fever. Has chronic TMJ with no change. + occ scalp tenderness. No visual loss. + associated cervical pain.    Dr. Shetty ordered CT  11/20/23 suggesting an aneurysm that was confirmed with MRI and subsequent vascular imaging.  Imaging was reviewed personally.    She saw Dr. Sterling (ENT) on 1/8/24.  Sinus irrigation helps minimally.  She has reduced buspirone with only mild effect.     Abortives   Tylenol (taking daily)    Prophylactics   None      Outpatient Medications Marked as Taking for the 4/2/24 encounter (Office Visit) with Demetrius Shukla MD   Medication Sig Dispense Refill    amLODIPine (NORVASC) 10 MG tablet Take 1 tablet by mouth once daily 90 tablet 0    busPIRone (BUSPAR) 10 MG tablet TAKE 1 TABLET BY MOUTH THREE TIMES DAILY (Patient taking differently: Take 10 mg by mouth 3 (three) times daily. Takes x1-2/d) 90 tablet 0    cartilage/collagen/bor/hyalur (JOINT HEALTH ORAL) Take 1 tablet by mouth once daily.      cranberry extract (CRANBERRY CONCENTRATE) 500 mg Cap Take 1 tablet by mouth once daily.      DAILY MULTI-VITAMIN ORAL Take by mouth.      DULoxetine (CYMBALTA) 60 MG capsule Take 1 capsule (60 mg total) by mouth once daily. 90 capsule 3    hydroCHLOROthiazide (MICROZIDE) 12.5 mg capsule Take 1 capsule by mouth once daily 90 capsule 0    predniSONE (DELTASONE) 50 MG Tab Pt to take 1 tab (50mg total) 13 hours, 7 hours, 1 hour before procedure 3 tablet 0    ranitidine (ZANTAC) 75 MG tablet Take 75 mg by mouth once daily.      [DISCONTINUED] tiZANidine (ZANAFLEX) 4 MG tablet Take 1 tablet (4 mg total) by mouth every evening. 30 tablet 3       /73   Pulse 85   Neck: R trap spasm/TP R> L  Gait is steady.    From last exam:   EOMF without nystagmus; mild L exo (hx strabismus)   Normal facial movements; mildly widened L palpebral fissure    Demetrius Shukla MD

## 2024-04-02 ENCOUNTER — OFFICE VISIT (OUTPATIENT)
Dept: NEUROLOGY | Facility: CLINIC | Age: 64
End: 2024-04-02
Payer: COMMERCIAL

## 2024-04-02 VITALS — SYSTOLIC BLOOD PRESSURE: 126 MMHG | HEART RATE: 85 BPM | DIASTOLIC BLOOD PRESSURE: 73 MMHG

## 2024-04-02 DIAGNOSIS — G44.86 HEADACHE, CERVICOGENIC: Primary | ICD-10-CM

## 2024-04-02 DIAGNOSIS — M79.18 CERVICAL MYOFASCIAL PAIN SYNDROME: ICD-10-CM

## 2024-04-02 DIAGNOSIS — I67.1 UNRUPTURED CEREBRAL ANEURYSM: ICD-10-CM

## 2024-04-02 PROCEDURE — 99214 OFFICE O/P EST MOD 30 MIN: CPT | Mod: S$GLB,,, | Performed by: PSYCHIATRY & NEUROLOGY

## 2024-04-02 PROCEDURE — 99999 PR PBB SHADOW E&M-EST. PATIENT-LVL III: CPT | Mod: PBBFAC,,, | Performed by: PSYCHIATRY & NEUROLOGY

## 2024-04-02 PROCEDURE — 1159F MED LIST DOCD IN RCRD: CPT | Mod: CPTII,S$GLB,, | Performed by: PSYCHIATRY & NEUROLOGY

## 2024-04-02 PROCEDURE — 3078F DIAST BP <80 MM HG: CPT | Mod: CPTII,S$GLB,, | Performed by: PSYCHIATRY & NEUROLOGY

## 2024-04-02 PROCEDURE — 3074F SYST BP LT 130 MM HG: CPT | Mod: CPTII,S$GLB,, | Performed by: PSYCHIATRY & NEUROLOGY

## 2024-04-02 RX ORDER — TIZANIDINE 4 MG/1
4 TABLET ORAL 2 TIMES DAILY
Qty: 180 TABLET | Refills: 3 | Status: SHIPPED | OUTPATIENT
Start: 2024-04-02

## 2024-04-03 ENCOUNTER — CLINICAL SUPPORT (OUTPATIENT)
Dept: REHABILITATION | Facility: HOSPITAL | Age: 64
End: 2024-04-03
Payer: COMMERCIAL

## 2024-04-03 ENCOUNTER — OFFICE VISIT (OUTPATIENT)
Dept: PRIMARY CARE CLINIC | Facility: CLINIC | Age: 64
End: 2024-04-03
Payer: COMMERCIAL

## 2024-04-03 VITALS
SYSTOLIC BLOOD PRESSURE: 130 MMHG | DIASTOLIC BLOOD PRESSURE: 70 MMHG | OXYGEN SATURATION: 99 % | HEART RATE: 88 BPM | RESPIRATION RATE: 16 BRPM | WEIGHT: 216.25 LBS | BODY MASS INDEX: 35.99 KG/M2

## 2024-04-03 DIAGNOSIS — E55.9 VITAMIN D DEFICIENCY: ICD-10-CM

## 2024-04-03 DIAGNOSIS — G44.86 CERVICOGENIC HEADACHE: ICD-10-CM

## 2024-04-03 DIAGNOSIS — Z00.00 ANNUAL PHYSICAL EXAM: Primary | ICD-10-CM

## 2024-04-03 DIAGNOSIS — R29.898 DECREASED ROM OF NECK: Primary | ICD-10-CM

## 2024-04-03 DIAGNOSIS — Z13.6 ENCOUNTER FOR LIPID SCREENING FOR CARDIOVASCULAR DISEASE: ICD-10-CM

## 2024-04-03 DIAGNOSIS — H93.13 TINNITUS OF BOTH EARS: ICD-10-CM

## 2024-04-03 DIAGNOSIS — F33.9 DEPRESSION, RECURRENT: ICD-10-CM

## 2024-04-03 DIAGNOSIS — Z13.220 ENCOUNTER FOR LIPID SCREENING FOR CARDIOVASCULAR DISEASE: ICD-10-CM

## 2024-04-03 DIAGNOSIS — Z12.4 SCREENING FOR CERVICAL CANCER: ICD-10-CM

## 2024-04-03 DIAGNOSIS — I10 BENIGN ESSENTIAL HTN: ICD-10-CM

## 2024-04-03 DIAGNOSIS — I67.1 CEREBRAL ANEURYSM: ICD-10-CM

## 2024-04-03 DIAGNOSIS — E66.01 SEVERE OBESITY (BMI 35.0-39.9) WITH COMORBIDITY: ICD-10-CM

## 2024-04-03 DIAGNOSIS — Z13.1 SCREENING FOR DIABETES MELLITUS: ICD-10-CM

## 2024-04-03 DIAGNOSIS — Z12.11 SCREEN FOR COLON CANCER: ICD-10-CM

## 2024-04-03 DIAGNOSIS — Q28.2 ARTERIOVENOUS MALFORMATION OF BRAIN: ICD-10-CM

## 2024-04-03 PROBLEM — I72.9 ANEURYSM: Status: ACTIVE | Noted: 2024-04-03

## 2024-04-03 PROCEDURE — 97112 NEUROMUSCULAR REEDUCATION: CPT | Mod: PN

## 2024-04-03 PROCEDURE — 1159F MED LIST DOCD IN RCRD: CPT | Mod: CPTII,S$GLB,, | Performed by: NURSE PRACTITIONER

## 2024-04-03 PROCEDURE — 99396 PREV VISIT EST AGE 40-64: CPT | Mod: S$GLB,,, | Performed by: NURSE PRACTITIONER

## 2024-04-03 PROCEDURE — 3008F BODY MASS INDEX DOCD: CPT | Mod: CPTII,S$GLB,, | Performed by: NURSE PRACTITIONER

## 2024-04-03 PROCEDURE — 97140 MANUAL THERAPY 1/> REGIONS: CPT | Mod: PN

## 2024-04-03 PROCEDURE — 3075F SYST BP GE 130 - 139MM HG: CPT | Mod: CPTII,S$GLB,, | Performed by: NURSE PRACTITIONER

## 2024-04-03 PROCEDURE — 1160F RVW MEDS BY RX/DR IN RCRD: CPT | Mod: CPTII,S$GLB,, | Performed by: NURSE PRACTITIONER

## 2024-04-03 PROCEDURE — 99999 PR PBB SHADOW E&M-EST. PATIENT-LVL IV: CPT | Mod: PBBFAC,,, | Performed by: NURSE PRACTITIONER

## 2024-04-03 PROCEDURE — 3078F DIAST BP <80 MM HG: CPT | Mod: CPTII,S$GLB,, | Performed by: NURSE PRACTITIONER

## 2024-04-03 PROCEDURE — 97110 THERAPEUTIC EXERCISES: CPT | Mod: PN

## 2024-04-03 RX ORDER — LOSARTAN POTASSIUM 25 MG/1
25 TABLET ORAL DAILY
Qty: 90 TABLET | Refills: 3 | Status: SHIPPED | OUTPATIENT
Start: 2024-04-03 | End: 2024-04-03

## 2024-04-03 RX ORDER — LOSARTAN POTASSIUM 25 MG/1
25 TABLET ORAL DAILY
Qty: 90 TABLET | Refills: 3 | Status: SHIPPED | OUTPATIENT
Start: 2024-04-03 | End: 2024-06-03 | Stop reason: SDUPTHER

## 2024-04-03 NOTE — PROGRESS NOTES
"[facility]   Physical Therapy Treatment Note      Name: Lisandra Toussaint  Clinic Number: [id number]    Therapy Diagnosis:   Encounter Diagnoses   Name Primary?    Decreased ROM of neck Yes    Cervicogenic headache        Physician: Demetrius Shukla MD    Visit Date: 4/3/2024    Physician Orders: PT Eval and Treat   Medical Diagnosis from Referral: Headache, cervicogenic [G44.86], Cervical myofascial pain syndrome [M79.18]   Evaluation Date: 1/26/2024  Authorization Period Expiration: 1/22/2025  Plan of Care Expiration: 4/19/2024  Visit # / Visits authorized: 15/20 + 1   FOTO: 2nd performed 2/21  PTA Visit #: 0/5      Precautions: Standard     Time In: 0811  Time Out: 0915  Total Billable Time: 64 minutes     Subjective     Patient reports: she feels tight in her neck today. Patient visited with Dr. Shukla and was informed that medications prior to angiogram may have positively impacted pain. Patient instructed to take Tylenol one day, Ibuprofen the next, and Naproxin the next if experiencing pain    She was compliant with home exercise program.  Response to previous treatment: decreased pain  Functional change: improved tolerance to errands and providing caregiver assistance    Pain: 3/10 at arrival, 0/10 at end  Location: Bilateral cervicoscapular area     Objective      Objective Measures updated at progress report unless specified.      Treatment     Lisandra received the treatments listed below:      therapeutic exercises to develop ROM and flexibility for 18 minutes including:    Seated:  Cervical rotation SNAGs: 5"x5 bilateral  Cervical extension SNAGs: 5"x20  Upper trapezius/levator trigger point release with theracane: 4' bilateral     neuromuscular re-education activities to improve: Coordination, Kinesthetic, and Posture for 24 minutes. The following activities were included:    Sidelying cervical side bend: 2x10 bilateral !  Seated cervical extension/retraction isometric into " "towel: 5"x20  Standing cervical flexion, extension, and side bend walkouts: Blue sport's cord x30 each    manual therapy techniques: were applied to the: neck for 22 minutes, including:     Functional dry needling to bilateral rectus capitus posterior major and minor, obliquus capitus superior. Patient was agreeable to dry needling by a certified dry needling PT offered verbal consent prior to treatment after being made aware of risks. 25 mm needles were inserted with periosteal pecking and winding for grasp. Afterwards, needles were removed and placed into a sharps container.   Functional dry needling to bilateral upper trapezius and levator scapulae. 40 mm needles were inserted with fanning. Afterwards, needles were removed and placed into a sharps container. Patient reported a good response to treatment.    Patient Education and Home Exercises       Education provided:   - Continue home exercise program    Written Home Exercises Provided: Continue those from 1/26/2024. Exercises were reviewed and Lisandra was able to demonstrate them prior to the end of the session.  Lisandra demonstrated good  understanding of the education provided. See Electronic Medical Record under Patient Instructions for exercises provided during therapy sessions    Assessment     Lisandra is a 63 y.o. female referred to outpatient Physical Therapy with a medical diagnosis of cervicogenic headache and myofascial pain syndrome. Low level pain that resolves with snag and retraction exercises and increases with side bend against gravity. Reduction with cervical isometrics that fully resolves after dry needling. Session slightly limited to time.      Lisandra Is progressing well towards her goals.   Patient prognosis is Good.   Patient will continue to benefit from skilled outpatient physical therapy to address the deficits listed in the problem list box on initial evaluation, provide pt/family education and to maximize pt's level of independence in the home " and community environment.      Patient's spiritual, cultural and educational needs considered and pt agreeable to plan of care and goals.  Anticipated barriers to physical therapy: Chronicity and intensity of headaches      Long Term Goals: 8 weeks   6. Patient will improve cervical rotation AROM to >/=75 degrees bilaterally in order to improve functional mobility for tasks such as driving. Progressing, not met   7. Patient will improve cervical rotation AROM to >/=75 degrees bilaterally in order to improve functional mobility for tasks such as driving. Progressing, not met   9. Patient will deny pain/headaches for 2 consecutive sessions to promote quality of life.  Progressing, not met      Plan     Monitor headaches - functional dry needling as appropriate.   Progress cervical and periscapular strengthening/kinesthetic as tolerated.    Progress functional activities next.    Becca Griffiths, PT, DPT, OCS

## 2024-04-03 NOTE — PROGRESS NOTES
[facility] Note    Chief Complaint      Chief Complaint   Patient presents with    Annual Exam    Hypertension     History of Present Illness     Lisandra Toussaint is a 63 y.o. female patient who presents today for annual and BP f/u.   Neuro wants pt's BP <130/80  Pt currently taking amlodipine 10mg daily and HCTZ 12.5mg prn  Will add losartan 25mg daily- pt has tolerated meds with the inactive sulfa ingredient ie HCTZ       Labs- ordered  Eye exams utd  Gyn- order  Mammogram- due in nov  Colonoscopy- ordered    Vaccines:  Covid-x2  Tdap-2022  Flu shot-11/2023    Health Maintenance   Topic Date Due    Colorectal Cancer Screening  Never done    Shingles Vaccine (1 of 2) Never done    Mammogram  11/06/2024    Lipid Panel  12/08/2027    TETANUS VACCINE  12/08/2032    Hepatitis C Screening  Completed       Past Medical History:   Diagnosis Date    Aneurysm     Anxiety     HTN (hypertension)     TMJ (dislocation of temporomandibular joint)        Past Surgical History:   Procedure Laterality Date    ABDOMINAL SURGERY      CARPAL TUNNEL RELEASE      EYE SURGERY      FOOT SURGERY      tarsel tunnel         family history includes Cancer in her maternal grandfather and mother; Heart disease in her maternal grandmother; Hypertension in her maternal grandfather and mother; Learning disabilities in her son and son; Stroke in her maternal grandfather.    Social History     Tobacco Use    Smoking status: Never    Smokeless tobacco: Never    Tobacco comments:     2nd hand smoke when it was not banned   Substance Use Topics    Alcohol use: Not Currently     Comment: Couple times a year 1 drink - 3 times    Drug use: Never       Review of Systems   Constitutional:  Negative for chills and fever.   HENT:  Negative for congestion, sinus pain and sore throat.    Eyes:  Negative for blurred vision.   Respiratory:  Negative for cough, shortness of breath and wheezing.    Cardiovascular:  Negative for chest pain,  palpitations and leg swelling.   Gastrointestinal:  Negative for abdominal pain, constipation, diarrhea, nausea and vomiting.   Genitourinary:  Negative for dysuria.   Musculoskeletal:  Negative for myalgias.   Skin:  Negative for rash.   Neurological:  Negative for dizziness, weakness and headaches.   Psychiatric/Behavioral:  Negative for depression. The patient is not nervous/anxious.         Outpatient Encounter Medications as of 4/3/2024   Medication Sig Dispense Refill    amLODIPine (NORVASC) 10 MG tablet Take 1 tablet by mouth once daily 90 tablet 0    busPIRone (BUSPAR) 10 MG tablet TAKE 1 TABLET BY MOUTH THREE TIMES DAILY (Patient taking differently: Take 10 mg by mouth 3 (three) times daily. Takes x1-2/d) 90 tablet 0    cartilage/collagen/bor/hyalur (JOINT HEALTH ORAL) Take 1 tablet by mouth once daily.      cranberry extract (CRANBERRY CONCENTRATE) 500 mg Cap Take 1 tablet by mouth once daily.      DAILY MULTI-VITAMIN ORAL Take by mouth.      DULoxetine (CYMBALTA) 60 MG capsule Take 1 capsule (60 mg total) by mouth once daily. 90 capsule 3    hydroCHLOROthiazide (MICROZIDE) 12.5 mg capsule Take 1 capsule by mouth once daily 90 capsule 0    ranitidine (ZANTAC) 75 MG tablet Take 75 mg by mouth once daily.      tiZANidine (ZANAFLEX) 4 MG tablet Take 1 tablet (4 mg total) by mouth 2 (two) times a day. 180 tablet 3    [DISCONTINUED] predniSONE (DELTASONE) 50 MG Tab Pt to take 1 tab (50mg total) 13 hours, 7 hours, 1 hour before procedure 3 tablet 0    losartan (COZAAR) 25 MG tablet Take 1 tablet (25 mg total) by mouth once daily. 90 tablet 3    [DISCONTINUED] losartan (COZAAR) 25 MG tablet Take 1 tablet (25 mg total) by mouth once daily. 90 tablet 3    [DISCONTINUED] tiZANidine (ZANAFLEX) 4 MG tablet Take 1 tablet (4 mg total) by mouth every evening. 30 tablet 3     No facility-administered encounter medications on file as of 4/3/2024.        Review of patient's allergies indicates:   Allergen Reactions     Cinnamon Anaphylaxis    Iodine and iodide containing products Other (See Comments)     Tears up skin    Macrodantin [nitrofurantoin macrocrystalline] Other (See Comments)     Knee and elbow swelling    Povidone-iodine      Other reaction(s): Blister    Sulfa (sulfonamide antibiotics) Hives       Physical Exam      Vital Signs  Pulse: 88  Resp: 16  SpO2: 99 %  BP: 130/70  BP Location: Right arm  Patient Position: Sitting  Height and Weight  Weight: 98.1 kg (216 lb 4.3 oz)    Physical Exam  Vitals and nursing note reviewed.   Constitutional:       General: She is not in acute distress.     Appearance: Normal appearance. She is not ill-appearing.   HENT:      Head: Normocephalic and atraumatic.   Cardiovascular:      Rate and Rhythm: Normal rate and regular rhythm.      Heart sounds: Normal heart sounds.   Pulmonary:      Effort: Pulmonary effort is normal. No respiratory distress.      Breath sounds: Normal breath sounds.   Musculoskeletal:         General: Normal range of motion.   Skin:     General: Skin is warm and dry.   Neurological:      General: No focal deficit present.      Mental Status: She is alert and oriented to person, place, and time.   Psychiatric:         Mood and Affect: Mood normal.         Behavior: Behavior normal.         Thought Content: Thought content normal.         Judgment: Judgment normal.          Laboratory:  CBC:  Lab Results   Component Value Date    WBC 6.29 12/08/2022    RBC 5.06 12/08/2022    HGB 15.4 12/08/2022    HCT 47.2 12/08/2022     12/08/2022    MCV 93 12/08/2022    MCH 30.4 12/08/2022    MCHC 32.6 12/08/2022    MCHC 32.8 11/12/2021     CMP:  Lab Results   Component Value Date     03/04/2024    CALCIUM 10.1 03/04/2024    ALBUMIN 4.0 12/08/2022    PROT 7.8 12/08/2022     03/04/2024    K 4.4 03/04/2024    CO2 26 03/04/2024     03/04/2024    BUN 25 (H) 03/04/2024    ALKPHOS 126 12/08/2022    ALT 15 12/08/2022    AST 24 12/08/2022    BILITOT 0.6  12/08/2022    BILITOT 0.5 11/12/2021     URINALYSIS:  Lab Results   Component Value Date    COLORU Yellow 11/12/2021    SPECGRAV 1.015 11/12/2021    PHUR 6.0 11/12/2021    PROTEINUA Negative 11/12/2021    BACTERIA Rare 11/12/2021    NITRITE Negative 11/12/2021    LEUKOCYTESUR Negative 11/12/2021      LIPIDS:  Lab Results   Component Value Date    TSH 4.173 (H) 12/08/2022    TSH 2.284 11/12/2021    HDL 56 12/08/2022    HDL 62 11/12/2021    CHOL 223 (H) 12/08/2022    CHOL 246 (H) 11/12/2021    TRIG 98 12/08/2022    TRIG 101 11/12/2021    LDLCALC 147.4 12/08/2022    LDLCALC 163.8 (H) 11/12/2021    CHOLHDL 25.1 12/08/2022    CHOLHDL 25.2 11/12/2021    NONHDLCHOL 167 12/08/2022    NONHDLCHOL 184 11/12/2021    TOTALCHOLEST 4.0 12/08/2022    TOTALCHOLEST 4.0 11/12/2021     TSH:  Lab Results   Component Value Date    TSH 4.173 (H) 12/08/2022    TSH 2.284 11/12/2021     A1C:  Lab Results   Component Value Date    HGBA1C 5.9 (H) 12/08/2022    HGBA1C 6.1 (H) 11/12/2021         Assessment/Plan     Lisandra Toussaint is a 63 y.o.female with:    Annual physical exam  -     CBC Auto Differential; Future; Expected date: 04/03/2024  -     Comprehensive Metabolic Panel; Future; Expected date: 04/03/2024  -     Hemoglobin A1C; Future; Expected date: 04/03/2024  -     TSH; Future; Expected date: 04/03/2024  -     Lipid Panel; Future; Expected date: 04/03/2024  -     Vitamin D; Future; Expected date: 04/03/2024    Benign essential HTN  -     Discontinue: losartan (COZAAR) 25 MG tablet; Take 1 tablet (25 mg total) by mouth once daily.  Dispense: 90 tablet; Refill: 3  -     losartan (COZAAR) 25 MG tablet; Take 1 tablet (25 mg total) by mouth once daily.  Dispense: 90 tablet; Refill: 3    Aneurysm  Cervicogenic headache  Stable, managed by neuro    Depression, recurrent  -     CBC Auto Differential; Future; Expected date: 04/03/2024  -     Comprehensive Metabolic Panel; Future; Expected date: 04/03/2024  -     Hemoglobin A1C; Future; Expected  date: 04/03/2024  -     TSH; Future; Expected date: 04/03/2024  -     Lipid Panel; Future; Expected date: 04/03/2024  -     Vitamin D; Future; Expected date: 04/03/2024    Severe obesity (BMI 35.0-39.9) with comorbidity  -     CBC Auto Differential; Future; Expected date: 04/03/2024  -     Comprehensive Metabolic Panel; Future; Expected date: 04/03/2024  -     Hemoglobin A1C; Future; Expected date: 04/03/2024  -     TSH; Future; Expected date: 04/03/2024  -     Lipid Panel; Future; Expected date: 04/03/2024  -     Vitamin D; Future; Expected date: 04/03/2024    Vitamin D deficiency  -     CBC Auto Differential; Future; Expected date: 04/03/2024  -     Comprehensive Metabolic Panel; Future; Expected date: 04/03/2024  -     Hemoglobin A1C; Future; Expected date: 04/03/2024  -     TSH; Future; Expected date: 04/03/2024  -     Lipid Panel; Future; Expected date: 04/03/2024  -     Vitamin D; Future; Expected date: 04/03/2024    Screening for diabetes mellitus  -     CBC Auto Differential; Future; Expected date: 04/03/2024  -     Comprehensive Metabolic Panel; Future; Expected date: 04/03/2024  -     Hemoglobin A1C; Future; Expected date: 04/03/2024  -     TSH; Future; Expected date: 04/03/2024  -     Lipid Panel; Future; Expected date: 04/03/2024  -     Vitamin D; Future; Expected date: 04/03/2024    Encounter for lipid screening for cardiovascular disease  -     CBC Auto Differential; Future; Expected date: 04/03/2024  -     Comprehensive Metabolic Panel; Future; Expected date: 04/03/2024  -     Hemoglobin A1C; Future; Expected date: 04/03/2024  -     TSH; Future; Expected date: 04/03/2024  -     Lipid Panel; Future; Expected date: 04/03/2024  -     Vitamin D; Future; Expected date: 04/03/2024    Screening for cervical cancer  -     Ambulatory referral/consult to Gynecology; Future; Expected date: 04/03/2024    Screen for colon cancer  -     Ambulatory referral/consult to Endo Procedure ; Future; Expected date:  04/03/2024      Tinnitus  Stable, managed by ent    Health Maintenance Due   Topic Date Due    Cervical Cancer Screening  Never done    Colorectal Cancer Screening  Never done    Shingles Vaccine (1 of 2) Never done    RSV Vaccine (Age 60+ and Pregnant patients) (1 - 1-dose 60+ series) Never done    COVID-19 Vaccine (3 - 2023-24 season) 09/01/2023        I spent 43 minutes on the day of this encounter for preparing for, evaluating, treating, and managing this patient.      -Continue current medications and maintain follow up with specialists.  Return to clinic in 1 year or sooner for any concerns   No follow-ups on file.       Sandra hSetty NP-C  [facility]

## 2024-04-05 ENCOUNTER — LAB VISIT (OUTPATIENT)
Dept: LAB | Facility: HOSPITAL | Age: 64
End: 2024-04-05
Attending: NURSE PRACTITIONER
Payer: COMMERCIAL

## 2024-04-05 DIAGNOSIS — Z13.1 SCREENING FOR DIABETES MELLITUS: ICD-10-CM

## 2024-04-05 DIAGNOSIS — Z13.6 ENCOUNTER FOR LIPID SCREENING FOR CARDIOVASCULAR DISEASE: ICD-10-CM

## 2024-04-05 DIAGNOSIS — E66.01 SEVERE OBESITY (BMI 35.0-39.9) WITH COMORBIDITY: ICD-10-CM

## 2024-04-05 DIAGNOSIS — E55.9 VITAMIN D DEFICIENCY: ICD-10-CM

## 2024-04-05 DIAGNOSIS — Z13.220 ENCOUNTER FOR LIPID SCREENING FOR CARDIOVASCULAR DISEASE: ICD-10-CM

## 2024-04-05 DIAGNOSIS — Z00.00 ANNUAL PHYSICAL EXAM: ICD-10-CM

## 2024-04-05 DIAGNOSIS — F33.9 DEPRESSION, RECURRENT: ICD-10-CM

## 2024-04-05 LAB
25(OH)D3+25(OH)D2 SERPL-MCNC: 26 NG/ML (ref 30–96)
ALBUMIN SERPL BCP-MCNC: 3.9 G/DL (ref 3.5–5.2)
ALP SERPL-CCNC: 95 U/L (ref 55–135)
ALT SERPL W/O P-5'-P-CCNC: 17 U/L (ref 10–44)
ANION GAP SERPL CALC-SCNC: 11 MMOL/L (ref 8–16)
AST SERPL-CCNC: 25 U/L (ref 10–40)
BASOPHILS # BLD AUTO: 0.06 K/UL (ref 0–0.2)
BASOPHILS NFR BLD: 1 % (ref 0–1.9)
BILIRUB SERPL-MCNC: 0.6 MG/DL (ref 0.1–1)
BUN SERPL-MCNC: 22 MG/DL (ref 8–23)
CALCIUM SERPL-MCNC: 9.8 MG/DL (ref 8.7–10.5)
CHLORIDE SERPL-SCNC: 105 MMOL/L (ref 95–110)
CHOLEST SERPL-MCNC: 243 MG/DL (ref 120–199)
CHOLEST/HDLC SERPL: 4.3 {RATIO} (ref 2–5)
CO2 SERPL-SCNC: 24 MMOL/L (ref 23–29)
CREAT SERPL-MCNC: 1 MG/DL (ref 0.5–1.4)
DIFFERENTIAL METHOD BLD: NORMAL
EOSINOPHIL # BLD AUTO: 0.2 K/UL (ref 0–0.5)
EOSINOPHIL NFR BLD: 2.7 % (ref 0–8)
ERYTHROCYTE [DISTWIDTH] IN BLOOD BY AUTOMATED COUNT: 13.1 % (ref 11.5–14.5)
EST. GFR  (NO RACE VARIABLE): >60 ML/MIN/1.73 M^2
ESTIMATED AVG GLUCOSE: 120 MG/DL (ref 68–131)
GLUCOSE SERPL-MCNC: 111 MG/DL (ref 70–110)
HBA1C MFR BLD: 5.8 % (ref 4–5.6)
HCT VFR BLD AUTO: 45.7 % (ref 37–48.5)
HDLC SERPL-MCNC: 56 MG/DL (ref 40–75)
HDLC SERPL: 23 % (ref 20–50)
HGB BLD-MCNC: 14.9 G/DL (ref 12–16)
IMM GRANULOCYTES # BLD AUTO: 0.01 K/UL (ref 0–0.04)
IMM GRANULOCYTES NFR BLD AUTO: 0.2 % (ref 0–0.5)
LDLC SERPL CALC-MCNC: 167.6 MG/DL (ref 63–159)
LYMPHOCYTES # BLD AUTO: 1.9 K/UL (ref 1–4.8)
LYMPHOCYTES NFR BLD: 31.7 % (ref 18–48)
MCH RBC QN AUTO: 31 PG (ref 27–31)
MCHC RBC AUTO-ENTMCNC: 32.6 G/DL (ref 32–36)
MCV RBC AUTO: 95 FL (ref 82–98)
MONOCYTES # BLD AUTO: 0.6 K/UL (ref 0.3–1)
MONOCYTES NFR BLD: 9.4 % (ref 4–15)
NEUTROPHILS # BLD AUTO: 3.2 K/UL (ref 1.8–7.7)
NEUTROPHILS NFR BLD: 55 % (ref 38–73)
NONHDLC SERPL-MCNC: 187 MG/DL
NRBC BLD-RTO: 0 /100 WBC
PLATELET # BLD AUTO: 273 K/UL (ref 150–450)
PMV BLD AUTO: 11.7 FL (ref 9.2–12.9)
POTASSIUM SERPL-SCNC: 5.1 MMOL/L (ref 3.5–5.1)
PROT SERPL-MCNC: 7.5 G/DL (ref 6–8.4)
RBC # BLD AUTO: 4.81 M/UL (ref 4–5.4)
SODIUM SERPL-SCNC: 140 MMOL/L (ref 136–145)
TRIGL SERPL-MCNC: 97 MG/DL (ref 30–150)
TSH SERPL DL<=0.005 MIU/L-ACNC: 3.14 UIU/ML (ref 0.4–4)
WBC # BLD AUTO: 5.86 K/UL (ref 3.9–12.7)

## 2024-04-05 PROCEDURE — 36415 COLL VENOUS BLD VENIPUNCTURE: CPT | Performed by: NURSE PRACTITIONER

## 2024-04-05 PROCEDURE — 82306 VITAMIN D 25 HYDROXY: CPT | Performed by: NURSE PRACTITIONER

## 2024-04-05 PROCEDURE — 80053 COMPREHEN METABOLIC PANEL: CPT | Performed by: NURSE PRACTITIONER

## 2024-04-05 PROCEDURE — 84443 ASSAY THYROID STIM HORMONE: CPT | Performed by: NURSE PRACTITIONER

## 2024-04-05 PROCEDURE — 83036 HEMOGLOBIN GLYCOSYLATED A1C: CPT | Performed by: NURSE PRACTITIONER

## 2024-04-05 PROCEDURE — 80061 LIPID PANEL: CPT | Performed by: NURSE PRACTITIONER

## 2024-04-05 PROCEDURE — 85025 COMPLETE CBC W/AUTO DIFF WBC: CPT | Performed by: NURSE PRACTITIONER

## 2024-04-10 ENCOUNTER — CLINICAL SUPPORT (OUTPATIENT)
Dept: REHABILITATION | Facility: HOSPITAL | Age: 64
End: 2024-04-10
Payer: COMMERCIAL

## 2024-04-10 DIAGNOSIS — G44.86 CERVICOGENIC HEADACHE: ICD-10-CM

## 2024-04-10 DIAGNOSIS — R29.898 DECREASED ROM OF NECK: Primary | ICD-10-CM

## 2024-04-10 PROCEDURE — 97110 THERAPEUTIC EXERCISES: CPT | Mod: PN

## 2024-04-10 PROCEDURE — 97530 THERAPEUTIC ACTIVITIES: CPT | Mod: PN

## 2024-04-10 PROCEDURE — 97112 NEUROMUSCULAR REEDUCATION: CPT | Mod: PN

## 2024-04-10 NOTE — PROGRESS NOTES
"[facility]   Physical Therapy Treatment Note      Name: Lisandra Toussaint  Clinic Number: [id number]    Therapy Diagnosis:   Encounter Diagnoses   Name Primary?    Decreased ROM of neck Yes    Cervicogenic headache      Physician: Demetrius Shukla MD    Visit Date: 4/10/2024    Physician Orders: PT Eval and Treat   Medical Diagnosis from Referral: Headache, cervicogenic [G44.86], Cervical myofascial pain syndrome [M79.18]   Evaluation Date: 1/26/2024  Authorization Period Expiration: 1/22/2025  Plan of Care Expiration: 4/19/2024  Visit # / Visits authorized: 16/20 + 1   FOTO: Next at plan of care update  PTA Visit #: 0/5      Precautions: Standard     Time In: 0809  Time Out: 0854  Total Billable Time: 45 minutes     Subjective     Patient reports: she is following instructions of taking Tylenol one day, Ibuprofen the next, and Naproxin the next if experiencing pain. Patient reports she only has to take Naproxin once. Pain at worst 3/10 since last visit    She was compliant with home exercise program.  Response to previous treatment: decreased pain  Functional change: above    Pain: 3/10 at arrival, 0/10 at end  Location: Bilateral cervicoscapular area     Objective      4/10:  Cervical rotation active range of motion: 79 degrees right, 81 degrees left     Treatment     Lisandra received the treatments listed below:      therapeutic exercises to develop ROM and flexibility for 12 minutes including:    Seated:  Cervical rotation SNAGs: 5"x20 bilateral. Additional 10 after cervical flexion exercise  Cervical extension SNAGs: 5"x20. Additional 10 after cervical flexion exercise    neuromuscular re-education activities to improve: Coordination, Kinesthetic, and Posture for 17 minutes. The following activities were included:    Sidelying cervical side bend: 2x10 bilateral  Supine cervical flexion with chin tuck: 2x10 ! (Mild)  Seated cervical extension/retraction isometric into towel: " "5"x10    therapeutic activities to improve functional performance for 16 minutes, including:    Unilateral farmer's carry: 12 lbs, 320 feet bilateral   Upright rows: 10 lbs 2x10  Free Motion shoulder press: 3 lbs 2x10    Patient Education and Home Exercises       Education provided:   - Updated home exercise program    Written Home Exercises Provided: On 4/10/2024. Exercises were reviewed and Lisandra was able to demonstrate them prior to the end of the session.  Lisandra demonstrated good  understanding of the education provided. See Electronic Medical Record under Patient Instructions for exercises provided during therapy sessions    Assessment     Lisandra is a 63 y.o. female referred to outpatient Physical Therapy with a medical diagnosis of cervicogenic headache and myofascial pain syndrome. Mild pain provocation with cervical flexion against gravity that resolved with snag and retraction exercises. Increased fatigue with addition of therapeutic activities.      Lisandra Is progressing well towards her goals.   Patient prognosis is Good.   Patient will continue to benefit from skilled outpatient physical therapy to address the deficits listed in the problem list box on initial evaluation, provide pt/family education and to maximize pt's level of independence in the home and community environment.      Patient's spiritual, cultural and educational needs considered and pt agreeable to plan of care and goals.  Anticipated barriers to physical therapy: Chronicity and intensity of headaches      Long Term Goals: 8 weeks   6. Patient will improve cervical rotation AROM to >/=75 degrees bilaterally in order to improve functional mobility for tasks such as driving. Met 4/10  9. Patient will deny pain/headaches for 2 consecutive sessions to promote quality of life.  Progressing, not met      Plan     Monitor headaches - functional dry needling as appropriate.   Progress cervical and periscapular strengthening/kinesthetic as tolerated.  "   Progress functional activities as tolerated.    Becca Griffiths, PT, DPT, OCS

## 2024-04-14 ENCOUNTER — PATIENT MESSAGE (OUTPATIENT)
Dept: PRIMARY CARE CLINIC | Facility: CLINIC | Age: 64
End: 2024-04-14
Payer: COMMERCIAL

## 2024-04-15 ENCOUNTER — CLINICAL SUPPORT (OUTPATIENT)
Dept: REHABILITATION | Facility: HOSPITAL | Age: 64
End: 2024-04-15
Payer: COMMERCIAL

## 2024-04-15 DIAGNOSIS — G44.86 CERVICOGENIC HEADACHE: ICD-10-CM

## 2024-04-15 DIAGNOSIS — R29.898 DECREASED ROM OF NECK: Primary | ICD-10-CM

## 2024-04-15 PROCEDURE — 97112 NEUROMUSCULAR REEDUCATION: CPT | Mod: PN

## 2024-04-15 PROCEDURE — 97530 THERAPEUTIC ACTIVITIES: CPT | Mod: PN

## 2024-04-15 PROCEDURE — 97110 THERAPEUTIC EXERCISES: CPT | Mod: PN

## 2024-04-15 NOTE — PROGRESS NOTES
"[facility]   Physical Therapy Treatment Note      Name: Lisandra Toussaint  Clinic Number: [id number]    Therapy Diagnosis:   Encounter Diagnoses   Name Primary?    Decreased ROM of neck Yes    Cervicogenic headache      Physician: Demertius Shukla MD    Visit Date: 4/15/2024    Physician Orders: PT Eval and Treat   Medical Diagnosis from Referral: Headache, cervicogenic [G44.86], Cervical myofascial pain syndrome [M79.18]   Evaluation Date: 1/26/2024  Authorization Period Expiration: 1/22/2025  Plan of Care Expiration: 4/19/2024  Visit # / Visits authorized: 17/20 + 1   FOTO: Next at plan of care update  PTA Visit #: 0/5      Precautions: Standard     Time In: 8:05 am  Time Out: 9:00 am  Total Billable Time: 55 minutes     Subjective     Patient reports: headaches have decrease to 2-3/10 pain level on and off. Highest rating headache gets to is about 4/10. Left upper trap irritability.    She was compliant with home exercise program.  Response to previous treatment: decreased pain  Functional change: decreased headache pain to 2-3/10    Pain: 2/10 at arrival, 0/10 at end  Location: Bilateral cervicoscapular area     Objective      4/10:  Cervical rotation active range of motion: 79 degrees right, 81 degrees left     Treatment     Lisandra received the treatments listed below:      therapeutic exercises to develop ROM and flexibility for 10 minutes including:    Seated:  Cervical rotation SNAGs: 5"x20 bilateral.   Cervical extension SNAGs: 5"x20. Additional 10 after supine chin tuck exercise    neuromuscular re-education activities to improve: Coordination, Kinesthetic, and Posture for 20 minutes. The following activities were included:    Supine:  Chin tucks with lifts: 10x6-8"  Chin tucks with rotation: 10x5"    Seated cervical extension/retraction isometric into towel: 5"x10. Additional 10 after supine chin tuck exercises.      therapeutic activities to improve functional performance for " 25 minutes, including:    Unilateral farmer's carry: 12 lbs, 320 feet bilateral   Free Motion shoulder press: 3 lbs 2x10  Upright rows: 10 lbs dumbbell 2x10  Free Motion latissimus dorsi pull down: 13 lbs 2x10      Patient Education and Home Exercises       Education provided:   - Updated home exercise program    Written Home Exercises Provided: On 4/10/2024. Exercises were reviewed and Lisandra was able to demonstrate them prior to the end of the session.  Lisandra demonstrated good  understanding of the education provided. See Electronic Medical Record under Patient Instructions for exercises provided during therapy sessions    Assessment     Lisandra is a 63 y.o. female referred to outpatient Physical Therapy with a medical diagnosis of cervicogenic headache and myofascial pain syndrome. Slight increase in pain following chin tucks with lift and rotation. Resolved following snags and retractions. Gross fatigue with periscapular functional activity resistance training. Plan of care nearing end.      Lisandra Is progressing well towards her goals.   Patient prognosis is Good.   Patient will continue to benefit from skilled outpatient physical therapy to address the deficits listed in the problem list box on initial evaluation, provide pt/family education and to maximize pt's level of independence in the home and community environment.      Patient's spiritual, cultural and educational needs considered and pt agreeable to plan of care and goals.  Anticipated barriers to physical therapy: Chronicity and intensity of headaches      Long Term Goals: 8 weeks   6. Patient will improve cervical rotation AROM to >/=75 degrees bilaterally in order to improve functional mobility for tasks such as driving. Met 4/10  9. Patient will deny pain/headaches for 2 consecutive sessions to promote quality of life.  Progressing, not met      Plan     Monitor headaches - functional dry needling as appropriate.   Progress cervical and periscapular  strengthening/kinesthetic as tolerated.    Progress functional activities as tolerated.    Sravani Ro, PT, DPT

## 2024-04-16 ENCOUNTER — OFFICE VISIT (OUTPATIENT)
Dept: NEUROSURGERY | Facility: CLINIC | Age: 64
End: 2024-04-16
Payer: COMMERCIAL

## 2024-04-16 ENCOUNTER — PATIENT MESSAGE (OUTPATIENT)
Dept: PRIMARY CARE CLINIC | Facility: CLINIC | Age: 64
End: 2024-04-16
Payer: COMMERCIAL

## 2024-04-16 VITALS — HEART RATE: 96 BPM | DIASTOLIC BLOOD PRESSURE: 80 MMHG | SYSTOLIC BLOOD PRESSURE: 144 MMHG

## 2024-04-16 DIAGNOSIS — I72.9 ANEURYSM: Primary | ICD-10-CM

## 2024-04-16 PROCEDURE — 99999 PR PBB SHADOW E&M-EST. PATIENT-LVL II: CPT | Mod: PBBFAC,,, | Performed by: NEUROLOGICAL SURGERY

## 2024-04-16 PROCEDURE — 3077F SYST BP >= 140 MM HG: CPT | Mod: CPTII,S$GLB,, | Performed by: NEUROLOGICAL SURGERY

## 2024-04-16 PROCEDURE — 3079F DIAST BP 80-89 MM HG: CPT | Mod: CPTII,S$GLB,, | Performed by: NEUROLOGICAL SURGERY

## 2024-04-16 PROCEDURE — 99215 OFFICE O/P EST HI 40 MIN: CPT | Mod: S$GLB,,, | Performed by: NEUROLOGICAL SURGERY

## 2024-04-16 PROCEDURE — 3044F HG A1C LEVEL LT 7.0%: CPT | Mod: CPTII,S$GLB,, | Performed by: NEUROLOGICAL SURGERY

## 2024-04-16 PROCEDURE — 4010F ACE/ARB THERAPY RXD/TAKEN: CPT | Mod: CPTII,S$GLB,, | Performed by: NEUROLOGICAL SURGERY

## 2024-04-16 NOTE — TELEPHONE ENCOUNTER
Met with pt after Dr. Green visit. Consent signed for Swift. Ans all questions. Pt instructed on plavix and aspirin. Pt vu.

## 2024-04-16 NOTE — PROGRESS NOTES
Neurosurgery  Established Patient    Scribe Attestation  I, Eloisa Zhou, attest that this documentation has been prepared under the direction and in the presence of Dr. Vlad Green MD.    SUBJECTIVE:        History of Present Illness 12/04/2023  Lisandra Toussaint is a 62 year-old female with chronic conditions of anxiety, TMJ, HTN who was referred to me for evaluation of aneurysm. The patient reports in September of 2023 she experienced an onset of symptoms including left ear ringing, visual disturbances, and lightheadedness induced by bright light. Months following, she states her ear ringing did not resolve and progressively worsened. The patient reported 24 years ago she endured a concussion w/ LOC. Due to the history she was recommended to have a CT scan which discovered the aneurysm. She currently reports associated symptoms of eye pain, eye dryness, and hearing loss (L>R). The patient denies a history of smoking and rarely drinks alcohol, 1-2 glasses a year. 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      Interval History 4/16/2024  Lisandra Toussaint 62 y/o F that presents to me today for 4-6 week follow up for angiogram and other imaging. 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 Today she reports her eye/head pain that was previously an 8 has now reduced to a 2-3. She notes having ongoing issues with constant blurred vision since 11/2023 as well as episodes of dizziness/imbalance. Episodes occur once a day or once every other day. The pt shares April 5 the experienced an episode that led her to fall and hit her head.     No smoking history. No family history of aneurysms when prompted.    Review of patient's allergies indicates:   Allergen Reactions    Cinnamon Anaphylaxis    Iodine and iodide containing products Other (See Comments)     Tears up skin    Macrodantin [nitrofurantoin macrocrystalline] Other (See Comments)     Knee and  elbow swelling    Povidone-iodine      Other reaction(s): Blister    Sulfa (sulfonamide antibiotics) Hives       Current Outpatient Medications   Medication Sig Dispense Refill    amLODIPine (NORVASC) 10 MG tablet Take 1 tablet by mouth once daily 90 tablet 0    busPIRone (BUSPAR) 10 MG tablet TAKE 1 TABLET BY MOUTH THREE TIMES DAILY (Patient taking differently: Take 10 mg by mouth 3 (three) times daily. Takes x1-2/d) 90 tablet 0    cartilage/collagen/bor/hyalur (JOINT HEALTH ORAL) Take 1 tablet by mouth once daily.      cranberry extract (CRANBERRY CONCENTRATE) 500 mg Cap Take 1 tablet by mouth once daily.      DAILY MULTI-VITAMIN ORAL Take by mouth.      DULoxetine (CYMBALTA) 60 MG capsule Take 1 capsule (60 mg total) by mouth once daily. 90 capsule 3    hydroCHLOROthiazide (MICROZIDE) 12.5 mg capsule Take 1 capsule by mouth once daily 90 capsule 0    losartan (COZAAR) 25 MG tablet Take 1 tablet (25 mg total) by mouth once daily. 90 tablet 3    ranitidine (ZANTAC) 75 MG tablet Take 75 mg by mouth once daily.      tiZANidine (ZANAFLEX) 4 MG tablet Take 1 tablet (4 mg total) by mouth 2 (two) times a day. 180 tablet 3     No current facility-administered medications for this visit.       Past Medical History:   Diagnosis Date    Aneurysm     Anxiety     HTN (hypertension)     TMJ (dislocation of temporomandibular joint)      Past Surgical History:   Procedure Laterality Date    ABDOMINAL SURGERY      CARPAL TUNNEL RELEASE      EYE SURGERY      FOOT SURGERY      tarsel tunnel       Family History       Problem Relation (Age of Onset)    Cancer Mother, Maternal Grandfather    Heart disease Maternal Grandmother    Hypertension Mother, Maternal Grandfather    Learning disabilities Son, Son    Stroke Maternal Grandfather          Social History     Socioeconomic History    Marital status: Single   Tobacco Use    Smoking status: Never    Smokeless tobacco: Never    Tobacco comments:     2nd hand smoke when it was not  banned   Substance and Sexual Activity    Alcohol use: Not Currently     Comment: Couple times a year 1 drink - 3 times    Drug use: Never    Sexual activity: Not Currently     Partners: Male     Birth control/protection: Abstinence, Post-menopausal, None     Comment: partner have ED     Social Determinants of Health     Financial Resource Strain: High Risk (1/7/2024)    Overall Financial Resource Strain (CARDIA)     Difficulty of Paying Living Expenses: Hard   Food Insecurity: Food Insecurity Present (1/7/2024)    Hunger Vital Sign     Worried About Running Out of Food in the Last Year: Sometimes true     Ran Out of Food in the Last Year: Sometimes true   Transportation Needs: No Transportation Needs (1/7/2024)    PRAPARE - Transportation     Lack of Transportation (Medical): No     Lack of Transportation (Non-Medical): No   Physical Activity: Insufficiently Active (1/7/2024)    Exercise Vital Sign     Days of Exercise per Week: 2 days     Minutes of Exercise per Session: 40 min   Stress: No Stress Concern Present (1/7/2024)    [employer] - Occupational Stress Questionnaire     Feeling of Stress : Only a little   Social Connections: Unknown (1/7/2024)    Social Connection and Isolation Panel [NHANES]     Frequency of Communication with Friends and Family: More than three times a week     Frequency of Social Gatherings with Friends and Family: Twice a week     Active Member of Clubs or Organizations: Yes     Attends Club or Organization Meetings: More than 4 times per year     Marital Status: Living with partner   Housing Stability: Low Risk  (1/7/2024)    Housing Stability Vital Sign     Unable to Pay for Housing in the Last Year: No     Number of Places Lived in the Last Year: 1     Unstable Housing in the Last Year: No       Review of Systems   Eyes:  Positive for visual disturbance (blurriness).   All other systems reviewed and are negative.      OBJECTIVE:     Vital Signs  Pulse:  96  BP: (!) 144/80  Pain Score:   2  There is no height or weight on file to calculate BMI.    Neurosurgery Physical Exam  General: no acute distress  Head: Non-traumatic, normocephalic  Eyes: Pupils equal, EOMI  Neck: Supple, normal ROM, no tenderness to palpation  CVS: Normal rate and rhythm, distal pulses present  Pulm: Symmetric expansion, no respiratory distress  GI: Abdomen nondistended, nontender    MSK: Moves all extremities without restriction, atraumatic  Skin: Dry, intact  Psych: Normal thought content and cognition    Neuro:  Alert, awake, oriented, to self, place, time  Language:  Speech is fluent, goal directed without any noted dysarthria or aphasia    Cranial nerves:    CNII-XII: Intact on fine exam,   Pupils equal round react to light,   Extraocular muscles are intact  V1 to V3 is intact to light touch  no facial asymmetry,   Hearing is intact to finger rub and voice  tongue/uvula/palate midline,   shoulder shrug equal,     No pronator drift    Extremities:  Motor:  Upper Extremity    Deltoid Triceps Biceps Wrist  Extension Wrist  Flexion Interosseous   R 5/5 5/5 5/5 5/5 5/5 5/5   L 5/5 5/5 5/5 5/5 5/5 5/5       Thumb   Abduction Thumb  ADDuction Finger  Flexion Finger  Extension     R 5/5 5/5 5/5 5/5     L 5/5 5/5 5/5 5/5        Lower Extremity     Iliopsoas Quadriceps Hamstring Plantarflexion Dorsiflexion EHL   R 5/5 5/5 5/5 5/5 5/5 5/5   L 5/5 5/5 5/5 5/5 5/5 5/5       Reflexes:     DTR: 2+ biceps    2+ triceps   2+ brachioradialis   2+ patellar  2+ Achilles     Butler's: Negative     Babinski's: Negative     Clonus: Negative     Sensory:      Sensation intact to light touch, temperature sensation, vibration, pinprick     Coordination:      Coordination intact throughout, no dysmetria, normal rapid alternating movements, no dysdiadochokinesia  Cerebellar:  Normal finger-to-nose, normal heel-to-shin  Cervical spine:  No midline cervical tenderness, negative Lhermitte, negative Spurling  Thoracic  spine:  No midline thoracic tenderness  Lumbar spine:  No midline lumbar tenderness     Diagnostic Results:  I personally reviewed the patient's Diagnostic Imaging.     CTA Head and Neck 12/28/2023  No acute intracranial process. Limited CTA due to contrast extravasation at the time of the study.  The 8 mm aneurysm at the junction of the right A1 and bilateral A2 segments with a an aplastic left A1 segment is again identified, better characterized on the recent MRA. Nonspecific mildly enlarged scattered lymph nodes within the jugular/spinal accessory chains bilaterally.      MRA and MRI Brain W WO Contrast 12/28/2023  No acute intracranial process. 8 mm aneurysm at the junction of a dominant right A1 segment (developmentally aplastic left A1 segment) and bilateral DERIAN with small excrescences protruding inferiorly and inferiorly to the left adjacent to areas of vessel wall enhancement on vessel wall imaging.     IR Angiogram Carotid Cerebral Bilateral 3/21/2024  Saccular anterior communicating artery aneurysm anteriorly projecting, with an inferiorly pointing lobular excrescence was demonstrated as described above. 5.88 mm in height x 10.7  In width x 6.81 mm of neck.      ASSESSMENT/PLAN:   64 y/o F with imaging revealing 8 mm saccular aneurysm at junction of right A1 and bilateral A2 segments. No smoking history. No family history of aneurysms.    Pt presents with vision blurriness onset since 11/2024. She also reports episodes of dizziness and imbalance that occur near daily.  Discussed imaging results of CTA Head and Neck, MRA Brain, MRI Brain, and IR Angiogram. Results were revealing for saccular anterior communicating artery aneurysm at junction of right A1 and bilateral A2 segments.  After discussion with the pt, I would like have the aneurysm treated via WEB. I have discussed the risks/benefits, indications, and alternatives for the proposed procedure in detail. I have answered all of their questions and  patient wish to proceed with surgery. We will schedule patient. I'd also like to start the pt on aspirin 81 mg and Plavix.    Thank you so very much for allowing me to participate in the care of this patient.  Please feel free to call any questions, comments, or concerns.     Vlad Green MD,MSc  Department of Neurosurgery   Department of Radiology  Department of Neurology  [facility]    [place]   [employer] [facility]     Total time spent in counseling and discussion about further management options including relevant lab work, treatment,  prognosis, medications and intended side effects was more than 60 minutes. More than 50 % of the time was spent in counseling and coordination of care.  I spent a total of 60 minutes on the day of the visit.This includes face to face time and non-face to face time preparing to see the patient (eg, review of tests), Obtaining and/or reviewing separately obtained history, Documenting clinical information in the electronic or other health record, Independently interpreting resultsand communicating results to the patient/family/caregiver, or Care coordination      Scribe Attestation  I, Dr.Vernard Green personally performed the services described in this documentation. All medical record entries made by the scribe, Eloisa Zhou, were at my direction and in my presence.  I have reviewed the chart and agree that the record reflects my personal performance and is accurate and complete.

## 2024-04-17 ENCOUNTER — PATIENT MESSAGE (OUTPATIENT)
Dept: PRIMARY CARE CLINIC | Facility: CLINIC | Age: 64
End: 2024-04-17
Payer: COMMERCIAL

## 2024-04-17 RX ORDER — AMLODIPINE BESYLATE 10 MG/1
10 TABLET ORAL
Qty: 90 TABLET | Refills: 0 | Status: SHIPPED | OUTPATIENT
Start: 2024-04-17 | End: 2024-06-14

## 2024-04-18 DIAGNOSIS — E55.9 VITAMIN D INSUFFICIENCY: Primary | ICD-10-CM

## 2024-04-18 RX ORDER — ERGOCALCIFEROL 1.25 MG/1
50000 CAPSULE ORAL
Qty: 24 CAPSULE | Refills: 0 | Status: SHIPPED | OUTPATIENT
Start: 2024-04-18 | End: 2024-04-19

## 2024-04-19 ENCOUNTER — PATIENT MESSAGE (OUTPATIENT)
Dept: NEUROSURGERY | Facility: CLINIC | Age: 64
End: 2024-04-19
Payer: COMMERCIAL

## 2024-04-19 RX ORDER — ERGOCALCIFEROL 1.25 MG/1
50000 CAPSULE ORAL
Qty: 24 CAPSULE | Refills: 0 | Status: SHIPPED | OUTPATIENT
Start: 2024-04-19

## 2024-04-22 ENCOUNTER — TELEPHONE (OUTPATIENT)
Dept: NEUROSURGERY | Facility: CLINIC | Age: 64
End: 2024-04-22
Payer: COMMERCIAL

## 2024-04-22 NOTE — TELEPHONE ENCOUNTER
On 04/16/24 met with pt after visit with Dr. Green. Consent signed for Swift. Instructions reviewed and written given to pt. Answered all questions.     Nothing to eat or drink after 12 midnight   Do not take any medications for sleep or anxiety night before procedure.  Do not take any diabetic oral medications the night before or morning of procedure  Take blood pressure medication with a small sip of water morning of procedure (norvasc, cozaar, plavix and aspirin)  Labs to be done prior to procedure (special labs)  Remove any nail polish or gel nail from one finger of each hand  Morning of procedure shower with antibacterial soap (dial, dove). Do not use hair spray, hair pins/clips or other hair products.Do not use perfume, powder, deodorant, after shave, makeup, mascara or false eyelashes or lotions after shower.  May wear glasses, contact lens, dentures or hearing aids but bring case to put them in when procedure started  Do not lift anything heavier than 10 pounds  Avoid strenuous activity for 2 weeks  Will need someone to drive home after procedure and for about 3 days after procedure  Need to be monitor for about 8 hours after home. Observe for shortness of breath, numbness/tingling in any extremity,difficulty speaking (report to nearest emergency room)  Keep incision clian and dry for 24 hrs. Remove bandage after 24 hrs and shower. Do not bathe or submerge in water until site is completely healed. Do not allow force of water to hit site until healed.

## 2024-04-23 RX ORDER — DIPHENHYDRAMINE HCL 50 MG
CAPSULE ORAL
Start: 2024-04-23

## 2024-04-23 RX ORDER — ASPIRIN 325 MG
325 TABLET, DELAYED RELEASE (ENTERIC COATED) ORAL DAILY
Start: 2024-04-23 | End: 2025-04-23

## 2024-04-23 RX ORDER — CLOPIDOGREL BISULFATE 75 MG/1
75 TABLET ORAL DAILY
Qty: 30 TABLET | Refills: 11 | Status: SHIPPED | OUTPATIENT
Start: 2024-04-23 | End: 2025-04-23

## 2024-04-23 RX ORDER — PREDNISONE 50 MG/1
TABLET ORAL
Qty: 3 TABLET | Refills: 0 | Status: SHIPPED | OUTPATIENT
Start: 2024-04-23 | End: 2024-06-10 | Stop reason: SDUPTHER

## 2024-04-26 ENCOUNTER — PATIENT MESSAGE (OUTPATIENT)
Dept: NEUROSURGERY | Facility: CLINIC | Age: 64
End: 2024-04-26
Payer: COMMERCIAL

## 2024-05-15 ENCOUNTER — PATIENT MESSAGE (OUTPATIENT)
Dept: NEUROSURGERY | Facility: CLINIC | Age: 64
End: 2024-05-15
Payer: COMMERCIAL

## 2024-05-21 ENCOUNTER — PATIENT MESSAGE (OUTPATIENT)
Dept: NEUROSURGERY | Facility: CLINIC | Age: 64
End: 2024-05-21
Payer: COMMERCIAL

## 2024-05-24 ENCOUNTER — PATIENT MESSAGE (OUTPATIENT)
Dept: NEUROLOGY | Facility: CLINIC | Age: 64
End: 2024-05-24
Payer: COMMERCIAL

## 2024-05-24 ENCOUNTER — PATIENT MESSAGE (OUTPATIENT)
Dept: NEUROSURGERY | Facility: CLINIC | Age: 64
End: 2024-05-24
Payer: COMMERCIAL

## 2024-05-27 ENCOUNTER — PATIENT MESSAGE (OUTPATIENT)
Dept: NEUROSURGERY | Facility: CLINIC | Age: 64
End: 2024-05-27
Payer: COMMERCIAL

## 2024-05-28 ENCOUNTER — PATIENT MESSAGE (OUTPATIENT)
Dept: NEUROSURGERY | Facility: CLINIC | Age: 64
End: 2024-05-28
Payer: COMMERCIAL

## 2024-05-31 ENCOUNTER — PATIENT MESSAGE (OUTPATIENT)
Dept: PRIMARY CARE CLINIC | Facility: CLINIC | Age: 64
End: 2024-05-31
Payer: COMMERCIAL

## 2024-05-31 ENCOUNTER — PATIENT MESSAGE (OUTPATIENT)
Dept: NEUROSURGERY | Facility: CLINIC | Age: 64
End: 2024-05-31
Payer: COMMERCIAL

## 2024-05-31 ENCOUNTER — TELEPHONE (OUTPATIENT)
Dept: NEUROSURGERY | Facility: CLINIC | Age: 64
End: 2024-05-31
Payer: COMMERCIAL

## 2024-05-31 ENCOUNTER — PATIENT MESSAGE (OUTPATIENT)
Dept: NEUROLOGY | Facility: CLINIC | Age: 64
End: 2024-05-31
Payer: COMMERCIAL

## 2024-05-31 DIAGNOSIS — I72.9 ANEURYSM: Primary | ICD-10-CM

## 2024-06-03 ENCOUNTER — PATIENT MESSAGE (OUTPATIENT)
Dept: PRIMARY CARE CLINIC | Facility: CLINIC | Age: 64
End: 2024-06-03
Payer: COMMERCIAL

## 2024-06-03 DIAGNOSIS — F43.22 ADJUSTMENT DISORDER WITH ANXIETY: ICD-10-CM

## 2024-06-03 DIAGNOSIS — I10 BENIGN ESSENTIAL HTN: Primary | ICD-10-CM

## 2024-06-03 RX ORDER — BUSPIRONE HYDROCHLORIDE 10 MG/1
10 TABLET ORAL 2 TIMES DAILY
Qty: 180 TABLET | Refills: 3 | Status: SHIPPED | OUTPATIENT
Start: 2024-06-03 | End: 2025-06-03

## 2024-06-03 RX ORDER — LOSARTAN POTASSIUM 50 MG/1
50 TABLET ORAL 2 TIMES DAILY
Qty: 60 TABLET | Refills: 3 | Status: SHIPPED | OUTPATIENT
Start: 2024-06-03 | End: 2025-06-03

## 2024-06-10 DIAGNOSIS — I72.9 ANEURYSM: ICD-10-CM

## 2024-06-11 ENCOUNTER — TELEPHONE (OUTPATIENT)
Dept: NEUROSURGERY | Facility: CLINIC | Age: 64
End: 2024-06-11
Payer: COMMERCIAL

## 2024-06-11 DIAGNOSIS — I72.9 ANEURYSM: Primary | ICD-10-CM

## 2024-06-11 RX ORDER — PREDNISONE 50 MG/1
TABLET ORAL
Qty: 3 TABLET | Refills: 0 | Status: SHIPPED | OUTPATIENT
Start: 2024-06-11 | End: 2024-06-11

## 2024-06-11 RX ORDER — PREDNISONE 50 MG/1
TABLET ORAL
Qty: 3 TABLET | Refills: 0 | Status: SHIPPED | OUTPATIENT
Start: 2024-06-11

## 2024-06-11 RX ORDER — PREDNISONE 50 MG/1
TABLET ORAL
Qty: 3 TABLET | Refills: 0 | Status: CANCELLED | OUTPATIENT
Start: 2024-06-11

## 2024-06-12 ENCOUNTER — CLINICAL SUPPORT (OUTPATIENT)
Dept: ENDOSCOPY | Facility: HOSPITAL | Age: 64
End: 2024-06-12
Payer: COMMERCIAL

## 2024-06-12 DIAGNOSIS — Z12.11 SCREEN FOR COLON CANCER: ICD-10-CM

## 2024-06-12 NOTE — PLAN OF CARE
We attempted to reach you to Schedule procedure. Please call back at [phone]. Sent MY O message to pt. New pAT scheduled for August for pt. To get a call back.

## 2024-06-13 ENCOUNTER — PATIENT MESSAGE (OUTPATIENT)
Dept: NEUROSURGERY | Facility: CLINIC | Age: 64
End: 2024-06-13
Payer: COMMERCIAL

## 2024-06-14 RX ORDER — AMLODIPINE BESYLATE 10 MG/1
10 TABLET ORAL
Qty: 90 TABLET | Refills: 0 | Status: SHIPPED | OUTPATIENT
Start: 2024-06-14

## 2024-06-17 ENCOUNTER — LAB VISIT (OUTPATIENT)
Dept: LAB | Facility: HOSPITAL | Age: 64
End: 2024-06-17
Attending: NEUROLOGICAL SURGERY
Payer: COMMERCIAL

## 2024-06-17 DIAGNOSIS — I72.9 ANEURYSM: ICD-10-CM

## 2024-06-17 LAB
ANION GAP SERPL CALC-SCNC: 11 MMOL/L (ref 8–16)
BUN SERPL-MCNC: 25 MG/DL (ref 8–23)
CALCIUM SERPL-MCNC: 10.4 MG/DL (ref 8.7–10.5)
CHLORIDE SERPL-SCNC: 106 MMOL/L (ref 95–110)
CO2 SERPL-SCNC: 29 MMOL/L (ref 23–29)
CREAT SERPL-MCNC: 1.2 MG/DL (ref 0.5–1.4)
EST. GFR  (NO RACE VARIABLE): 51.5 ML/MIN/1.73 M^2
GLUCOSE SERPL-MCNC: 118 MG/DL (ref 70–110)
PA AA PRP-ACNC: 569 ARU (ref 620–672)
PLATELET RESPONSE PLAVIX: 170 PRU (ref 194–418)
POTASSIUM SERPL-SCNC: 4.6 MMOL/L (ref 3.5–5.1)
SODIUM SERPL-SCNC: 146 MMOL/L (ref 136–145)

## 2024-06-17 PROCEDURE — 80048 BASIC METABOLIC PNL TOTAL CA: CPT | Performed by: NEUROLOGICAL SURGERY

## 2024-06-17 PROCEDURE — 85576 BLOOD PLATELET AGGREGATION: CPT | Mod: 91 | Performed by: NEUROLOGICAL SURGERY

## 2024-06-17 PROCEDURE — 36415 COLL VENOUS BLD VENIPUNCTURE: CPT | Performed by: NEUROLOGICAL SURGERY

## 2024-06-17 PROCEDURE — 85576 BLOOD PLATELET AGGREGATION: CPT | Performed by: NEUROLOGICAL SURGERY

## 2024-06-18 ENCOUNTER — DOCUMENTATION ONLY (OUTPATIENT)
Dept: PREADMISSION TESTING | Facility: HOSPITAL | Age: 64
End: 2024-06-18
Payer: COMMERCIAL

## 2024-06-18 NOTE — PRE-PROCEDURE INSTRUCTIONS
PRE-OP INSTRUCTIONS:  Instructed patient to have no food,milk or milk products after midnight 6/19/2024  It is ok to take AM medications with a few sips of water   Medication instructions for pm prior to and am of surgery reviewed.  Instructed patient to avoid taking vitamins,supplements or ibuprofen the am of surgery.  Shower instructions provided    Patient denies any side effects or issues with anesthesia or sedation.

## 2024-06-20 ENCOUNTER — ANESTHESIA (OUTPATIENT)
Dept: INTERVENTIONAL RADIOLOGY/VASCULAR | Facility: HOSPITAL | Age: 64
End: 2024-06-20
Payer: COMMERCIAL

## 2024-06-20 ENCOUNTER — TELEPHONE (OUTPATIENT)
Dept: NEUROSURGERY | Facility: CLINIC | Age: 64
End: 2024-06-20
Payer: COMMERCIAL

## 2024-06-20 ENCOUNTER — HOSPITAL ENCOUNTER (INPATIENT)
Dept: INTERVENTIONAL RADIOLOGY/VASCULAR | Facility: HOSPITAL | Age: 64
LOS: 1 days | Discharge: HOME | DRG: 026 | End: 2024-06-21
Attending: NEUROLOGICAL SURGERY | Admitting: PSYCHIATRY & NEUROLOGY
Payer: COMMERCIAL

## 2024-06-20 DIAGNOSIS — Z95.828 PRESENCE OF ARTERIAL STENT: Primary | ICD-10-CM

## 2024-06-20 DIAGNOSIS — I72.9 ANEURYSM: ICD-10-CM

## 2024-06-20 DIAGNOSIS — I72.9 ANEURYSM: Primary | ICD-10-CM

## 2024-06-20 DIAGNOSIS — Q28.2 ARTERIOVENOUS MALFORMATION OF BRAIN: ICD-10-CM

## 2024-06-20 PROBLEM — I10 ESSENTIAL HYPERTENSION: Chronic | Status: ACTIVE | Noted: 2024-06-20

## 2024-06-20 LAB
BILIRUB UR QL STRIP: NEGATIVE
CLARITY UR REFRACT.AUTO: CLEAR
COLOR UR AUTO: YELLOW
GLUCOSE UR QL STRIP: NEGATIVE
HGB UR QL STRIP: NEGATIVE
KETONES UR QL STRIP: NEGATIVE
LEUKOCYTE ESTERASE UR QL STRIP: NEGATIVE
NITRITE UR QL STRIP: NEGATIVE
PH UR STRIP: 7 [PH] (ref 5–8)
PROT UR QL STRIP: ABNORMAL
SP GR UR STRIP: >=1.03 (ref 1–1.03)
URN SPEC COLLECT METH UR: ABNORMAL

## 2024-06-20 PROCEDURE — 36224 PLACE CATH CAROTD ART: CPT | Mod: RT | Performed by: NEUROLOGICAL SURGERY

## 2024-06-20 PROCEDURE — 63600175 PHARM REV CODE 636 W HCPCS: Performed by: NEUROLOGICAL SURGERY

## 2024-06-20 PROCEDURE — 25000003 PHARM REV CODE 250

## 2024-06-20 PROCEDURE — 27201078 IR ANGIOGRAM CAROTID INTERNAL INC ARCH AND CEREBRAL BILAT

## 2024-06-20 PROCEDURE — 25500020 PHARM REV CODE 255: Performed by: NEUROLOGICAL SURGERY

## 2024-06-20 PROCEDURE — 99223 1ST HOSP IP/OBS HIGH 75: CPT | Mod: ,,,

## 2024-06-20 PROCEDURE — 81003 URINALYSIS AUTO W/O SCOPE: CPT

## 2024-06-20 PROCEDURE — 75894 X-RAYS TRANSCATH THERAPY: CPT | Mod: 26,,, | Performed by: NEUROLOGICAL SURGERY

## 2024-06-20 PROCEDURE — 61624 TCAT PERM OCCLS/EMBOLJ CNS: CPT | Mod: ,,, | Performed by: NEUROLOGICAL SURGERY

## 2024-06-20 PROCEDURE — 37000009 HC ANESTHESIA EA ADD 15 MINS

## 2024-06-20 PROCEDURE — B3161ZZ FLUOROSCOPY OF RIGHT INTERNAL CAROTID ARTERY USING LOW OSMOLAR CONTRAST: ICD-10-PCS | Performed by: NEUROLOGICAL SURGERY

## 2024-06-20 PROCEDURE — 25000003 PHARM REV CODE 250: Performed by: PHYSICIAN ASSISTANT

## 2024-06-20 PROCEDURE — 25000003 PHARM REV CODE 250: Performed by: NEUROLOGICAL SURGERY

## 2024-06-20 PROCEDURE — 03VG3DZ RESTRICTION OF INTRACRANIAL ARTERY WITH INTRALUMINAL DEVICE, PERCUTANEOUS APPROACH: ICD-10-PCS | Performed by: NEUROLOGICAL SURGERY

## 2024-06-20 PROCEDURE — 99900035 HC TECH TIME PER 15 MIN (STAT)

## 2024-06-20 PROCEDURE — 63600175 PHARM REV CODE 636 W HCPCS: Performed by: NURSE ANESTHETIST, CERTIFIED REGISTERED

## 2024-06-20 PROCEDURE — 37000008 HC ANESTHESIA 1ST 15 MINUTES

## 2024-06-20 PROCEDURE — 61624 TCAT PERM OCCLS/EMBOLJ CNS: CPT | Performed by: NEUROLOGICAL SURGERY

## 2024-06-20 PROCEDURE — 63600175 PHARM REV CODE 636 W HCPCS: Performed by: PHYSICIAN ASSISTANT

## 2024-06-20 PROCEDURE — 94761 N-INVAS EAR/PLS OXIMETRY MLT: CPT

## 2024-06-20 PROCEDURE — 25000003 PHARM REV CODE 250: Performed by: NURSE ANESTHETIST, CERTIFIED REGISTERED

## 2024-06-20 PROCEDURE — 75898 FOLLOW-UP ANGIOGRAPHY: CPT | Mod: 26,59,, | Performed by: NEUROLOGICAL SURGERY

## 2024-06-20 PROCEDURE — 27000221 HC OXYGEN, UP TO 24 HOURS

## 2024-06-20 PROCEDURE — 75894 X-RAYS TRANSCATH THERAPY: CPT | Mod: TC | Performed by: NEUROLOGICAL SURGERY

## 2024-06-20 PROCEDURE — 75898 FOLLOW-UP ANGIOGRAPHY: CPT | Mod: TC | Performed by: NEUROLOGICAL SURGERY

## 2024-06-20 PROCEDURE — 36224 PLACE CATH CAROTD ART: CPT | Mod: 51,RT,, | Performed by: NEUROLOGICAL SURGERY

## 2024-06-20 PROCEDURE — 20000000 HC ICU ROOM

## 2024-06-20 RX ORDER — ONDANSETRON HYDROCHLORIDE 2 MG/ML
4 INJECTION, SOLUTION INTRAVENOUS EVERY 8 HOURS PRN
Status: DISCONTINUED | OUTPATIENT
Start: 2024-06-20 | End: 2024-06-21 | Stop reason: HOSPADM

## 2024-06-20 RX ORDER — CLOPIDOGREL BISULFATE 75 MG/1
75 TABLET ORAL NIGHTLY
Status: DISCONTINUED | OUTPATIENT
Start: 2024-06-20 | End: 2024-06-21 | Stop reason: HOSPADM

## 2024-06-20 RX ORDER — ACETAMINOPHEN 500 MG
1000 TABLET ORAL ONCE
Status: COMPLETED | OUTPATIENT
Start: 2024-06-20 | End: 2024-06-20

## 2024-06-20 RX ORDER — LANOLIN ALCOHOL/MO/W.PET/CERES
800 CREAM (GRAM) TOPICAL
Status: DISCONTINUED | OUTPATIENT
Start: 2024-06-20 | End: 2024-06-21 | Stop reason: HOSPADM

## 2024-06-20 RX ORDER — SODIUM CHLORIDE 9 MG/ML
INJECTION, SOLUTION INTRAVENOUS CONTINUOUS
Status: DISCONTINUED | OUTPATIENT
Start: 2024-06-20 | End: 2024-06-21

## 2024-06-20 RX ORDER — ASPIRIN 81 MG/1
81 TABLET ORAL NIGHTLY
Status: DISCONTINUED | OUTPATIENT
Start: 2024-06-21 | End: 2024-06-20

## 2024-06-20 RX ORDER — METOCLOPRAMIDE HYDROCHLORIDE 5 MG/ML
5 INJECTION INTRAMUSCULAR; INTRAVENOUS ONCE
Status: COMPLETED | OUTPATIENT
Start: 2024-06-20 | End: 2024-06-20

## 2024-06-20 RX ORDER — LIDOCAINE HYDROCHLORIDE 20 MG/ML
INJECTION, SOLUTION EPIDURAL; INFILTRATION; INTRACAUDAL; PERINEURAL
Status: DISCONTINUED | OUTPATIENT
Start: 2024-06-20 | End: 2024-06-20

## 2024-06-20 RX ORDER — IODIXANOL 320 MG/ML
400 INJECTION, SOLUTION INTRAVASCULAR
Status: COMPLETED | OUTPATIENT
Start: 2024-06-20 | End: 2024-06-20

## 2024-06-20 RX ORDER — DULOXETIN HYDROCHLORIDE 30 MG/1
60 CAPSULE, DELAYED RELEASE ORAL DAILY
Status: DISCONTINUED | OUTPATIENT
Start: 2024-06-21 | End: 2024-06-21 | Stop reason: HOSPADM

## 2024-06-20 RX ORDER — FENTANYL CITRATE 50 UG/ML
25 INJECTION, SOLUTION INTRAMUSCULAR; INTRAVENOUS EVERY 5 MIN PRN
OUTPATIENT
Start: 2024-06-20

## 2024-06-20 RX ORDER — MAGNESIUM SULFATE HEPTAHYDRATE 40 MG/ML
2 INJECTION, SOLUTION INTRAVENOUS ONCE
Status: COMPLETED | OUTPATIENT
Start: 2024-06-20 | End: 2024-06-20

## 2024-06-20 RX ORDER — LOSARTAN POTASSIUM 50 MG/1
50 TABLET ORAL NIGHTLY
Status: DISCONTINUED | OUTPATIENT
Start: 2024-06-20 | End: 2024-06-21 | Stop reason: HOSPADM

## 2024-06-20 RX ORDER — SODIUM CHLORIDE 0.9 % (FLUSH) 0.9 %
10 SYRINGE (ML) INJECTION
Status: DISCONTINUED | OUTPATIENT
Start: 2024-06-20 | End: 2024-06-21 | Stop reason: HOSPADM

## 2024-06-20 RX ORDER — VERAPAMIL HYDROCHLORIDE 2.5 MG/ML
INJECTION, SOLUTION INTRAVENOUS
Status: COMPLETED | OUTPATIENT
Start: 2024-06-20 | End: 2024-06-20

## 2024-06-20 RX ORDER — LIDOCAINE HYDROCHLORIDE 10 MG/ML
1 INJECTION, SOLUTION EPIDURAL; INFILTRATION; INTRACAUDAL; PERINEURAL ONCE
Status: DISCONTINUED | OUTPATIENT
Start: 2024-06-20 | End: 2024-06-20

## 2024-06-20 RX ORDER — ONDANSETRON HYDROCHLORIDE 2 MG/ML
INJECTION, SOLUTION INTRAVENOUS
Status: DISCONTINUED | OUTPATIENT
Start: 2024-06-20 | End: 2024-06-20

## 2024-06-20 RX ORDER — SODIUM CHLORIDE 0.9 % (FLUSH) 0.9 %
10 SYRINGE (ML) INJECTION
OUTPATIENT
Start: 2024-06-20

## 2024-06-20 RX ORDER — ASPIRIN 325 MG
325 TABLET, DELAYED RELEASE (ENTERIC COATED) ORAL NIGHTLY
Status: DISCONTINUED | OUTPATIENT
Start: 2024-06-20 | End: 2024-06-20

## 2024-06-20 RX ORDER — ACETAMINOPHEN 325 MG/1
650 TABLET ORAL EVERY 6 HOURS PRN
Status: DISCONTINUED | OUTPATIENT
Start: 2024-06-20 | End: 2024-06-21 | Stop reason: HOSPADM

## 2024-06-20 RX ORDER — SODIUM,POTASSIUM PHOSPHATES 280-250MG
2 POWDER IN PACKET (EA) ORAL
Status: DISCONTINUED | OUTPATIENT
Start: 2024-06-20 | End: 2024-06-21 | Stop reason: HOSPADM

## 2024-06-20 RX ORDER — PROPOFOL 10 MG/ML
VIAL (ML) INTRAVENOUS
Status: DISCONTINUED | OUTPATIENT
Start: 2024-06-20 | End: 2024-06-20

## 2024-06-20 RX ORDER — FENTANYL CITRATE 50 UG/ML
INJECTION, SOLUTION INTRAMUSCULAR; INTRAVENOUS
Status: DISCONTINUED | OUTPATIENT
Start: 2024-06-20 | End: 2024-06-20

## 2024-06-20 RX ORDER — AMLODIPINE BESYLATE 10 MG/1
10 TABLET ORAL DAILY
Status: DISCONTINUED | OUTPATIENT
Start: 2024-06-21 | End: 2024-06-21 | Stop reason: HOSPADM

## 2024-06-20 RX ORDER — ROCURONIUM BROMIDE 10 MG/ML
INJECTION, SOLUTION INTRAVENOUS
Status: DISCONTINUED | OUTPATIENT
Start: 2024-06-20 | End: 2024-06-20

## 2024-06-20 RX ORDER — HALOPERIDOL 5 MG/ML
0.5 INJECTION INTRAMUSCULAR EVERY 10 MIN PRN
OUTPATIENT
Start: 2024-06-20

## 2024-06-20 RX ORDER — AMOXICILLIN 250 MG
1 CAPSULE ORAL 2 TIMES DAILY
Status: DISCONTINUED | OUTPATIENT
Start: 2024-06-20 | End: 2024-06-21 | Stop reason: HOSPADM

## 2024-06-20 RX ORDER — LABETALOL HCL 20 MG/4 ML
10 SYRINGE (ML) INTRAVENOUS EVERY 6 HOURS PRN
Status: DISCONTINUED | OUTPATIENT
Start: 2024-06-20 | End: 2024-06-21 | Stop reason: HOSPADM

## 2024-06-20 RX ORDER — HEPARIN SODIUM 1000 [USP'U]/ML
INJECTION, SOLUTION INTRAVENOUS; SUBCUTANEOUS
Status: DISCONTINUED | OUTPATIENT
Start: 2024-06-20 | End: 2024-06-20

## 2024-06-20 RX ORDER — ASPIRIN 325 MG
325 TABLET, DELAYED RELEASE (ENTERIC COATED) ORAL NIGHTLY
Status: DISCONTINUED | OUTPATIENT
Start: 2024-06-21 | End: 2024-06-21

## 2024-06-20 RX ORDER — HEPARIN SODIUM 1000 [USP'U]/ML
INJECTION, SOLUTION INTRAVENOUS; SUBCUTANEOUS
Status: COMPLETED | OUTPATIENT
Start: 2024-06-20 | End: 2024-06-20

## 2024-06-20 RX ADMIN — ROCURONIUM BROMIDE 10 MG: 10 INJECTION INTRAVENOUS at 12:06

## 2024-06-20 RX ADMIN — FENTANYL CITRATE 100 MCG: 50 INJECTION, SOLUTION INTRAMUSCULAR; INTRAVENOUS at 10:06

## 2024-06-20 RX ADMIN — SUGAMMADEX 200 MG: 100 INJECTION, SOLUTION INTRAVENOUS at 02:06

## 2024-06-20 RX ADMIN — LIDOCAINE HYDROCHLORIDE 100 MG: 20 INJECTION, SOLUTION EPIDURAL; INFILTRATION; INTRACAUDAL; PERINEURAL at 09:06

## 2024-06-20 RX ADMIN — HEPARIN SODIUM 500 UNITS: 1000 INJECTION, SOLUTION INTRAVENOUS; SUBCUTANEOUS at 12:06

## 2024-06-20 RX ADMIN — HEPARIN SODIUM 5000 ML: 1000 INJECTION, SOLUTION INTRAVENOUS; SUBCUTANEOUS at 10:06

## 2024-06-20 RX ADMIN — HEPARIN SODIUM 2000 UNITS: 1000 INJECTION, SOLUTION INTRAVENOUS; SUBCUTANEOUS at 10:06

## 2024-06-20 RX ADMIN — SODIUM CHLORIDE: 0.9 INJECTION, SOLUTION INTRAVENOUS at 10:06

## 2024-06-20 RX ADMIN — HEPARIN SODIUM 3000 UNITS: 1000 INJECTION, SOLUTION INTRAVENOUS; SUBCUTANEOUS at 10:06

## 2024-06-20 RX ADMIN — MAGNESIUM SULFATE HEPTAHYDRATE 2 G: 40 INJECTION, SOLUTION INTRAVENOUS at 03:06

## 2024-06-20 RX ADMIN — HEPARIN SODIUM 1000 UNITS: 1000 INJECTION, SOLUTION INTRAVENOUS; SUBCUTANEOUS at 10:06

## 2024-06-20 RX ADMIN — ACETAMINOPHEN 1000 MG: 500 TABLET ORAL at 03:06

## 2024-06-20 RX ADMIN — ASPIRIN 325 MG: 325 TABLET, COATED ORAL at 08:06

## 2024-06-20 RX ADMIN — METOCLOPRAMIDE 5 MG: 5 INJECTION, SOLUTION INTRAMUSCULAR; INTRAVENOUS at 03:06

## 2024-06-20 RX ADMIN — ROCURONIUM BROMIDE 30 MG: 10 INJECTION INTRAVENOUS at 09:06

## 2024-06-20 RX ADMIN — VERAPAMIL HYDROCHLORIDE 10 MG: 2.5 INJECTION, SOLUTION INTRAVENOUS at 10:06

## 2024-06-20 RX ADMIN — ROCURONIUM BROMIDE 10 MG: 10 INJECTION INTRAVENOUS at 11:06

## 2024-06-20 RX ADMIN — IODIXANOL 220 ML: 320 INJECTION, SOLUTION INTRAVASCULAR at 03:06

## 2024-06-20 RX ADMIN — SODIUM CHLORIDE: 0.9 INJECTION, SOLUTION INTRAVENOUS at 09:06

## 2024-06-20 RX ADMIN — ONDANSETRON 4 MG: 2 INJECTION INTRAMUSCULAR; INTRAVENOUS at 01:06

## 2024-06-20 RX ADMIN — ROCURONIUM BROMIDE 20 MG: 10 INJECTION INTRAVENOUS at 10:06

## 2024-06-20 RX ADMIN — LOSARTAN POTASSIUM 50 MG: 50 TABLET, FILM COATED ORAL at 08:06

## 2024-06-20 RX ADMIN — PROPOFOL 150 MG: 10 INJECTION, EMULSION INTRAVENOUS at 09:06

## 2024-06-20 RX ADMIN — SENNOSIDES AND DOCUSATE SODIUM 1 TABLET: 50; 8.6 TABLET ORAL at 08:06

## 2024-06-20 RX ADMIN — SODIUM CHLORIDE 1000 ML: 9 INJECTION, SOLUTION INTRAVENOUS at 03:06

## 2024-06-20 RX ADMIN — SODIUM CHLORIDE: 9 INJECTION, SOLUTION INTRAVENOUS at 02:06

## 2024-06-20 RX ADMIN — HEPARIN SODIUM 500 UNITS: 1000 INJECTION, SOLUTION INTRAVENOUS; SUBCUTANEOUS at 11:06

## 2024-06-20 RX ADMIN — CLOPIDOGREL BISULFATE 75 MG: 75 TABLET ORAL at 08:06

## 2024-06-20 RX ADMIN — FENTANYL CITRATE 100 MCG: 50 INJECTION, SOLUTION INTRAMUSCULAR; INTRAVENOUS at 09:06

## 2024-06-20 RX ADMIN — ROCURONIUM BROMIDE 30 MG: 10 INJECTION INTRAVENOUS at 12:06

## 2024-06-20 RX ADMIN — HEPARIN SODIUM 500 UNITS: 1000 INJECTION, SOLUTION INTRAVENOUS; SUBCUTANEOUS at 01:06

## 2024-06-20 NOTE — ANESTHESIA PROCEDURE NOTES
Intubation    Date/Time: 6/20/2024 9:50 AM    Performed by: Rogers Atwood CRNA  Authorized by: Quinton Guerin MD    Intubation:     Induction:  Intravenous    Intubated:  Postinduction    Mask Ventilation:  Easy mask    Attempts:  1    Attempted By:  CRNA    Blade:  [place] 3    Laryngeal View Grade: Grade I - full view of cords      Difficult Airway Encountered?: No      Complications:  None    Airway Device:  Oral endotracheal tube    Airway Device Size:  7.0    Style/Cuff Inflation:  Cuffed (inflated to minimal occlusive pressure)    Tube secured:  18    Secured at:  The teeth    Placement Verified By:  Capnometry    Complicating Factors:  None    Findings Post-Intubation:  BS equal bilateral

## 2024-06-20 NOTE — HPI
Pt is a 63 y.o. female with PMHx of HTN, headaches, and depression/anxiety presented to the [facility] after having an elective ACOM aneurysmal WILDER and T-assisted coiling due to an unruptured A1 aneurysm that was found in 12/2023. Patient was experiencing tinnitus, visual disturbances, and lightheadedness in September 2023 that had been increasing with intensity over the last 5 years prior. A prior CTH revealed an aneurysm and in 3/2024 the patient had a diagnostic angiogram where an unruptured A1 aneurysm was found. Because of the complexity of the procedure and need for close observation/monitoring, the patient will be admitted to the [facility].

## 2024-06-20 NOTE — PLAN OF CARE
"[facility] Care Plan    POC reviewed with Lisandra Toussaint and family at 1500. Pt verbalized understanding. Questions and concerns addressed. No acute events today. Pt progressing toward goals. Will continue to monitor. See below and flowsheets for full assessment and VS info.     -post WEB procedure in IR  -TR band to R wrist, see progress note  -mild headache, given migraine cocktail        Is this a stroke patient? no    Neuro:  [place] Coma Scale  Best Eye Response: 4-->(E4) spontaneous  Best Motor Response: 6-->(M6) obeys commands  Best Verbal Response: 5-->(V5) oriented  [place] Coma Scale Score: 15  Assessment Qualifiers: patient not sedated/intubated  Pupil PERRLA: yes     24 hr Temp:  [98 °F (36.7 °C)-99 °F (37.2 °C)]     CV:   Rhythm: normal sinus rhythm  BP goals:   SBP < 140  MAP > 65    Resp:           Plan: N/A    GI/:     Diet/Nutrition Received: NPO     Voiding Characteristics: external catheter    Intake/Output Summary (Last 24 hours) at 6/20/2024 1727  Last data filed at 6/20/2024 1702  Gross per 24 hour   Intake 2998.87 ml   Output --   Net 2998.87 ml          Labs/Accuchecks:  No results for input(s): "WBC", "RBC", "HGB", "HCT", "PLT" in the last 168 hours.   Recent Labs   Lab 06/17/24  0942   *   K 4.6   CO2 29      BUN 25*   CREATININE 1.2    No results for input(s): "PROTIME", "INR", "APTT", "HEPANTIXA" in the last 168 hours. No results for input(s): "CPK", "CPKMB", "TROPONINI", "MB" in the last 168 hours.    Electrolytes: N/A - electrolytes WDL  Accuchecks: none    Gtts:   0.9% NaCl   Intravenous Continuous   Stopped at 06/20/24 1542       LDA/Wounds:      Nurses Note -- 4 Eyes      Is there altered skin present? yes     Please check the following boxes that apply:   [] LDA Added if Not in Epic (Describe Wound)   [] New Altered Skin Integrity was Present on Admit and Documented in LDA   [] Wound Image Taken    Wound Care Consulted? No    Second RN/Staff Member:  Patricia" RN      Restraints:        WCANTONIA

## 2024-06-20 NOTE — PLAN OF CARE
Pt tolerated cerebral angiogram well. VSS. Dr. Green used a TR band for radial closure. Used 14 mL of air to obtain hemostasis at 13:57. Maintains good waveform on pulse oximetry that is applied to the 2nd finger on the right hand. 7,500 units of Heparin were used during the case. May deflate the TR band in 120 minutes in the ICU (15:57).

## 2024-06-20 NOTE — ASSESSMENT & PLAN NOTE
A1 aneurysm, unruptured, found in 3/2023 during diagnostic aneurysm. Patient was experiencing tinnitus, visual disturbance, and light-headedness. Denies all s/s at this time. Presented 6/20/2024 for post-operative monitoring and management.  Neurology IR following   Hourly neurochecks and VS  SBP goal <140  Utilize PRN anti-hypertensives  Resumed home dose amlodipine   Resumed home dose losartan  Restart DAPT therapy tonight, taken prior to hospitalization  SCDs applied for mechanical DVT Ppx  NPO  PT to eval and treat

## 2024-06-20 NOTE — PLAN OF CARE
Pt arrived to  (4)  for cerebral angiogram with WEB embolization. Pt oriented to unit and staff. Plan of care reviewed with patient, patient verbalizes understanding. Comfort measures utilized. Pt safely transferred from stretcher to procedural table. Fall risk reviewed with patient, fall risk interventions maintained. Positioner pillows utilized to minimize pressure points. Blankets applied. Pt prepped and draped utilizing standard sterile technique. Timeouts completed utilizing standard universal time-out, per department and facility policy.  Anesthesia at bedside; Refer to anesthesia record regarding sedation and vital signs.

## 2024-06-20 NOTE — SUBJECTIVE & OBJECTIVE
Past Medical History:   Diagnosis Date    Aneurysm     Anxiety     HTN (hypertension)     TMJ (dislocation of temporomandibular joint)      Past Surgical History:   Procedure Laterality Date    ABDOMINAL SURGERY      CARPAL TUNNEL RELEASE      EYE SURGERY      FOOT SURGERY      tarsel tunnel        No current facility-administered medications on file prior to encounter.     Current Outpatient Medications on File Prior to Encounter   Medication Sig Dispense Refill    amLODIPine (NORVASC) 10 MG tablet Take 1 tablet by mouth once daily 90 tablet 0    aspirin (ECOTRIN) 325 MG EC tablet Take 1 tablet (325 mg total) by mouth once daily.      busPIRone (BUSPAR) 10 MG tablet Take 1 tablet (10 mg total) by mouth 2 (two) times daily. 180 tablet 3    clopidogreL (PLAVIX) 75 mg tablet Take 1 tablet (75 mg total) by mouth once daily. To take with aspirin 325 mg daily (otc) 30 tablet 11    cranberry extract (CRANBERRY CONCENTRATE) 500 mg Cap Take 1 tablet by mouth once daily.      DAILY MULTI-VITAMIN ORAL Take by mouth.      diphenhydrAMINE (BENADRYL) 50 MG capsule Pt to take 50 mg benadryl at 1 hour before procedure      DULoxetine (CYMBALTA) 60 MG capsule Take 1 capsule (60 mg total) by mouth once daily. 90 capsule 3    ergocalciferol (ERGOCALCIFEROL) 50,000 unit Cap Take 1 capsule (50,000 Units total) by mouth every 7 days. 24 capsule 0    losartan (COZAAR) 50 MG tablet Take 1 tablet (50 mg total) by mouth 2 (two) times a day. 60 tablet 3    predniSONE (DELTASONE) 50 MG Tab Pt to take 1 tab (50mg total) 13 hours, 7 hours, 1 hour before procedure in addition take over the counter benadryl 50 mg 1 hr prior to procedure 3 tablet 0    ranitidine (ZANTAC) 75 MG tablet Take 75 mg by mouth every morning.      tiZANidine (ZANAFLEX) 4 MG tablet Take 1 tablet (4 mg total) by mouth 2 (two) times a day. 180 tablet 3    cartilage/collagen/bor/hyalur (JOINT HEALTH ORAL) Take 1 tablet by mouth once daily.      hydroCHLOROthiazide  (MICROZIDE) 12.5 mg capsule Take 1 capsule by mouth once daily (Patient taking differently: Take by mouth daily as needed. Take 1 capsule by mouth once daily) 90 capsule 0      Allergies: Cinnamon, Iodine and iodide containing products, Macrodantin [nitrofurantoin macrocrystalline], Povidone-iodine, and Sulfa (sulfonamide antibiotics)  Family History   Problem Relation Name Age of Onset    Cancer Mother Giancarlo         Squamous Cell Skin Carcinoma on ear, aggressive went to lymph nodes facial structure 18mo later    Hypertension Mother Giancarlo     Cancer Maternal Grandfather Servin         Lung cancer    Hypertension Maternal Grandfather Servin     Stroke Maternal Grandfather Servin         Stroke at 80    Heart disease Maternal Grandmother Radha         Dementia and heart attack early 60s    Learning disabilities Son Rambo         Dyslexia    Learning disabilities Son Juma         Dyslexia     Social History     Tobacco Use    Smoking status: Never    Smokeless tobacco: Never    Tobacco comments:     2nd hand smoke when it was not banned   Substance Use Topics    Alcohol use: Not Currently     Comment: Couple times a year 1 drink - 3 times    Drug use: Never     Review of Systems   Constitutional: Negative.    HENT: Negative.     Eyes: Negative.    Respiratory:  Negative for cough and shortness of breath.    Cardiovascular:  Negative for chest pain and leg swelling.   Gastrointestinal:  Negative for nausea and vomiting.   Endocrine: Negative.    Genitourinary: Negative.    Musculoskeletal:  Positive for myalgias.   Skin: Negative.    Allergic/Immunologic: Negative.    Neurological:  Positive for weakness. Negative for seizures and numbness.   Hematological:  Bruises/bleeds easily.   Psychiatric/Behavioral:  Negative for confusion.      Objective:     Vitals:    Temp: 99 °F (37.2 °C)  Pulse: 86  Rhythm: normal sinus rhythm  BP: (!) 120/56  MAP (mmHg): 81  Resp: 19  SpO2: 99 %    Temp  Min: 98 °F (36.7 °C)  Max: 99  °F (37.2 °C)  Pulse  Min: 84  Max: 103  BP  Min: 120/56  Max: 143/76  MAP (mmHg)  Min: 81  Max: 88  Resp  Min: 15  Max: 21  SpO2  Min: 95 %  Max: 100 %    No intake/output data recorded.            Physical Exam  Vitals and nursing note reviewed.   Constitutional:       General: She is not in acute distress.     Appearance: She is obese.   Cardiovascular:      Rate and Rhythm: Normal rate and regular rhythm.      Pulses: Normal pulses.   Pulmonary:      Effort: Pulmonary effort is normal.      Breath sounds: Normal breath sounds.   Abdominal:      General: Bowel sounds are normal.      Palpations: Abdomen is soft.      Tenderness: There is no abdominal tenderness.   Musculoskeletal:         General: No swelling or deformity. Normal range of motion.   Skin:     General: Skin is warm and dry.      Capillary Refill: Capillary refill takes less than 2 seconds.   Neurological:      Mental Status: She is alert and oriented to person, place, and time.      Sensory: No sensory deficit.      Motor: Weakness present. No seizure activity.      Comments: -GCS [id number]  -Awake, Alert, and Oriented to person, place, and time. Speech fluent. Follows commands.  BENOIT spontaneously and purposeful  5/5 in all extremities   Sensation intact   Psychiatric:         Mood and Affect: Mood normal.            Today I personally reviewed pertinent medications, lines/drains/airways, imaging, cardiology results, laboratory results, notably:

## 2024-06-20 NOTE — ANESTHESIA PREPROCEDURE EVALUATION
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     Pre-operative evaluation for IR angiogram     Past Medical History:   Diagnosis Date    Aneurysm     Anxiety     HTN (hypertension)     TMJ (dislocation of temporomandibular joint)        Review of patient's allergies indicates:   Allergen Reactions    Cinnamon Anaphylaxis    Iodine and iodide containing products Other (See Comments)     Tears up skin    Macrodantin [nitrofurantoin macrocrystalline] Other (See Comments)     Knee and elbow swelling    Povidone-iodine      Other reaction(s): Blister    Sulfa (sulfonamide antibiotics) Hives       Current Outpatient Medications on File Prior to Encounter   Medication Sig Dispense Refill    amLODIPine (NORVASC) 10 MG tablet Take 1 tablet by mouth once daily 90 tablet 0    aspirin (ECOTRIN) 325 MG EC tablet Take 1 tablet (325 mg total) by mouth once daily.      busPIRone (BUSPAR) 10 MG tablet Take 1 tablet (10 mg total) by mouth 2 (two) times daily. 180 tablet 3    cartilage/collagen/bor/hyalur (JOINT HEALTH ORAL) Take 1 tablet by mouth once daily.      clopidogreL (PLAVIX) 75 mg tablet Take 1 tablet (75 mg total) by mouth once daily. To take with aspirin 325 mg daily (otc) 30 tablet 11    cranberry extract (CRANBERRY CONCENTRATE) 500 mg Cap Take 1 tablet by mouth once daily.      DAILY MULTI-VITAMIN ORAL Take by mouth.      diphenhydrAMINE (BENADRYL) 50 MG capsule Pt to take 50 mg benadryl at 1 hour before procedure      DULoxetine (CYMBALTA) 60 MG capsule Take 1 capsule (60 mg total) by mouth once daily. 90 capsule 3    ergocalciferol (ERGOCALCIFEROL) 50,000 unit Cap Take 1 capsule (50,000 Units total) by mouth every 7 days. 24 capsule 0    hydroCHLOROthiazide (MICROZIDE) 12.5 mg capsule Take 1  "capsule by mouth once daily (Patient taking differently: Take by mouth daily as needed. Take 1 capsule by mouth once daily) 90 capsule 0    losartan (COZAAR) 50 MG tablet Take 1 tablet (50 mg total) by mouth 2 (two) times a day. 60 tablet 3    predniSONE (DELTASONE) 50 MG Tab Pt to take 1 tab (50mg total) 13 hours, 7 hours, 1 hour before procedure in addition take over the counter benadryl 50 mg 1 hr prior to procedure 3 tablet 0    ranitidine (ZANTAC) 75 MG tablet Take 75 mg by mouth every morning.      tiZANidine (ZANAFLEX) 4 MG tablet Take 1 tablet (4 mg total) by mouth 2 (two) times a day. 180 tablet 3     No current facility-administered medications on file prior to encounter.       Past Surgical History:   Procedure Laterality Date    ABDOMINAL SURGERY      CARPAL TUNNEL RELEASE      EYE SURGERY      FOOT SURGERY      tarsel tunnel         CBC: No results for input(s): "WBC", "HGB", "HCT", "PLT" in the last 72 hours.    CMP:   Recent Labs     24  0942   *   K 4.6      CO2 29   BUN 25*   CREATININE 1.2   *   CALCIUM 10.4       COAGS  No results for input(s): "PT", "INR", "PROTIME", "APTT" in the last 72 hours.    BP Readings from Last 3 Encounters:   24 (!) 144/80   24 130/70   24 126/73       Diagnostic Studies:      EKD Echo:  No results found for this or any previous visit.        Pre-op Assessment    I have reviewed the Patient Summary Reports.     I have reviewed the Nursing Notes. I have reviewed the NPO Status.   I have reviewed the Medications.     Review of Systems  Anesthesia Hx:               Denies Personal Hx of Anesthesia complications.                        Physical Exam  General: Alert, Cooperative and Oriented    Airway:  Mallampati: III / II  Mouth Opening: Small, but > 3cm  TM Distance: Normal  Tongue: Normal  Neck ROM: Normal ROM        Anesthesia Plan  Type of Anesthesia, risks & benefits discussed:    Anesthesia Type: Gen ETT, Gen " Natural Airway  Intra-op Monitoring Plan: Standard ASA Monitors  Post Op Pain Control Plan: multimodal analgesia  Induction:  IV  Airway Plan: Direct  Informed Consent: Informed consent signed with the Patient and all parties understand the risks and agree with anesthesia plan.  All questions answered.   ASA Score: 3  Day of Surgery Review of History & Physical: H&P Update referred to the surgeon/provider.    Ready For Surgery From Anesthesia Perspective.     .

## 2024-06-20 NOTE — PROCEDURES
Interventional Neuroradiology Post-Procedure Note    Pre Op Diagnosis: A.Comm Aneurysm    Post Op Diagnosis: Same    Procedure: Diagnostic cerebral angiogram + WEB + T Stent Assisted Coiling    Procedure performed by: Vlad Pitt MD; Claudia BALL, Marcello    Written Informed Consent Obtained: Yes    Specimen Removed: NO    Estimated Blood Loss: Minimal    Procedure report:   A 7F slender sheath was placed into the right radial artery. Later, Zoom RDL access catheter was exchanged with 7F Sheath. With help of 5F Penumbra Redmond 2 catheter, Zoom RDL was advanced over 0.035 glidewire into the right ICA. Previously described A.Comm aneurysm was seen.     Preliminary intervention: WEB was deployed and during detachment, distal end got involuted and collapsed as well as left A2 was seen to be stenosed. Using scepter balloon, angioplasty was performed and Bartlett stent was placed. Later, another Bartlett stent was placed into the right A2 across A.Comm to halfway an aneurysm neck making it T-stent. Using SL-10 over Franklin 14 microwire, 3 coils were deployed into remaining aneurysm neck below WEB. Follow up angiogram demonstrated satisfactory deployment of WEB + Stents+ Coils.  Please see Imaging report for full details.    A right radial artery angiogram was performed, the sheath removed and hemostasis achieved using transradial bend with closing pressure of 14cc.  No hematoma was present at the time of hemostasis.    The patient tolerated the procedure well.     Devices used: 7F Slender Sheath, Zoom RDL, 5F Jannet, VIA 27, (WEB 9mm x 4mm)  Scepter 6tum14fn, Synchro 2, Bartlett (4mm x 24mm and 3mm x 15mm)  SL-10, Franklin 14, Coils (Target 360 3.5mm x 8cm, Target becky 2mm x 4cm, Target Becky 2mm x 3cm)    Plan:  -To [facility] for monitoring  -To recovery for 2h  -Cont. ASA 81mg OD + Plavix 75mg OD for 6 months  -Avoid checking blood pressure in right arm   -Avoid carrying heavy weights with right arm > 10 lbs x 24 hrs                                Marcello Taylor MD, MHA  Fellow, NeuroEndovascular Surgery, [facility] Oracio King  Neurologist, [facility] [place] [facility] [place]  .

## 2024-06-20 NOTE — PLAN OF CARE
Procedure site noted to be bleeding after hemostasis initially achieved, so 4 mL of air was added to TR band before leaving procedure room, totaling 18 mL air. Upon arrival to ICU, right hand was noticeably more red than proximal extremity, so IR RN Lily removed 4 mL of air from TR band; hemostasis maintained. Waveform on pulse ox applied to right 2nd finger remained normal throughout TR band adjustments. Right ulnar pulse 2+. TR band has 14 mL air at time of handoff to ICU Mayra GIFFORD.

## 2024-06-20 NOTE — NURSING
Patient arrived to [facility] from IR  (name of hospital or home) by (mode of transportation & company name)    Report received from: IR RN and CRNA    Type of stroke/diagnosis:  sp WILDER        Procedure end time (if applicable) 1557    Current symptoms:  non e    Skin Assessment done: Y/N yes  Wounds noted: Rt Radial inciison   *If wounds noted, was Wound Care consulted? Y/N na  *If wounds noted, LDA placed? Y/N yes  Skin Assessment Verified by:      Kristina Completed? Y/N    Patient Belongings on Admit: (be specific): Crocks, pants, socks, shirt     NCC notified: name of person notified :  Nora PINEDA    Rt radial TR band: IR RN states that initially 14 ml of air was placed and charted but an additional 4 ml of air was placed due to site oozing, pt arrived with Rt hand reddness and the IR RN removed 4 ml of air, relieving the hand redness, leaving a total of 14 ml of air  
 used

## 2024-06-20 NOTE — TRANSFER OF CARE
"Anesthesia Transfer of Care Note    Patient: Lisandra Toussaint    Procedure(s) Performed: * No procedures listed *    Patient location: ICU    Anesthesia Type: general    Transport from OR: Transported from OR on 6-10 L/min O2 by face mask with adequate spontaneous ventilation    Post pain: adequate analgesia    Post assessment: no apparent anesthetic complications    Post vital signs: stable    Level of consciousness: awake    Complications: none    Transfer of care protocol was followed      Last vitals: Visit Vitals  /70 (Patient Position: Sitting)   Pulse 103   Temp 36.7 °C (98 °F) (Oral)   Resp 16   Ht 5' 5" (1.651 m)   Wt 98 kg (216 lb 0.8 oz)   SpO2 95%   Breastfeeding No   BMI 35.95 kg/m²     "

## 2024-06-20 NOTE — H&P
Interventional Neuroradiology Pre-procedure Note    Procedure: Diagnostic cerebral angiogram and aneurysm embolization    History of Present Illness:  Lisandra Toussaint is a 63 y.o. female with A. Comm aneurysm ho presents for aneurysm embolization using WEB device.     ROS:   Hematological: no known coagulopathies  Respiratory: no shortness of breath  Cardiovascular: no chest pain  Gastrointestinal: no abdominal pain  Genito-Urinary: no dysuria  Musculoskeletal: negative  Neurological: no TIA or stroke symptoms     Scheduled Meds:    LIDOcaine (PF) 10 mg/ml (1%)  1 mL Other Once     Current Meds:   Current Outpatient Medications:     amLODIPine (NORVASC) 10 MG tablet, Take 1 tablet by mouth once daily, Disp: 90 tablet, Rfl: 0    aspirin (ECOTRIN) 325 MG EC tablet, Take 1 tablet (325 mg total) by mouth once daily., Disp: , Rfl:     busPIRone (BUSPAR) 10 MG tablet, Take 1 tablet (10 mg total) by mouth 2 (two) times daily., Disp: 180 tablet, Rfl: 3    cartilage/collagen/bor/hyalur (JOINT HEALTH ORAL), Take 1 tablet by mouth once daily., Disp: , Rfl:     clopidogreL (PLAVIX) 75 mg tablet, Take 1 tablet (75 mg total) by mouth once daily. To take with aspirin 325 mg daily (otc), Disp: 30 tablet, Rfl: 11    cranberry extract (CRANBERRY CONCENTRATE) 500 mg Cap, Take 1 tablet by mouth once daily., Disp: , Rfl:     DAILY MULTI-VITAMIN ORAL, Take by mouth., Disp: , Rfl:     diphenhydrAMINE (BENADRYL) 50 MG capsule, Pt to take 50 mg benadryl at 1 hour before procedure, Disp: , Rfl:     DULoxetine (CYMBALTA) 60 MG capsule, Take 1 capsule (60 mg total) by mouth once daily., Disp: 90 capsule, Rfl: 3    ergocalciferol (ERGOCALCIFEROL) 50,000 unit Cap, Take 1 capsule (50,000 Units total) by mouth every 7 days., Disp: 24 capsule, Rfl: 0    hydroCHLOROthiazide (MICROZIDE) 12.5 mg capsule, Take 1 capsule by mouth once daily (Patient taking differently: Take by mouth daily as needed. Take 1 capsule by mouth once daily), Disp: 90 capsule,  Rfl: 0    losartan (COZAAR) 50 MG tablet, Take 1 tablet (50 mg total) by mouth 2 (two) times a day., Disp: 60 tablet, Rfl: 3    predniSONE (DELTASONE) 50 MG Tab, Pt to take 1 tab (50mg total) 13 hours, 7 hours, 1 hour before procedure in addition take over the counter benadryl 50 mg 1 hr prior to procedure, Disp: 3 tablet, Rfl: 0    ranitidine (ZANTAC) 75 MG tablet, Take 75 mg by mouth every morning., Disp: , Rfl:     tiZANidine (ZANAFLEX) 4 MG tablet, Take 1 tablet (4 mg total) by mouth 2 (two) times a day., Disp: 180 tablet, Rfl: 3    Current Facility-Administered Medications:     0.9%  NaCl infusion, , Intravenous, Continuous, Judith Berg PA-C    LIDOcaine (PF) 10 mg/ml (1%) injection 10 mg, 1 mL, Other, Once, Judith Berg PA-C   Continuous Infusions:    sodium chloride 0.9%   Intravenous Continuous         PRN Meds:    Allergies:   Review of patient's allergies indicates:   Allergen Reactions    Cinnamon Anaphylaxis    Iodine and iodide containing products Other (See Comments)     Tears up skin    Macrodantin [nitrofurantoin macrocrystalline] Other (See Comments)     Knee and elbow swelling    Povidone-iodine      Other reaction(s): Blister    Sulfa (sulfonamide antibiotics) Hives     Sedation Hx: No adverse events.    Labs:              Objective:  Vitals: There were no vitals taken for this visit.     Physical Exam:  General: well developed, well nourished, no distress.   Head: normocephalic, atraumatic  Neck: No tracheal deviation. No palpable masses. Full ROM.   Neurologic: Alert and oriented. Thought content appropriate.  GCS: E4 V5 M6; Total: 15  Language: No aphasia  Speech: No dysarthria  Cranial nerves: face symmetric, tongue midline, CN II-XII grossly intact.   Eyes: pupils equal, round, reactive to light with accomodation, EOMI.   Pulmonary: normal respirations, no signs of respiratory distress  Abdomen: soft, non-distended, not tender to palpation  Vascular: Pulses 2+ and symmetric radial  and dorsalis pedis. No LE edema.   Skin: Skin is warm, dry and intact.  Sensory: intact to light touch throughout  Motor Strength: Moves all extremities spontaneously with good tone.  Full strength upper and lower extremities. No abnormal movements seen.     ASA: 2  MAL: 2    Plan:  -Plan for cerebral angiogram with aneurysm embolization  -Sedation Plan: General anesthesia  -All diagnostics and imaging reviewed  -Patient NPO since MN  -Risks & benefits of procedure explained in detail; patient consented and all questions answered  -Further reccs to follow procedure          Marcello Taylor MD, MHA  Fellow, NeuroEndovascular Surgery, [facility] King  Neurologist, [facility] [place], [place]

## 2024-06-20 NOTE — DISCHARGE INSTRUCTIONS
The following are instructions your doctor would like you to follow regarding your activity, diet, and follow up care.    Your procedure was done by making a small puncture in your wrist. You must observe that area for bleeding and swelling once you are discharged from the hospital. Be careful not to over-stimulate the affected wrist for 24 hours.    Do not strenuously flex the wrist for 24 hours.  Occlusive bandage (Tegaderm) may be removed after 24 hours.  At 24 hours after your procedure, remove the Tagaderm bandage and leave puncture site open to air. If minor oozing, apply adhesive bandage (Band-Aid) and remove after 12 hours.  No driving for 24 hours.  No soaking wrist for 2 days. Gently wash site with soap and water. Dry well. Do not apply powders, lotions or ointments. No tub baths, whirlpool or swimming for 48 hours.  No lifting more than 3-5 pounds with affected wrist for 7 days.  Restart your usual diet and medications with appropriate changes as dictated by your doctor.  For any bleeding, hold pressure with thumb against puncture site and finger against back of wrist.  If you see fresh bleeding, bright red or a lump that is getting bigger at the puncture site, apply pressure to the area as instructed above and call [phone] between the hours of 8:00am-5:00pm or [phone] after hours and ask to speak to the Interventional Radiology Resident on-call. If you are unable to control the bleeding, call [phone].   Call the Interventional Radiology Resident on-call at [phone] with any concerns or any of the following:  Swelling, redness, discharge for the site, large bruising or increasing pain, numbness or tingling at the site  Temperature of 101 F or higher  Shortness of breath    Discharge Instructions for Cerebral Angiogram   When you get home after your procedure, do the following:  Monitor the injection site for bleeding. A small bruise is normal as is an occasional drop of blood at the  site.  Monitor the limb that was used for changes in temperature, color, numbness, tingling, or loss of function.  Take your prescribed antiplatelet medicines as directed. However, they may cause you to bruise more easily.  Shower instead of taking tub baths for a few days.   Avoid lifting anything over 10 pounds for a few days.  Take it easy, but try to get back to your normal routine as much as possible.  Ask your doctor about driving, returning to work, and other activities.  When to call your doctor  Call your doctor if you have any of the following problems:  Problems at the incision site, such as swelling, redness, bleeding, warmth, leaking of fluids, or increasing pain  A cold or painful leg or foot  Severe headache  Weakness or numbness in a leg or arm  Difficulty talking, or difficulty finding the words to say what you want  Changes in your vision  Dizziness or imbalance  Go to the emergency room if your doctors office is closed.      Keeping appointments for follow-up care helps make your procedure a success.   Your follow-up  Within a month after the procedure, youll have a follow-up exam and tests. These tests may include an ultrasound and a brain function exam. You may also continue to take antiplatelet medication.   Call 911  Call 911 if you have any of these stroke symptoms:  Paralysis or weakness on 1 side of the body  Numbness or tingling on 1 side of the body  Difficulty speaking  Loss of vision in 1 eye  Drooping on 1 side of the face  Dizziness or imbalance  Swelling or persistent pain in the groin     Call your doctor or go to the Emergency Room for any signs of infection, including: increased redness, drainage, pain, or fever (temperature ?101.5 for 24 hours). Call your doctor or go to the Emergency Room if there are any localized neurological changes; problems with speech, vision, numbness, tingling, weakness, or severe headache; or for other concerns.

## 2024-06-20 NOTE — PROGRESS NOTES
TR band to R wrist, activity as follows:    1603--3 cc air removed (11 cc left)    1613--3 cc air removed (8 cc left)    1623--small amount of bleeding noted at site          --3 cc air added (11 cc left)    1653--3 cc air removed (8 cc left)    1703--3 cc air removed (5 cc left)    1713--3 cc air removed (2 cc left)    1723--2 cc air removed (0 cc left)

## 2024-06-20 NOTE — H&P
[facility]  Neurocritical Care  History & Physical    Admit Date: 6/20/2024  Service Date: 06/20/2024  Length of Stay: 0    Subjective:     Chief Complaint: Cerebral aneurysm, nonruptured    History of Present Illness: Pt is a 63 y.o. female with PMHx of HTN, headaches, and depression/anxiety presented to the [facility] after having an elective ACOM aneurysmal WILDER and T-assisted coiling due to an unruptured A1 aneurysm that was found in 12/2023. Patient was experiencing tinnitus, visual disturbances, and lightheadedness in September 2023 that had been increasing with intensity over the last 5 years prior. A prior CTH revealed an aneurysm and in 3/2024 the patient had a diagnostic angiogram where an unruptured A1 aneurysm was found. Because of the complexity of the procedure and need for close observation/monitoring, the patient will be admitted to the [facility].      Past Medical History:   Diagnosis Date    Aneurysm     Anxiety     HTN (hypertension)     TMJ (dislocation of temporomandibular joint)      Past Surgical History:   Procedure Laterality Date    ABDOMINAL SURGERY      CARPAL TUNNEL RELEASE      EYE SURGERY      FOOT SURGERY      tarsel tunnel        No current facility-administered medications on file prior to encounter.     Current Outpatient Medications on File Prior to Encounter   Medication Sig Dispense Refill    amLODIPine (NORVASC) 10 MG tablet Take 1 tablet by mouth once daily 90 tablet 0    aspirin (ECOTRIN) 325 MG EC tablet Take 1 tablet (325 mg total) by mouth once daily.      busPIRone (BUSPAR) 10 MG tablet Take 1 tablet (10 mg total) by mouth 2 (two) times daily. 180 tablet 3    clopidogreL (PLAVIX) 75 mg tablet Take 1 tablet (75 mg total) by mouth once daily. To take with aspirin 325 mg daily (otc) 30 tablet 11    cranberry extract (CRANBERRY CONCENTRATE) 500 mg Cap Take 1 tablet by mouth once daily.      DAILY MULTI-VITAMIN ORAL Take by mouth.      diphenhydrAMINE (BENADRYL) 50  MG capsule Pt to take 50 mg benadryl at 1 hour before procedure      DULoxetine (CYMBALTA) 60 MG capsule Take 1 capsule (60 mg total) by mouth once daily. 90 capsule 3    ergocalciferol (ERGOCALCIFEROL) 50,000 unit Cap Take 1 capsule (50,000 Units total) by mouth every 7 days. 24 capsule 0    losartan (COZAAR) 50 MG tablet Take 1 tablet (50 mg total) by mouth 2 (two) times a day. 60 tablet 3    predniSONE (DELTASONE) 50 MG Tab Pt to take 1 tab (50mg total) 13 hours, 7 hours, 1 hour before procedure in addition take over the counter benadryl 50 mg 1 hr prior to procedure 3 tablet 0    ranitidine (ZANTAC) 75 MG tablet Take 75 mg by mouth every morning.      tiZANidine (ZANAFLEX) 4 MG tablet Take 1 tablet (4 mg total) by mouth 2 (two) times a day. 180 tablet 3    cartilage/collagen/bor/hyalur (JOINT HEALTH ORAL) Take 1 tablet by mouth once daily.      hydroCHLOROthiazide (MICROZIDE) 12.5 mg capsule Take 1 capsule by mouth once daily (Patient taking differently: Take by mouth daily as needed. Take 1 capsule by mouth once daily) 90 capsule 0      Allergies: Cinnamon, Iodine and iodide containing products, Macrodantin [nitrofurantoin macrocrystalline], Povidone-iodine, and Sulfa (sulfonamide antibiotics)  Family History   Problem Relation Name Age of Onset    Cancer Mother Giancarlo         Squamous Cell Skin Carcinoma on ear, aggressive went to lymph nodes facial structure 18mo later    Hypertension Mother Giancarlo     Cancer Maternal Grandfather Servin         Lung cancer    Hypertension Maternal Grandfather Servin     Stroke Maternal Grandfather Servin         Stroke at 80    Heart disease Maternal Grandmother Radha         Dementia and heart attack early 60s    Learning disabilities Son Rambo         Dyslexia    Learning disabilities Son Juma         Dyslexia     Social History     Tobacco Use    Smoking status: Never    Smokeless tobacco: Never    Tobacco comments:     2nd hand smoke when it was not banned   Substance  Use Topics    Alcohol use: Not Currently     Comment: Couple times a year 1 drink - 3 times    Drug use: Never     Review of Systems   Constitutional: Negative.    HENT: Negative.     Eyes: Negative.    Respiratory:  Negative for cough and shortness of breath.    Cardiovascular:  Negative for chest pain and leg swelling.   Gastrointestinal:  Negative for nausea and vomiting.   Endocrine: Negative.    Genitourinary: Negative.    Musculoskeletal:  Positive for myalgias.   Skin: Negative.    Allergic/Immunologic: Negative.    Neurological:  Negative for weakness. Negative for seizures and numbness.   Hematological:  Bruises/bleeds easily.   Psychiatric/Behavioral:  Negative for confusion.      Objective:     Vitals:    Temp: 99 °F (37.2 °C)  Pulse: 86  Rhythm: normal sinus rhythm  BP: (!) 120/56  MAP (mmHg): 81  Resp: 19  SpO2: 99 %    Temp  Min: 98 °F (36.7 °C)  Max: 99 °F (37.2 °C)  Pulse  Min: 84  Max: 103  BP  Min: 120/56  Max: 143/76  MAP (mmHg)  Min: 81  Max: 88  Resp  Min: 15  Max: 21  SpO2  Min: 95 %  Max: 100 %    No intake/output data recorded.            Physical Exam  Vitals and nursing note reviewed.   Constitutional:       General: She is not in acute distress.     Appearance: She is obese.   Cardiovascular:      Rate and Rhythm: Normal rate and regular rhythm.      Pulses: Normal pulses.   Pulmonary:      Effort: Pulmonary effort is normal.      Breath sounds: Normal breath sounds.   Abdominal:      General: Bowel sounds are normal.      Palpations: Abdomen is soft.      Tenderness: There is no abdominal tenderness.   Musculoskeletal:         General: No swelling or deformity. Normal range of motion.   Skin:     General: Skin is warm and dry.      Capillary Refill: Capillary refill takes less than 2 seconds.   Neurological:      Mental Status: She is alert and oriented to person, place, and time.      Sensory: No sensory deficit.      Motor: Weakness present. No seizure activity.      Comments: -GCS  [id number]  -Awake, Alert, and Oriented to person, place, and time. Speech fluent. Follows commands.  BENOIT spontaneously and purposeful  5/5 in all extremities   Sensation intact   Psychiatric:         Mood and Affect: Mood normal.            Today I personally reviewed pertinent medications, lines/drains/airways, imaging, cardiology results, laboratory results, notably:      Assessment/Plan:     Neuro  * Cerebral aneurysm, nonruptured  A1 aneurysm, unruptured, found in 3/2023 during diagnostic aneurysm. Patient was experiencing tinnitus, visual disturbance, and light-headedness. Denies all s/s at this time. Presented 6/20/2024 for an elective nonruptured ACOM WILDER embolization and T Stent-assisted coiling.   Neurology IR following   Hourly neurochecks and VS  SBP goal <140  Utilize PRN anti-hypertensives  Resumed home dose amlodipine   Resumed home dose losartan  Restart DAPT therapy tonight, taken prior to hospitalization  SCDs applied for mechanical DVT Ppx  NPO  PT to eval and treat     Cervicogenic headache  Utilize PRN tylenol for headache pain  Consider migraine cocktail if pain persists    Psychiatric  Depression, recurrent  Resume home dose duloxetine     Cardiac/Vascular  Essential hypertension  SBP goal <140  Resumed home dose losartan  Resumed home dose amlodipine           The patient is being Prophylaxed for:  Venous Thromboembolism with: Mechanical  Stress Ulcer with: Not Applicable   Ventilator Pneumonia with: not applicable    Activity Orders            Progressive Mobility Protocol (mobilize patient to their highest level of functioning at least twice daily) starting at 06/20 2000    Progressive Mobility Protocol (mobilize patient to their highest level of functioning at least twice daily) starting at 06/20 2000    Turn patient starting at 06/20 1600    Elevate HOB starting at 06/20 1505    Diet NPO: NPO starting at 06/20 1505          Full Code  Level 3  Carla Lewis NP  Neurocritical Care  [facility] - Neuro Critical Care

## 2024-06-21 VITALS
RESPIRATION RATE: 21 BRPM | TEMPERATURE: 99 F | WEIGHT: 216.06 LBS | SYSTOLIC BLOOD PRESSURE: 130 MMHG | HEART RATE: 95 BPM | OXYGEN SATURATION: 96 % | BODY MASS INDEX: 36 KG/M2 | HEIGHT: 65 IN | DIASTOLIC BLOOD PRESSURE: 60 MMHG

## 2024-06-21 PROBLEM — Z95.828 PRESENCE OF ARTERIAL STENT: Status: ACTIVE | Noted: 2024-06-21

## 2024-06-21 LAB
ALBUMIN SERPL BCP-MCNC: 3 G/DL (ref 3.5–5.2)
ALP SERPL-CCNC: 70 U/L (ref 55–135)
ALT SERPL W/O P-5'-P-CCNC: 12 U/L (ref 10–44)
ANION GAP SERPL CALC-SCNC: 7 MMOL/L (ref 8–16)
APTT PPP: 21.9 SEC (ref 21–32)
AST SERPL-CCNC: 19 U/L (ref 10–40)
BASOPHILS # BLD AUTO: 0.03 K/UL (ref 0–0.2)
BASOPHILS NFR BLD: 0.3 % (ref 0–1.9)
BILIRUB SERPL-MCNC: 0.4 MG/DL (ref 0.1–1)
BUN SERPL-MCNC: 20 MG/DL (ref 8–23)
CALCIUM SERPL-MCNC: 8 MG/DL (ref 8.7–10.5)
CHLORIDE SERPL-SCNC: 110 MMOL/L (ref 95–110)
CO2 SERPL-SCNC: 22 MMOL/L (ref 23–29)
CREAT SERPL-MCNC: 0.9 MG/DL (ref 0.5–1.4)
DIFFERENTIAL METHOD BLD: ABNORMAL
EOSINOPHIL # BLD AUTO: 0 K/UL (ref 0–0.5)
EOSINOPHIL NFR BLD: 0.2 % (ref 0–8)
ERYTHROCYTE [DISTWIDTH] IN BLOOD BY AUTOMATED COUNT: 12.6 % (ref 11.5–14.5)
EST. GFR  (NO RACE VARIABLE): >60 ML/MIN/1.73 M^2
ESTIMATED AVG GLUCOSE: 120 MG/DL (ref 68–131)
GLUCOSE SERPL-MCNC: 104 MG/DL (ref 70–110)
HBA1C MFR BLD: 5.8 % (ref 4–5.6)
HCT VFR BLD AUTO: 37.3 % (ref 37–48.5)
HGB BLD-MCNC: 12.3 G/DL (ref 12–16)
IMM GRANULOCYTES # BLD AUTO: 0.02 K/UL (ref 0–0.04)
IMM GRANULOCYTES NFR BLD AUTO: 0.2 % (ref 0–0.5)
INR PPP: 1.4 (ref 0.8–1.2)
LYMPHOCYTES # BLD AUTO: 1.6 K/UL (ref 1–4.8)
LYMPHOCYTES NFR BLD: 16.5 % (ref 18–48)
MAGNESIUM SERPL-MCNC: 2.5 MG/DL (ref 1.6–2.6)
MCH RBC QN AUTO: 31.3 PG (ref 27–31)
MCHC RBC AUTO-ENTMCNC: 33 G/DL (ref 32–36)
MCV RBC AUTO: 95 FL (ref 82–98)
MONOCYTES # BLD AUTO: 0.8 K/UL (ref 0.3–1)
MONOCYTES NFR BLD: 8.1 % (ref 4–15)
NEUTROPHILS # BLD AUTO: 7.2 K/UL (ref 1.8–7.7)
NEUTROPHILS NFR BLD: 74.7 % (ref 38–73)
NRBC BLD-RTO: 0 /100 WBC
PHOSPHATE SERPL-MCNC: 2.8 MG/DL (ref 2.7–4.5)
PLATELET # BLD AUTO: 205 K/UL (ref 150–450)
PMV BLD AUTO: 11.1 FL (ref 9.2–12.9)
POTASSIUM SERPL-SCNC: 3.7 MMOL/L (ref 3.5–5.1)
PROT SERPL-MCNC: 5.8 G/DL (ref 6–8.4)
PROTHROMBIN TIME: 15.5 SEC (ref 9–12.5)
RBC # BLD AUTO: 3.93 M/UL (ref 4–5.4)
SODIUM SERPL-SCNC: 139 MMOL/L (ref 136–145)
WBC # BLD AUTO: 9.7 K/UL (ref 3.9–12.7)

## 2024-06-21 PROCEDURE — 94761 N-INVAS EAR/PLS OXIMETRY MLT: CPT

## 2024-06-21 PROCEDURE — 25000003 PHARM REV CODE 250

## 2024-06-21 PROCEDURE — 85610 PROTHROMBIN TIME: CPT

## 2024-06-21 PROCEDURE — 83735 ASSAY OF MAGNESIUM: CPT

## 2024-06-21 PROCEDURE — 97530 THERAPEUTIC ACTIVITIES: CPT

## 2024-06-21 PROCEDURE — 99238 HOSP IP/OBS DSCHRG MGMT 30/<: CPT | Mod: ICN,,,

## 2024-06-21 PROCEDURE — 80053 COMPREHEN METABOLIC PANEL: CPT

## 2024-06-21 PROCEDURE — 99900035 HC TECH TIME PER 15 MIN (STAT)

## 2024-06-21 PROCEDURE — 25000003 PHARM REV CODE 250: Performed by: PHYSICIAN ASSISTANT

## 2024-06-21 PROCEDURE — 27000221 HC OXYGEN, UP TO 24 HOURS

## 2024-06-21 PROCEDURE — 85025 COMPLETE CBC W/AUTO DIFF WBC: CPT

## 2024-06-21 PROCEDURE — 83036 HEMOGLOBIN GLYCOSYLATED A1C: CPT | Performed by: NEUROLOGICAL SURGERY

## 2024-06-21 PROCEDURE — 97161 PT EVAL LOW COMPLEX 20 MIN: CPT

## 2024-06-21 PROCEDURE — 99499 UNLISTED E&M SERVICE: CPT | Mod: ,,, | Performed by: PSYCHIATRY & NEUROLOGY

## 2024-06-21 PROCEDURE — 84100 ASSAY OF PHOSPHORUS: CPT

## 2024-06-21 PROCEDURE — 85730 THROMBOPLASTIN TIME PARTIAL: CPT

## 2024-06-21 RX ORDER — NAPROXEN SODIUM 220 MG/1
81 TABLET, FILM COATED ORAL NIGHTLY
Status: DISCONTINUED | OUTPATIENT
Start: 2024-06-21 | End: 2024-06-21 | Stop reason: HOSPADM

## 2024-06-21 RX ORDER — NAPROXEN SODIUM 220 MG/1
81 TABLET, FILM COATED ORAL DAILY
Qty: 30 TABLET | Refills: 0 | Status: SHIPPED | OUTPATIENT
Start: 2024-06-21 | End: 2024-07-21

## 2024-06-21 RX ADMIN — ACETAMINOPHEN 650 MG: 325 TABLET ORAL at 05:06

## 2024-06-21 RX ADMIN — SENNOSIDES AND DOCUSATE SODIUM 1 TABLET: 50; 8.6 TABLET ORAL at 08:06

## 2024-06-21 RX ADMIN — AMLODIPINE BESYLATE 10 MG: 10 TABLET ORAL at 08:06

## 2024-06-21 RX ADMIN — DULOXETINE HYDROCHLORIDE 60 MG: 30 CAPSULE, DELAYED RELEASE ORAL at 08:06

## 2024-06-21 RX ADMIN — SODIUM CHLORIDE: 9 INJECTION, SOLUTION INTRAVENOUS at 05:06

## 2024-06-21 RX ADMIN — POTASSIUM BICARBONATE 50 MEQ: 978 TABLET, EFFERVESCENT ORAL at 03:06

## 2024-06-21 RX ADMIN — ACETAMINOPHEN 650 MG: 325 TABLET ORAL at 01:06

## 2024-06-21 NOTE — DISCHARGE SUMMARY
[facility]  Neurocritical Care  Discharge Summary    Admit Date: 6/20/2024    Service Date: 06/21/2024    Discharge Date: 06/21/2024     Length of Stay: 1    Final Active Diagnoses:    Diagnosis Date Noted POA    PRINCIPAL PROBLEM:  Cerebral aneurysm, nonruptured [I67.1] 04/03/2024 Yes    Presence of arterial stent [Z95.828] 06/21/2024 Yes    Essential hypertension [I10] 06/20/2024 Yes     Chronic    Cervicogenic headache [G44.86] 01/29/2024 Yes    Depression, recurrent [F33.9] 11/14/2023 Yes      Problems Resolved During this Admission:      History of Present Illness: Pt is a 63 y.o. female with PMHx of HTN, headaches, and depression/anxiety presented to the [facility] after having an elective ACOM aneurysmal WILDER and T-assisted coiling due to an unruptured A1 aneurysm that was found in 12/2023. Patient was experiencing tinnitus, visual disturbances, and lightheadedness in September 2023 that had been increasing with intensity over the last 5 years prior. A prior CTH revealed an aneurysm and in 3/2024 the patient had a diagnostic angiogram where an unruptured A1 aneurysm was found. Because of the complexity of the procedure and need for close observation/monitoring, the patient will be admitted to the [facility].    Hospital Course by Event: 06/21/2024 Ambulated down trevizo with NCC team. Stable for discharge today.       Hospital Course by Problem:   * Cerebral aneurysm, nonruptured  A1 aneurysm, unruptured, found in 3/2023 during diagnostic aneurysm. Patient was experiencing tinnitus, visual disturbance, and light-headedness. Denies all s/s at this time. Presented 6/20/2024 for post-operative monitoring and management.  Neurology IR following   Hourly neurochecks and VS  SBP goal <140, resume home meds  Restart DAPT therapy tonight, taken prior to hospitalization  SCDs applied for mechanical DVT Ppx    Stable for discharge home today    Presence of arterial stent  Bilateral A2   Continue  DAPT    Essential hypertension  SBP goal <140  Resume home medications    Cervicogenic headache  Utilize PRN tylenol for headache pain  Consider migraine cocktail if pain persists    Depression, recurrent  Resume home medications        Goals of Care Treatment Preferences:  Code Status: Full Code      Significant Results:  Imaging:  N/A    Cardiology:  N/A    Microbiology:  N/A    Laboratory:  Lab Results   Component Value Date    HGBA1C 5.8 (H) 06/21/2024    CHOL 243 (H) 04/05/2024    HDL 56 04/05/2024    LDLCALC 167.6 (H) 04/05/2024    TRIG 97 04/05/2024    TSH 3.142 04/05/2024       Pending Results: N/A    Consultations:  IP CONSULT TO SOCIAL WORK/CASE MANAGEMENT  NeuroIR    Procedures:   Diagnostic cerebral angiogram + WEB + T Stent Assisted Coiling     Medications:      Medication List        START taking these medications      aspirin 81 MG Chew  Take 1 tablet (81 mg total) by mouth once daily.  Replaces: aspirin 325 MG EC tablet            CHANGE how you take these medications      hydroCHLOROthiazide 12.5 mg capsule  Commonly known as: MICROZIDE  Take 1 capsule by mouth once daily  What changed:   how to take this  when to take this  reasons to take this            CONTINUE taking these medications      amLODIPine 10 MG tablet  Commonly known as: NORVASC  Take 1 tablet by mouth once daily     busPIRone 10 MG tablet  Commonly known as: BUSPAR  Take 1 tablet (10 mg total) by mouth 2 (two) times daily.     clopidogreL 75 mg tablet  Commonly known as: PLAVIX  Take 1 tablet (75 mg total) by mouth once daily. To take with aspirin 325 mg daily (otc)     CRANBERRY CONCENTRATE 500 mg Cap  Generic drug: cranberry extract     DAILY MULTI-VITAMIN ORAL     DULoxetine 60 MG capsule  Commonly known as: CYMBALTA  Take 1 capsule (60 mg total) by mouth once daily.     ergocalciferol 50,000 unit Cap  Commonly known as: ERGOCALCIFEROL  Take 1 capsule (50,000 Units total) by mouth every 7 days.     JOINT HEALTH ORAL     losartan  50 MG tablet  Commonly known as: COZAAR  Take 1 tablet (50 mg total) by mouth 2 (two) times a day.     ranitidine 75 MG tablet  Commonly known as: ZANTAC     tiZANidine 4 MG tablet  Commonly known as: ZANAFLEX  Take 1 tablet (4 mg total) by mouth 2 (two) times a day.            STOP taking these medications      aspirin 325 MG EC tablet  Commonly known as: ECOTRIN  Replaced by: aspirin 81 MG Chew     diphenhydrAMINE 50 MG capsule  Commonly known as: BENADRYL     predniSONE 50 MG Tab  Commonly known as: DELTASONE               Where to Get Your Medications        These medications were sent to [facility] [place] [place] [address]      Phone: [phone]   aspirin 81 MG Chew       Diet: Regular diet    Activity: As tolerated.     Disposition: Discharged to home in stable condition.    Follow Up Plan:  Follow up with Vascular Neurology x 2 weeks.    Call your doctor or go to the Emergency Room for any signs of infection, including: increased redness, drainage, pain, or fever (temperature ?101.5 for 24 hours). Call your doctor or go to the Emergency Room if there are any localized neurological changes; problems with speech, vision, numbness, tingling, weakness, or severe headache; or for other concerns.     For the first 24 hours:  Dont drive  Avoid walking bending, lifting, and taking stairs  Avoid lifting anything over 10 lbs  Be sure to follow any other instructions from your doctor    When should I call my health care provider?    Call your doctor if you have any of the following:    Severe headache, visual problems, new weakness, dizziness, or trouble speaking - these are signs of a stroke, call 911  Fever of 100.4 (38C) or higher lasting for 24 to 48 hours  Bleeding, swelling, or a large lump at the insertions site  Sharp or increasing pain at the insertion site  Leg pain, numbness, or a cold leg or foot  Any other symptoms your provider instructed you to  report based on your medical condition.    Contact information:    For immediate concerns that are not emergent, you may call our interventional radiology clinic at [phone] or [phone]    After hours and weekends: Call the paging  at [phone] and ask for the Radiology Resident on call     This discharge took less than 30 minutes to complete.    Zee Tiwari PA-C  Neurocritical Care  [facility]

## 2024-06-21 NOTE — PLAN OF CARE
[facility]  Discharge Final Note    Primary Care Provider: Sandra Shetty NP    Expected Discharge Date: 6/21/2024    Final Discharge Note (most recent)       Final Note - 06/21/24 1249          Final Note    Assessment Type Final Discharge Note (P)      Anticipated Discharge Disposition Home or Self Care (P)      What phone number can be called within the next 1-3 days to see how you are doing after discharge? [phone] (P)      Hospital Resources/Appts/Education Provided Appointments scheduled and added to AVS (P)         Post-Acute Status    Discharge Delays None known at this time (P)                  Patient discharged with family, no discharge needs at the moment per medical team.      Discharge Plan A and Plan B have been determined by review of patient's clinical status, future medical and therapeutic needs, and coverage/benefits for post-acute care in coordination with multidisciplinary team members.    Olivia Denyn MSW, LCSW  [facility]  Case Management Dept.        Important Message from Medicare             Contact Info       [facility] VASCULAR NEUROLOGY   Specialty: Vascular Neurology    [address]  [facility] [address]   Phone: [phone]       Next Steps: Follow up in 2 week(s)    Sandra Shetty NP   Specialty: Family Medicine    [address] [facility]  [address]  [facility] [address]   Phone: [phone]       Next Steps: Follow up on 6/28/2024    Instructions: Follow-up appointment at 11:00 am.

## 2024-06-21 NOTE — PLAN OF CARE
"[facility] Care Plan    POC reviewed with Lisandra Toussaint and family at 0300. Pt verbalized understanding. Questions and concerns addressed. No acute events overnight. Pt progressing toward goals. Will continue to monitor. See below and flowsheets for full assessment and VS info.   -Oxygen placed due to de sat to 90% while sleeping;   -no other complaints of HA at this time          Is this a stroke patient? no    Neuro:  [place] Coma Scale  Best Eye Response: 4-->(E4) spontaneous  Best Motor Response: 6-->(M6) obeys commands  Best Verbal Response: 5-->(V5) oriented  [place] Coma Scale Score: 15  Assessment Qualifiers: patient not sedated/intubated  Pupil PERRLA: yes     24hr Temp:  [98 °F (36.7 °C)-99 °F (37.2 °C)]     CV:   Rhythm: normal sinus rhythm  BP goals:   SBP < 140  MAP > 65    Resp: RA; 2LNC at night for apnea?          Plan: N/A    GI/:     Diet/Nutrition Received: regular  Last Bowel Movement: 06/18/24  Voiding Characteristics: voids spontaneously without difficulty    Intake/Output Summary (Last 24 hours) at 6/21/2024 0343  Last data filed at 6/21/2024 0337  Gross per 24 hour   Intake 3239.91 ml   Output 1000 ml   Net 2239.91 ml          Labs/Accuchecks:  Recent Labs   Lab 06/21/24  0207   WBC 9.70   RBC 3.93*   HGB 12.3   HCT 37.3         Recent Labs   Lab 06/21/24  0207      K 3.7   CO2 22*      BUN 20   CREATININE 0.9   ALKPHOS 70   ALT 12   AST 19   BILITOT 0.4      Recent Labs   Lab 06/21/24  0207   INR 1.4*   APTT 21.9    No results for input(s): "CPK", "CPKMB", "TROPONINI", "MB" in the last 168 hours.    Electrolytes: Electrolytes replaced  Accuchecks: none    Gtts:   0.9% NaCl   Intravenous Continuous 75 mL/hr at 06/20/24 1802 Rate Verify at 06/20/24 1802       LDA/Wounds:    Nurses Note -- 4 Eyes    Is there altered skin present? Yes, surgical right wrist    Second RN/Staff Member:  Geeta GIFFORD    Restraints:        WCTM   Problem: Adult Inpatient Plan of Care  Goal: Plan of " Care Review  Outcome: Progressing     Problem: Fall Injury Risk  Goal: Absence of Fall and Fall-Related Injury  Outcome: Progressing

## 2024-06-21 NOTE — SUBJECTIVE & OBJECTIVE
Interval History:  NAEON.     Review of Systems   All other systems reviewed and are negative.      Objective:     Vitals:  Temp: 98.1 °F (36.7 °C)  Pulse: 81  Rhythm: normal sinus rhythm  BP: 128/60  MAP (mmHg): 86  Resp: 14  SpO2: 95 %    Temp  Min: 97.9 °F (36.6 °C)  Max: 99 °F (37.2 °C)  Pulse  Min: 77  Max: 102  BP  Min: 106/59  Max: 139/67  MAP (mmHg)  Min: 76  Max: 95  Resp  Min: 12  Max: 38  SpO2  Min: 91 %  Max: 100 %    06/20 0701 - 06/21 0700  In: 4161.4 [P.O.:200; I.V.:1061.4]  Out: 1000 [Urine:1000]            Physical Exam  Vitals and nursing note reviewed.   Constitutional:       General: She is not in acute distress.     Appearance: She is obese.   Cardiovascular:      Rate and Rhythm: Normal rate and regular rhythm.      Pulses: Normal pulses.   Pulmonary:      Effort: Pulmonary effort is normal.      Breath sounds: Normal breath sounds.   Abdominal:      General: Bowel sounds are normal.      Palpations: Abdomen is soft.      Tenderness: There is no abdominal tenderness.   Musculoskeletal:         General: No swelling or deformity. Normal range of motion.   Skin:     General: Skin is warm and dry.      Capillary Refill: Capillary refill takes less than 2 seconds.   Neurological:      Mental Status: She is alert.      Motor: No seizure activity.      Comments: -GCS E4V5M6  -Awake, Alert, and Oriented to person, place, and time. Speech fluent. Follows commands.  BENOIT spontaneously and purposeful  5/5 in all extremities   Sensation intact   Psychiatric:         Mood and Affect: Mood normal.            Medications:  Continuous ScheduledamLODIPine, 10 mg, Daily  aspirin, 81 mg, QHS  clopidogreL, 75 mg, QHS  DULoxetine, 60 mg, Daily  losartan, 50 mg, QHS  senna-docusate 8.6-50 mg, 1 tablet, BID    PRNacetaminophen, 650 mg, Q6H PRN  labetalol, 10 mg, Q6H PRN  magnesium oxide, 800 mg, PRN  magnesium oxide, 800 mg, PRN  ondansetron, 4 mg, Q8H PRN  potassium bicarbonate, 35 mEq, PRN  potassium bicarbonate, 50  mEq, PRN  potassium bicarbonate, 60 mEq, PRN  potassium, sodium phosphates, 2 packet, PRN  potassium, sodium phosphates, 2 packet, PRN  potassium, sodium phosphates, 2 packet, PRN  sodium chloride 0.9%, 10 mL, PRN  sodium chloride 0.9%, 10 mL, PRN  sodium chloride 0.9%, 10 mL, PRN      Today I personally reviewed pertinent medications, lines/drains/airways, imaging, cardiology results, laboratory results, microbiology results, notably:          Diet  Diet Adult Regular Thin; Standard Tray  Diet Adult Regular Thin; Standard Tray

## 2024-06-21 NOTE — ANESTHESIA POSTPROCEDURE EVALUATION
Anesthesia Post Evaluation    Patient: Lisandra Toussaint    Procedure(s) Performed: * No procedures listed *    Final Anesthesia Type: general      Patient location during evaluation: PACU  Patient participation: Yes- Able to Participate  Level of consciousness: awake and alert  Post-procedure vital signs: reviewed and stable  Pain management: adequate  Airway patency: patent    PONV status at discharge: No PONV  Anesthetic complications: no      Cardiovascular status: hemodynamically stable  Hydration status: euvolemic  Follow-up not needed.              Vitals Value Taken Time   /55 06/20/24 2146   Temp 37.2 °C (99 °F) 06/20/24 1427   Pulse 90 06/20/24 2158   Resp 25 06/20/24 2158   SpO2 92 % 06/20/24 2158   Vitals shown include unfiled device data.      No case tracking events are documented in the log.      Pain/Gonzalez Score: Pain Rating Prior to Med Admin: 7 (6/20/2024  3:45 PM)  Pain Rating Post Med Admin: 2 (6/20/2024  5:02 PM)

## 2024-06-21 NOTE — PLAN OF CARE
[facility]  Initial Discharge Assessment       Primary Care Provider: Sandra Shetty NP    Admission Diagnosis: Aneurysm [I72.9]    Admission Date: 6/20/2024  Expected Discharge Date:     Transition of Care Barriers: None    Payor: BLUE CROSS BLUE SHIELD / Plan: BLUE CONNECT / Product Type: HMO /     Extended Emergency Contact Information  Primary Emergency Contact: RENUKA DE LUNA  Address: [address] [place] [address]  Mobile Phone: [phone]  Relation: Son   needed? No    Discharge Plan A: Home with family  Discharge Plan B: Home      [id number] - [place] - [address]  [address]  [place] [address]  Phone: [phone] Fax: [phone]    [facility] - [place]  [address]  [address]  [place] [address]  Phone: [phone] Fax: [phone]      Initial Assessment (most recent)       Adult Discharge Assessment - 06/21/24 1110          Discharge Assessment    Assessment Type Discharge Planning Assessment     Confirmed/corrected address, phone number and insurance Yes     Confirmed Demographics Correct on Facesheet     Source of Information patient     Communicated VIVIAN with patient/caregiver Yes     Reason For Admission Cerebral aneurysm, nonruptured     People in Home alone     Do you expect to return to your current living situation? Yes     Do you have help at home or someone to help you manage your care at home? No     Prior to hospitilization cognitive status: Alert/Oriented     Current cognitive status: Alert/Oriented     Walking or Climbing Stairs Difficulty no     Dressing/Bathing Difficulty no     Home Layout Able to live on 1st floor     Equipment Currently Used at Home none     Readmission within 30 days? No     Patient currently being followed by outpatient case management? No     Do you currently have service(s) that help you manage your care at home? No     Do you  take prescription medications? Yes     Do you have prescription coverage? Yes     Coverage Payor: BLUE CROSS BLUE SHIELD - BLUE CONNECT -     Do you have any problems affording any of your prescribed medications? No     Is the patient taking medications as prescribed? yes     Who is going to help you get home at discharge? Leonardo spence     How do you get to doctors appointments? car, drives self;family or friend will provide     Are you on dialysis? No     Do you take coumadin? No   Plavix and Asipirin    Discharge Plan A Home with family     Discharge Plan B Home     DME Needed Upon Discharge  none     Discharge Plan discussed with: Patient     Transition of Care Barriers None        Physical Activity    On average, how many days per week do you engage in moderate to strenuous exercise (like a brisk walk)? 0 days     On average, how many minutes do you engage in exercise at this level? 0 min        Financial Resource Strain    How hard is it for you to pay for the very basics like food, housing, medical care, and heating? Not hard at all        Housing Stability    In the last 12 months, was there a time when you were not able to pay the mortgage or rent on time? No     At any time in the past 12 months, were you homeless or living in a shelter (including now)? No        Transportation Needs    Has the lack of transportation kept you from medical appointments, meetings, work or from getting things needed for daily living? No        Food Insecurity    Within the past 12 months, you worried that your food would run out before you got the money to buy more. Never true     Within the past 12 months, the food you bought just didn't last and you didn't have money to get more. Never true        Stress    Do you feel stress - tense, restless, nervous, or anxious, or unable to sleep at night because your mind is troubled all the time - these days? Not at all        Social Isolation    How often do you feel lonely or isolated  from those around you?  Never        Alcohol Use    Q1: How often do you have a drink containing alcohol? Never     Q2: How many drinks containing alcohol do you have on a typical day when you are drinking? Patient does not drink     Q3: How often do you have six or more drinks on one occasion? Never        Utilities    In the past 12 months has the electric, gas, oil, or water company threatened to shut off services in your home? No        Health Literacy    How often do you need to have someone help you when you read instructions, pamphlets, or other written material from your doctor or pharmacy? Never                   The SW met with the patient at bedside. The SW placed name and contact information on the blackboard in the patient's room. Use preferred pharmacy / bedside delivery for any necessary medications at the time of discharge. The patient is independent with all ADLs. The patient is not on Dialysis or Coumadin.  The patient takes Plavix and Asprin . The Patient does not use DME or home oxygen, The patient's  son Juma  [phone]  will provide assistance to the patient upon discharge. The patient's son will provide transportation upon discharge. SW will continue to follow for course of hospitalization.        Discharge Plan A and Plan B have been determined by review of patient's clinical status, future medical and therapeutic needs, and coverage/benefits for post-acute care in coordination with multidisciplinary team members.    Olivia Denny MSW, CHRISTINEW  [facility]  Case Management Dept.

## 2024-06-21 NOTE — PT/OT/SLP EVAL
Physical Therapy Evaluation    Patient Name:  Lisandra Toussaint   MRN:  [id number]    Recommendations:     Discharge Recommendations: Low Intensity Therapy   Discharge Equipment Recommendations: none   Barriers to discharge: None    Assessment:     Lisandra Toussaint is a 63 y.o. female admitted with a medical diagnosis of Cerebral aneurysm, nonruptured.  She presents with the following impairments/functional limitations: weakness, impaired endurance, impaired functional mobility, gait instability, impaired balance, decreased lower extremity function, pain, impaired coordination. Patient presented with increased motivation to participate in evaluation, with good response to completed activities and provided education. She demonstrated overall good quality functional mobility this date, however patient's gait quality impacted by her headache. Specifically, patient demonstrated tendency to ambulate close to hallway HR, impaired step & SUSANNA characteristics, and (x1) episode of R knee buckling indicating impairments in balance & strength. Based upon information gained, PT recommends low intensity skilled physical therapy services post-acutely. Provided recommendation based upon needed intensity to not only directly address patient's previously listed functional impairments, discrepancy between functional baseline & current mobility status, but to also positively impact patient's quality of life.    Rehab Prognosis: Good; patient would benefit from acute skilled PT services to address these deficits and reach maximum level of function.    Recent Surgery: * No procedures listed * 1 Day Post-Op    Plan:     During this hospitalization, patient to be seen 3 x/week to address the identified rehab impairments via gait training, therapeutic activities, therapeutic exercises, neuromuscular re-education and progress toward the following goals:    Plan of Care Expires:  07/21/24    Subjective     Chief Complaint: Headache  Patient/Family  "Comments/goals: "My head feels heavy"  Pain/Comfort:  Pain Rating 1: 1/10  Location - Orientation 1: generalized  Location 1: head  Pain Addressed 1: Reposition, Distraction, Nurse notified  Pain Rating Post-Intervention 1: Intensity not provided    Patients cultural, spiritual, Worship conflicts given the current situation: no    Social History:  Residence: Patient lives with their spouse in a single story house with  a ramp entrance . Patient's bathroom has a T/S combo, with associated grab bars.  Equipment Owned: none, bedside commode, crutches, grab bar, rollator, shower chair, walker, rolling, wheelchair  Prior level of function:  Prior to admission, patient was independent for ambulation, mobility, and ADLs  Assistance Upon Discharge:  Adult Sons    Objective:     Communicated with RN prior to session.  Patient found HOB elevated with telemetry, peripheral IV, pulse ox (continuous)  upon PT entry to room.    General Precautions: Standard, fall   Orthopedic Precautions:N/A   Braces: N/A   Body mass index is 35.95 kg/m².  Oxygen Device: Room Air  Vitals: /60   Pulse 95   Temp 98.7 °F (37.1 °C) (Oral)   Resp (!) 21   Ht 5' 5" (1.651 m)   Wt 98 kg (216 lb 0.8 oz)   SpO2 96%   Breastfeeding No   BMI 35.95 kg/m²     Exams:  Cognition:   Alert and Cooperative   Patient is oriented to Person, Place, Time, Situation  Command following: Follows multistep verbal commands  Fluency: clear/fluent  Postural Assessment: rounded shoulders and forward head  Physical Exam:   LLE ROM: WFL  RLE ROM: WFL    LLE Strength (out of 5):   Hip Flexion:4: Holds test positioning against MODERATE pressure  Hip Abduction:4+: Holds test position against MODERATE to STRONG pressure  Ankle Dorsiflexion:5: Holds test position against STRONG pressure    RLE Strength (out of 5):   Hip Flexion:4: Holds test positioning against MODERATE pressure  Hip Abduction:4+: Holds test position against MODERATE to STRONG pressure  Ankle " Dorsiflexion:5: Holds test position against STRONG pressure    Functional Mobility:  Bed Mobility:     EOB Scooting:   Anterior: SBA  Supine>Sit: x1, SBA with HOB Elevated  Sit>Supine: x1, Supervision with HOB Elevated    Transfers:     Sit<>Stand:   x1, Supervision from Edge of Bed with No AD  x1, SBA from Edge of Bed with No AD    Gait:  Gait: x150ft, SBA with No AD. Pt demonstrating mild episode of R knee buckling, with pt able to self-correct  Gait Assessment: decreased step length, decreased foot clearance, NBOS, decreased gait speed, impaired quad engagement  Total Distance: 150ft    Balance:   Static Sitting: Supervision  Dynamic Sitting: Supervision    Static Standing:  SBA  Dynamic Standing: SBA    Stairs: Deferred 2/2 to ramp entry into home  AM-PAC 6 CLICK MOBILITY  Total Score:19     Treatment & Education:  Increased patient contact time due to:  Review of exercises for patient to complete for BLE strength, including  Sit<>Stands: Verbal review  Seated LAQ - x5 R.  *VC for time-under-tension during eccentric phase    Patient Education Provided on:  The role of physical therapy and how the patient can benefit from skilled services  The negative effects of prolonged bed rest/sedentary behavior, along with the importance of OOB activity & patient participation with PT  Functional mobility while minding BP, such as avoiding valsalva maneuver & heavy lifting; prolonged deep FFP; and how to re-introduce exercise safely  The importance of contacting RN, via call light, for mobility throughout the day  Pt white board updated with current therapists name and level of mobility assistance needed.     Patient Verbalized understanding of all topics touched on this date. All patient questions answered within the PT scope of practice    Patient left HOB elevated with call button in reach, RN notified, and adult children present.    GOALS:   Multidisciplinary Problems       Physical Therapy Goals          Problem:  Physical Therapy    Goal Priority Disciplines Outcome Goal Variances Interventions   Physical Therapy Goal     PT, PT/OT Progressing     Description: Goals to be met by: 24     Patient will increase functional independence with mobility by performin. Supine to sit with [place] with HOB flat  2. Sit to stand transfer with [place]  3. Gait  x 200 feet with [place] using  No Assistive Device.  4. Ascend/Descend 6 inch curb step with [place] using No Assistive Device.  5. Stand for 8 minutes with [place] using No Assistive Device  6. Lower extremity exercise program x20 reps per handout, with assistance as needed                         History:     Past Medical History:   Diagnosis Date    Aneurysm     Anxiety     HTN (hypertension)     TMJ (dislocation of temporomandibular joint)        Past Surgical History:   Procedure Laterality Date    ABDOMINAL SURGERY      CARPAL TUNNEL RELEASE      EYE SURGERY      FOOT SURGERY      tarsel tunnel         Time Tracking:     PT Received On: 24  PT Start Time: 1238     PT Stop Time: 1305  PT Total Time (min): 27 min     Billable Minutes: Evaluation 12 and Therapeutic Activity 15      2024

## 2024-06-21 NOTE — PLAN OF CARE
Problem: Physical Therapy  Goal: Physical Therapy Goal  Description: Goals to be met by: 24     Patient will increase functional independence with mobility by performin. Supine to sit with [place] with HOB flat  2. Sit to stand transfer with [place]  3. Gait  x 200 feet with [place] using  No Assistive Device.  4. Ascend/Descend 6 inch curb step with [place] using No Assistive Device.  5. Stand for 8 minutes with [place] using No Assistive Device  6. Lower extremity exercise program x20 reps per handout, with assistance as needed    Outcome: Progressing    Evaluation Complete. Goals Appropriate.

## 2024-06-21 NOTE — PLAN OF CARE
Problem: Adult Inpatient Plan of Care  Goal: Plan of Care Review  6/21/2024 0346 by Elizabeth Branham, RN  Outcome: Progressing  6/21/2024 0343 by Elizabeth Branham, RN  Outcome: Progressing     Problem: Fall Injury Risk  Goal: Absence of Fall and Fall-Related Injury  6/21/2024 0346 by Elizabeth Branham, RN  Outcome: Progressing  6/21/2024 0343 by Elizabeth Branham, RN  Outcome: Progressing

## 2024-06-21 NOTE — ASSESSMENT & PLAN NOTE
A1 aneurysm, unruptured, found in 3/2023 during diagnostic aneurysm. Patient was experiencing tinnitus, visual disturbance, and light-headedness. Denies all s/s at this time. Presented 6/20/2024 for post-operative monitoring and management.  Neurology IR following   Hourly neurochecks and VS  SBP goal <140, resume home meds  Restart DAPT therapy tonight, taken prior to hospitalization  SCDs applied for mechanical DVT Ppx    Stable for discharge home today

## 2024-06-21 NOTE — PROGRESS NOTES
[facility]  Neurocritical Care  Progress Note    Admit Date: 6/20/2024  Service Date: 06/21/2024  Length of Stay: 1    Subjective:     Chief Complaint: Cerebral aneurysm, nonruptured    History of Present Illness: Pt is a 63 y.o. female with PMHx of HTN, headaches, and depression/anxiety presented to the [facility] after having an elective ACOM aneurysmal WILDER and T-assisted coiling due to an unruptured A1 aneurysm that was found in 12/2023. Patient was experiencing tinnitus, visual disturbances, and lightheadedness in September 2023 that had been increasing with intensity over the last 5 years prior. A prior CTH revealed an aneurysm and in 3/2024 the patient had a diagnostic angiogram where an unruptured A1 aneurysm was found. Because of the complexity of the procedure and need for close observation/monitoring, the patient will be admitted to the [facility].    Hospital Course: 06/21/2024 Ambulated down trevizo with NCC team. Stable for discharge today.       Interval History:  NAEON.     Review of Systems   All other systems reviewed and are negative.      Objective:     Vitals:  Temp: 98.1 °F (36.7 °C)  Pulse: 81  Rhythm: normal sinus rhythm  BP: 128/60  MAP (mmHg): 86  Resp: 14  SpO2: 95 %    Temp  Min: 97.9 °F (36.6 °C)  Max: 99 °F (37.2 °C)  Pulse  Min: 77  Max: 102  BP  Min: 106/59  Max: 139/67  MAP (mmHg)  Min: 76  Max: 95  Resp  Min: 12  Max: 38  SpO2  Min: 91 %  Max: 100 %    06/20 0701 - 06/21 0700  In: 4161.4 [P.O.:200; I.V.:1061.4]  Out: 1000 [Urine:1000]            Physical Exam  Vitals and nursing note reviewed.   Constitutional:       General: She is not in acute distress.     Appearance: She is obese.   Cardiovascular:      Rate and Rhythm: Normal rate and regular rhythm.      Pulses: Normal pulses.   Pulmonary:      Effort: Pulmonary effort is normal.      Breath sounds: Normal breath sounds.   Abdominal:      General: Bowel sounds are normal.      Palpations: Abdomen is soft.       Tenderness: There is no abdominal tenderness.   Musculoskeletal:         General: No swelling or deformity. Normal range of motion.   Skin:     General: Skin is warm and dry.      Capillary Refill: Capillary refill takes less than 2 seconds.   Neurological:      Mental Status: She is alert.      Motor: No seizure activity.      Comments: -GCS E4V5M6  -Awake, Alert, and Oriented to person, place, and time. Speech fluent. Follows commands.  BENOIT spontaneously and purposeful  5/5 in all extremities   Sensation intact   Psychiatric:         Mood and Affect: Mood normal.            Medications:  Continuous ScheduledamLODIPine, 10 mg, Daily  aspirin, 81 mg, QHS  clopidogreL, 75 mg, QHS  DULoxetine, 60 mg, Daily  losartan, 50 mg, QHS  senna-docusate 8.6-50 mg, 1 tablet, BID    PRNacetaminophen, 650 mg, Q6H PRN  labetalol, 10 mg, Q6H PRN  magnesium oxide, 800 mg, PRN  magnesium oxide, 800 mg, PRN  ondansetron, 4 mg, Q8H PRN  potassium bicarbonate, 35 mEq, PRN  potassium bicarbonate, 50 mEq, PRN  potassium bicarbonate, 60 mEq, PRN  potassium, sodium phosphates, 2 packet, PRN  potassium, sodium phosphates, 2 packet, PRN  potassium, sodium phosphates, 2 packet, PRN  sodium chloride 0.9%, 10 mL, PRN  sodium chloride 0.9%, 10 mL, PRN  sodium chloride 0.9%, 10 mL, PRN      Today I personally reviewed pertinent medications, lines/drains/airways, imaging, cardiology results, laboratory results, microbiology results, notably:          Diet  Diet Adult Regular Thin; Standard Tray  Diet Adult Regular Thin; Standard Tray        Assessment/Plan:     Neuro  * Cerebral aneurysm, nonruptured  A1 aneurysm, unruptured, found in 3/2023 during diagnostic aneurysm. Patient was experiencing tinnitus, visual disturbance, and light-headedness. Denies all s/s at this time. Presented 6/20/2024 for post-operative monitoring and management.  Neurology IR following   Hourly neurochecks and VS  SBP goal <140, resume home meds  Restart DAPT therapy  tonight, taken prior to hospitalization  SCDs applied for mechanical DVT Ppx    Stable for discharge home today    Cervicogenic headache  Utilize PRN tylenol for headache pain  Consider migraine cocktail if pain persists    Psychiatric  Depression, recurrent  Resume home dose duloxetine     Cardiac/Vascular  Presence of arterial stent  Bilateral A2   Continue DAPT    Essential hypertension  SBP goal <140  Resumed home dose losartan  Resumed home dose amlodipine           The patient is being Prophylaxed for:  Venous Thromboembolism with: Mechanical  Stress Ulcer with: None  Ventilator Pneumonia with: none    Activity Orders            Progressive Mobility Protocol (mobilize patient to their highest level of functioning at least twice daily) starting at 06/20 2000    Progressive Mobility Protocol (mobilize patient to their highest level of functioning at least twice daily) starting at 06/20 2000    Diet Adult Regular Thin; Standard Tray: Regular starting at 06/20 1726    Turn patient starting at 06/20 1600    Elevate HOB starting at 06/20 1505          Full Code    Level II    Zee Tiwari PA-C  Neurocritical Care  [facility]

## 2024-06-22 ENCOUNTER — PATIENT MESSAGE (OUTPATIENT)
Dept: PSYCHIATRY | Facility: CLINIC | Age: 64
End: 2024-06-22
Payer: COMMERCIAL

## 2024-06-24 ENCOUNTER — PATIENT MESSAGE (OUTPATIENT)
Dept: ADMINISTRATIVE | Facility: CLINIC | Age: 64
End: 2024-06-24
Payer: COMMERCIAL

## 2024-06-24 ENCOUNTER — PATIENT OUTREACH (OUTPATIENT)
Dept: ADMINISTRATIVE | Facility: CLINIC | Age: 64
End: 2024-06-24
Payer: COMMERCIAL

## 2024-06-24 NOTE — PROGRESS NOTES
C3 nurse attempted to contact Lisandra Breana for a TCC post hospital discharge follow up call. No answer. Left voicemail with callback information. The patient has a scheduled HOSFU appointment with Sandra Shetty NP (PCP) on 6/28/24 @ 11:00am.

## 2024-06-24 NOTE — PROGRESS NOTES
C3 nurse spoke with Lisandra Toussaint for a TCC post hospital discharge follow up call.  The patient has a scheduled HOSFU appointment with Sandra Shetty NP (PCP) on 6/28/24 @ 11:00am.

## 2024-06-27 LAB
POC ACTIVATED CLOTTING TIME K: 220 SEC (ref 74–137)
SAMPLE: ABNORMAL

## 2024-06-28 ENCOUNTER — OFFICE VISIT (OUTPATIENT)
Dept: PRIMARY CARE CLINIC | Facility: CLINIC | Age: 64
End: 2024-06-28
Payer: COMMERCIAL

## 2024-06-28 VITALS
HEIGHT: 65 IN | OXYGEN SATURATION: 98 % | WEIGHT: 211.63 LBS | DIASTOLIC BLOOD PRESSURE: 70 MMHG | SYSTOLIC BLOOD PRESSURE: 130 MMHG | BODY MASS INDEX: 35.26 KG/M2 | RESPIRATION RATE: 16 BRPM | HEART RATE: 92 BPM

## 2024-06-28 DIAGNOSIS — R06.83 SNORING: ICD-10-CM

## 2024-06-28 DIAGNOSIS — R53.83 FATIGUE, UNSPECIFIED TYPE: ICD-10-CM

## 2024-06-28 DIAGNOSIS — G44.86 CERVICOGENIC HEADACHE: ICD-10-CM

## 2024-06-28 DIAGNOSIS — R51.9 NONINTRACTABLE HEADACHE, UNSPECIFIED CHRONICITY PATTERN, UNSPECIFIED HEADACHE TYPE: ICD-10-CM

## 2024-06-28 DIAGNOSIS — I10 ESSENTIAL HYPERTENSION: Chronic | ICD-10-CM

## 2024-06-28 DIAGNOSIS — Q28.2 ARTERIOVENOUS MALFORMATION OF BRAIN: ICD-10-CM

## 2024-06-28 DIAGNOSIS — I67.1 CEREBRAL ANEURYSM, NONRUPTURED: Primary | ICD-10-CM

## 2024-06-28 PROCEDURE — 1160F RVW MEDS BY RX/DR IN RCRD: CPT | Mod: CPTII,S$GLB,, | Performed by: NURSE PRACTITIONER

## 2024-06-28 PROCEDURE — 3044F HG A1C LEVEL LT 7.0%: CPT | Mod: CPTII,S$GLB,, | Performed by: NURSE PRACTITIONER

## 2024-06-28 PROCEDURE — 3078F DIAST BP <80 MM HG: CPT | Mod: CPTII,S$GLB,, | Performed by: NURSE PRACTITIONER

## 2024-06-28 PROCEDURE — 1111F DSCHRG MED/CURRENT MED MERGE: CPT | Mod: CPTII,S$GLB,, | Performed by: NURSE PRACTITIONER

## 2024-06-28 PROCEDURE — 4010F ACE/ARB THERAPY RXD/TAKEN: CPT | Mod: CPTII,S$GLB,, | Performed by: NURSE PRACTITIONER

## 2024-06-28 PROCEDURE — 1159F MED LIST DOCD IN RCRD: CPT | Mod: CPTII,S$GLB,, | Performed by: NURSE PRACTITIONER

## 2024-06-28 PROCEDURE — 3075F SYST BP GE 130 - 139MM HG: CPT | Mod: CPTII,S$GLB,, | Performed by: NURSE PRACTITIONER

## 2024-06-28 PROCEDURE — 99495 TRANSJ CARE MGMT MOD F2F 14D: CPT | Mod: S$GLB,,, | Performed by: NURSE PRACTITIONER

## 2024-06-28 PROCEDURE — 99999 PR PBB SHADOW E&M-EST. PATIENT-LVL IV: CPT | Mod: PBBFAC,,, | Performed by: NURSE PRACTITIONER

## 2024-06-28 NOTE — PROGRESS NOTES
Transitional Care Note  Subjective:       Patient ID: Lisandra Toussaint is a 63 y.o. female.  Chief Complaint: Hospital Follow Up    Family and/or Caretaker present at visit?  Yes.  Diagnostic tests reviewed/disposition: No diagnosic tests pending after this hospitalization.  Disease/illness education: close monitoring of signs and symptoms  Home health/community services discussion/referrals: Patient does not have home health established from hospital visit.  They do not need home health.  If needed, we will set up home health for the patient.   Establishment or re-establishment of referral orders for community resources: No other necessary community resources.   Discussion with other health care providers: No discussion with other health care providers necessary.       HPI    This is a 63-year-old female patient who is a hospital follow-up from 6/20/24-6/21/24 for a cerebral aneurysm-nonruptured    Procedures:   Diagnostic cerebral angiogram + WEB + T Stent Assisted Coiling    Discharge :  History of Present Illness: Pt is a 63 y.o. female with PMHx of HTN, headaches, and depression/anxiety presented to the [facility] after having an elective ACOM aneurysmal WILDER and T-assisted coiling due to an unruptured A1 aneurysm that was found in 12/2023. Patient was experiencing tinnitus, visual disturbances, and lightheadedness in September 2023 that had been increasing with intensity over the last 5 years prior. A prior CTH revealed an aneurysm and in 3/2024 the patient had a diagnostic angiogram where an unruptured A1 aneurysm was found. Because of the complexity of the procedure and need for close observation/monitoring, the patient will be admitted to the [facility].     Hospital Course by Event: 06/21/2024 Ambulated down trevizo with [facility] team. Stable for discharge today.         Hospital Course by Problem:   * Cerebral aneurysm, nonruptured  A1 aneurysm, unruptured, found in 3/2023 during diagnostic aneurysm. Patient was  experiencing tinnitus, visual disturbance, and light-headedness. Denies all s/s at this time. Presented 6/20/2024 for post-operative monitoring and management.  Neurology IR following   Hourly neurochecks and VS  SBP goal <140, resume home meds  Restart DAPT therapy tonight, taken prior to hospitalization  SCDs applied for mechanical DVT Ppx      Stable for discharge home today     Presence of arterial stent  Bilateral A2   Continue DAPT     Essential hypertension  SBP goal <140  Resume home medications     Cervicogenic headache  Utilize PRN tylenol for headache pain  Consider migraine cocktail if pain persists     Depression, recurrent  Resume home medications       Review of Systems   Constitutional:  Negative for appetite change, chills, fatigue and fever.   HENT:  Negative for congestion, ear pain, mouth sores, postnasal drip, sinus pressure, sinus pain, sore throat and trouble swallowing.    Eyes:  Negative for redness and visual disturbance.   Respiratory:  Negative for cough, chest tightness and shortness of breath.    Cardiovascular:  Negative for chest pain, palpitations and leg swelling.   Gastrointestinal:  Negative for abdominal distention, abdominal pain, constipation, diarrhea and nausea.   Endocrine: Negative for polyuria.   Genitourinary:  Negative for difficulty urinating, dysuria and flank pain.   Musculoskeletal:  Negative for gait problem, joint swelling and myalgias.   Skin:  Negative for color change, pallor and rash.   Neurological:  Positive for dizziness and headaches. Negative for facial asymmetry, speech difficulty, weakness, light-headedness and numbness.   Psychiatric/Behavioral:  Negative for agitation and confusion. The patient is not nervous/anxious.            Objective:      Physical Exam  Vitals and nursing note reviewed.   Constitutional:       General: She is not in acute distress.     Appearance: Normal appearance. She is well-developed. She is not ill-appearing.   HENT:       Head: Normocephalic and atraumatic.      Right Ear: External ear normal.      Left Ear: External ear normal.   Eyes:      Conjunctiva/sclera: Conjunctivae normal.      Pupils: Pupils are equal, round, and reactive to light.   Neck:      Thyroid: No thyromegaly.      Vascular: No JVD.      Trachea: No tracheal deviation.   Cardiovascular:      Rate and Rhythm: Normal rate and regular rhythm.      Heart sounds: Normal heart sounds. No murmur heard.  Pulmonary:      Effort: Pulmonary effort is normal.      Breath sounds: Normal breath sounds.   Chest:      Chest wall: No tenderness.   Abdominal:      Palpations: Abdomen is soft.      Tenderness: There is no abdominal tenderness. There is no guarding.   Musculoskeletal:         General: Normal range of motion.      Cervical back: Normal range of motion and neck supple.   Lymphadenopathy:      Cervical: No cervical adenopathy.   Skin:     General: Skin is warm and dry.   Neurological:      General: No focal deficit present.      Mental Status: She is alert and oriented to person, place, and time.   Psychiatric:         Mood and Affect: Mood normal.         Behavior: Behavior normal.         Thought Content: Thought content normal.         Judgment: Judgment normal.             Assessment:       1. Cerebral aneurysm, nonruptured    2. Arteriovenous malformation of brain    3. Cervicogenic headache    4. Fatigue, unspecified type    5. Nonintractable headache, unspecified chronicity pattern, unspecified headache type    6. Snoring    7. Essential hypertension        Plan:         Regular diet  Activity as tolerated    Follow-ups:  Vascular Neurology x2 weeks      START taking these medications       aspirin 81 MG Chew  Take 1 tablet (81 mg total) by mouth once daily.  Replaces: aspirin 325 MG EC tablet       STOP taking these medications       aspirin 325 MG EC tablet  Commonly known as: ECOTRIN  Replaced by: aspirin 81 MG Chew      diphenhydrAMINE 50 MG capsule  Commonly  known as: BENADRYL      predniSONE 50 MG Tab  Commonly known as: DELTASONE       Follow up with Neurology  Follow up in clinic as needed for any concerns

## 2024-07-03 ENCOUNTER — PATIENT MESSAGE (OUTPATIENT)
Dept: NEUROSURGERY | Facility: CLINIC | Age: 64
End: 2024-07-03

## 2024-07-03 ENCOUNTER — TELEPHONE (OUTPATIENT)
Dept: NEUROSURGERY | Facility: CLINIC | Age: 64
End: 2024-07-03

## 2024-07-03 ENCOUNTER — CLINICAL SUPPORT (OUTPATIENT)
Dept: NEUROSURGERY | Facility: CLINIC | Age: 64
End: 2024-07-03
Payer: COMMERCIAL

## 2024-07-03 DIAGNOSIS — I72.9 ANEURYSM: Primary | ICD-10-CM

## 2024-07-03 NOTE — PROGRESS NOTES
Established Patient - Audio Only Telehealth Visit     Lisandra Moy a 63 y.o. female for 2 week post op wound check.    Surgery: Pt is POD 14 FROM 06/20/24 on [facility] by Dr. Green.    DME: none    Brace in Use: none    SUBJECTIVE    New Symptoms: none      PAIN MANAGEMENT     0,       INCISION (S)    Location: right wrist    Incision Status: Clean, Dry, PAMELA. Edges well-approximated. No drainage or swelling noted.     PICTURE    INTERVENTION    Incision: None    Medication Refills Requested: None     EDUCATION    Reviewed Post Op instructions with patient.  Keep incision PAMELA, no lotions, creams or bandages.  Ok to shower without direct water pressure to incision. Pat dry after shower.  Patient encouraged to walk as much as possible but advised to walk with someone and rest as necessary.  Take over the counter stool softner/laxative for constipation.  Call your doctor or report to the Emergency Room for any signs of infection, including: increased redness, drainage, pain or fever (temperature greater than or equal to 101.5 for 24 hours). Call doctor or go to the Emergency Room if there are any localized neurological changes; problems with speech, vision, numbness, tingling, weakness, or severe headache; or for other concerns.      All questions answered. Pt encouraged to call clinic or send message through portal with any future concerns. Patient verbalized understanding.     FOLLOW UP    Future Appointments   Date Time Provider Department Center   7/3/2024 10:30 AM Ashely Linda RN [facility] NEUROS8 [facility]   7/9/2024 10:20 AM Demetrius Shukla MD [facility] STROKE8 [facility]   7/30/2024  1:00 PM [facility] OIC-MRI1 [facility]   8/6/2024 10:00 AM Vlad Green MD [facility] NEUROS8 [facility]   8/20/2024 11:40 AM PRE-ADMIT NURSE 1, ENDO -[facility] ENDOPP4 [facility]   9/12/2024  2:00 PM Hayley Harrington MD [facility] SLEEP [facility]

## 2024-07-08 ENCOUNTER — PATIENT MESSAGE (OUTPATIENT)
Dept: NEUROSURGERY | Facility: CLINIC | Age: 64
End: 2024-07-08
Payer: COMMERCIAL

## 2024-07-08 PROBLEM — M79.18 CERVICAL MYOFASCIAL PAIN SYNDROME: Status: ACTIVE | Noted: 2024-07-08

## 2024-07-08 NOTE — PROGRESS NOTES
Neurology Clinic Follow-Up Note    Impression:  L cervico-occipital headaches:  Suspect cervicogenic due to cervical myofascial syndrome.  There may be a component of MOH.  Improving with PT and tizanidine.  Incidental, unruptured ACOM aneurysm s/p coiling  Obesity  HTN: controlled  Recent hair loss, suspect angio-related    Plan:  Increase tizanidine to 4mg qhs and 1mg am and mid-day  Continue home cervical PT  Limit abortives to <= 3 days/week.  Alternate NSAID with acetaminophen prn only  Exercise/weight loss   F/U with Dr. Green re aneurysm and hair loss  RTC 6 months or sooner, prn.  She will update me via [employer] prior, as needed      Problem List Items Addressed This Visit          1 - High    Cervicogenic headache       2     Cerebral aneurysm, nonruptured       Unprioritized    Arteriovenous malformation of brain    Presence of arterial stent    Cervical myofascial pain syndrome     Other Visit Diagnoses       Headache, cervicogenic    -  Primary    Unruptured cerebral aneurysm              Patient returns for follow-up of above.  Since coiling, headaches worsened but things are improving some.  No symptoms of SAH.  She has reduced tizanidine to 1mg/1mg/2mg.  She recently noticed hair loss and has reached out to Dr. Green's office.        7/2/2024    10:23 AM 3/26/2024     9:31 AM 1/22/2024    10:48 PM   Headache Questionnaire Answers   In the past 30 days, how many days did you suffer from a headache? 30 30 30   In the past 30 days, how many days did you have headache that caused you to stay in bed, miss a social gathering and/or disappoint a close family member/friend? 10 0 4   In the past 30 days, how many times did you miss work/school and/or seek care at an urgent care or emergency department for the headache? 10 0 0       Running history:    She is doing much better with PT and tizanidine 4mg qhs.  Still with daily HA, however.  Taking acetaminophen daily. Angio confirmed acom aneurysm and she has  a f/u appt scheduled with Dr. Green.  No symptoms to suggest SAH. BP controlled.       cRP and ESR were nl      CC:  headache    HPI:  64 y/o WF referred by Dr. Green for evaluation of headaches.  She developed headaches in Oct.  Pain is L cervico-occipital with radiation anteriorly.  Pain is constant and waxes and wanes.  Described as blunt pain. When bad, chronic tinnitus increases.  Tylenol is partially effective.  Changing pillows ineffective. Sleeping sitting up helps.  No fever. Has chronic TMJ with no change. + occ scalp tenderness. No visual loss. + associated cervical pain.    Dr. Shetty ordered CT 11/20/23 suggesting an aneurysm that was confirmed with MRI and subsequent vascular imaging.  Imaging was reviewed personally.    She saw Dr. Sterling (ENT) on 1/8/24.  Sinus irrigation helps minimally.  She has reduced buspirone with only mild effect.     Abortives   Tylenol (taking daily)    Prophylactics   None      Outpatient Medications Marked as Taking for the 7/9/24 encounter (Office Visit) with Demetrius Shukla MD   Medication Sig Dispense Refill    acetaminophen (TYLENOL) 500 mg Cap Take 500 mg by mouth as needed.      amLODIPine (NORVASC) 10 MG tablet Take 1 tablet by mouth once daily 90 tablet 0    aspirin 81 MG Chew Take 1 tablet (81 mg total) by mouth once daily. 30 tablet 0    busPIRone (BUSPAR) 10 MG tablet Take 1 tablet (10 mg total) by mouth 2 (two) times daily. 180 tablet 3    cartilage/collagen/bor/hyalur (JOINT HEALTH ORAL) Take 1 tablet by mouth once daily.      clopidogreL (PLAVIX) 75 mg tablet Take 1 tablet (75 mg total) by mouth once daily. To take with aspirin 325 mg daily (otc) 30 tablet 11    cranberry extract (CRANBERRY CONCENTRATE) 500 mg Cap Take 1 tablet by mouth once daily.      DAILY MULTI-VITAMIN ORAL Take by mouth.      DULoxetine (CYMBALTA) 60 MG capsule Take 1 capsule (60 mg total) by mouth once daily. 90 capsule 3    ergocalciferol (ERGOCALCIFEROL) 50,000 unit Cap  "Take 1 capsule (50,000 Units total) by mouth every 7 days. 24 capsule 0    hydroCHLOROthiazide (MICROZIDE) 12.5 mg capsule Take 1 capsule by mouth once daily (Patient taking differently: Take by mouth daily as needed. Take 1 capsule by mouth once daily) 90 capsule 0    losartan (COZAAR) 50 MG tablet Take 1 tablet (50 mg total) by mouth 2 (two) times a day. 60 tablet 3    ranitidine (ZANTAC) 75 MG tablet Take 75 mg by mouth every morning.      tiZANidine (ZANAFLEX) 4 MG tablet Take 1 tablet (4 mg total) by mouth 2 (two) times a day. 180 tablet 3       /76 (BP Location: Right arm, Patient Position: Sitting, BP Method: Medium (Automatic))   Pulse 82   Ht 5' 5" (1.651 m)   Wt 97.4 kg (214 lb 11.7 oz)   BMI 35.73 kg/m²   Neck: L trap spasm.  Discs sharp, EOMF, mild L exo (hx strabismus),  mildly wide L palpebral fissure. Gait is steady.    31 mins chart review, face to face, documentation    Demetrius Shukla MD      "

## 2024-07-09 ENCOUNTER — OFFICE VISIT (OUTPATIENT)
Dept: NEUROLOGY | Facility: CLINIC | Age: 64
End: 2024-07-09
Payer: COMMERCIAL

## 2024-07-09 ENCOUNTER — PATIENT MESSAGE (OUTPATIENT)
Dept: NEUROSURGERY | Facility: CLINIC | Age: 64
End: 2024-07-09
Payer: COMMERCIAL

## 2024-07-09 ENCOUNTER — TELEPHONE (OUTPATIENT)
Dept: NEUROSURGERY | Facility: CLINIC | Age: 64
End: 2024-07-09
Payer: COMMERCIAL

## 2024-07-09 VITALS
SYSTOLIC BLOOD PRESSURE: 132 MMHG | HEIGHT: 65 IN | BODY MASS INDEX: 35.78 KG/M2 | DIASTOLIC BLOOD PRESSURE: 76 MMHG | HEART RATE: 82 BPM | WEIGHT: 214.75 LBS

## 2024-07-09 DIAGNOSIS — Q28.2 ARTERIOVENOUS MALFORMATION OF BRAIN: ICD-10-CM

## 2024-07-09 DIAGNOSIS — I67.1 UNRUPTURED CEREBRAL ANEURYSM: ICD-10-CM

## 2024-07-09 DIAGNOSIS — G44.86 HEADACHE, CERVICOGENIC: Primary | ICD-10-CM

## 2024-07-09 DIAGNOSIS — Z95.828 PRESENCE OF ARTERIAL STENT: ICD-10-CM

## 2024-07-09 DIAGNOSIS — M79.18 CERVICAL MYOFASCIAL PAIN SYNDROME: ICD-10-CM

## 2024-07-09 DIAGNOSIS — G44.86 CERVICOGENIC HEADACHE: ICD-10-CM

## 2024-07-09 DIAGNOSIS — I67.1 CEREBRAL ANEURYSM, NONRUPTURED: ICD-10-CM

## 2024-07-09 PROCEDURE — 1159F MED LIST DOCD IN RCRD: CPT | Mod: CPTII,S$GLB,, | Performed by: PSYCHIATRY & NEUROLOGY

## 2024-07-09 PROCEDURE — 3044F HG A1C LEVEL LT 7.0%: CPT | Mod: CPTII,S$GLB,, | Performed by: PSYCHIATRY & NEUROLOGY

## 2024-07-09 PROCEDURE — 3075F SYST BP GE 130 - 139MM HG: CPT | Mod: CPTII,S$GLB,, | Performed by: PSYCHIATRY & NEUROLOGY

## 2024-07-09 PROCEDURE — 99999 PR PBB SHADOW E&M-EST. PATIENT-LVL IV: CPT | Mod: PBBFAC,,, | Performed by: PSYCHIATRY & NEUROLOGY

## 2024-07-09 PROCEDURE — 99214 OFFICE O/P EST MOD 30 MIN: CPT | Mod: S$GLB,,, | Performed by: PSYCHIATRY & NEUROLOGY

## 2024-07-09 PROCEDURE — G2211 COMPLEX E/M VISIT ADD ON: HCPCS | Mod: S$GLB,,, | Performed by: PSYCHIATRY & NEUROLOGY

## 2024-07-09 PROCEDURE — 3078F DIAST BP <80 MM HG: CPT | Mod: CPTII,S$GLB,, | Performed by: PSYCHIATRY & NEUROLOGY

## 2024-07-09 PROCEDURE — 1111F DSCHRG MED/CURRENT MED MERGE: CPT | Mod: CPTII,S$GLB,, | Performed by: PSYCHIATRY & NEUROLOGY

## 2024-07-09 PROCEDURE — 3008F BODY MASS INDEX DOCD: CPT | Mod: CPTII,S$GLB,, | Performed by: PSYCHIATRY & NEUROLOGY

## 2024-07-09 PROCEDURE — 4010F ACE/ARB THERAPY RXD/TAKEN: CPT | Mod: CPTII,S$GLB,, | Performed by: PSYCHIATRY & NEUROLOGY

## 2024-07-09 RX ORDER — ACETAMINOPHEN 500 MG/1
500 CAPSULE, LIQUID FILLED ORAL
COMMUNITY
Start: 2024-01-01

## 2024-07-30 ENCOUNTER — HOSPITAL ENCOUNTER (OUTPATIENT)
Dept: RADIOLOGY | Facility: HOSPITAL | Age: 64
Discharge: HOME | End: 2024-07-30
Attending: NEUROLOGICAL SURGERY
Payer: COMMERCIAL

## 2024-07-30 DIAGNOSIS — I72.9 ANEURYSM: ICD-10-CM

## 2024-07-30 PROCEDURE — 70546 MR ANGIOGRAPH HEAD W/O&W/DYE: CPT | Mod: TC

## 2024-07-30 PROCEDURE — A9585 GADOBUTROL INJECTION: HCPCS | Performed by: NEUROLOGICAL SURGERY

## 2024-07-30 PROCEDURE — 25500020 PHARM REV CODE 255: Performed by: NEUROLOGICAL SURGERY

## 2024-07-30 PROCEDURE — 70546 MR ANGIOGRAPH HEAD W/O&W/DYE: CPT | Mod: 26,,, | Performed by: RADIOLOGY

## 2024-07-30 RX ORDER — GADOBUTROL 604.72 MG/ML
10 INJECTION INTRAVENOUS
Status: COMPLETED | OUTPATIENT
Start: 2024-07-30 | End: 2024-07-30

## 2024-07-30 RX ADMIN — GADOBUTROL 10 ML: 604.72 INJECTION INTRAVENOUS at 01:07

## 2024-08-06 ENCOUNTER — OFFICE VISIT (OUTPATIENT)
Dept: NEUROSURGERY | Facility: CLINIC | Age: 64
End: 2024-08-06
Payer: COMMERCIAL

## 2024-08-06 ENCOUNTER — PATIENT MESSAGE (OUTPATIENT)
Dept: NEUROSURGERY | Facility: CLINIC | Age: 64
End: 2024-08-06

## 2024-08-06 VITALS — HEART RATE: 82 BPM | SYSTOLIC BLOOD PRESSURE: 126 MMHG | DIASTOLIC BLOOD PRESSURE: 75 MMHG

## 2024-08-06 DIAGNOSIS — I72.9 ANEURYSM: Primary | ICD-10-CM

## 2024-08-06 PROCEDURE — 3078F DIAST BP <80 MM HG: CPT | Mod: CPTII,S$GLB,, | Performed by: NEUROLOGICAL SURGERY

## 2024-08-06 PROCEDURE — 99215 OFFICE O/P EST HI 40 MIN: CPT | Mod: S$GLB,,, | Performed by: NEUROLOGICAL SURGERY

## 2024-08-06 PROCEDURE — 3044F HG A1C LEVEL LT 7.0%: CPT | Mod: CPTII,S$GLB,, | Performed by: NEUROLOGICAL SURGERY

## 2024-08-06 PROCEDURE — 4010F ACE/ARB THERAPY RXD/TAKEN: CPT | Mod: CPTII,S$GLB,, | Performed by: NEUROLOGICAL SURGERY

## 2024-08-06 PROCEDURE — 3074F SYST BP LT 130 MM HG: CPT | Mod: CPTII,S$GLB,, | Performed by: NEUROLOGICAL SURGERY

## 2024-08-06 PROCEDURE — 1159F MED LIST DOCD IN RCRD: CPT | Mod: CPTII,S$GLB,, | Performed by: NEUROLOGICAL SURGERY

## 2024-08-06 PROCEDURE — 99999 PR PBB SHADOW E&M-EST. PATIENT-LVL III: CPT | Mod: PBBFAC,,, | Performed by: NEUROLOGICAL SURGERY

## 2024-08-06 RX ORDER — DIPHENHYDRAMINE HCL 50 MG
CAPSULE ORAL
Start: 2024-08-06

## 2024-08-06 RX ORDER — PREDNISONE 50 MG/1
TABLET ORAL
Qty: 3 TABLET | Refills: 0 | Status: SHIPPED | OUTPATIENT
Start: 2024-08-06

## 2024-08-06 NOTE — PROGRESS NOTES
Neurosurgery  Established Patient    Scribe Attestation  I, Eloisa Zhou, attest that this documentation has been prepared under the direction and in the presence of Vlad Green MD.    SUBJECTIVE:     History of Present Illness 12/04/2023  Lisandra Toussaint is a 62 year-old female with chronic conditions of anxiety, TMJ, HTN who was referred to me for evaluation of aneurysm. The patient reports in September of 2023 she experienced an onset of symptoms including left ear ringing, visual disturbances, and lightheadedness induced by bright light. Months following, she states her ear ringing did not resolve and progressively worsened. The patient reported 24 years ago she endured a concussion w/ LOC. Due to the history she was recommended to have a CT scan which discovered the aneurysm. She currently reports associated symptoms of eye pain, eye dryness, and hearing loss (L>R). The patient denies a history of smoking and rarely drinks alcohol, 1-2 glasses a year. 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      Interval History 4/16/2024  Lisandra Toussaint 64 y/o F that presents to me today for 4-6 week follow up for angiogram and other imaging. 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 Today she reports her eye/head pain that was previously an 8 has now reduced to a 2-3. She notes having ongoing issues with constant blurred vision since 11/2023 as well as episodes of dizziness/imbalance. Episodes occur once a day or once every other day. The pt shares April 5 the experienced an episode that led her to fall and hit her head.      Interval History 08/06/24  Patient returns to clinic for f/u after undergoing WEB with stent and coil augmentation for embolization of saccular DERIAN aneurysm on 06/20/24. Today she reports feeling overall well and better since her procedure. She notes having dizziness 3 weeks after her procedure, but it has since resolved.  Pt also reports having occasional word loss where mid-sentence she is unable to find a word she wants to convey. Occurs 1-2x/day. This is the extent of her complaints at this time. Endorses compliance with Plavix and ASA 81 mg.    Review of patient's allergies indicates:   Allergen Reactions    Cinnamon Anaphylaxis    Iodine and iodide containing products Other (See Comments)     Tears up skin    Macrodantin [nitrofurantoin macrocrystalline] Other (See Comments)     Knee and elbow swelling    Povidone-iodine      Other reaction(s): Blister    Sulfa (sulfonamide antibiotics) Hives       Current Outpatient Medications   Medication Sig Dispense Refill    acetaminophen (TYLENOL) 500 mg Cap Take 500 mg by mouth as needed.      amLODIPine (NORVASC) 10 MG tablet Take 1 tablet by mouth once daily 90 tablet 0    aspirin 81 MG Chew Take 1 tablet (81 mg total) by mouth once daily. 30 tablet 0    busPIRone (BUSPAR) 10 MG tablet Take 1 tablet (10 mg total) by mouth 2 (two) times daily. 180 tablet 3    cartilage/collagen/bor/hyalur (JOINT HEALTH ORAL) Take 1 tablet by mouth once daily.      clopidogreL (PLAVIX) 75 mg tablet Take 1 tablet (75 mg total) by mouth once daily. To take with aspirin 325 mg daily (otc) 30 tablet 11    cranberry extract (CRANBERRY CONCENTRATE) 500 mg Cap Take 1 tablet by mouth once daily.      DAILY MULTI-VITAMIN ORAL Take by mouth.      DULoxetine (CYMBALTA) 60 MG capsule Take 1 capsule (60 mg total) by mouth once daily. 90 capsule 3    ergocalciferol (ERGOCALCIFEROL) 50,000 unit Cap Take 1 capsule (50,000 Units total) by mouth every 7 days. 24 capsule 0    hydroCHLOROthiazide (MICROZIDE) 12.5 mg capsule Take 1 capsule by mouth once daily (Patient taking differently: Take by mouth daily as needed. Take 1 capsule by mouth once daily) 90 capsule 0    losartan (COZAAR) 50 MG tablet Take 1 tablet (50 mg total) by mouth 2 (two) times a day. 60 tablet 3    ranitidine (ZANTAC) 75 MG tablet Take 75 mg by mouth  every morning.      tiZANidine (ZANAFLEX) 4 MG tablet Take 1 tablet (4 mg total) by mouth 2 (two) times a day. 180 tablet 3     No current facility-administered medications for this visit.       Past Medical History:   Diagnosis Date    Aneurysm     Anxiety     HTN (hypertension)     TMJ (dislocation of temporomandibular joint)      Past Surgical History:   Procedure Laterality Date    ABDOMINAL SURGERY      CARPAL TUNNEL RELEASE      EYE SURGERY      FOOT SURGERY      tarsel tunnel       Family History       Problem Relation (Age of Onset)    Cancer Mother, Maternal Grandfather    Heart disease Maternal Grandmother    Hypertension Mother, Maternal Grandfather    Learning disabilities Son, Son    Stroke Maternal Grandfather          Social History     Socioeconomic History    Marital status: Single   Tobacco Use    Smoking status: Never    Smokeless tobacco: Never    Tobacco comments:     2nd hand smoke when it was not banned   Substance and Sexual Activity    Alcohol use: Not Currently     Comment: Couple times a year 1 drink - 3 times    Drug use: Never    Sexual activity: Not Currently     Partners: Male     Birth control/protection: Abstinence, Post-menopausal, None     Comment: partner have ED     Social Determinants of Health     Financial Resource Strain: Low Risk  (6/21/2024)    Overall Financial Resource Strain (CARDIA)     Difficulty of Paying Living Expenses: Not hard at all   Food Insecurity: No Food Insecurity (6/21/2024)    Hunger Vital Sign     Worried About Running Out of Food in the Last Year: Never true     Ran Out of Food in the Last Year: Never true   Transportation Needs: No Transportation Needs (6/21/2024)    TRANSPORTATION NEEDS     Transportation : No   Physical Activity: Inactive (6/21/2024)    Exercise Vital Sign     Days of Exercise per Week: 0 days     Minutes of Exercise per Session: 0 min   Stress: No Stress Concern Present (6/21/2024)    Citizen of [place] [employer] -  Occupational Stress Questionnaire     Feeling of Stress : Not at all   Housing Stability: Low Risk  (6/21/2024)    Housing Stability Vital Sign     Unable to Pay for Housing in the Last Year: No     Homeless in the Last Year: No       Review of Systems   Neurological:  Positive for speech difficulty (occasional word loss).   All other systems reviewed and are negative.    OBJECTIVE:     Vital Signs     There is no height or weight on file to calculate BMI.    Neurosurgery Physical Exam  General: no acute distress  Head: Non-traumatic, normocephalic  Eyes: Pupils equal, EOMI  Neck: Supple, normal ROM, no tenderness to palpation  CVS: Normal rate and rhythm, distal pulses present  Pulm: Symmetric expansion, no respiratory distress  GI: Abdomen nondistended, nontender    MSK: Moves all extremities without restriction, atraumatic  Skin: Dry, intact  Psych: Normal thought content and cognition    Neuro:  Alert, awake, oriented, to self, place, time  Language:  Speech is fluent, goal directed without any noted dysarthria or aphasia    Cranial nerves:    CNII-XII: Intact on fine exam,   Pupils equal round react to light,   Extraocular muscles are intact  V1 to V3 is intact to light touch,   no facial asymmetry,   Hearing is intact to finger rub and voice  tongue/uvula/palate midline,   shoulder shrug equal,     No pronator drift    Extremities:  Motor:  Upper Extremity    Deltoid Triceps Biceps Wrist  Extension Wrist  Flexion Interosseous   R 5/5 5/5 5/5 5/5 5/5 5/5   L 5/5 5/5 5/5 5/5 5/5 5/5       Thumb   Abduction Thumb  ADDuction Finger  Flexion Finger  Extension     R 5/5 5/5 5/5 5/5     L 5/5 5/5 5/5 5/5        Lower Extremity     Iliopsoas Quadriceps Hamstring Plantarflexion Dorsiflexion EHL   R 5/5 5/5 5/5 5/5 5/5 5/5   L 5/5 5/5 5/5 5/5 5/5 5/5       Reflexes:     DTR: 2+ biceps    2+ triceps   2+ brachioradialis   2+ patellar  2+ Achilles     Butler's: Negative     Babinski's: Negative     Clonus: Negative      Sensory:      Sensation intact to light touch, temperature sensation, vibration, pinprick     Coordination:      Coordination intact throughout, no dysmetria, normal rapid alternating movements, no dysdiadochokinesia  Cerebellar:  Normal finger-to-nose, normal heel-to-shin  Cervical spine:  No midline cervical tenderness, negative Lhermitte, negative Spurling  Thoracic spine:  No midline thoracic tenderness  Lumbar spine:  No midline lumbar tenderness     Diagnostic Results:  I personally reviewed the patient's Diagnostic Imaging.     MRA Brain W WO Cont 07/30/24  Status post web deployment, stenting, and coiling for an anterior communicating artery aneurysm.  Small areas of peripheral contrast enhancement about the treated aneurysm without evidence of contrast enhancement within the aneurysm sac.     Otherwise, patent anterior and posterior circulation without additional aneurysm or occlusion.    IR Angiogram Carotid Cerebral Bilateral 06/20/24  Successful WEB with stent and coil augmentation, embolization of saccular, anterior communicating artery aneurysm.  Inferior segment of intra saccular device was involuted and collapsed occluding the left A2 segment.  Successful balloon angioplasty followed by Y/T stenting was performed and coils were deployed into neck of the residual aneurysm.  There with no evidence of off target embolization, or flow-limiting stenosis.    ASSESSMENT/PLAN:   64 y/o F with 8 mm saccular aneurysm at junction of right A1 and bilateral A2 segments s/p WEB with stent and coil embolization (06/20/24). No smoking history. No family history of aneurysms.     Patient presents with occasional word loss that occurs 1-2x/day. No other focal neurological deficits.   Discussed imaging results of MRA Brain revealing s/p WEB deployment, stenting, and coiling for anterior communicating artery aneurysm, small areas of peripheral contrast enhancement about the treated aneurysm w/out evidence of contrast  enhancement within aneurysm sac.  Discussed imaging results of angiogram revealing successful WEB with stent and coil augmentation, embolization of saccular, anterior communicating artery aneurysm.  Inferior segment of intra saccular device was involuted and collapsed occluding the left A2 segment. Successful balloon angioplasty followed by Y/T stenting was performed and coils were deployed into neck of the residual aneurysm. There with no evidence of off target embolization, or flow-limiting stenosis.  After discussion with the patient, I recommend f/u with MRA Brain and MRI Brain in 3 months. After obtaining the results of these studies, I'd also like to order an angiogram. I have answered all of their questions and patient wish to proceed with this plan. We will schedule patient.       Thank you so very much for allowing me to participate in the care of this patient.  Please feel free to call any questions, comments, or concerns.     Vlad Green MD,MSc  Department of Neurosurgery   Department of Radiology  Department of Neurology  [facility]    [facility]   [employer] [facility] [employer] [facility]     Total time spent in counseling and discussion about further management options including relevant lab work, treatment,  prognosis, medications and intended side effects was more than 60 minutes. More than 50 % of the time was spent in counseling and coordination of care.  I spent a total of 60 minutes on the day of the visit.This includes face to face time and non-face to face time preparing to see the patient (eg, review of tests), Obtaining and/or reviewing separately obtained history, Documenting clinical information in the electronic or other health record, Independently interpreting resultsand communicating results to the patient/family/caregiver, or Care coordination.     Scribe Attestation  I,  Dr.Vernard Green personally performed the services described in this documentation. All medical record entries made by the scribe, Eloisa Zhou, were at my direction and in my presence.  I have reviewed the chart and agree that the record reflects my personal performance and is accurate and complete.

## 2024-08-20 ENCOUNTER — CLINICAL SUPPORT (OUTPATIENT)
Dept: ENDOSCOPY | Facility: HOSPITAL | Age: 64
End: 2024-08-20
Payer: COMMERCIAL

## 2024-08-20 ENCOUNTER — TELEPHONE (OUTPATIENT)
Dept: ENDOSCOPY | Facility: HOSPITAL | Age: 64
End: 2024-08-20

## 2024-08-20 ENCOUNTER — PATIENT MESSAGE (OUTPATIENT)
Dept: ENDOSCOPY | Facility: HOSPITAL | Age: 64
End: 2024-08-20

## 2024-08-20 DIAGNOSIS — Z12.11 SCREEN FOR COLON CANCER: ICD-10-CM

## 2024-08-20 NOTE — PLAN OF CARE
Spoke to pt. To schedule. Pt. Had Brain aneurism in late June with Neuro admission for arterial Stent placement and coil placement. Placed on Plavix. Follow up MRI of brain and Neuro vist in early November. New pAT made for sept. 8. Pt. Verbalized understanding to call back after Neuro advisement. EC

## 2024-09-12 ENCOUNTER — OFFICE VISIT (OUTPATIENT)
Dept: SLEEP MEDICINE | Facility: CLINIC | Age: 64
End: 2024-09-12
Attending: PSYCHIATRY & NEUROLOGY
Payer: COMMERCIAL

## 2024-09-12 DIAGNOSIS — R53.83 FATIGUE, UNSPECIFIED TYPE: ICD-10-CM

## 2024-09-12 DIAGNOSIS — R51.9 NONINTRACTABLE HEADACHE, UNSPECIFIED CHRONICITY PATTERN, UNSPECIFIED HEADACHE TYPE: ICD-10-CM

## 2024-09-12 DIAGNOSIS — R06.83 SNORING: ICD-10-CM

## 2024-09-12 DIAGNOSIS — G47.30 SLEEP APNEA, UNSPECIFIED TYPE: Primary | ICD-10-CM

## 2024-09-12 PROCEDURE — 1159F MED LIST DOCD IN RCRD: CPT | Mod: CPTII,95,, | Performed by: PSYCHIATRY & NEUROLOGY

## 2024-09-12 PROCEDURE — 1160F RVW MEDS BY RX/DR IN RCRD: CPT | Mod: CPTII,95,, | Performed by: PSYCHIATRY & NEUROLOGY

## 2024-09-12 PROCEDURE — 3044F HG A1C LEVEL LT 7.0%: CPT | Mod: CPTII,95,, | Performed by: PSYCHIATRY & NEUROLOGY

## 2024-09-12 PROCEDURE — 99204 OFFICE O/P NEW MOD 45 MIN: CPT | Mod: 95,,, | Performed by: PSYCHIATRY & NEUROLOGY

## 2024-09-12 PROCEDURE — 4010F ACE/ARB THERAPY RXD/TAKEN: CPT | Mod: CPTII,95,, | Performed by: PSYCHIATRY & NEUROLOGY

## 2024-09-12 NOTE — PATIENT INSTRUCTIONS
SLEEP LAB ([facility] or [facility]) will contact you to schedulethe sleep study. Their number is [phone] (ext [phone]). Please call them if you do not hear from them in 2 weeks from now.  The [facility] is located on 7th floor of the [facility]; [place] [facility] is located in [facility] ( 3rd floor [facility]).    SLEEP CLINIC (my assistant) will call you when the sleep study results are ready or I will message you through the portal with the results as we have discussed - if you have not heard from us by 2 weeks from the date of the study, or you can use My [facility] to contact me.    Our clinic phone number is [phone] (ext 1)       You are advised to abstain from driving should you feel sleepy or drowsy.  _____________            Sleep Hygiene Practices     1. Try going to bed only when you are drowsy.  ?   2. If you are unable to fall asleep or stay asleep, leave your bedroom and engage in a quiet activity elsewhere. Do not permit yourself to fall asleep outside the bedroom. Return to bed when and only when you are sleepy. Repeat this process as often as necessary throughout night.   3. Maintain regular wake-up time, even on days off work & weekends   4. Use your bedroom for sleep and sex   5. Do not watch TV in bed  6. Avoid napping during the daytime. If daytime sleepiness becomes overwhelming, limit nap time to a single nap of less than 1hr, no later than 3pm.   7. Distract your mind. Avoid clock watching. Lying in bed unable to sleep and frustrated needs to be avoided. Try reading or watching a videotape or listening to books on tape. It may be necessary to go into another room to do these.   8. Avoid caffeine within 4-6hrs of bedtime   9. Avoid use of nicotine close to bedtime   10. do not drink alcoholic beverages within 4-6hrs of bedtime   11. While a light snack before bedtime can help promote sound sleep, avoid large meals.   12. Obtain regular exercise, but avoid  strenuous exercise within 4hrs of bedtime   13. Minimize light, noise, and extremes in temperature in the bedroom.   14. Precautions: The patient was advised to abstain from driving should they feel sleepy or drowsy.

## 2024-09-12 NOTE — PROGRESS NOTES
The patient location is: home  The chief complaint leading to consultation is: sleep disorder  Visit type: audiovisual  Each patient to whom he or she provides medical services by telemedicine is:  (1) informed of the relationship between the physician and patient and the respective role of any other health care provider with respect to management of the patient; and (2) notified that he or she may decline to receive medical services by telemedicine and may withdraw from such care at any time.  31 minutes of total time spent on the encounter, which includes face to face time and non-face to face time preparing to see the patient (eg, review of tests), Obtaining and/or reviewing separately obtained history, Documenting clinical information in the electronic or other health record, Independently interpreting results (not separately reported) and communicating results to the patient/family/caregiver, or Care coordination (not separately reported).        Lisandra Toussaint is a 63 y.o. female is here to be evaluated for a sleep disorder; referred by Sandra Shetty NP.    The patient reports snoring, gasping for air, excessive daytime sleepiness, and excessive daytime fatigue since over 10 years ago.  Resent lorena had aneurysm surgery = nurse in ICU told her to get checked.     Lisandra Toussaint denied  choking  and witnessed apneas.    The patient does not feel rested upon awakening. Takes naps but usually no time    The patient  reports  morning headaches and reports  dry mouth on awakening.   Lisandra Toussaint denies  nasal congestion.    Earring  for TMJ      The patient  reports difficulty with falling and staying asleep.    Lisandra Toussaint  denies symptoms concerning for parasomnia except for occasional somniloquy.  The patient  denies auxiliary symptoms of narcolepsy including sleep onset paralysis, hypnagogic hallucinations, sleep attacks and cataplexy.    Lisandra Toussaint denied symptoms concerning for  RLS; nocturnal leg movements have not been noticed.   The patient does not experience sleep related leg cramps.           Medications pertinent to sleep  disorders taken currently: Tizanidine for cervicogenic factors - also helps her sleep. Taking 2-4 mg  Previous  medications taken  for sleep disorders:  -Melatonin regular and TR    Sleep studies  no          9/5/2024     7:25 PM   EPWORTH SLEEPINESS SCALE TOTAL SCORE    Total score 14             9/5/2024     7:25 PM   EPWORTH SLEEPINESS SCALE   Sitting and reading 3   Watching TV 2   Sitting, inactive in a public place (e.g. a theatre or a meeting) 2   As a passenger in a car for an hour without a break 2   Lying down to rest in the afternoon when circumstances permit 1   Sitting and talking to someone 1   Sitting quietly after a lunch without alcohol 3   In a car, while stopped for a few minutes in traffic 0   Total score 14           4/3/2024   PHQ-9 Depression Patient Health Questionnaire   Over the last two weeks how often have you been bothered by little interest or pleasure in doing things 0   Over the last two weeks how often have you been bothered by feeling down, depressed or hopeless 0          8/8/2023     2:12 PM   ALEXA-7   Was test performed?    1. Feeling nervous, anxious, or on edge?    2. Not being able to stop or control worrying?    3. Worrying too much about different things?    4. Trouble relaxing?    5. Being so restless that it is hard to sit still?    6. Becoming easily annoyed or irritable?    7. Feeling afraid as if something awful might happen?    8. If you checked off any problems, how difficult have these problems made it for you to do your work, take care of things at home, or get along with other people?    ALEXA-7 Score    Number answered (out of first 7)    Interpretation        Information is confidential and restricted. Go to Review Flowsheets to unlock data.           9/5/2024     7:25 PM   EPWORTH SLEEPINESS SCALE   Sitting and  reading 3   Watching TV 2   Sitting, inactive in a public place (e.g. a theatre or a meeting) 2   As a passenger in a car for an hour without a break 2   Lying down to rest in the afternoon when circumstances permit 1   Sitting and talking to someone 1   Sitting quietly after a lunch without alcohol 3   In a car, while stopped for a few minutes in traffic 0   Total score 14         9/5/2024     7:34 PM   Sleep Clinic New Patient   What time do you go to bed on a week day? (Give a range) 845 to 915   What time do you go to bed on a day off? (Give a range) 915 to 930   How long does it take you to fall asleep? (Give a range) 45 min to hr   On average, how many times per night do you wake up? 3   How long does it take you to fall back into sleep? (Give a range) 15min to 2 hrs   What time do you wake up to start your day on a week day? (Give a range) 630 to 730   What time do you wake up to start your day on a day off? (Give a range) 730 to 830   What time do you get out of bed? (Give a range) 630 to 830   On average, how many hours do you sleep? 7 to 8 hrs   On average, how many naps do you take per day? None I work full time and take care of boyfriend that is bed bound from massive stroke on 12.31.22   Rate your sleep quality from 0 to 5 (0-poor, 5-great). 2   Have you experienced:  N/a   How much weight have you lost or gained (in lbs.) in the last year? 0   On average, how many times per night do you go to the bathroom?  2 or 3   Have you ever had a sleep study/CPAP machine/surgery for sleep apnea? No   Have you ever had a CPAP machine for sleep apnea? No   Have you ever had surgery for sleep apnea? No           9/5/2024     7:34 PM   Sleep Clinic ROS    Difficulty breathing through the nose?  Yes   Sore throat or dry mouth in the morning? Yes   Irregular or very fast heart beat?  Yes   Shortness of breath?  Yes   Acid reflux? Yes   Body aches and pains?  Yes   Morning headaches? Yes   Dizziness? Sometimes   Mood  changes?  Sometimes   Do you exercise?  Sometimes   Do you feel like moving your legs a lot?  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    Essential hypertension 06/20/2024    Presence of arterial stent 06/21/2024    Cervical myofascial pain syndrome 07/08/2024     Resolved Ambulatory Problems     Diagnosis Date Noted    Chronic pain of right knee 03/15/2023     Past Medical History:   Diagnosis Date    Aneurysm     Anxiety     HTN (hypertension)     TMJ (dislocation of temporomandibular joint)                 PAST SURGICAL HISTORY:    Past Surgical History:   Procedure Laterality Date    ABDOMINAL SURGERY      CARPAL TUNNEL RELEASE      EYE SURGERY      FOOT SURGERY      tarsel tunnel           FAMILY HISTORY:                Family History   Problem Relation Name Age of Onset    Cancer Mother Giancarlo         Squamous Cell Skin Carcinoma on ear, aggressive went to lymph nodes facial structure 18mo later    Hypertension Mother Giancarlo     Cancer Maternal Grandfather Servin         Lung cancer    Hypertension Maternal Grandfather Servin     Stroke Maternal Grandfather Servin         Stroke at 80    Heart disease Maternal Grandmother Radha         Dementia and heart attack early 60s    Learning disabilities Son Rambo         Dyslexia    Learning disabilities Son Juma         Dyslexia       SOCIAL HISTORY:          Tobacco:   Social History     Tobacco Use   Smoking Status Never   Smokeless Tobacco Never   Tobacco Comments    2nd hand smoke when it was not banned       alcohol use:    Social History     Substance and Sexual Activity    Alcohol Use Not Currently    Comment: Couple times a year 1 drink - 3 times                   ALLERGIES:    Review of patient's allergies indicates:   Allergen Reactions    Cinnamon Anaphylaxis    Iodine and iodide containing products Other (See Comments)     Tears up skin    Macrodantin [nitrofurantoin macrocrystalline] Other (See Comments)     Knee and elbow swelling    Povidone-iodine      Other reaction(s): Blister    Sulfa (sulfonamide antibiotics) Hives       CURRENT MEDICATIONS:    Current Outpatient Medications   Medication Sig Dispense Refill    acetaminophen (TYLENOL) 500 mg Cap Take 500 mg by mouth as needed.      amLODIPine (NORVASC) 10 MG tablet Take 1 tablet by mouth once daily 90 tablet 0    aspirin 81 MG Chew Take 1 tablet (81 mg total) by mouth once daily. 30 tablet 0    busPIRone (BUSPAR) 10 MG tablet Take 1 tablet (10 mg total) by mouth 2 (two) times daily. 180 tablet 3    cartilage/collagen/bor/hyalur (JOINT HEALTH ORAL) Take 1 tablet by mouth once daily.      clopidogreL (PLAVIX) 75 mg tablet Take 1 tablet (75 mg total) by mouth once daily. To take with aspirin 325 mg daily (otc) 30 tablet 11    cranberry extract (CRANBERRY CONCENTRATE) 500 mg Cap Take 1 tablet by mouth once daily.      DAILY MULTI-VITAMIN ORAL Take by mouth.      diphenhydrAMINE (BENADRYL) 50 MG capsule Pt to take 50 mg benadryl at 1 hour before procedure  Pt to obtain over the counter      DULoxetine (CYMBALTA) 60 MG capsule Take 1 capsule (60 mg total) by mouth once daily. 90 capsule 3    ergocalciferol (ERGOCALCIFEROL) 50,000 unit Cap Take 1 capsule (50,000 Units total) by mouth every 7 days. 24 capsule 0    hydroCHLOROthiazide (MICROZIDE) 12.5 mg capsule Take 1 capsule by mouth once daily (Patient taking differently: Take by mouth daily as needed. Take 1 capsule by mouth once daily) 90 capsule 0    losartan (COZAAR) 50 MG tablet Take 1 tablet (50 mg total) by mouth 2 (two) times a day. 60 tablet 3    predniSONE  (DELTASONE) 50 MG Tab Pt to take 1 tab (50mg total) 13 hours, 7 hours, 1 hour before procedure in addition take over the counter benadryl 50 mg 1 hr prior to procedure 3 tablet 0    ranitidine (ZANTAC) 75 MG tablet Take 75 mg by mouth every morning.      tiZANidine (ZANAFLEX) 4 MG tablet Take 1 tablet (4 mg total) by mouth 2 (two) times a day. 180 tablet 3     No current facility-administered medications for this visit.                      PHYSICAL EXAM:  There were no vitals taken for this visit.  GENERAL: Normal development, well groomed.        ASSESSMENT:    1. Sleep Apnea NEC. The patient symptomatically has  snoring, choking , interrupted sleep, excessive daytime sleepiness, and excessive daytime fatigue  with exam findings of crowded airway and elevated BMI. The patient has medical co-morbidities of depression and hypertension, h/o brain aneurysm (managed surgically successfully)  which can be worsened by LENNIE. This warrants further investigation for possible obstructive sleep apnea.        2. Insomnia NEC. Multi-factorial -  excess time in bed, poor sleep hygiene, and likely paradoxical insomnia play a role        PLAN:    Diagnostic: Home Sleep Study. The nature of this procedure and its indication was discussed with the patient. We will notify the patient about sleep study resuts via My Chart.     Sleep hygiene was discussed in detail + brochure was provided.  OK to try transdermal melatonin.      Sleep Hygiene Practices     1. Try going to bed only when you are drowsy.  ?   2. If you are unable to fall asleep or stay asleep, leave your bedroom and engage in a quiet activity elsewhere. Do not permit yourself to fall asleep outside the bedroom. Return to bed when and only when you are sleepy. Repeat this process as often as necessary throughout night.   3. Maintain regular wake-up time, even on days off work & weekends   4. Use your bedroom for sleep and sex   5. Do not watch TV in bed  6. Avoid napping  during the daytime. If daytime sleepiness becomes overwhelming, limit nap time to a single nap of less than 1hr, no later than 3pm.   7. Distract your mind. Avoid clock watching. Lying in bed unable to sleep and frustrated needs to be avoided. Try reading or watching a videotape or listening to books on tape. It may be necessary to go into another room to do these.   8. Avoid caffeine within 4-6hrs of bedtime   9. Avoid use of nicotine close to bedtime   10. do not drink alcoholic beverages within 4-6hrs of bedtime   11. While a light snack before bedtime can help promote sound sleep, avoid large meals.   12. Obtain regular exercise, but avoid strenuous exercise within 4hrs of bedtime   13. Minimize light, noise, and extremes in temperature in the bedroom.   14. Precautions: The patient was advised to abstain from driving should they feel sleepy or drowsy.        During our discussion today, we talked about the etiology of  LENNIE as well as the potential ramifications of untreated sleep apnea, which could include daytime sleepiness, hypertension, heart disease and/or stroke.  We discussed potential treatment options, which could include weight loss, body positioning, continuous positive airway pressure (CPAP), or referral for surgical consideration. Meanwhile, she  is urged to avoid supine sleep, weight gain and alcoholic beverages since all of these can worsen LENNIE.     The patient was given open opportunity to ask questions and/or express concerns about treatment plan. Two point patient identifier confirmed.       Precautions: The patient was advised to abstain from driving should he feel sleepy or drowsy.    Follow up: MD after the sleep study has been completed.     ESS ([place] Sleepiness Scale) and other sleep medicine related questionnaires were reviewed with the patient.      46-minute visit. >50% spent counseling patient and coordination of care.  The patient was  cautioned against drowsy driving.

## 2024-09-23 ENCOUNTER — HOSPITAL ENCOUNTER (OUTPATIENT)
Dept: SLEEP MEDICINE | Facility: HOSPITAL | Age: 64
Discharge: HOME | End: 2024-09-23
Attending: PSYCHIATRY & NEUROLOGY
Payer: COMMERCIAL

## 2024-09-23 DIAGNOSIS — G47.33 OSA (OBSTRUCTIVE SLEEP APNEA): Primary | ICD-10-CM

## 2024-09-23 DIAGNOSIS — G47.30 SLEEP APNEA, UNSPECIFIED TYPE: ICD-10-CM

## 2024-09-23 PROCEDURE — 95800 SLP STDY UNATTENDED: CPT

## 2024-09-23 NOTE — PROGRESS NOTES
Per physician orders, patient was given home sleep testing device and instructed on how to apply the device before going to bed tonight. I sized the device and showed the patient using a mirror how the device fits and what it should look like so they can use a mirror when putting it on themselves at home. We reviewed the instruction booklet. Patient verbalized understanding of the instructions and teach back complete. Patient was instructed to return the device the next day between the hours of 5:00am and 9:00am in the drop box that is located to the left and you walk into the main lobby of the hospital. I also educated the patient on sleep apnea, treatments options for sleep apena, and what to expect after the home sleep study is complete.

## 2024-09-25 ENCOUNTER — PATIENT MESSAGE (OUTPATIENT)
Dept: SLEEP MEDICINE | Facility: CLINIC | Age: 64
End: 2024-09-25

## 2024-09-25 PROBLEM — G47.30 SLEEP APNEA: Status: ACTIVE | Noted: 2024-09-25

## 2024-09-25 PROBLEM — G47.33 OSA (OBSTRUCTIVE SLEEP APNEA): Status: ACTIVE | Noted: 2024-09-25

## 2024-09-25 NOTE — PROCEDURES
Patient Name Lisandra Toussaint Study Ordered By Hayley Harrington  Date of Night 1 09/23/2024 09:09:58 PM Date of Birth 1960  Identification Number [id number]  Overall AHI (4%)* Overall RDI % time < 90% Sp02 Mean Sp02 % time snoring > 30dB  11 36 0% 95.1% 21.5%  PHYSICIAN INTERPRETATION AND COMMENTS: Findings are consistent with moderate, obstructive sleep apnea (LENNIE)  (G47.33), by overall AHI (apnea hypopnea index). However, findings on this study suggest that the degree of sleep  disordered breathing is in the severe range, when RDI is measured. This study was technically adequate to allow for  interpretation.  CLINICAL HISTORY: 63 year old female presented with: 13.5 inch neck, BMI of 35.6, an [place]berg sleepiness score of 9, history  of hypertension, depression and symptoms of nocturnal snoring, waking up choking and witnessed apneas. Based on the  clinical history, the patient has a high pre-test probability of having Moderate LENNIE.  SLEEP STUDY FINDINGS: Patient underwent a 1 night Home Sleep Test and by behavioral criteria, slept for approximately  6.62 hours, with a sleep latency of 6 minutes and a sleep efficiency of 96%. Mild sleep disordered breathing (AHI=11) is  noted based on a 4% hypopnea desaturation criteria. The patient slept supine 42.2% of the night based on valid recording  time of 6.59 hours and is 1.8 times as likely to have apneas/hypopneas when supine. When considering more subtle  measures of sleep disordered breathing, the overall respiratory disturbance index is severe(RDI=36) based on a 1%  hypopnea desaturation criteria with confirmation by surrogate arousal indicators, predominantly in the supine position  (45 events/hour). The apneas/hypopneas are accompanied by minimal oxygen desaturation (percent time below 90%  SpO2: 0%, Min SpO2: 83.3%). The average desaturation across all sleep disordered breathing events is 2.3%. Snoring occurs  for 21.5% (30 dB) of the study, 14.7% is very  loud. The mean pulse rate is 69.9 BPM, with very frequent pulse rate variability  (67 events with >= 6 BPM increase/decrease per hour).  TREATMENT CONSIDERATIONS: Consider trial of Auto-titrating CPAP 6-20 cm, mask of patient's choice, and heated  humidification. If patient has difficulty with CPAP adherence or ongoing LENNIE symptoms or despite CPAP adherence, then  consider an in-lab titration sleep study in order to determine optimal fixed CPAP setting. Alternatively consider oral  appliance fitted by a dentist specializing in these devices, or surgical consultation for uvulopalatopharyngoplasty (UPPP)  for treatment of obstructive sleep apnea.  DISEASE MANAGEMENT CONSIDERATIONS: Definitive treatment for LENNIE is recommended. Consider Sleep Clinic referral for  LENNIE management.  Signature: Date: 09- 13:41:44 EST  Study Review: The raw data of this SERGE study has been reviewed, with the report confirmed and electronically signed by Hayley Harrington, Read  and interpreted by Hayley Harrington MD, Diplomate, Sleep Medicine, ABPN.. Caution: The diagnosis of the Obstructive Sleep Apnea Syndrome must

## 2024-09-26 ENCOUNTER — TELEPHONE (OUTPATIENT)
Dept: SLEEP MEDICINE | Facility: CLINIC | Age: 64
End: 2024-09-26
Payer: COMMERCIAL

## 2024-09-26 DIAGNOSIS — G47.33 OSA (OBSTRUCTIVE SLEEP APNEA): Primary | ICD-10-CM

## 2024-09-26 NOTE — TELEPHONE ENCOUNTER
Tomer Lopez,       I've ordered CPAP for Lisandra Breana    Please schedule CPAP follow ([facility] which is like Medicare)        Thank you!        60

## 2024-10-03 ENCOUNTER — PATIENT MESSAGE (OUTPATIENT)
Dept: PRIMARY CARE CLINIC | Facility: CLINIC | Age: 64
End: 2024-10-03
Payer: COMMERCIAL

## 2024-10-07 ENCOUNTER — PATIENT MESSAGE (OUTPATIENT)
Dept: PRIMARY CARE CLINIC | Facility: CLINIC | Age: 64
End: 2024-10-07
Payer: COMMERCIAL

## 2024-10-07 ENCOUNTER — PATIENT MESSAGE (OUTPATIENT)
Dept: SPORTS MEDICINE | Facility: CLINIC | Age: 64
End: 2024-10-07
Payer: COMMERCIAL

## 2024-10-07 DIAGNOSIS — T78.00XA ALLERGY WITH ANAPHYLAXIS DUE TO FOOD: Primary | ICD-10-CM

## 2024-10-08 RX ORDER — EPINEPHRINE 0.3 MG/.3ML
INJECTION SUBCUTANEOUS
Qty: 2 EACH | Refills: 1 | Status: SHIPPED | OUTPATIENT
Start: 2024-10-08

## 2024-10-11 ENCOUNTER — OFFICE VISIT (OUTPATIENT)
Dept: SPORTS MEDICINE | Facility: CLINIC | Age: 64
End: 2024-10-11
Payer: COMMERCIAL

## 2024-10-11 ENCOUNTER — HOSPITAL ENCOUNTER (OUTPATIENT)
Dept: RADIOLOGY | Facility: HOSPITAL | Age: 64
Discharge: HOME | End: 2024-10-11
Attending: PHYSICIAN ASSISTANT
Payer: COMMERCIAL

## 2024-10-11 VITALS
HEIGHT: 65 IN | SYSTOLIC BLOOD PRESSURE: 124 MMHG | WEIGHT: 211 LBS | HEART RATE: 80 BPM | DIASTOLIC BLOOD PRESSURE: 77 MMHG | BODY MASS INDEX: 35.16 KG/M2

## 2024-10-11 DIAGNOSIS — G89.29 CHRONIC PAIN OF BOTH KNEES: ICD-10-CM

## 2024-10-11 DIAGNOSIS — M17.12 OSTEOARTHRITIS OF LEFT KNEE, UNSPECIFIED OSTEOARTHRITIS TYPE: ICD-10-CM

## 2024-10-11 DIAGNOSIS — M17.11 OSTEOARTHRITIS OF RIGHT KNEE, UNSPECIFIED OSTEOARTHRITIS TYPE: ICD-10-CM

## 2024-10-11 DIAGNOSIS — M25.561 CHRONIC PAIN OF BOTH KNEES: ICD-10-CM

## 2024-10-11 DIAGNOSIS — M25.561 RIGHT KNEE PAIN, UNSPECIFIED CHRONICITY: ICD-10-CM

## 2024-10-11 DIAGNOSIS — I10 BENIGN ESSENTIAL HTN: ICD-10-CM

## 2024-10-11 DIAGNOSIS — F43.22 ADJUSTMENT DISORDER WITH ANXIETY: ICD-10-CM

## 2024-10-11 DIAGNOSIS — G89.29 CHRONIC PAIN OF LEFT KNEE: ICD-10-CM

## 2024-10-11 DIAGNOSIS — M25.562 CHRONIC PAIN OF LEFT KNEE: ICD-10-CM

## 2024-10-11 DIAGNOSIS — M25.561 CHRONIC PAIN OF RIGHT KNEE: Primary | ICD-10-CM

## 2024-10-11 DIAGNOSIS — M25.562 CHRONIC PAIN OF BOTH KNEES: ICD-10-CM

## 2024-10-11 DIAGNOSIS — G89.29 CHRONIC PAIN OF RIGHT KNEE: Primary | ICD-10-CM

## 2024-10-11 PROCEDURE — 99999 PR PBB SHADOW E&M-EST. PATIENT-LVL IV: CPT | Mod: PBBFAC,,, | Performed by: PHYSICIAN ASSISTANT

## 2024-10-11 PROCEDURE — 1159F MED LIST DOCD IN RCRD: CPT | Mod: CPTII,S$GLB,, | Performed by: PHYSICIAN ASSISTANT

## 2024-10-11 PROCEDURE — 99214 OFFICE O/P EST MOD 30 MIN: CPT | Mod: S$GLB,,, | Performed by: PHYSICIAN ASSISTANT

## 2024-10-11 PROCEDURE — 3078F DIAST BP <80 MM HG: CPT | Mod: CPTII,S$GLB,, | Performed by: PHYSICIAN ASSISTANT

## 2024-10-11 PROCEDURE — 4010F ACE/ARB THERAPY RXD/TAKEN: CPT | Mod: CPTII,S$GLB,, | Performed by: PHYSICIAN ASSISTANT

## 2024-10-11 PROCEDURE — 3044F HG A1C LEVEL LT 7.0%: CPT | Mod: CPTII,S$GLB,, | Performed by: PHYSICIAN ASSISTANT

## 2024-10-11 PROCEDURE — 1160F RVW MEDS BY RX/DR IN RCRD: CPT | Mod: CPTII,S$GLB,, | Performed by: PHYSICIAN ASSISTANT

## 2024-10-11 PROCEDURE — 73564 X-RAY EXAM KNEE 4 OR MORE: CPT | Mod: 26,,, | Performed by: RADIOLOGY

## 2024-10-11 PROCEDURE — 73564 X-RAY EXAM KNEE 4 OR MORE: CPT | Mod: TC,50

## 2024-10-11 PROCEDURE — 3074F SYST BP LT 130 MM HG: CPT | Mod: CPTII,S$GLB,, | Performed by: PHYSICIAN ASSISTANT

## 2024-10-11 PROCEDURE — 3008F BODY MASS INDEX DOCD: CPT | Mod: CPTII,S$GLB,, | Performed by: PHYSICIAN ASSISTANT

## 2024-10-11 RX ORDER — LOSARTAN POTASSIUM 50 MG/1
50 TABLET ORAL 2 TIMES DAILY
Qty: 60 TABLET | Refills: 3 | Status: SHIPPED | OUTPATIENT
Start: 2024-10-11

## 2024-10-11 NOTE — PROGRESS NOTES
CC: bilateral knee pain    63 y.o. Female who reports anterior and medial knee pain refractory to conservative mgmt.    She reports years of knee pain. Pain is constant and radiates up and down from her knees. Right knee pain > left knee. She has had her knees drained and injected over the years.   She reports being told she had a meniscus tear in her right knee in 2009.     She reports having to stop exercising due to a brain aneurysm which has been treated. Over the last few months she started exercising more and her knees began hurting her.   She has tried OTC ibuprofen and tylenol with ice compresses with no pain relief.     She reports that the pain is worse with steps.  It also bothers her at night.    Is affecting ADLs.     No mechanical symptoms, no instability    She is concerned about repeated CSI injection to her body and joints.     Previously:  She last had bilateral 40mg of kenalog intra-articular knee injections from me previously with great pain relief and then pain gradually returned. She now reports moderate right knee pain and mild left knee pain.     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       Review of Systems   Constitution: Negative. Negative for chills, fever and night sweats.   HENT: Negative for congestion and headaches.    Eyes: Negative for blurred vision, left vision loss and right vision loss.   Cardiovascular: Negative for chest pain and syncope.   Respiratory: Negative for cough and shortness of breath.    Endocrine: Negative for polydipsia, polyphagia and polyuria.   Hematologic/Lymphatic: Negative for bleeding problem. Does not bruise/bleed easily.   Skin: Negative for dry skin, itching and rash.   Musculoskeletal: Negative for falls and muscle weakness.   Gastrointestinal: Negative for abdominal pain and bowel incontinence.   Genitourinary: Negative for bladder incontinence and nocturia.   Neurological: Negative for  disturbances in coordination, loss of balance and seizures.   Psychiatric/Behavioral: Negative for depression. The patient does not have insomnia.    Allergic/Immunologic: Negative for hives and persistent infections.   All other systems negative      PAST MEDICAL HISTORY:   Past Medical History:   Diagnosis Date    Aneurysm     Anxiety     HTN (hypertension)     TMJ (dislocation of temporomandibular joint)      PAST SURGICAL HISTORY:   Past Surgical History:   Procedure Laterality Date    ABDOMINAL SURGERY      CARPAL TUNNEL RELEASE      EYE SURGERY      FOOT SURGERY      tarsel tunnel       FAMILY HISTORY:   Family History   Problem Relation Name Age of Onset    Cancer Mother Giancarlo         Squamous Cell Skin Carcinoma on ear, aggressive went to lymph nodes facial structure 18mo later    Hypertension Mother Giancarlo     Learning disabilities Son Rambo         Dyslexia    Learning disabilities Son Juma         Dyslexia    Heart disease Maternal Grandmother Radha         Dementia and heart attack early 60s    Cancer Maternal Grandfather Servin         Lung cancer    Hypertension Maternal Grandfather Servin     Stroke Maternal Grandfather Servin         Stroke at 80     SOCIAL HISTORY:   Social History     Socioeconomic History    Marital status: Single   Tobacco Use    Smoking status: Never    Smokeless tobacco: Never    Tobacco comments:     2nd hand smoke when it was not banned   Substance and Sexual Activity    Alcohol use: Not Currently     Comment: Couple times a year 1 drink - 3 times    Drug use: Never    Sexual activity: Not Currently     Partners: Male     Birth control/protection: Abstinence, Post-menopausal, None     Comment: partner have ED     Social Drivers of Health     Financial Resource Strain: Low Risk  (6/21/2024)    Overall Financial Resource Strain (CARDIA)     Difficulty of Paying Living Expenses: Not hard at all   Food Insecurity: No Food Insecurity (6/21/2024)    Hunger Vital Sign      Worried About Running Out of Food in the Last Year: Never true     Ran Out of Food in the Last Year: Never true   Transportation Needs: No Transportation Needs (6/21/2024)    TRANSPORTATION NEEDS     Transportation : No   Physical Activity: Inactive (6/21/2024)    Exercise Vital Sign     Days of Exercise per Week: 0 days     Minutes of Exercise per Session: 0 min   Stress: No Stress Concern Present (6/21/2024)    [employer] [place] [employer] - Occupational Stress Questionnaire     Feeling of Stress : Not at all   Housing Stability: Low Risk  (6/21/2024)    Housing Stability Vital Sign     Unable to Pay for Housing in the Last Year: No     Homeless in the Last Year: No       MEDICATIONS:   Current Outpatient Medications:     amLODIPine (NORVASC) 10 MG tablet, Take 1 tablet by mouth once daily, Disp: 90 tablet, Rfl: 0    busPIRone (BUSPAR) 10 MG tablet, Take 1 tablet (10 mg total) by mouth 2 (two) times daily., Disp: 180 tablet, Rfl: 3    cartilage/collagen/bor/hyalur (JOINT HEALTH ORAL), Take 1 tablet by mouth once daily., Disp: , Rfl:     clopidogreL (PLAVIX) 75 mg tablet, Take 1 tablet (75 mg total) by mouth once daily. To take with aspirin 325 mg daily (otc), Disp: 30 tablet, Rfl: 11    cranberry extract (CRANBERRY CONCENTRATE) 500 mg Cap, Take 1 tablet by mouth once daily., Disp: , Rfl:     DAILY MULTI-VITAMIN ORAL, Take by mouth., Disp: , Rfl:     diphenhydrAMINE (BENADRYL) 50 MG capsule, Pt to take 50 mg benadryl at 1 hour before procedure Pt to obtain over the counter, Disp: , Rfl:     DULoxetine (CYMBALTA) 60 MG capsule, Take 1 capsule (60 mg total) by mouth once daily., Disp: 90 capsule, Rfl: 3    EPINEPHrine (EPIPEN) 0.3 mg/0.3 mL AtIn, Inject one syringe at first symptoms, may repeat x 1 within 5-15 minutes, Disp: 2 each, Rfl: 1    ergocalciferol (ERGOCALCIFEROL) 50,000 unit Cap, Take 1 capsule (50,000 Units total) by mouth every 7 days., Disp: 24 capsule, Rfl: 0    hydroCHLOROthiazide  "(MICROZIDE) 12.5 mg capsule, Take 1 capsule by mouth once daily (Patient taking differently: Take by mouth daily as needed. Take 1 capsule by mouth once daily), Disp: 90 capsule, Rfl: 0    losartan (COZAAR) 50 MG tablet, Take 1 tablet (50 mg total) by mouth 2 (two) times a day., Disp: 60 tablet, Rfl: 3    predniSONE (DELTASONE) 50 MG Tab, Pt to take 1 tab (50mg total) 13 hours, 7 hours, 1 hour before procedure in addition take over the counter benadryl 50 mg 1 hr prior to procedure, Disp: 3 tablet, Rfl: 0    ranitidine (ZANTAC) 75 MG tablet, Take 75 mg by mouth every morning., Disp: , Rfl:     tiZANidine (ZANAFLEX) 4 MG tablet, Take 1 tablet (4 mg total) by mouth 2 (two) times a day., Disp: 180 tablet, Rfl: 3    aspirin 81 MG Chew, Take 1 tablet (81 mg total) by mouth once daily., Disp: 30 tablet, Rfl: 0  ALLERGIES:   Review of patient's allergies indicates:   Allergen Reactions    Cinnamon Anaphylaxis    Iodine and iodide containing products Other (See Comments)     Tears up skin    Macrodantin [nitrofurantoin macrocrystalline] Other (See Comments)     Knee and elbow swelling    Povidone-iodine      Other reaction(s): Blister    Sulfa (sulfonamide antibiotics) Hives       VITAL SIGNS: /77   Pulse 80   Ht 5' 5" (1.651 m)   Wt 95.7 kg (210 lb 15.7 oz)   BMI 35.11 kg/m²      PHYSICAL EXAMINATION    General:  The patient is alert and oriented x 3.  Mood is pleasant.  Observation of ears, eyes and nose reveal no gross abnormalities.  HEENT: NCAT, sclera nonicteric  Lungs: Respirations are equal and unlabored.    bilateral  KNEE EXAMINATION     OBSERVATION / INSPECTION   Gait:   Nonantalgic   Alignment:  Neutral   Scars:   None   Muscle atrophy: Mild  Effusion:  None   Warmth:  None   Discoloration:   none     TENDERNESS / CREPITUS (T / C):          T / C      T / C   Patella   - / -   Lateral joint line   -/ -      Peripatellar medial  + bilateral  Medial joint line    + bilateral / -   Peripatellar " lateral -  Medial plica   - / -   Patellar tendon -  Popliteal fossa  - / -   Quad tendon   -   Gastrocnemius   -   Prepatellar Bursa - / -   Quadricep   -   Tibial tubercle  -  Thigh/hamstring  -   Pes anserine/HS -  Fibula    -   ITB   - / -  Tibia     -   Tib/fib joint  - / -  LCL    -     MFC   - / -   MCL: Proximal  -    LFC   - / -    Distal   -          ROM: (* = pain)  PASSIVE   ACTIVE    Left :   0 / 0 / 130   0 / 0 / 120   Right :    0 / 0 / 130   0 / 0 / 120    Patellofemoral examination:  See above noted areas of tenderness.   Patella position    Subluxation / dislocation: Centered           Sup. / Inf;   Normal   Crepitus (PF):    Absent   Patellar Mobility:       Medial-lateral:   Normal    Superior-inferior:  Normal    Inferior tilt   Normal    Patellar tendon:  Normal   Lateral tilt:    Normal   J-sign:     None   Patellofemoral grind:   + bilateral      MENISCAL SIGNS:     Pain on terminal extension:  -  Pain on terminal flexion:  + bilateral  Jesikas maneuver:  negative  Squat     NT    LIGAMENT EXAMINATION:  ACL / Lachman:  normal (-1 to 2mm)    PCL-Post.  drawer: normal 0 to 2mm  MCL- Valgus:  normal 0 to 2mm  LCL- Varus:  normal 0 to 2mm  Pivot shift: normal (Equal)   Dial Test: difference c/w other side   At 30° flexion: normal (< 5°)    At 90° flexion: normal (< 5°)       STRENGTH: (* = with pain) PAINFUL SIDE   Quadricep   5/5   Hamstrin/5    EXTREMITY NEURO-VASCULAR EXAMINATION:   Sensation:  Grossly intact to light touch all dermatomal regions.   Motor Function:  Fully intact motor function at hip, knee, foot and ankle    DTRs;  quadriceps and  achilles 2+.  No clonus and downgoing Babinski.    Vascular status:  DP and PT pulses 2+, brisk capillary refill, symmetric.     Other Findings:       Xrays:  Xrays of the bilateral knees with flexion were ordered and reviewed by me today. No fracture, subluxation, Bilateral knee DJD noted mostly to anterior and medial compartments to  moderate degree on right knee and mild on left knee. Equivalent medial joint space narrowing noted bilaterally.  Kellgren Jeremy 2 or greater noted.       ASSESSMENT:    1. bilateral Knee pain, chronic  2. Bilateral knee osteoarthritis   Bilateral hip abd/core weakness    PLAN:    She wishes to hold off on csi knee injections at this time.   Euflexxa injections discussed today which I recommended   Prior auth placed.      Ice compresses prn pain  RTC  for injections.      All questions were answered, pt will contact us for questions or concerns in the interim.    I made the decision to obtain old records of the patient including previous notes and imaging. New imaging was ordered today of the extremity or extremities evaluated. I independently reviewed and interpreted the radiographs and/or MRIs today as well as prior imaging.           Medical Necessary Criteria for Requested Treatment    This patient has radiologic confirmation of Kellgren-Jeremy Scale score of grade 2 or greater on most recent imaging for the affected knee.        This patient has had inadequate response to TWO or more of the following conservative nonpharmacologic therapy which is signified with an [X]:        _X__: Cardiovascular (aerobic) activity, such as: walking, biking, stationary bike, aquatic exercise      ___: Resistance exercise      _X__: Weight reduction (for persons who are overweight)      ___: Participation in self-management programs      ___: Wear of medially directed patellar taping      ___: Wear of wedged insoles      _X__: Thermal agents      ___: Walking aids      ___: Physical therapy      ___: Occupational therapy                  Inadequate response, intolerance, or contraindication to TWO or more of the following which is signified with an [X]:        __X_: Acetaminophen      _X__: Oral NSAIDs      ___: Topical NSAIDs            Inadequate response, intolerance, or contraindication to intra-articular      steroid  injections in which efficacy lasted less than 8 weeks.

## 2024-10-16 RX ORDER — AMLODIPINE BESYLATE 10 MG/1
10 TABLET ORAL
Qty: 90 TABLET | Refills: 0 | Status: SHIPPED | OUTPATIENT
Start: 2024-10-16

## 2024-10-22 ENCOUNTER — PATIENT MESSAGE (OUTPATIENT)
Dept: SLEEP MEDICINE | Facility: CLINIC | Age: 64
End: 2024-10-22
Payer: COMMERCIAL

## 2024-10-25 ENCOUNTER — OFFICE VISIT (OUTPATIENT)
Dept: SPORTS MEDICINE | Facility: CLINIC | Age: 64
End: 2024-10-25
Payer: COMMERCIAL

## 2024-10-25 VITALS
HEART RATE: 80 BPM | HEIGHT: 65 IN | WEIGHT: 210 LBS | SYSTOLIC BLOOD PRESSURE: 137 MMHG | DIASTOLIC BLOOD PRESSURE: 76 MMHG | BODY MASS INDEX: 34.99 KG/M2

## 2024-10-25 DIAGNOSIS — M25.561 CHRONIC PAIN OF RIGHT KNEE: ICD-10-CM

## 2024-10-25 DIAGNOSIS — M25.562 CHRONIC PAIN OF LEFT KNEE: Primary | ICD-10-CM

## 2024-10-25 DIAGNOSIS — G89.29 CHRONIC PAIN OF LEFT KNEE: Primary | ICD-10-CM

## 2024-10-25 DIAGNOSIS — G89.29 CHRONIC PAIN OF RIGHT KNEE: ICD-10-CM

## 2024-10-25 DIAGNOSIS — M17.12 OSTEOARTHRITIS OF LEFT KNEE, UNSPECIFIED OSTEOARTHRITIS TYPE: ICD-10-CM

## 2024-10-25 DIAGNOSIS — M17.11 OSTEOARTHRITIS OF RIGHT KNEE, UNSPECIFIED OSTEOARTHRITIS TYPE: ICD-10-CM

## 2024-10-25 PROCEDURE — 99999 PR PBB SHADOW E&M-EST. PATIENT-LVL IV: CPT | Mod: PBBFAC,,, | Performed by: PHYSICIAN ASSISTANT

## 2024-10-25 NOTE — PROGRESS NOTES
Patient is here for follow up of Bilateral knee arthritis. Pt is requesting Euflexxa injection #1.  PMFH reviewed per encounter record. Has failed other conservative modalities including NSAIDS, activity modification, weight loss.    She has not received these injections in the past.     The prior shots were tolerated well.    PHYSICAL EXAMINATION:     General: The patient is alert and oriented x 3. Mood is pleasant.   Observation of ears, eyes and nose reveals no gross abnormalities. No   labored breathing observed.     No signs of infection or adverse reaction to knee.        Euflexxa Injection Procedure #1 Bilateral     A time out was performed, including verification of patient ID, procedure, site and side, availability of information and equipment, review of safety issues, and agreement with consent, the procedure site was marked.    The patient was prepped aseptically with povidone-iodine swabsticks. A diagnostic and therapeutic injection of 2cc Euflexxa was given under sterile technique using a 21.5g x 1.5 needle from the anterolateral aspect of the bilateral knee Joints with the patient in the seated position.     Lisandra Toussaint had no adverse reactions to the medication. Pain decreased. female was instructed to apply ice to the joint for 20 minutes and avoid strenuous activities for 24-36 hours following the injection. female was warned of possible blood pressure changes during that time. Following that time, female can resume activities as prior to the injection.    female was reminded to call the clinic immediately for any adverse side effects as explained in clinic today.    RTC in 1 week for injection #2  All patients questions were answered. Patient was advised to call us with any concerns or questions.

## 2024-11-04 ENCOUNTER — PATIENT MESSAGE (OUTPATIENT)
Dept: SLEEP MEDICINE | Facility: CLINIC | Age: 64
End: 2024-11-04
Payer: COMMERCIAL

## 2024-11-06 ENCOUNTER — HOSPITAL ENCOUNTER (OUTPATIENT)
Dept: RADIOLOGY | Facility: HOSPITAL | Age: 64
Discharge: HOME | End: 2024-11-06
Attending: NEUROLOGICAL SURGERY
Payer: COMMERCIAL

## 2024-11-06 DIAGNOSIS — I72.9 ANEURYSM: ICD-10-CM

## 2024-11-06 PROCEDURE — A9585 GADOBUTROL INJECTION: HCPCS | Performed by: NEUROLOGICAL SURGERY

## 2024-11-06 PROCEDURE — 70546 MR ANGIOGRAPH HEAD W/O&W/DYE: CPT | Mod: TC

## 2024-11-06 PROCEDURE — 25500020 PHARM REV CODE 255: Performed by: NEUROLOGICAL SURGERY

## 2024-11-06 PROCEDURE — 70553 MRI BRAIN STEM W/O & W/DYE: CPT | Mod: TC

## 2024-11-06 RX ORDER — GADOBUTROL 604.72 MG/ML
10 INJECTION INTRAVENOUS
Status: COMPLETED | OUTPATIENT
Start: 2024-11-06 | End: 2024-11-06

## 2024-11-06 RX ADMIN — GADOBUTROL 10 ML: 604.72 INJECTION INTRAVENOUS at 04:11

## 2024-11-07 ENCOUNTER — TELEPHONE (OUTPATIENT)
Dept: SLEEP MEDICINE | Facility: CLINIC | Age: 64
End: 2024-11-07
Payer: COMMERCIAL

## 2024-11-07 ENCOUNTER — PATIENT MESSAGE (OUTPATIENT)
Dept: NEUROSURGERY | Facility: CLINIC | Age: 64
End: 2024-11-07
Payer: COMMERCIAL

## 2024-11-07 NOTE — TELEPHONE ENCOUNTER
Bonnie Rose to [facility]    11/6/24  9:29 AM  Dr. Harrington,    The pt will need to have a f2f with you asap to have documented intolerance to the APAP and transition to BIPAP. She was set up on APAP on 10/16/24. The f2f, Rx and transition would need happen no later than 01/15/25. If this is not possible then the pt would have to be sent for a titration.  November 5, 2024  Me to Lisandra Toussaint         11/5/24  6:47 PM        Dear Ms. Toussaint         Yes, I will check with our DME department if you can change to BPAP right away, or if insurance will first require a BPAP  study.           Sincerely,     Hayley Harrington MD

## 2024-11-08 ENCOUNTER — OFFICE VISIT (OUTPATIENT)
Dept: SPORTS MEDICINE | Facility: CLINIC | Age: 64
End: 2024-11-08
Payer: COMMERCIAL

## 2024-11-08 VITALS
HEART RATE: 80 BPM | BODY MASS INDEX: 35.02 KG/M2 | DIASTOLIC BLOOD PRESSURE: 74 MMHG | HEIGHT: 65 IN | WEIGHT: 210.19 LBS | SYSTOLIC BLOOD PRESSURE: 120 MMHG

## 2024-11-08 DIAGNOSIS — G89.29 CHRONIC PAIN OF RIGHT KNEE: ICD-10-CM

## 2024-11-08 DIAGNOSIS — M17.12 OSTEOARTHRITIS OF LEFT KNEE, UNSPECIFIED OSTEOARTHRITIS TYPE: ICD-10-CM

## 2024-11-08 DIAGNOSIS — M25.561 CHRONIC PAIN OF RIGHT KNEE: ICD-10-CM

## 2024-11-08 DIAGNOSIS — M25.562 CHRONIC PAIN OF LEFT KNEE: Primary | ICD-10-CM

## 2024-11-08 DIAGNOSIS — M17.11 OSTEOARTHRITIS OF RIGHT KNEE, UNSPECIFIED OSTEOARTHRITIS TYPE: ICD-10-CM

## 2024-11-08 DIAGNOSIS — G89.29 CHRONIC PAIN OF LEFT KNEE: Primary | ICD-10-CM

## 2024-11-08 PROCEDURE — 99999 PR PBB SHADOW E&M-EST. PATIENT-LVL IV: CPT | Mod: PBBFAC,,, | Performed by: PHYSICIAN ASSISTANT

## 2024-11-08 NOTE — PROGRESS NOTES
Patient is here for follow up of Bilateral knee arthritis. Pt is requesting Euflexxa injection #2.  PMFH reviewed per encounter record. Has failed other conservative modalities including NSAIDS, activity modification, weight loss.    She has not received these injections in the past.     The prior shots were tolerated well.    PHYSICAL EXAMINATION:     General: The patient is alert and oriented x 3. Mood is pleasant.   Observation of ears, eyes and nose reveals no gross abnormalities. No   labored breathing observed.     No signs of infection or adverse reaction to knee.        Euflexxa Injection Procedure #2 Bilateral     A time out was performed, including verification of patient ID, procedure, site and side, availability of information and equipment, review of safety issues, and agreement with consent, the procedure site was marked.    The patient was prepped aseptically with povidone-iodine swabsticks. A diagnostic and therapeutic injection of 2cc Euflexxa was given under sterile technique using a 21.5g x 1.5 needle from the anterolateral aspect of the bilateral knee Joints with the patient in the seated position.     Lisandra Toussaint had no adverse reactions to the medication. Pain decreased. female was instructed to apply ice to the joint for 20 minutes and avoid strenuous activities for 24-36 hours following the injection. female was warned of possible blood pressure changes during that time. Following that time, female can resume activities as prior to the injection.    female was reminded to call the clinic immediately for any adverse side effects as explained in clinic today.    RTC in 1 week for injection #3  All patients questions were answered. Patient was advised to call us with any concerns or questions.

## 2024-11-15 ENCOUNTER — PATIENT MESSAGE (OUTPATIENT)
Dept: SLEEP MEDICINE | Facility: CLINIC | Age: 64
End: 2024-11-15
Payer: COMMERCIAL

## 2024-11-15 ENCOUNTER — HOSPITAL ENCOUNTER (OUTPATIENT)
Dept: RADIOLOGY | Facility: HOSPITAL | Age: 64
Discharge: HOME | End: 2024-11-15
Attending: NURSE PRACTITIONER
Payer: COMMERCIAL

## 2024-11-15 VITALS — WEIGHT: 210 LBS | HEIGHT: 65 IN | BODY MASS INDEX: 34.99 KG/M2

## 2024-11-15 DIAGNOSIS — Z12.31 ENCOUNTER FOR SCREENING MAMMOGRAM FOR BREAST CANCER: ICD-10-CM

## 2024-11-15 DIAGNOSIS — G47.33 OSA (OBSTRUCTIVE SLEEP APNEA): Primary | ICD-10-CM

## 2024-11-15 PROCEDURE — 77067 SCR MAMMO BI INCL CAD: CPT | Mod: TC

## 2024-11-15 PROCEDURE — 77067 SCR MAMMO BI INCL CAD: CPT | Mod: 26,,, | Performed by: RADIOLOGY

## 2024-11-15 PROCEDURE — 77063 BREAST TOMOSYNTHESIS BI: CPT | Mod: 26,,, | Performed by: RADIOLOGY

## 2024-11-25 ENCOUNTER — OFFICE VISIT (OUTPATIENT)
Dept: SPORTS MEDICINE | Facility: CLINIC | Age: 64
End: 2024-11-25
Payer: COMMERCIAL

## 2024-11-25 VITALS — WEIGHT: 207.25 LBS | BODY MASS INDEX: 34.49 KG/M2

## 2024-11-25 DIAGNOSIS — G89.29 CHRONIC PAIN OF LEFT KNEE: ICD-10-CM

## 2024-11-25 DIAGNOSIS — M17.11 OSTEOARTHRITIS OF RIGHT KNEE, UNSPECIFIED OSTEOARTHRITIS TYPE: ICD-10-CM

## 2024-11-25 DIAGNOSIS — G89.29 CHRONIC PAIN OF RIGHT KNEE: ICD-10-CM

## 2024-11-25 DIAGNOSIS — M25.562 CHRONIC PAIN OF LEFT KNEE: ICD-10-CM

## 2024-11-25 DIAGNOSIS — M17.12 OSTEOARTHRITIS OF LEFT KNEE, UNSPECIFIED OSTEOARTHRITIS TYPE: Primary | ICD-10-CM

## 2024-11-25 DIAGNOSIS — M25.561 CHRONIC PAIN OF RIGHT KNEE: ICD-10-CM

## 2024-11-25 PROCEDURE — 99999 PR PBB SHADOW E&M-EST. PATIENT-LVL III: CPT | Mod: PBBFAC,,, | Performed by: PHYSICIAN ASSISTANT

## 2024-11-26 ENCOUNTER — TELEPHONE (OUTPATIENT)
Dept: SLEEP MEDICINE | Facility: CLINIC | Age: 64
End: 2024-11-26
Payer: COMMERCIAL

## 2024-11-26 ENCOUNTER — PATIENT MESSAGE (OUTPATIENT)
Dept: SLEEP MEDICINE | Facility: CLINIC | Age: 64
End: 2024-11-26
Payer: COMMERCIAL

## 2024-11-26 NOTE — TELEPHONE ENCOUNTER
Patient was scheduled for 12/26/2024 at 2:00 , patient has been moved   to 02/20/2025 at 11:30 due to the provider being out/lvm/letter

## 2024-11-26 NOTE — PROGRESS NOTES
Patient is here for follow up of Bilateral knee arthritis. Pt is requesting Euflexxa injection #3.  PMFH reviewed per encounter record. Has failed other conservative modalities including NSAIDS, activity modification, weight loss.    She has not received these injections in the past.     The prior shots were tolerated well.    PHYSICAL EXAMINATION:     General: The patient is alert and oriented x 3. Mood is pleasant.   Observation of ears, eyes and nose reveals no gross abnormalities. No   labored breathing observed.     No signs of infection or adverse reaction to knee.        Euflexxa Injection Procedure #3 Bilateral     A time out was performed, including verification of patient ID, procedure, site and side, availability of information and equipment, review of safety issues, and agreement with consent, the procedure site was marked.    The patient was prepped aseptically with povidone-iodine swabsticks. A diagnostic and therapeutic injection of 2cc Euflexxa was given under sterile technique using a 21.5g x 1.5 needle from the anterolateral aspect of the bilateral knee Joints with the patient in the seated position.     Lisandra Toussaint had no adverse reactions to the medication. Pain decreased. female was instructed to apply ice to the joint for 20 minutes and avoid strenuous activities for 24-36 hours following the injection. female was warned of possible blood pressure changes during that time. Following that time, female can resume activities as prior to the injection.    female was reminded to call the clinic immediately for any adverse side effects as explained in clinic today.    RTC prn in 7-8 weeks if no knee pain relief.   All patients questions were answered. Patient was advised to call us with any concerns or questions.

## 2024-11-29 ENCOUNTER — TELEPHONE (OUTPATIENT)
Dept: SLEEP MEDICINE | Facility: CLINIC | Age: 64
End: 2024-11-29
Payer: COMMERCIAL

## 2024-11-29 DIAGNOSIS — G47.33 OSA (OBSTRUCTIVE SLEEP APNEA): Primary | ICD-10-CM

## 2024-11-29 NOTE — TELEPHONE ENCOUNTER
November 29, 2024  Bonnie Rose to Me   SS    11/29/24 10:43 AM  I'll have to get a new auth from the insurance. I'll notify the pt.  Me to Lisandra LandaBreana         11/29/24  9:30 AM        Dear Ms. Toussaint         It looks like your aerophagia symptoms are really severe, and pressure adjustments did not work for you.  I would suggest putting your CPAP on hold for now.  Yes, the machine that you have sent me is the BPAP Resmed - I will check with our durable medical equipment department if you can swap your current machine for that  BPAP machine.     But again, there is no guarantee that your aerophagia will completely resolve on BPAP.     Other  definitive treatment options include OA (oral appliance to wear at night) for LENNIE and Inspire device (which is a surgical treatment option)        Sincerely,     Hayley Harrington MD    This [employer] message has not been read.  Me to Bonnie Rose       11/29/24  9:26 AM  Abel Nicole,    Can Lisandra Toussaint's CPAP be upgraded for BPAP?  She is experiencing severe aerophagia.  I have placed the order.    Thank you!    L.  November 27, 2024  Lisandra Breana to Me   LW      11/27/24  8:00 PM  I have been doing all those things since you suggested them a couple months ago. I even tried sitting up to sleep a few nights.   If I go longer than a couple hrs. I end up with diarrhea after 3 days.  I have been on the Noom diet since end of August to lose weight to help my knees. I have lost 16 pounds but the stomach and chest issues are causing me problems with reading my body signals accurately.  When I stop using the cpap, the nausea and diarrhea go away. I just finished taking the Euflexxa shots in the knees every 2 weeks to try to help my knees.  I'll try until the end of yr, then if no progress, I'll turn it back in.  I am desperate to try anything that I can to make a difference.   Me to Lisandra Toussaint         11/27/24  7:23 PM        Dear Ms. Toussaint             Have you tried lifting the head of the bed up? That sometimes helps.  Are you taking Gas-x?     It looks like  your body is built that way, that it does not tolerate high PAP pressures all that well - instead of going 100 % to the lungs, part of the air diverts into your esophagus and ends up in your stomach unfortunately.     It is possible, unfortunately, that aerophagia will not improve, despite trying different machine and different masks, so I would not recommend you taking any financial risk.     Please try to keep increasing head of the bed - I will further decrease your top pressure from 10 to 9.

## 2024-12-02 ENCOUNTER — PATIENT MESSAGE (OUTPATIENT)
Dept: SPORTS MEDICINE | Facility: CLINIC | Age: 64
End: 2024-12-02
Payer: COMMERCIAL

## 2024-12-02 ENCOUNTER — PATIENT MESSAGE (OUTPATIENT)
Dept: SLEEP MEDICINE | Facility: CLINIC | Age: 64
End: 2024-12-02
Payer: COMMERCIAL

## 2024-12-12 ENCOUNTER — TELEPHONE (OUTPATIENT)
Dept: SLEEP MEDICINE | Facility: CLINIC | Age: 64
End: 2024-12-12
Payer: COMMERCIAL

## 2024-12-13 NOTE — TELEPHONE ENCOUNTER
BIPAP Order  Received: 1 week ago  Bonnie Rose Liudmila, MD Dr. Lysenko,    I don't see where the pt has completed a f2f with you. The f2f is needed to obtain auth for the pt to change from APAP to BIPAP for intolerance.      Bonnie

## 2024-12-18 ENCOUNTER — OFFICE VISIT (OUTPATIENT)
Dept: SLEEP MEDICINE | Facility: CLINIC | Age: 64
End: 2024-12-18
Attending: PSYCHIATRY & NEUROLOGY
Payer: COMMERCIAL

## 2024-12-18 VITALS
WEIGHT: 208.63 LBS | HEART RATE: 90 BPM | DIASTOLIC BLOOD PRESSURE: 72 MMHG | BODY MASS INDEX: 34.71 KG/M2 | SYSTOLIC BLOOD PRESSURE: 129 MMHG

## 2024-12-18 DIAGNOSIS — G47.33 OSA (OBSTRUCTIVE SLEEP APNEA): Primary | ICD-10-CM

## 2024-12-18 PROCEDURE — 1160F RVW MEDS BY RX/DR IN RCRD: CPT | Mod: CPTII,S$GLB,, | Performed by: PSYCHIATRY & NEUROLOGY

## 2024-12-18 PROCEDURE — 99214 OFFICE O/P EST MOD 30 MIN: CPT | Mod: S$GLB,,, | Performed by: PSYCHIATRY & NEUROLOGY

## 2024-12-18 PROCEDURE — 3008F BODY MASS INDEX DOCD: CPT | Mod: CPTII,S$GLB,, | Performed by: PSYCHIATRY & NEUROLOGY

## 2024-12-18 PROCEDURE — 99999 PR PBB SHADOW E&M-EST. PATIENT-LVL IV: CPT | Mod: PBBFAC,,, | Performed by: PSYCHIATRY & NEUROLOGY

## 2024-12-18 PROCEDURE — 4010F ACE/ARB THERAPY RXD/TAKEN: CPT | Mod: CPTII,S$GLB,, | Performed by: PSYCHIATRY & NEUROLOGY

## 2024-12-18 PROCEDURE — 3044F HG A1C LEVEL LT 7.0%: CPT | Mod: CPTII,S$GLB,, | Performed by: PSYCHIATRY & NEUROLOGY

## 2024-12-18 PROCEDURE — 3074F SYST BP LT 130 MM HG: CPT | Mod: CPTII,S$GLB,, | Performed by: PSYCHIATRY & NEUROLOGY

## 2024-12-18 PROCEDURE — 3078F DIAST BP <80 MM HG: CPT | Mod: CPTII,S$GLB,, | Performed by: PSYCHIATRY & NEUROLOGY

## 2024-12-18 PROCEDURE — 1159F MED LIST DOCD IN RCRD: CPT | Mod: CPTII,S$GLB,, | Performed by: PSYCHIATRY & NEUROLOGY

## 2024-12-18 NOTE — PROGRESS NOTES
2024    10:02 AM 2024     7:25 PM   EPWORTH SLEEPINESS SCALE TOTAL SCORE    Total score 9  14       Patient-reported       Lisandra Toussaint is a 64 y.o. female seen today for the  follow up. Last seen on 2024.  Had HST and started APAP since last time with initial EDS improvement; later developed aerophagia; decreasing the pressure, trying various masks, sleeping upright  did not help.     The patient reports improved sleep continuity and daytime sleepiness on PAP. ESS today is .  Denies break through snoring.  No  dry mouth.  No aerophagia or air hunger. Denies occasional mask leaks and discomfort. Using a nasal  mask (tried FFM).    Had interventional radiology for brain aneurysm .    Also Melatonin ER and Tizanidine      Aerophagia is limiting her compliance       DME: [facility] got machine in 10/2024    Lisandra Toussaint 10/16/2024 - 2024 Patient ID: [id number] : 1960 Age: 64 years Gender: Female [facility] [address] [place], [address] Compliance Report Compliance Payor Standard Usage 10/16/2024 - 2024 Usage days 37/60 days (62%) >= 4 hours 21 days (35%) < 4 hours 16 days (27%) Usage hours 160 hours 43 minutes Average usage (total days) 2 hours 41 minutes Average usage (days used) 4 hours 21 minutes Median usage (days used) 4 hours 35 minutes Total used hours (value since last reset - 2024) 160 hours AirSense 11 AutoSet Serial number [id number] Mode AutoSet Min Pressure 4 cmH2O Max Pressure 9 cmH2O EPR Fulltime EPR level 3 Response Standard Therapy Pressure - cmH2O Median: 7.1 95th percentile: 9.8 Maximum: 10.4 Leaks - L/min Median: 0.1 95th percentile: 7.3 Maximum: 16.2 Events per hour AI: 4.8 HI: 0.2 AHI: 5.0 Apnea Index Central: 0.5 Obstructive: 4.1 Unknown: 0.1 RERA Index 0.6 Cheyne-Gonzales respiration (average duration per night) 0 minute            INTERVAL HISTORY:    2024: Lisandra Toussaint is a 64 y.o. female is here to be evaluated  for a sleep disorder; referred by No ref. provider found.    The patient reports snoring, gasping for air, excessive daytime sleepiness, and excessive daytime fatigue since over 10 years ago.  [place] had aneurysm surgery = nurse in ICU told her to get checked.     Lisandra Toussaint denied  choking  and witnessed apneas.    The patient does not feel rested upon awakening. Takes naps but usually no time    The patient  reports  morning headaches and reports  dry mouth on awakening.   Lisandra Toussaint denies  nasal congestion.    Earring  for TMJ      The patient  reports difficulty with falling and staying asleep.    Lisandra Toussaint  denies symptoms concerning for parasomnia except for occasional somniloquy.  The patient  denies auxiliary symptoms of narcolepsy including sleep onset paralysis, hypnagogic hallucinations, sleep attacks and cataplexy.    Lisandra Toussaint denied symptoms concerning for RLS; nocturnal leg movements have not been noticed.   The patient does not experience sleep related leg cramps.           Medications pertinent to sleep  disorders taken currently: Tizanidine for cervicogenic factors - also helps her sleep. Taking 2-4 mg; Now Tizaidien haleps her sleep and small dose Melatonin  Previous  medications taken  for sleep disorders:  -Melatonin regular and TR    Sleep studies  2014: Mild sleep disordered breathing (AHI=11) is  noted based on a 4% hypopnea desaturation criteria. The patient slept supine 42.2% of the night based on valid recording  time of 6.59 hours and is 1.8 times as likely to have apneas/hypopneas when supine. When considering more subtle  measures of sleep disordered breathing, the overall respiratory disturbance index is severe(RDI=36) based on a 1%  hypopnea desaturation criteria with confirmation by surrogate arousal indicators, predominantly in the supine position  (45 events/hour). The apneas/hypopneas are accompanied by minimal oxygen desaturation (percent time  below 90%  SpO2: 0%, Min SpO2: 83.3%)            PHYSICAL EXAM:  /72 (BP Location: Left arm, Patient Position: Sitting)   Pulse 90   Wt 94.6 kg (208 lb 9.6 oz)   BMI 34.71 kg/m²   GENERAL: Normal development, well groomed.        ASSESSMENT:    1. LENNIE - severe based on RDI The patient symptomatically has  snoring, choking , interrupted sleep, excessive daytime sleepiness, and excessive daytime fatigue  with exam findings of crowded airway and elevated BMI. The patient has medical co-morbidities of depression and hypertension, h/o brain aneurysm (managed surgically successfully)  which can be worsened by LENNIE. This warrants treatment. Benefiting from CPAP use in terms of sleep continuity and daytime sleepiness. Later on aerophagia developed - can barely use CPAP now; trying various mask, decreasing pressure, changing EPR, sleeping upright, GAsx did not help. Failing CPAP due to aerophagia.         2. Insomnia NEC. Multi-factorial -  excess time in bed, poor sleep hygiene, and likely paradoxical insomnia play a role        PLAN:    Will upgrade APAP buster BPAP as a last attempt to help with aerophagia (severe; a lot of pain)   Will start at 8/4 with nasal mask and chin strap or mouth tape.          During our discussion today, we talked about the etiology of  LENNIE as well as the potential ramifications of untreated sleep apnea, which could include daytime sleepiness, hypertension, heart disease and/or stroke.  We discussed potential treatment options, which could include weight loss, body positioning, continuous positive airway pressure (CPAP), or referral for surgical consideration. Meanwhile, she  is urged to avoid supine sleep, weight gain and alcoholic beverages since all of these can worsen LENNIE.     The patient was given open opportunity to ask questions and/or express concerns about treatment plan. Two point patient identifier confirmed.       Precautions: The patient was advised to abstain from driving  should he feel sleepy or drowsy.    Follow up: MD after the sleep study has been completed.     ESS ([place] Sleepiness Scale) and other sleep medicine related questionnaires were reviewed with the patient.      46-minute visit. >50% spent counseling patient and coordination of care.  The patient was  cautioned against drowsy driving.

## 2024-12-18 NOTE — Clinical Note
Lisandra Benton is failing CPAP due to aerophagia; I  am seeing her in clinic today and ordered upgrading to BPAP - could you kindly expedite?  Thank you!  L  PS: please let us know if we nee to do anything else

## 2024-12-18 NOTE — PATIENT INSTRUCTIONS
I'm ordering  the machine for you through [facility]:  [phone]->1->2  They will call you within several weeks, or you can call them.  ________________________________    Aftter you get your machine:    1. Please keep in mind, that when you come to  the machine, you will be choosing the CPAP mask during that time.   Please call [phone]->1->2 within 30 days if you uncomfortable with your mask    2. Please let me know through [facility] if you are not comfortable with the pressure settings - I can help.    My medical assistant will reach out to you to schedule the follow up.              Sincerely,    Dr. Harrington             Xolair Pregnancy And Lactation Text: This medication is Pregnancy Category B and is considered safe during pregnancy. This medication is excreted in breast milk.

## 2024-12-20 ENCOUNTER — PATIENT MESSAGE (OUTPATIENT)
Dept: NEUROSURGERY | Facility: CLINIC | Age: 64
End: 2024-12-20
Payer: COMMERCIAL

## 2024-12-24 ENCOUNTER — PATIENT MESSAGE (OUTPATIENT)
Dept: SLEEP MEDICINE | Facility: CLINIC | Age: 64
End: 2024-12-24
Payer: COMMERCIAL

## 2024-12-31 ENCOUNTER — OFFICE VISIT (OUTPATIENT)
Dept: NEUROSURGERY | Facility: CLINIC | Age: 64
End: 2024-12-31
Payer: COMMERCIAL

## 2024-12-31 VITALS — DIASTOLIC BLOOD PRESSURE: 66 MMHG | HEART RATE: 83 BPM | SYSTOLIC BLOOD PRESSURE: 111 MMHG

## 2024-12-31 DIAGNOSIS — Q28.2 ARTERIOVENOUS MALFORMATION OF CEREBRAL VESSELS: ICD-10-CM

## 2024-12-31 DIAGNOSIS — I72.9 ANEURYSM: Primary | ICD-10-CM

## 2024-12-31 DIAGNOSIS — I67.1 CEREBRAL ANEURYSM, NONRUPTURED: ICD-10-CM

## 2024-12-31 PROCEDURE — 99999 PR PBB SHADOW E&M-EST. PATIENT-LVL III: CPT | Mod: PBBFAC,,, | Performed by: NEUROLOGICAL SURGERY

## 2024-12-31 PROCEDURE — 3078F DIAST BP <80 MM HG: CPT | Mod: CPTII,S$GLB,, | Performed by: NEUROLOGICAL SURGERY

## 2024-12-31 PROCEDURE — 4010F ACE/ARB THERAPY RXD/TAKEN: CPT | Mod: CPTII,S$GLB,, | Performed by: NEUROLOGICAL SURGERY

## 2024-12-31 PROCEDURE — 3074F SYST BP LT 130 MM HG: CPT | Mod: CPTII,S$GLB,, | Performed by: NEUROLOGICAL SURGERY

## 2024-12-31 PROCEDURE — 99215 OFFICE O/P EST HI 40 MIN: CPT | Mod: S$GLB,,, | Performed by: NEUROLOGICAL SURGERY

## 2024-12-31 NOTE — PROGRESS NOTES
Neurosurgery  Established Patient    Scribe Attestation  I, Eloisa Zhou, attest that this documentation has been prepared under the direction and in the presence of Vlad Green MD.    SUBJECTIVE:     History of Present Illness 12/04/2023  Lisandra Toussaint is a 62 year-old female with chronic conditions of anxiety, TMJ, HTN who was referred to me for evaluation of aneurysm. The patient reports in September of 2023 she experienced an onset of symptoms including left ear ringing, visual disturbances, and lightheadedness induced by bright light. Months following, she states her ear ringing did not resolve and progressively worsened. The patient reported 24 years ago she endured a concussion w/ LOC. Due to the history she was recommended to have a CT scan which discovered the aneurysm. She currently reports associated symptoms of eye pain, eye dryness, and hearing loss (L>R). The patient denies a history of smoking and rarely drinks alcohol, 1-2 glasses a year. 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      Interval History 08/06/24  Patient returns to clinic for f/u after undergoing WEB with stent and coil augmentation for embolization of saccular DERIAN aneurysm on 06/20/24. Today she reports feeling overall well and better since her procedure. She notes having dizziness 3 weeks after her procedure, but it has since resolved. Pt also reports having occasional word loss where mid-sentence she is unable to find a word she wants to convey. Occurs 1-2x/day. This is the extent of her complaints at this time. Endorses compliance with Plavix and ASA 81 mg.     Interval Hx 12/31/24  Patient returns to clinic for f/u with updated imaging. Today she reports feeling okay. She has been having headaches which sit at a 3/10. No nausea or vomiting. Has occasional dizziness which she attributes to moving fast. Patient also continues to have daily word loss mid-conversation. Otherwise  she is fine and has no other acute changes or issues to report. Compliant with ASA 81 mg and Plavix.      Review of patient's allergies indicates:   Allergen Reactions    Cinnamon Anaphylaxis    Iodine and iodide containing products Other (See Comments)     Tears up skin    Macrodantin [nitrofurantoin macrocrystalline] Other (See Comments)     Knee and elbow swelling    Povidone-iodine      Other reaction(s): Blister    Sulfa (sulfonamide antibiotics) Hives       Current Outpatient Medications   Medication Sig Dispense Refill    amLODIPine (NORVASC) 10 MG tablet Take 1 tablet by mouth once daily 90 tablet 0    aspirin 81 MG Chew Take 1 tablet (81 mg total) by mouth once daily. 30 tablet 0    busPIRone (BUSPAR) 10 MG tablet Take 1 tablet (10 mg total) by mouth 2 (two) times daily. 180 tablet 3    cartilage/collagen/bor/hyalur (JOINT HEALTH ORAL) Take 1 tablet by mouth once daily.      clopidogreL (PLAVIX) 75 mg tablet Take 1 tablet (75 mg total) by mouth once daily. To take with aspirin 325 mg daily (otc) 30 tablet 11    cranberry extract (CRANBERRY CONCENTRATE) 500 mg Cap Take 1 tablet by mouth once daily.      DAILY MULTI-VITAMIN ORAL Take by mouth.      DULoxetine (CYMBALTA) 60 MG capsule Take 1 capsule (60 mg total) by mouth once daily. 90 capsule 3    EPINEPHrine (EPIPEN) 0.3 mg/0.3 mL AtIn Inject one syringe at first symptoms, may repeat x 1 within 5-15 minutes 2 each 1    hydroCHLOROthiazide (MICROZIDE) 12.5 mg capsule Take 1 capsule by mouth once daily (Patient taking differently: Take by mouth daily as needed. Take 1 capsule by mouth once daily) 90 capsule 0    losartan (COZAAR) 50 MG tablet Take 1 tablet (50 mg total) by mouth 2 (two) times a day. 60 tablet 3    ranitidine (ZANTAC) 75 MG tablet Take 75 mg by mouth every morning.      tiZANidine (ZANAFLEX) 4 MG tablet Take 1 tablet (4 mg total) by mouth 2 (two) times a day. 180 tablet 3     No current facility-administered medications for this visit.        Past Medical History:   Diagnosis Date    Aneurysm     Anxiety     HTN (hypertension)     TMJ (dislocation of temporomandibular joint)      Past Surgical History:   Procedure Laterality Date    ABDOMINAL SURGERY      CARPAL TUNNEL RELEASE      EYE SURGERY      FOOT SURGERY      tarsel tunnel       Family History       Problem Relation (Age of Onset)    Cancer Mother, Maternal Grandfather    Heart disease Maternal Grandmother    Hypertension Mother, Maternal Grandfather    Learning disabilities Son, Son    Stroke Maternal Grandfather          Social History     Socioeconomic History    Marital status: Single   Tobacco Use    Smoking status: Never    Smokeless tobacco: Never    Tobacco comments:     2nd hand smoke when it was not banned   Substance and Sexual Activity    Alcohol use: Not Currently     Comment: Couple times a year 1 drink - 3 times    Drug use: Never    Sexual activity: Not Currently     Partners: Male     Birth control/protection: Abstinence, Post-menopausal, None     Comment: partner have ED     Social Drivers of Health     Financial Resource Strain: Low Risk  (6/21/2024)    Overall Financial Resource Strain (CARDIA)     Difficulty of Paying Living Expenses: Not hard at all   Food Insecurity: No Food Insecurity (6/21/2024)    Hunger Vital Sign     Worried About Running Out of Food in the Last Year: Never true     Ran Out of Food in the Last Year: Never true   Transportation Needs: No Transportation Needs (6/21/2024)    TRANSPORTATION NEEDS     Transportation : No   Physical Activity: Inactive (6/21/2024)    Exercise Vital Sign     Days of Exercise per Week: 0 days     Minutes of Exercise per Session: 0 min   Stress: No Stress Concern Present (6/21/2024)    [employer] [employer] - Occupational Stress Questionnaire     Feeling of Stress : Not at all   Housing Stability: Low Risk  (6/21/2024)    Housing Stability Vital Sign     Unable to Pay for Housing in the Last Year: No      Homeless in the Last Year: No       Review of Systems   Neurological:  Positive for dizziness, speech difficulty (word loss) and headaches.   All other systems reviewed and are negative.      OBJECTIVE:     Vital Signs     There is no height or weight on file to calculate BMI.    Neurosurgery Physical Exam  General: no acute distress  Head: Non-traumatic, normocephalic  Eyes: Pupils equal, EOMI  Neck: Supple, normal ROM, no tenderness to palpation  CVS: Normal rate and rhythm, distal pulses present  Pulm: Symmetric expansion, no respiratory distress  GI: Abdomen nondistended, nontender     MSK: Moves all extremities without restriction, atraumatic  Skin: Dry, intact  Psych: Normal thought content and cognition     Neuro:  Alert, awake, oriented, to self, place, time  Language:  Speech is fluent, goal directed without any noted dysarthria or aphasia     Cranial nerves:    CNII-XII: Intact on fine exam,   Pupils equal round react to light,   Extraocular muscles are intact  V1 to V3 is intact to light touch,   no facial asymmetry,   Hearing is intact to finger rub and voice  tongue/uvula/palate midline,   shoulder shrug equal,      No pronator drift     Extremities:  Motor:           Upper Extremity     Deltoid Triceps Biceps Wrist  Extension Wrist  Flexion Interosseous   R 5/5 5/5 5/5 5/5 5/5 5/5   L 5/5 5/5 5/5 5/5 5/5 5/5         Thumb   Abduction Thumb  ADDuction Finger  Flexion Finger  Extension       R 5/5 5/5 5/5 5/5       L 5/5 5/5 5/5 5/5           Lower Extremity      Iliopsoas Quadriceps Hamstring Plantarflexion Dorsiflexion EHL   R 5/5 5/5 5/5 5/5 5/5 5/5   L 5/5 5/5 5/5 5/5 5/5 5/5         Reflexes:     DTR:    2+ biceps                       2+ triceps              2+ brachioradialis              2+ patellar  2+ Achilles     Butler's: Negative     Babinski's: Negative     Clonus: Negative     Sensory:      Sensation intact to light touch, temperature sensation, vibration, pinprick     Coordination:       Coordination intact throughout, no dysmetria, normal rapid alternating movements, no dysdiadochokinesia  Cerebellar:  Normal finger-to-nose, normal heel-to-shin  Cervical spine:  No midline cervical tenderness, negative Lhermitte, negative Spurling  Thoracic spine:  No midline thoracic tenderness  Lumbar spine:  No midline lumbar tenderness    Diagnostic Results:  I personally reviewed the patient's Diagnostic Imaging.     MRI and MRA Brain W WO Cont 11/06/24  Mild chronic small vessel ischemic change and generalized cerebral volume loss, similar to prior.     Two punctate foci of hemorrhage in the cerebral hemispheres, new from prior.     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    IR Angiogram Cerebral Carotid Bilateral 06/20/24  Successful WEB with stent and coil augmentation, embolization of saccular, anterior communicating artery aneurysm. Inferior segment of intra saccular device was involuted and collapsed occluding the left A2 segment. Successful balloon angioplasty followed by Y/T stenting was performed and coils were deployed into neck of the residual aneurysm. There with no evidence of off target embolization, or flow-limiting stenosis.     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    Patient presents with headaches, occasional dizziness, and daily word loss.  Discussed imaging results of MRI and MRA Brain revealing prior endovascular treatment of an anterior communicating artery aneurysm, with small focus of apparent residual filling at the neck of the aneurysm.  Discussed imaging results of Angiogram revealing successful WEB with stent and coil augmentation, embolization of saccular, anterior communicating artery aneurysm.  After  discussion with the patient, I recommend f/u with updated cerebral angiogram to better evaluate aneurysm and residual filling seen on MRI/MRA. I have discussed the risks/benefits, indications, and alternatives for the proposed procedure in detail. I have answered all of their questions and patient wish to proceed with this plan. We will schedule patient.       Thank you so very much for allowing me to participate in the care of this patient.  Please feel free to call any questions, comments, or concerns.     Vlad Green MD,MSc  Department of Neurosurgery   Department of Radiology  Department of Neurology  [facility]    [facility]   [employer] [facility] [employer] [facility]     Total time spent in counseling and discussion about further management options including relevant lab work, treatment,  prognosis, medications and intended side effects was more than 60 minutes. More than 50 % of the time was spent in counseling and coordination of care.  I spent a total of 60 minutes on the day of the visit.This includes face to face time and non-face to face time preparing to see the patient (eg, review of tests), Obtaining and/or reviewing separately obtained history, Documenting clinical information in the electronic or other health record, Independently interpreting resultsand communicating results to the patient/family/caregiver, or Care coordination.     Scribe Attestation  I, Dr. Vlad Green personally performed the services described in this documentation. All medical record entries made by the scribe, Eloisa Zhou, were at my direction and in my presence.  I have reviewed the chart and agree that the record reflects my personal performance and is accurate and complete.

## 2025-01-15 RX ORDER — AMLODIPINE BESYLATE 10 MG/1
10 TABLET ORAL
Qty: 90 TABLET | Refills: 0 | Status: SHIPPED | OUTPATIENT
Start: 2025-01-15

## 2025-02-03 DIAGNOSIS — Q28.2 ARTERIOVENOUS MALFORMATION OF CEREBRAL VESSELS: Primary | ICD-10-CM

## 2025-02-04 ENCOUNTER — LAB VISIT (OUTPATIENT)
Dept: LAB | Facility: HOSPITAL | Age: 65
End: 2025-02-04
Payer: COMMERCIAL

## 2025-02-04 DIAGNOSIS — I72.9 ANEURYSM: ICD-10-CM

## 2025-02-04 LAB
ANION GAP SERPL CALC-SCNC: 13 MMOL/L (ref 8–16)
BUN SERPL-MCNC: 28 MG/DL (ref 8–23)
CALCIUM SERPL-MCNC: 10.1 MG/DL (ref 8.7–10.5)
CHLORIDE SERPL-SCNC: 107 MMOL/L (ref 95–110)
CO2 SERPL-SCNC: 24 MMOL/L (ref 23–29)
CREAT SERPL-MCNC: 1 MG/DL (ref 0.5–1.4)
EST. GFR  (NO RACE VARIABLE): >60 ML/MIN/1.73 M^2
GLUCOSE SERPL-MCNC: 74 MG/DL (ref 70–110)
POTASSIUM SERPL-SCNC: 5.5 MMOL/L (ref 3.5–5.1)
SODIUM SERPL-SCNC: 144 MMOL/L (ref 136–145)

## 2025-02-04 PROCEDURE — 36415 COLL VENOUS BLD VENIPUNCTURE: CPT | Performed by: NEUROLOGICAL SURGERY

## 2025-02-04 PROCEDURE — 80048 BASIC METABOLIC PNL TOTAL CA: CPT | Performed by: NEUROLOGICAL SURGERY

## 2025-02-04 RX ORDER — DIPHENHYDRAMINE HCL 50 MG
CAPSULE ORAL
Start: 2025-02-04

## 2025-02-04 RX ORDER — PREDNISONE 50 MG/1
TABLET ORAL
Qty: 3 TABLET | Refills: 0 | Status: SHIPPED | OUTPATIENT
Start: 2025-02-04

## 2025-02-06 ENCOUNTER — ANESTHESIA EVENT (OUTPATIENT)
Dept: INTERVENTIONAL RADIOLOGY/VASCULAR | Facility: HOSPITAL | Age: 65
End: 2025-02-06
Payer: COMMERCIAL

## 2025-02-06 ENCOUNTER — HOSPITAL ENCOUNTER (OUTPATIENT)
Dept: INTERVENTIONAL RADIOLOGY/VASCULAR | Facility: HOSPITAL | Age: 65
Discharge: HOME | End: 2025-02-06
Attending: NEUROLOGICAL SURGERY | Admitting: NEUROLOGICAL SURGERY
Payer: COMMERCIAL

## 2025-02-06 VITALS
DIASTOLIC BLOOD PRESSURE: 70 MMHG | WEIGHT: 201 LBS | TEMPERATURE: 98 F | BODY MASS INDEX: 33.49 KG/M2 | HEART RATE: 94 BPM | HEIGHT: 65 IN | OXYGEN SATURATION: 97 % | SYSTOLIC BLOOD PRESSURE: 125 MMHG | RESPIRATION RATE: 18 BRPM

## 2025-02-06 DIAGNOSIS — I72.9 ANEURYSM: ICD-10-CM

## 2025-02-06 PROCEDURE — 63600175 PHARM REV CODE 636 W HCPCS

## 2025-02-06 PROCEDURE — 36224 PLACE CATH CAROTD ART: CPT | Mod: RT | Performed by: NEUROLOGICAL SURGERY

## 2025-02-06 PROCEDURE — 36224 PLACE CATH CAROTD ART: CPT | Mod: RT,,, | Performed by: NEUROLOGICAL SURGERY

## 2025-02-06 PROCEDURE — 27201423 OPTIME MED/SURG SUP & DEVICES STERILE SUPPLY

## 2025-02-06 PROCEDURE — 76377 3D RENDER W/INTRP POSTPROCES: CPT | Mod: TC | Performed by: NEUROLOGICAL SURGERY

## 2025-02-06 PROCEDURE — 37000008 HC ANESTHESIA 1ST 15 MINUTES

## 2025-02-06 PROCEDURE — 76377 3D RENDER W/INTRP POSTPROCES: CPT | Mod: 26,,, | Performed by: NEUROLOGICAL SURGERY

## 2025-02-06 PROCEDURE — D9220A PRA ANESTHESIA: Mod: CRNA,,,

## 2025-02-06 PROCEDURE — C1760 CLOSURE DEV, VASC: HCPCS

## 2025-02-06 PROCEDURE — 37000009 HC ANESTHESIA EA ADD 15 MINS

## 2025-02-06 PROCEDURE — 25500020 PHARM REV CODE 255: Performed by: ANESTHESIOLOGY

## 2025-02-06 PROCEDURE — 63600175 PHARM REV CODE 636 W HCPCS: Performed by: NEUROLOGICAL SURGERY

## 2025-02-06 PROCEDURE — 25000003 PHARM REV CODE 250

## 2025-02-06 PROCEDURE — C1894 INTRO/SHEATH, NON-LASER: HCPCS

## 2025-02-06 PROCEDURE — C1769 GUIDE WIRE: HCPCS

## 2025-02-06 PROCEDURE — 25000003 PHARM REV CODE 250: Performed by: NEUROLOGICAL SURGERY

## 2025-02-06 PROCEDURE — D9220A PRA ANESTHESIA: Mod: ANES,,, | Performed by: ANESTHESIOLOGY

## 2025-02-06 RX ORDER — HEPARIN SODIUM 1000 [USP'U]/ML
INJECTION, SOLUTION INTRAVENOUS; SUBCUTANEOUS
Status: DISCONTINUED | OUTPATIENT
Start: 2025-02-06 | End: 2025-02-06

## 2025-02-06 RX ORDER — LIDOCAINE HYDROCHLORIDE 10 MG/ML
INJECTION, SOLUTION INFILTRATION; PERINEURAL
Status: COMPLETED | OUTPATIENT
Start: 2025-02-06 | End: 2025-02-06

## 2025-02-06 RX ORDER — GLUCAGON 1 MG
1 KIT INJECTION
Status: DISCONTINUED | OUTPATIENT
Start: 2025-02-06 | End: 2025-02-07 | Stop reason: HOSPADM

## 2025-02-06 RX ORDER — FENTANYL CITRATE 50 UG/ML
INJECTION, SOLUTION INTRAMUSCULAR; INTRAVENOUS
Status: DISCONTINUED | OUTPATIENT
Start: 2025-02-06 | End: 2025-02-06

## 2025-02-06 RX ORDER — PROPOFOL 10 MG/ML
VIAL (ML) INTRAVENOUS
Status: DISCONTINUED | OUTPATIENT
Start: 2025-02-06 | End: 2025-02-06

## 2025-02-06 RX ORDER — SODIUM CHLORIDE 0.9 % (FLUSH) 0.9 %
10 SYRINGE (ML) INJECTION
Status: DISCONTINUED | OUTPATIENT
Start: 2025-02-06 | End: 2025-02-07 | Stop reason: HOSPADM

## 2025-02-06 RX ORDER — DEXMEDETOMIDINE HYDROCHLORIDE 100 UG/ML
INJECTION, SOLUTION INTRAVENOUS
Status: DISCONTINUED | OUTPATIENT
Start: 2025-02-06 | End: 2025-02-06

## 2025-02-06 RX ORDER — SODIUM CHLORIDE 9 MG/ML
INJECTION, SOLUTION INTRAVENOUS CONTINUOUS
Status: DISCONTINUED | OUTPATIENT
Start: 2025-02-06 | End: 2025-02-07 | Stop reason: HOSPADM

## 2025-02-06 RX ORDER — IODIXANOL 320 MG/ML
200 INJECTION, SOLUTION INTRAVASCULAR
Status: COMPLETED | OUTPATIENT
Start: 2025-02-06 | End: 2025-02-06

## 2025-02-06 RX ORDER — SODIUM CHLORIDE 0.9 % (FLUSH) 0.9 %
3 SYRINGE (ML) INJECTION
Status: DISCONTINUED | OUTPATIENT
Start: 2025-02-06 | End: 2025-02-07 | Stop reason: HOSPADM

## 2025-02-06 RX ORDER — HALOPERIDOL 5 MG/ML
0.5 INJECTION INTRAMUSCULAR EVERY 10 MIN PRN
Status: DISCONTINUED | OUTPATIENT
Start: 2025-02-06 | End: 2025-02-07 | Stop reason: HOSPADM

## 2025-02-06 RX ORDER — LIDOCAINE HYDROCHLORIDE 20 MG/ML
INJECTION INTRAVENOUS
Status: DISCONTINUED | OUTPATIENT
Start: 2025-02-06 | End: 2025-02-06

## 2025-02-06 RX ORDER — FENTANYL CITRATE 50 UG/ML
25 INJECTION, SOLUTION INTRAMUSCULAR; INTRAVENOUS EVERY 5 MIN PRN
Status: DISCONTINUED | OUTPATIENT
Start: 2025-02-06 | End: 2025-02-07 | Stop reason: HOSPADM

## 2025-02-06 RX ORDER — MIDAZOLAM HYDROCHLORIDE 1 MG/ML
INJECTION INTRAMUSCULAR; INTRAVENOUS
Status: DISCONTINUED | OUTPATIENT
Start: 2025-02-06 | End: 2025-02-06

## 2025-02-06 RX ORDER — DIPHENHYDRAMINE HYDROCHLORIDE 50 MG/ML
INJECTION INTRAMUSCULAR; INTRAVENOUS
Status: DISCONTINUED | OUTPATIENT
Start: 2025-02-06 | End: 2025-02-06

## 2025-02-06 RX ADMIN — PROPOFOL 50 MCG/KG/MIN: 10 INJECTION, EMULSION INTRAVENOUS at 04:02

## 2025-02-06 RX ADMIN — HEPARIN SODIUM 5000 ML: 1000 INJECTION, SOLUTION INTRAVENOUS; SUBCUTANEOUS at 04:02

## 2025-02-06 RX ADMIN — SODIUM CHLORIDE: 0.9 INJECTION, SOLUTION INTRAVENOUS at 04:02

## 2025-02-06 RX ADMIN — MIDAZOLAM HYDROCHLORIDE 2 MG: 2 INJECTION, SOLUTION INTRAMUSCULAR; INTRAVENOUS at 04:02

## 2025-02-06 RX ADMIN — HEPARIN SODIUM 2000 UNITS: 1000 INJECTION, SOLUTION INTRAVENOUS; SUBCUTANEOUS at 04:02

## 2025-02-06 RX ADMIN — DEXMEDETOMIDINE 8 MCG: 200 INJECTION, SOLUTION INTRAVENOUS at 04:02

## 2025-02-06 RX ADMIN — DEXMEDETOMIDINE 4 MCG: 200 INJECTION, SOLUTION INTRAVENOUS at 04:02

## 2025-02-06 RX ADMIN — LIDOCAINE HYDROCHLORIDE 10 ML: 10 INJECTION, SOLUTION INFILTRATION; PERINEURAL at 04:02

## 2025-02-06 RX ADMIN — FENTANYL CITRATE 12.5 MCG: 50 INJECTION, SOLUTION INTRAMUSCULAR; INTRAVENOUS at 04:02

## 2025-02-06 RX ADMIN — LIDOCAINE HYDROCHLORIDE 80 MG: 20 INJECTION INTRAVENOUS at 04:02

## 2025-02-06 RX ADMIN — IODIXANOL 32 ML: 320 INJECTION, SOLUTION INTRAVASCULAR at 05:02

## 2025-02-06 RX ADMIN — FENTANYL CITRATE 12.5 MCG: 50 INJECTION, SOLUTION INTRAMUSCULAR; INTRAVENOUS at 05:02

## 2025-02-06 RX ADMIN — FENTANYL CITRATE 25 MCG: 50 INJECTION, SOLUTION INTRAMUSCULAR; INTRAVENOUS at 04:02

## 2025-02-06 RX ADMIN — PROPOFOL 70 MG: 10 INJECTION, EMULSION INTRAVENOUS at 04:02

## 2025-02-06 RX ADMIN — DEXMEDETOMIDINE 4 MCG: 200 INJECTION, SOLUTION INTRAVENOUS at 05:02

## 2025-02-06 RX ADMIN — DIPHENHYDRAMINE HYDROCHLORIDE 50 MG: 50 INJECTION, SOLUTION INTRAMUSCULAR; INTRAVENOUS at 04:02

## 2025-02-06 NOTE — TRANSFER OF CARE
"Anesthesia Transfer of Care Note    Patient: Lisandra Toussaint    Procedure(s) Performed: * No procedures listed *    Patient location: [facility]    Anesthesia Type: MAC    Transport from OR: Transported from OR on room air with adequate spontaneous ventilation    Post pain: adequate analgesia    Post assessment: no apparent anesthetic complications    Post vital signs: stable    Level of consciousness: awake, alert and oriented    Nausea/Vomiting: no nausea/vomiting    Complications: none    Transfer of care protocol was followed      Last vitals: Visit Vitals  BP (!) 156/70   Pulse 99   Temp 36.8 °C (98.2 °F) (Temporal)   Resp 18   Ht 5' 5" (1.651 m)   Wt 91.2 kg (201 lb)   SpO2 97%   Breastfeeding No   BMI 33.45 kg/m²     "

## 2025-02-06 NOTE — H&P
Interventional Neuroradiology Pre-procedure Note    Procedure: Diagnostic cerebral angiogram    History of Present Illness:  Lisandra Toussaint is a 64 y.o. female who presents for f/u DSA. She had WEB embolization + Y/T Stenting of the A.Comm aneurysm in April, 2024.    ROS:   Hematological: no known coagulopathies  Respiratory: no shortness of breath  Cardiovascular: no chest pain  Gastrointestinal: no abdominal pain  Genito-Urinary: no dysuria  Musculoskeletal: negative  Neurological: no TIA or stroke symptoms     Scheduled Meds:   Current Meds:   Current Outpatient Medications:     amLODIPine (NORVASC) 10 MG tablet, Take 1 tablet by mouth once daily, Disp: 90 tablet, Rfl: 0    aspirin 81 MG Chew, Take 1 tablet (81 mg total) by mouth once daily., Disp: 30 tablet, Rfl: 0    busPIRone (BUSPAR) 10 MG tablet, Take 1 tablet (10 mg total) by mouth 2 (two) times daily., Disp: 180 tablet, Rfl: 3    cartilage/collagen/bor/hyalur (JOINT HEALTH ORAL), Take 1 tablet by mouth once daily., Disp: , Rfl:     clopidogreL (PLAVIX) 75 mg tablet, Take 1 tablet (75 mg total) by mouth once daily. To take with aspirin 325 mg daily (otc), Disp: 30 tablet, Rfl: 11    DAILY MULTI-VITAMIN ORAL, Take by mouth., Disp: , Rfl:     diphenhydrAMINE (BENADRYL) 50 MG capsule, Pt to take 50 mg benadryl at 1 hour before procedure, Disp: , Rfl:     DULoxetine (CYMBALTA) 60 MG capsule, Take 1 capsule (60 mg total) by mouth once daily., Disp: 90 capsule, Rfl: 3    losartan (COZAAR) 50 MG tablet, Take 1 tablet (50 mg total) by mouth 2 (two) times a day., Disp: 60 tablet, Rfl: 3    predniSONE (DELTASONE) 50 MG Tab, Pt to take 1 tab (50mg total) 13 hours, 7 hours, 1 hour before procedure in addition take over the counter benadryl 50 mg 1 hr prior to procedure, Disp: 3 tablet, Rfl: 0    cranberry extract (CRANBERRY CONCENTRATE) 500 mg Cap, Take 1 tablet by mouth once daily., Disp: , Rfl:     EPINEPHrine (EPIPEN) 0.3 mg/0.3 mL AtIn, Inject one syringe at first  "symptoms, may repeat x 1 within 5-15 minutes, Disp: 2 each, Rfl: 1    hydroCHLOROthiazide (MICROZIDE) 12.5 mg capsule, Take 1 capsule by mouth once daily (Patient taking differently: Take by mouth daily as needed. Take 1 capsule by mouth once daily), Disp: 90 capsule, Rfl: 0    ranitidine (ZANTAC) 75 MG tablet, Take 75 mg by mouth every morning., Disp: , Rfl:     tiZANidine (ZANAFLEX) 4 MG tablet, Take 1 tablet (4 mg total) by mouth 2 (two) times a day., Disp: 180 tablet, Rfl: 3    Current Facility-Administered Medications:     0.9% NaCl infusion, , Intravenous, Continuous, Phyllis Bishop NP   Continuous Infusions:    0.9% NaCl   Intravenous Continuous         PRN Meds:    Allergies:   Review of patient's allergies indicates:   Allergen Reactions    Cinnamon Anaphylaxis    Iodine and iodide containing products Other (See Comments)     Tears up skin    Macrodantin [nitrofurantoin macrocrystalline] Other (See Comments)     Knee and elbow swelling    Povidone-iodine      Other reaction(s): Blister    Sulfa (sulfonamide antibiotics) Hives     Sedation Hx: No adverse events.    Labs:              Objective:  Vitals: Blood pressure (!) 156/70, pulse 99, temperature 98.2 °F (36.8 °C), temperature source Temporal, resp. rate 18, height 5' 5" (1.651 m), weight 91.2 kg (201 lb), SpO2 97%, not currently breastfeeding.     Physical Exam:  General: well developed, well nourished, no distress.   Head: normocephalic, atraumatic  Neck: No tracheal deviation. No palpable masses. Full ROM.   Neurologic: Alert and oriented. Thought content appropriate.  GCS: E4 V5 M6; Total: 15  Language: No aphasia  Speech: No dysarthria  Cranial nerves: face symmetric, tongue midline, CN II-XII grossly intact.   Eyes: pupils equal, round, reactive to light with accomodation, EOMI.   Pulmonary: normal respirations, no signs of respiratory distress  Abdomen: soft, non-distended, not tender to palpation  Vascular: Pulses 2+ and symmetric radial " and dorsalis pedis. No LE edema.   Skin: Skin is warm, dry and intact.  Sensory: intact to light touch throughout  Motor Strength: Moves all extremities spontaneously with good tone.  Full strength upper and lower extremities. No abnormal movements seen.     ASA: 2  MAL: 2    Plan:  -Plan for cerebral angiogram   -Sedation Plan: per anesthesia  -All diagnostics and imaging reviewed  -Patient NPO since MN  -Risks & benefits of procedure explained in detail; patient consented and all questions answered  -Further reccs to follow procedure          Marcello Taylor MD, MHA  Fellow, NeuroEndovascular Surgery, [facility] Oracio King  Neurologist, [facility] [place], [place]

## 2025-02-06 NOTE — PROCEDURES
Interventional Neuroradiology Post-Procedure Note    Pre Op Diagnosis: WEB embolization + Y/T Stenting of the A.Comm aneurysm in April, 2024     Post Op Diagnosis: Same    Procedure: Diagnostic cerebral angiogram    Procedure performed by: Vlad Pitt MD; Claudia BALL, Marcello    Written Informed Consent Obtained: Yes    Specimen Removed: NO    Estimated Blood Loss: Minimal    Procedure report:   A 5F sheath was placed into the right femoral artery and a 5F Johann catheter was advanced into the aortic arch.  The right common and internal carotid arteries were subselected and angiography of the brain was performed after injection into each of these vessels.    Preliminary interpretation: Aj Av II Occlusion of the Previously Treated A.Comm Aneurysm.  Please see Imaging report for full details.    A right femoral artery angiogram was performed, the sheath removed and hemostasis achieved using 5F Vascade device.  No hematoma was present at the time of hemostasis.    The patient tolerated the procedure well.     Plan:  -Bed rest for 2h  -Groin check and pulse check q2h   -Avoid carrying heavy weights > 10 lbs x 24 hrs   -Remove groin dressing tomorrow                       Marcello Taylor MD, A  Fellow, [facility]Endovascular Surgery, [facility]  Neurologist, [facility] [place], [place]

## 2025-02-06 NOTE — ANESTHESIA PREPROCEDURE EVALUATION
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     Pre-operative evaluation for IR angiogram     Past Medical History:   Diagnosis Date    Aneurysm     Anxiety     HTN (hypertension)     Sleep apnea     TMJ (dislocation of temporomandibular joint)        Review of patient's allergies indicates:   Allergen Reactions    Cinnamon Anaphylaxis    Iodine and iodide containing products Other (See Comments)     Tears up skin    Macrodantin [nitrofurantoin macrocrystalline] Other (See Comments)     Knee and elbow swelling    Povidone-iodine      Other reaction(s): Blister    Sulfa (sulfonamide antibiotics) Hives       Current Outpatient Medications on File Prior to Encounter   Medication Sig Dispense Refill    amLODIPine (NORVASC) 10 MG tablet Take 1 tablet by mouth once daily 90 tablet 0    aspirin 81 MG Chew Take 1 tablet (81 mg total) by mouth once daily. 30 tablet 0    busPIRone (BUSPAR) 10 MG tablet Take 1 tablet (10 mg total) by mouth 2 (two) times daily. 180 tablet 3    cartilage/collagen/bor/hyalur (JOINT HEALTH ORAL) Take 1 tablet by mouth once daily.      clopidogreL (PLAVIX) 75 mg tablet Take 1 tablet (75 mg total) by mouth once daily. To take with aspirin 325 mg daily (otc) 30 tablet 11    DAILY MULTI-VITAMIN ORAL Take by mouth.      diphenhydrAMINE (BENADRYL) 50 MG capsule Pt to take 50 mg benadryl at 1 hour before procedure      DULoxetine (CYMBALTA) 60 MG capsule Take 1 capsule (60 mg total) by mouth once daily. 90 capsule 3    losartan (COZAAR) 50 MG tablet Take 1 tablet (50 mg total) by mouth 2 (two) times a day. 60 tablet 3    predniSONE (DELTASONE) 50 MG Tab Pt to take 1 tab (50mg total) 13 hours, 7 hours, 1 hour before procedure in addition take over the counter benadryl 50 mg 1  "hr prior to procedure 3 tablet 0    cranberry extract (CRANBERRY CONCENTRATE) 500 mg Cap Take 1 tablet by mouth once daily.      EPINEPHrine (EPIPEN) 0.3 mg/0.3 mL AtIn Inject one syringe at first symptoms, may repeat x 1 within 5-15 minutes 2 each 1    hydroCHLOROthiazide (MICROZIDE) 12.5 mg capsule Take 1 capsule by mouth once daily (Patient taking differently: Take by mouth daily as needed. Take 1 capsule by mouth once daily) 90 capsule 0    ranitidine (ZANTAC) 75 MG tablet Take 75 mg by mouth every morning.      tiZANidine (ZANAFLEX) 4 MG tablet Take 1 tablet (4 mg total) by mouth 2 (two) times a day. 180 tablet 3     No current facility-administered medications on file prior to encounter.       Past Surgical History:   Procedure Laterality Date    ABDOMINAL SURGERY      CARPAL TUNNEL RELEASE      EYE SURGERY      FOOT SURGERY Bilateral     tarsel tunnel         CBC: No results for input(s): "WBC", "HGB", "HCT", "PLT" in the last 72 hours.    CMP:   Recent Labs     25  1124      K 5.5*      CO2 24   BUN 28*   CREATININE 1.0   GLU 74   CALCIUM 10.1       COAGS  No results for input(s): "PT", "INR", "PROTIME", "APTT" in the last 72 hours.    BP Readings from Last 3 Encounters:   25 (!) 156/70   24 111/66   24 129/72       Diagnostic Studies:      EKD Echo:  No results found for this or any previous visit.        Pre-op Assessment    I have reviewed the Patient Summary Reports.     I have reviewed the Nursing Notes. I have reviewed the NPO Status.   I have reviewed the Medications.     Review of Systems  Anesthesia Hx:               Denies Personal Hx of Anesthesia complications.                        Physical Exam  General: Alert, Cooperative and Oriented    Airway:  Mallampati: III / II  Mouth Opening: Small, but > 3cm  TM Distance: Normal  Tongue: Normal  Neck ROM: Normal ROM        Anesthesia Plan  Type of Anesthesia, risks & benefits discussed:    Anesthesia Type: " Gen ETT  Intra-op Monitoring Plan: Standard ASA Monitors  Post Op Pain Control Plan: multimodal analgesia  Induction:  IV  Airway Plan: Direct  Informed Consent: Informed consent signed with the Patient and all parties understand the risks and agree with anesthesia plan.  All questions answered.   ASA Score: 3  Day of Surgery Review of History & Physical: H&P Update referred to the surgeon/provider.    Ready For Surgery From Anesthesia Perspective.     .

## 2025-02-06 NOTE — DISCHARGE INSTRUCTIONS
Discharge Instructions for Cerebral Angiogram   When you get home after your procedure, do the following:  Monitor the injection site for bleeding. A small bruise is normal as is an occasional drop of blood at the site.  Monitor the limb that was used for changes in temperature, color, numbness, tingling, or loss of function.  Take your prescribed antiplatelet medicines as directed. However, they may cause you to bruise more easily.  Shower instead of taking tub baths for a few days. But, wait for your doctors OK to get the wound wet first.  Avoid lifting anything over 10 pounds for a few days.  Take it easy, but try to get back to your normal routine as much as possible.  Ask your doctor about driving, returning to work, and other activities.  When to call your doctor  Call your doctor if you have any of the following problems:  Problems at the incision site, such as swelling, redness, bleeding, warmth, leaking of fluids, or increasing pain  A cold or painful leg or foot  Severe headache  Weakness or numbness in a leg or arm  Difficulty talking, or difficulty finding the words to say what you want  Changes in your vision  Dizziness or imbalance  Go to the emergency room if your doctors office is closed.      Keeping appointments for follow-up care helps make your procedure a success.   Your follow-up  Within a month after the procedure, youll have a follow-up exam and tests. These tests may include an ultrasound and a brain function exam. Then youll be monitored with ultrasound or another imaging test every 6 months for 1 to 2 years. After that, youll be monitored at least every 12 months. You may also continue to take antiplatelet medication. In some cases, the carotid can narrow again. If this happens, it can often be treated again with balloon angioplasty.  Call 911  Call 911 if you have any of these stroke symptoms:  Paralysis or weakness on 1 side of the body  Numbness or tingling on 1 side of the  body  Difficulty speaking  Loss of vision in 1 eye  Drooping on 1 side of the face  Dizziness or imbalance  Swelling or persistent pain in the groin   Please call with any questions or concerns.    Monday through Friday 8:00 am - 4:00 pm  Interventional Radiology Clinic  [phone]    After Hours  Ask the  for the Radiology Resident on call  [phone]    You may remove the dressing over your procedure site 24 hours after your procedure. You may shower 24 hours after your procedure. Keep the procedure site clean, dry, and open to air. Do not submerge in water (bath, pool, hot tub, ect.) until the site is completely healed.     Call your doctor if you have any of the following:    Fever of 100.4 (38C) or higher  Nausea and/or vomitting   Bleeding, swelling, or warmth/redness at the procedure site  Severe pain not controlled by over the counter pain medicine  Any other symptoms your provider instructed you to report based on your medical condition.

## 2025-02-06 NOTE — PLAN OF CARE
Pt arrived to  (4)  for cerebral angiogram. Pt oriented to unit and staff. Plan of care reviewed with patient, patient verbalizes understanding. Comfort measures utilized. Pt safely transferred from stretcher to procedural table. Fall risk reviewed with patient, fall risk interventions maintained. Positioner pillows utilized to minimize pressure points. Blankets applied. Pt prepped and draped utilizing standard sterile technique. Timeouts completed utilizing standard universal time-out, per department and facility policy.  Anesthesia at bedside; Refer to anesthesia record regarding sedation and vital signs.

## 2025-02-06 NOTE — PLAN OF CARE
Procedure completed. Patient tolerated well; vitals per anesthesia. Hemostasis achieved to right groin via Vascade at 1710; patient to remain flat until 1910. Site CDI without hematoma. Patient to be transported to [facility] accompanied by this RN and CRNA; report to be given at the bedside.

## 2025-02-07 DIAGNOSIS — I10 BENIGN ESSENTIAL HTN: ICD-10-CM

## 2025-02-07 DIAGNOSIS — F43.22 ADJUSTMENT DISORDER WITH ANXIETY: ICD-10-CM

## 2025-02-07 RX ORDER — LOSARTAN POTASSIUM 50 MG/1
50 TABLET ORAL 2 TIMES DAILY
Qty: 60 TABLET | Refills: 0 | Status: SHIPPED | OUTPATIENT
Start: 2025-02-07

## 2025-02-07 NOTE — ANESTHESIA POSTPROCEDURE EVALUATION
Anesthesia Post Evaluation    Patient: Lisandra Toussaint    Procedure(s) Performed: * No procedures listed *    Final Anesthesia Type: MAC      Patient location during evaluation: PACU  Patient participation: Yes- Able to Participate  Level of consciousness: awake and alert  Post-procedure vital signs: reviewed and stable  Pain management: adequate  Airway patency: patent    PONV status at discharge: No PONV  Anesthetic complications: no      Cardiovascular status: hemodynamically stable  Respiratory status: unassisted, spontaneous ventilation and room air  Hydration status: euvolemic  Follow-up not needed.          Vitals Value Taken Time   /64 02/06/25 1907   Temp 36.7 °C (98 °F) 02/06/25 1900   Pulse 90 02/06/25 1909   Resp 18 02/06/25 1745   SpO2 100 % 02/06/25 1909   Vitals shown include unfiled device data.      No case tracking events are documented in the log.      Pain/Gonzalez Score: Gonzalez Score: 10 (2/6/2025  5:45 PM)

## 2025-02-09 ENCOUNTER — PATIENT MESSAGE (OUTPATIENT)
Dept: NEUROSURGERY | Facility: CLINIC | Age: 65
End: 2025-02-09
Payer: COMMERCIAL

## 2025-02-19 ENCOUNTER — PATIENT MESSAGE (OUTPATIENT)
Dept: NEUROSURGERY | Facility: CLINIC | Age: 65
End: 2025-02-19

## 2025-02-19 ENCOUNTER — CLINICAL SUPPORT (OUTPATIENT)
Dept: NEUROSURGERY | Facility: CLINIC | Age: 65
End: 2025-02-19
Payer: COMMERCIAL

## 2025-02-19 DIAGNOSIS — I72.9 ANEURYSM: Primary | ICD-10-CM

## 2025-02-19 NOTE — PROGRESS NOTES
Audio Only Telehealth Visit     Angiogram performed on 02/06/25, by Dr. Green  Site right groin  Reviewed post angiogram instructions with patient.  Keep incision PAMELA, no lotions, creams or bandages.  Ok to shower without direct water pressure to incision. Pat dry after shower.  Do not submerge wound in bath tub or go swimming until released by surgeon.  No lifting > 10lbs.  No twisting or bending surgical area greater than 45 degrees from neutral position.  Patient encouraged to walk as much as possible but advised to walk with someone and rest as necessary.  Take over the counter stool softner/laxative for constipation.  Call your doctor or report to the Emergency Room for any signs of infection, including: increased redness, drainage, pain or fever (temperature greater than or equal to 101.5 for 24 hours). Call doctor or go to the Emergency Room if there are any localized neurological changes; problems with speech, vision, numbness, tingling, weakness, bleeding/swelling from site or severe headache; or for other concerns.  Pt still reporting bruising to right thigh and posterior leg. Now has hives to clear area of skin on leg. Pt is to send updated picture.     Future Appointments   Date Time Provider Department Center   2/19/2025 11:30 AM Ashely Linda RN [facility] NEUROS8 [facility]   2/20/2025 10:30 AM Sabrina Brian PA-C [facility] PSYCH [facility]   3/11/2025  1:30 PM Ashley Melvin MD [facility] DERM [facility]   3/17/2025 11:30 AM Vlad Green MD [facility] NEUROS8 [facility]   5/20/2025  8:30 AM Hayley Harrington MD [facility] SLEEP [facility]

## 2025-02-20 ENCOUNTER — OFFICE VISIT (OUTPATIENT)
Dept: PSYCHIATRY | Facility: CLINIC | Age: 65
End: 2025-02-20
Payer: COMMERCIAL

## 2025-02-20 VITALS
BODY MASS INDEX: 34.5 KG/M2 | HEART RATE: 111 BPM | DIASTOLIC BLOOD PRESSURE: 84 MMHG | WEIGHT: 207.31 LBS | SYSTOLIC BLOOD PRESSURE: 141 MMHG

## 2025-02-20 DIAGNOSIS — F43.22 ADJUSTMENT DISORDER WITH ANXIETY: Primary | ICD-10-CM

## 2025-02-20 DIAGNOSIS — F33.9 DEPRESSION, RECURRENT: ICD-10-CM

## 2025-02-20 NOTE — PROGRESS NOTES
"Outpatient Psychiatry Follow-Up Visit (PA)    02/20/2025    Clinical Status of Patient:  Outpatient (Ambulatory)    Chief Complaint:  Lisandra Toussaint is a 64 y.o. female who presents today for follow-up of anxiety and adjustment problems.  Met with patient.     Current Psych Medications:   Cymbalta 60 mg PO daily  Buspar 5 mg PO daily    Interval History and Content of Current Session:  Patient seen and chart reviewed. Last seen on 9/13/2023    Patient has a psychiatric history of: anxiety and adjustment     Pt reports for follow up today stating that she has not come to see me in a while due to her physical health. Went for CT scan in November of 2023, to do an MRI, found an aneurysm. States that she had surgery in July of 2024. She reports having another angiogram a week and half ago to see if everything was in her brain.She reports stressor with taking care of her boyfriend.    She reports she only takes 5 mg of Buspar once daily now, feels that her anxiety has been okay. Not forgetting as often as she was.    Mood: Overall mood is "I'm not overly sad, or overly upset or mad"    Anxiety: 6 or 7/10 with 10/10 being the most severe    Pt appears happy and euthymic    Denies adverse effects from medication    Sleep: Sleep has been using BiPap machine in December, sleeping better with this, waking up around 3 or 4 am, using melatonin patch to stay sleeping or to fall back asleep, Also using tizanidine for sleep    Appetite: Appetite is normal    Denies SI/HI/AVH.     Pt reports taking medications as prescribed and behaving appropriately during interview today.      Psychotherapy:  Target symptoms: anxiety   Why chosen therapy is appropriate versus another modality: relevant to diagnosis  Outcome monitoring methods: self-report, observation  Therapeutic intervention type: insight oriented psychotherapy, supportive psychotherapy  Topics discussed/themes: relationships difficulties, illness/death of a loved one  The " patient's response to the intervention is accepting. The patient's progress toward treatment goals is good.   Duration of intervention: 10 minutes.    Review of Systems   PSYCHIATRIC: Pertinant items are noted in the narrative.  CONSTITUTIONAL: No weight gain or loss.   MUSCULOSKELETAL: No pain or stiffness of the joints.  NEUROLOGIC: No weakness, sensory changes, seizures, confusion, memory loss, tremor or other abnormal movements.  ENDOCRINE: No polydipsia or polyuria.  INTEGUMENTARY: No rashes or lacerations.  EYES: No exophthalmos, jaundice or blindness.  ENT: No dizziness, tinnitus or hearing loss.  RESPIRATORY: No shortness of breath.  CARDIOVASCULAR: No tachycardia or chest pain.  GASTROINTESTINAL: No nausea, vomiting, pain, constipation or diarrhea.  GENITOURINARY: No frequency, dysuria or sexual dysfunction.  HEMATOLOGIC/LYMPHATIC: No excessive bleeding, prolonged or excessive bleeding after dental extraction/injury.    Past Medication Trials:  NONE    Past Medical, Family and Social History: The patient's past medical, family and social history have been reviewed and updated as appropriate within the electronic medical record - see encounter notes.    Compliance: yes    Side effects: None    Risk Parameters:  Patient reports no suicidal ideation  Patient reports no homicidal ideation  Patient reports no self-injurious behavior  Patient reports no violent behavior    Exam (detailed: at least 9 elements; comprehensive: all 15 elements)   Constitutional  Vitals:  Most recent vital signs, dated less than 90 days prior to this appointment, were reviewed.   Vitals:    02/20/25 1016   BP: (!) 141/84   Pulse: (!) 111   Weight: 94 kg (207 lb 5.5 oz)      General:  unremarkable, age appropriate, casually dressed     Musculoskeletal  Muscle Strength/Tone:  no spasicity, no rigidity, no cogwheeling, no flaccidity   Gait & Station:  non-ataxic     Psychiatric  Speech:  no latency; no press   Mood & Affect:  steady,  happy  anxious   Thought Process:  normal and logical   Associations:  intact   Thought Content:  normal, no suicidality, no homicidality, delusions, or paranoia   Insight:  has awareness of illness   Judgement: behavior is adequate to circumstances   Orientation:  person, place, situation, time/date   Memory: intact for content of interview   Language: grossly intact   Attention Span & Concentration:  able to focus   Fund of Knowledge:  intact and appropriate to age and level of education     Assessment and Diagnosis   Status/Progress: Based on the examination today, the patient's problem(s) is/are improved.  New problems have not been presented today.   Co-morbidities are not complicating management of the primary condition.  There are no active rule-out diagnoses for this patient at this time.     General Impression: Lisandra Toussaint is a 65 yo female who presents for follow up stating that her anxiety and mood has been overall pretty good. Will D/C Buspar, and continue Cymbalta.       ICD-10-CM ICD-9-CM    1. Adjustment disorder with anxiety  F43.22 309.24       2. Depression, recurrent  F33.9 296.30           Intervention/Counseling/Treatment Plan   Medication Management: Continue current medications.  Continue Cymbalta 60 mg PO daily  D/C Buspar  Advised to see therapist soon   Discussed diagnosis, risk and benefits of proposed treatment above vs alternative treatment vs no treatment, and potential side effects of these treatments, and the inherent unpredictability of individual responses to these treatments. The patient expresses understanding and gives informed consent to pursue treatment at this time, believing that the potential benefits outweigh the potential risks. Patient has no other questions. Risks/adverse effects at this time include but are not limited to: GI side effects, sexual dysfunction, activation vs sedation, triggering of suicidal ideation, and serotonin syndrome.   Patient voices  understanding and agreement with this plan  Provided crisis numbers  Encouraged patient to keep future appointments  Instruct patient to call or message with questions  In the event of an emergency, including suicidal ideation, patient was advised to go to the emergency room      Return to Clinic: 3 months    Total time: 25 minutes (which included pts differential diagnosis and prognosis for psychiatric conditions, risks, benefits of treatments, instructions and adherence to treatment plan, risk reduction, reviewing current psychiatric medication regimen, medical problems and social stressors. In addtion to possible discussion with other healthcare provider/s)    Add on Psychotherapy time: 10 minutes    Sabrina Brian PA-C

## 2025-03-09 DIAGNOSIS — F33.9 DEPRESSION, RECURRENT: ICD-10-CM

## 2025-03-10 RX ORDER — DULOXETIN HYDROCHLORIDE 60 MG/1
60 CAPSULE, DELAYED RELEASE ORAL
Qty: 90 CAPSULE | Refills: 0 | Status: SHIPPED | OUTPATIENT
Start: 2025-03-10

## 2025-03-11 ENCOUNTER — OFFICE VISIT (OUTPATIENT)
Dept: DERMATOLOGY | Facility: CLINIC | Age: 65
End: 2025-03-11
Payer: COMMERCIAL

## 2025-03-11 DIAGNOSIS — D22.9 MULTIPLE BENIGN MELANOCYTIC NEVI: ICD-10-CM

## 2025-03-11 DIAGNOSIS — L30.8 ASTEATOTIC ECZEMA: Primary | ICD-10-CM

## 2025-03-11 DIAGNOSIS — Z12.83 SCREENING EXAM FOR SKIN CANCER: ICD-10-CM

## 2025-03-11 DIAGNOSIS — R23.8 VENOUS LAKE OF LIP: ICD-10-CM

## 2025-03-11 DIAGNOSIS — L73.8 SEBACEOUS HYPERPLASIA: ICD-10-CM

## 2025-03-11 DIAGNOSIS — L82.1 SEBORRHEIC KERATOSES: ICD-10-CM

## 2025-03-11 PROCEDURE — 99204 OFFICE O/P NEW MOD 45 MIN: CPT | Mod: S$GLB,,, | Performed by: DERMATOLOGY

## 2025-03-11 PROCEDURE — 4010F ACE/ARB THERAPY RXD/TAKEN: CPT | Mod: CPTII,S$GLB,, | Performed by: DERMATOLOGY

## 2025-03-11 PROCEDURE — 1159F MED LIST DOCD IN RCRD: CPT | Mod: CPTII,S$GLB,, | Performed by: DERMATOLOGY

## 2025-03-11 PROCEDURE — 1160F RVW MEDS BY RX/DR IN RCRD: CPT | Mod: CPTII,S$GLB,, | Performed by: DERMATOLOGY

## 2025-03-11 RX ORDER — TRIAMCINOLONE ACETONIDE 1 MG/G
CREAM TOPICAL
Qty: 80 G | Refills: 1 | Status: SHIPPED | OUTPATIENT
Start: 2025-03-11

## 2025-03-11 NOTE — PROGRESS NOTES
Patient Information  Name: Lisandra Toussaint  : 1960  MRN: [id number]     Referring Physician:  Self   Primary Care Physician:  Sandra Shetty NP   Date of Visit: 3/11/25      Subjective:     History of Present lllness:    Lisandra Toussaint is a 64 y.o. female who presents with a chief complaint of spot, lesion, and bump.    Spot  Location: left lower lip  Duration: 1 year  Signs/Symptoms: brown lesion on lip, has grown a little, dentist is concerned  Exacerbating factors: none  Relieving factors/Prior treatments: none    Lesion  Location: right breast  Duration: 6 months  Signs/Symptoms: crusty dark spot  Exacerbating factors: none  Relieving factors/Prior treatments: none    Bump  Location: forehead  Duration: months  Signs/Symptoms: lower back  Exacerbating factors: none  Relieving factors/Prior treatments: none    Clinical documentation obtained by nursing staff reviewed.    Review of Systems    Objective:   Physical Exam   Constitutional: She appears well-developed and well-nourished. No distress.   Neurological: She is alert and oriented to person, place, and time. She is not disoriented.   Psychiatric: She has a normal mood and affect.   Skin:   Areas Examined (abnormalities noted in diagram):   Head / Face Inspection Performed  Neck Inspection Performed  Chest / Axilla Inspection Performed  Abdomen Inspection Performed  Back Inspection Performed  RUE Inspected  LUE Inspection Performed  RLE Inspected  LLE Inspection Performed                 Diagram Legend     Erythematous scaling macule/papule c/w actinic keratosis       Vascular papule c/w angioma      Pigmented verrucoid papule/plaque c/w seborrheic keratosis      Yellow umbilicated papule c/w sebaceous hyperplasia      Irregularly shaped tan macule c/w lentigo     1-2 mm smooth white papules consistent with Milia      Movable subcutaneous cyst with punctum c/w epidermal inclusion cyst      Subcutaneous movable cyst c/w pilar cyst      Firm pink to  brown papule c/w dermatofibroma      Pedunculated fleshy papule(s) c/w skin tag(s)      Evenly pigmented macule c/w junctional nevus     Mildly variegated pigmented, slightly irregular-bordered macule c/w mildly atypical nevus      Flesh colored to evenly pigmented papule c/w intradermal nevus       Pink pearly papule/plaque c/w basal cell carcinoma      Erythematous hyperkeratotic cursted plaque c/w SCC      Surgical scar with no sign of skin cancer recurrence      Open and closed comedones      Inflammatory papules and pustules      Verrucoid papule consistent consistent with wart     Erythematous eczematous patches and plaques     Dystrophic onycholytic nail with subungual debris c/w onychomycosis     Umbilicated papule    Erythematous-base heme-crusted tan verrucoid plaque consistent with inflamed seborrheic keratosis     Erythematous Silvery Scaling Plaque c/w Psoriasis     See annotation    No images are attached to the encounter or orders placed in the encounter.      [] Data reviewed  [] Prior external notes reviewed  [] Independent review of test  [] Management discussed with another provider  [] Independent historian    Assessment / Plan:        Asteatotic eczema  - chronic problem, not at treatment goal  Use ceramide-containing moisturizers (such as CeraVe Moisturizing Cream, Eucerin Advanced Repair Cream, La Roche-Posay Lipikar AP+ Triple Repair Body Moisturizer, or CeraVe Healing Ointment) to repair the skin barrier and to minimize irritation.   -     triamcinolone acetonide 0.1% (KENALOG) 0.1 % cream; Apply to affected areas of body BID prn rash. Do not use on face, underarms, or groin.  Dispense: 80 g; Refill: 1  Side effects from the overuse of topical steroids include thinning of skin, easy tearing/bruising of skin, stretch marks, spider veins, etc. Use the topical steroid no more than 2 days per week if used long-term and/or take breaks from the topical steroid, especially if any of the above side  effects are noticed.    Venous lake of lip  Reassurance was given to the patient. No treatment is necessary.  Recommended and discussed risks, benefits, alternatives, and side effects of pulse dye laser (PDL).    Multiple benign melanocytic nevi  Multiple benign-appearing nevi present on exam today. Reassurance provided. Counseled patient to periodically examine moles and return to clinic if any changes in size, shape, or color are noted or if it becomes symptomatic (bleeding, itching, pain, etc).  Recommend using a broad-spectrum, water-resistant sunscreen with SPF of 30 or higher--reapply every 2 hours. Seek shade, wear sun-protective clothing, and perform regular skin self-exams.    Sebaceous hyperplasia  This is a common condition representing benign enlargement of oil glands. It typically occurs in adulthood. Reassurance was given to the patient. No treatment required.    Seborrheic keratoses  These are benign, inherited growths without a malignant potential. Reassurance given to patient. No treatment is necessary.    Screening exam for skin cancer  Total body skin examination performed today as noted in physical exam. No lesions suspicious for malignancy were seen.  Recommend using a broad-spectrum, water-resistant sunscreen with SPF of 30 or higher--reapply every 2 hours. Seek shade, wear sun-protective clothing, and perform regular skin self-exams.       Follow up for any new problems or changing lesions.      Ashley Melvin MD, FAAD  [facility]

## 2025-03-11 NOTE — PATIENT INSTRUCTIONS
Use ceramide-containing moisturizers (such as CeraVe Moisturizing Cream, Eucerin Advanced Repair Cream, La Roche-Posay Lipikar AP+ Triple Repair Body Moisturizer, or CeraVe Healing Ointment) to repair the skin barrier and to minimize irritation.

## 2025-03-13 DIAGNOSIS — F43.22 ADJUSTMENT DISORDER WITH ANXIETY: ICD-10-CM

## 2025-03-13 DIAGNOSIS — I10 BENIGN ESSENTIAL HTN: ICD-10-CM

## 2025-03-13 RX ORDER — LOSARTAN POTASSIUM 50 MG/1
50 TABLET ORAL 2 TIMES DAILY
Qty: 60 TABLET | Refills: 0 | Status: SHIPPED | OUTPATIENT
Start: 2025-03-13

## 2025-03-14 ENCOUNTER — PATIENT MESSAGE (OUTPATIENT)
Dept: PRIMARY CARE CLINIC | Facility: CLINIC | Age: 65
End: 2025-03-14
Payer: COMMERCIAL

## 2025-03-17 ENCOUNTER — OFFICE VISIT (OUTPATIENT)
Dept: NEUROSURGERY | Facility: CLINIC | Age: 65
End: 2025-03-17
Payer: COMMERCIAL

## 2025-03-17 VITALS — DIASTOLIC BLOOD PRESSURE: 67 MMHG | SYSTOLIC BLOOD PRESSURE: 124 MMHG | HEART RATE: 92 BPM

## 2025-03-17 DIAGNOSIS — Q28.2 ARTERIOVENOUS MALFORMATION OF CEREBRAL VESSELS: ICD-10-CM

## 2025-03-17 DIAGNOSIS — I67.1 CEREBRAL ANEURYSM, NONRUPTURED: ICD-10-CM

## 2025-03-17 DIAGNOSIS — I72.9 ANEURYSM: Primary | ICD-10-CM

## 2025-03-17 PROCEDURE — 99999 PR PBB SHADOW E&M-EST. PATIENT-LVL III: CPT | Mod: PBBFAC,,, | Performed by: NEUROLOGICAL SURGERY

## 2025-03-17 PROCEDURE — 3078F DIAST BP <80 MM HG: CPT | Mod: CPTII,S$GLB,, | Performed by: NEUROLOGICAL SURGERY

## 2025-03-17 PROCEDURE — 4010F ACE/ARB THERAPY RXD/TAKEN: CPT | Mod: CPTII,S$GLB,, | Performed by: NEUROLOGICAL SURGERY

## 2025-03-17 PROCEDURE — 99417 PROLNG OP E/M EACH 15 MIN: CPT | Mod: S$GLB,,, | Performed by: NEUROLOGICAL SURGERY

## 2025-03-17 PROCEDURE — 99215 OFFICE O/P EST HI 40 MIN: CPT | Mod: S$GLB,,, | Performed by: NEUROLOGICAL SURGERY

## 2025-03-17 PROCEDURE — 1159F MED LIST DOCD IN RCRD: CPT | Mod: CPTII,S$GLB,, | Performed by: NEUROLOGICAL SURGERY

## 2025-03-17 PROCEDURE — 3074F SYST BP LT 130 MM HG: CPT | Mod: CPTII,S$GLB,, | Performed by: NEUROLOGICAL SURGERY

## 2025-03-17 NOTE — PROGRESS NOTES
Neurosurgery  Established Patient    Scribe Attestation  I, Eloisa Zhou, attest that this documentation has been prepared under the direction and in the presence of Vlad Green MD.    SUBJECTIVE:     History of Present Illness 12/04/2023  Lisandra Toussaint is a 62 year-old female with chronic conditions of anxiety, TMJ, HTN who was referred to me for evaluation of aneurysm. The patient reports in September of 2023 she experienced an onset of symptoms including left ear ringing, visual disturbances, and lightheadedness induced by bright light. Months following, she states her ear ringing did not resolve and progressively worsened. The patient reported 24 years ago she endured a concussion w/ LOC. Due to the history she was recommended to have a CT scan which discovered the aneurysm. She currently reports associated symptoms of eye pain, eye dryness, and hearing loss (L>R). The patient denies a history of smoking and rarely drinks alcohol, 1-2 glasses a year. 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      Interval Hx 12/31/24  Patient returns to clinic for f/u with updated imaging. Today she reports feeling okay. She has been having headaches which sit at a 3/10. No nausea or vomiting. Has occasional dizziness which she attributes to moving fast. Patient also continues to have daily word loss mid-conversation. Otherwise she is fine and has no other acute changes or issues to report. Compliant with ASA 81 mg and Plavix.     Interval Hx 03/17/25  Patient returns to clinic for f/u after undergoing cerebral angiogram for evaluation of saccular AComm aneurysm. Today she reports feeling overall well. Has had some headaches that sit around a 2/10 but are tolerable. No nausea or vomiting. She has been concerned due to recent development of daily night sweats that cause her to wake up. When she wakes up, she feels dizzy and off balance. Denies any falls. Symptoms did not  appear last night. On ASA and Plavix.      Review of patient's allergies indicates:   Allergen Reactions    Cinnamon Anaphylaxis    Iodine and iodide containing products Other (See Comments)     Tears up skin    Macrodantin [nitrofurantoin macrocrystalline] Other (See Comments)     Knee and elbow swelling    Povidone-iodine      Other reaction(s): Blister    Sulfa (sulfonamide antibiotics) Hives       Current Medications[1]    Past Medical History:   Diagnosis Date    Aneurysm     Anxiety     HTN (hypertension)     Sleep apnea     TMJ (dislocation of temporomandibular joint)      Past Surgical History:   Procedure Laterality Date    ABDOMINAL SURGERY      CARPAL TUNNEL RELEASE      EYE SURGERY      FOOT SURGERY Bilateral     tarsel tunnel       Family History       Problem Relation (Age of Onset)    Cancer Mother, Maternal Grandfather    Heart disease Maternal Grandmother    Hypertension Mother, Maternal Grandfather    Learning disabilities Son, Son    Stroke Maternal Grandfather          Social History     Socioeconomic History    Marital status: Single   Tobacco Use    Smoking status: Never    Smokeless tobacco: Never    Tobacco comments:     2nd hand smoke when it was not banned   Substance and Sexual Activity    Alcohol use: Not Currently    Drug use: Never    Sexual activity: Not Currently     Partners: Male     Birth control/protection: Abstinence, Post-menopausal, None     Comment: partner have ED     Social Drivers of Health     Financial Resource Strain: High Risk (2/20/2025)    Overall Financial Resource Strain (CARDIA)     Difficulty of Paying Living Expenses: Hard   Food Insecurity: Food Insecurity Present (2/20/2025)    Hunger Vital Sign     Worried About Running Out of Food in the Last Year: Sometimes true     Ran Out of Food in the Last Year: Patient declined   Transportation Needs: No Transportation Needs (2/20/2025)    PRAPARE - Transportation     Lack of Transportation (Medical): No     Lack of  Transportation (Non-Medical): No   Physical Activity: Insufficiently Active (2/20/2025)    Exercise Vital Sign     Days of Exercise per Week: 1 day     Minutes of Exercise per Session: 40 min   Stress: No Stress Concern Present (2/20/2025)    [employer] - Occupational Stress Questionnaire     Feeling of Stress : Only a little   Housing Stability: High Risk (2/20/2025)    Housing Stability Vital Sign     Unable to Pay for Housing in the Last Year: Yes     Number of Times Moved in the Last Year: 0     Homeless in the Last Year: No       Review of Systems   Endocrine:        Night sweats   Neurological:  Positive for dizziness and headaches.   All other systems reviewed and are negative.      OBJECTIVE:     Vital Signs     There is no height or weight on file to calculate BMI.    Neurosurgery Physical Exam  General: no acute distress  Head: Non-traumatic, normocephalic  Eyes: Pupils equal, EOMI  Neck: Supple, normal ROM, no tenderness to palpation  CVS: Normal rate and rhythm, distal pulses present  Pulm: Symmetric expansion, no respiratory distress  GI: Abdomen nondistended, nontender    MSK: Moves all extremities without restriction, atraumatic  Skin: Dry, intact  Psych: Normal thought content and cognition    Neuro:  Alert, awake, oriented, to self, place, time  Language:  Speech is fluent, goal directed without any noted dysarthria or aphasia    Cranial nerves:    CNII-XII: Intact on fine exam,   Pupils equal round react to light,   Extraocular muscles are intact  V1 to V3 is intact to light touch,   no facial asymmetry,   Hearing is intact to finger rub and voice  tongue/uvula/palate midline,   shoulder shrug equal,     No pronator drift    Extremities:  Motor:  Upper Extremity    Deltoid Triceps Biceps Wrist  Extension Wrist  Flexion Interosseous   R 5/5 5/5 5/5 5/5 5/5 5/5   L 5/5 5/5 5/5 5/5 5/5 5/5       Thumb   Abduction Thumb  ADDuction Finger  Flexion Finger  Extension     R  5/5 5/5 5/5 5/5     L 5/5 5/5 5/5 5/5        Lower Extremity     Iliopsoas Quadriceps Hamstring Plantarflexion Dorsiflexion EHL   R 5/5 5/5 5/5 5/5 5/5 5/5   L 5/5 5/5 5/5 5/5 5/5 5/5       Reflexes:     DTR: 2+ biceps    2+ triceps   2+ brachioradialis   2+ patellar  2+ Achilles     Butler's: Negative     Babinski's: Negative     Clonus: Negative     Sensory:      Sensation intact to light touch, temperature sensation, vibration, pinprick     Coordination:      Coordination intact throughout, no dysmetria, normal rapid alternating movements, no dysdiadochokinesia  Cerebellar:  Normal finger-to-nose, normal heel-to-shin  Cervical spine:  No midline cervical tenderness, negative Lhermitte, negative Spurling  Thoracic spine:  No midline thoracic tenderness  Lumbar spine:  No midline lumbar tenderness     Diagnostic Results:  I personally reviewed the patient's Diagnostic Imaging.     IR Angiogram Carotid Cerebral Bilateral 02/16/25  Previously treated anterior communicating artery aneurysm with Aj-Av class II occlusion.     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     Patient presents with 2/10 headaches and recent development of daily night sweats associated with dizziness and balance issues.    Discussed imaging results of IR Angiogram Carotid Cerebral Bilateral revealing previously treated anterior communicating artery aneurysm with Aj-Av class II occlusion.    After discussion with the patient, I recommend follow up in 6 months with repeat MRA Brain with contrast (vessel wall imaging) and to begin weaning off Plavix over the next week. Continue taking ASA 81 mg. I have answered all of their questions and patient wish to proceed with this plan. We will schedule patient.      Thank you so very much for allowing me to participate in the care of this patient.  Please feel free to call any  questions, comments, or concerns.     Vlad Green MD,MSc  Department of Neurosurgery   Department of Radiology  Department of Neurology  [facility]    [facility]   [employer] [facility] [employer] [facility]     Total time spent in counseling and discussion about further management options including relevant lab work, treatment,  prognosis, medications and intended side effects was more than 60 minutes. More than 50 % of the time was spent in counseling and coordination of care.  I spent a total of 60 minutes on the day of the visit.This includes face to face time and non-face to face time preparing to see the patient (eg, review of tests), Obtaining and/or reviewing separately obtained history, Documenting clinical information in the electronic or other health record, Independently interpreting resultsand communicating results to the patient/family/caregiver, or Care coordination.     Scribe Attestation  I, Dr. Vlad Green personally performed the services described in this documentation. All medical record entries made by the scribe, Eloisa Zhou, were at my direction and in my presence.  I have reviewed the chart and agree that the record reflects my personal performance and is accurate and complete.           [1]   Current Outpatient Medications   Medication Sig Dispense Refill    amLODIPine (NORVASC) 10 MG tablet Take 1 tablet by mouth once daily 90 tablet 0    aspirin 81 MG Chew Take 1 tablet (81 mg total) by mouth once daily. 30 tablet 0    cartilage/collagen/bor/hyalur (JOINT HEALTH ORAL) Take 1 tablet by mouth once daily.      clopidogreL (PLAVIX) 75 mg tablet Take 1 tablet (75 mg total) by mouth once daily. To take with aspirin 325 mg daily (otc) 30 tablet 11    cranberry extract (CRANBERRY CONCENTRATE) 500 mg Cap Take 1 tablet by mouth once daily.      DAILY MULTI-VITAMIN ORAL Take by mouth.       diphenhydrAMINE (BENADRYL) 50 MG capsule Pt to take 50 mg benadryl at 1 hour before procedure      DULoxetine (CYMBALTA) 60 MG capsule Take 1 capsule by mouth once daily 90 capsule 0    EPINEPHrine (EPIPEN) 0.3 mg/0.3 mL AtIn Inject one syringe at first symptoms, may repeat x 1 within 5-15 minutes 2 each 1    hydroCHLOROthiazide (MICROZIDE) 12.5 mg capsule Take 1 capsule by mouth once daily (Patient taking differently: Take by mouth daily as needed. Take 1 capsule by mouth once daily) 90 capsule 0    losartan (COZAAR) 50 MG tablet Take 1 tablet by mouth twice daily 60 tablet 0    predniSONE (DELTASONE) 50 MG Tab Pt to take 1 tab (50mg total) 13 hours, 7 hours, 1 hour before procedure in addition take over the counter benadryl 50 mg 1 hr prior to procedure 3 tablet 0    tiZANidine (ZANAFLEX) 4 MG tablet Take 1 tablet (4 mg total) by mouth 2 (two) times a day. 180 tablet 3    triamcinolone acetonide 0.1% (KENALOG) 0.1 % cream Apply to affected areas of body BID prn rash. Do not use on face, underarms, or groin. 80 g 1     No current facility-administered medications for this visit.

## 2025-03-26 ENCOUNTER — PATIENT MESSAGE (OUTPATIENT)
Dept: SLEEP MEDICINE | Facility: CLINIC | Age: 65
End: 2025-03-26
Payer: COMMERCIAL

## 2025-03-27 ENCOUNTER — TELEPHONE (OUTPATIENT)
Dept: PRIMARY CARE CLINIC | Facility: CLINIC | Age: 65
End: 2025-03-27
Payer: COMMERCIAL

## 2025-03-27 NOTE — TELEPHONE ENCOUNTER
Response from Dr. Green neurosurgery with colonoscopy recommendation    Hi, I trust you are well and enjoying your day. I think she can come off the Plavix based off her recent angiogram, I would, however, keep her on the aspirin but down from 325 to 81mg.    VF  Thank you for reaching out. Have a wonderful day!

## 2025-04-07 RX ORDER — TIZANIDINE 4 MG/1
4 TABLET ORAL 2 TIMES DAILY
Qty: 180 TABLET | Refills: 0 | Status: SHIPPED | OUTPATIENT
Start: 2025-04-07

## 2025-04-08 DIAGNOSIS — F43.22 ADJUSTMENT DISORDER WITH ANXIETY: ICD-10-CM

## 2025-04-08 DIAGNOSIS — I10 BENIGN ESSENTIAL HTN: ICD-10-CM

## 2025-04-08 RX ORDER — LOSARTAN POTASSIUM 50 MG/1
50 TABLET ORAL 2 TIMES DAILY
Qty: 60 TABLET | Refills: 0 | Status: SHIPPED | OUTPATIENT
Start: 2025-04-08

## 2025-04-12 ENCOUNTER — TELEPHONE (OUTPATIENT)
Dept: ENDOSCOPY | Facility: HOSPITAL | Age: 65
End: 2025-04-12

## 2025-04-12 ENCOUNTER — CLINICAL SUPPORT (OUTPATIENT)
Dept: ENDOSCOPY | Facility: HOSPITAL | Age: 65
End: 2025-04-12
Payer: COMMERCIAL

## 2025-04-12 VITALS — HEIGHT: 65 IN | BODY MASS INDEX: 34.49 KG/M2 | WEIGHT: 207 LBS

## 2025-04-12 DIAGNOSIS — Z12.11 SCREEN FOR COLON CANCER: ICD-10-CM

## 2025-04-12 DIAGNOSIS — Z12.11 ENCOUNTER FOR SCREENING COLONOSCOPY: Primary | ICD-10-CM

## 2025-04-12 NOTE — TELEPHONE ENCOUNTER
Referral for procedure from PAT appointment      Spoke to patient to schedule procedure(s) Colonoscopy       Physician to perform procedure(s) Dr. Anne  Date of Procedure (s) 05/06/2025  Arrival Time 11:30 AM   Time of Procedure(s) 12:30 Pm    Location of Procedure(s) [place] 4th Floor  Type of Rx Prep sent to patient: Suprep  Instructions provided to [facility] via [facility]    Patient was informed on the following information and verbalized understanding. Screening questionnaire reviewed with patient and complete. If procedure requires anesthesia, a responsible adult needs to be present to accompany the patient home, patient cannot drive after receiving anesthesia. Appointment details are tentative, especially check-in time. Patient will receive a prep-op call 7 days prior to confirm check-in time for procedure. If applicable the patient should contact their pharmacy to verify Rx for procedure prep is ready for pick-up. Patient was advised to call the scheduling department at [phone] if pharmacy states no Rx is available. Patient was advised to call the endoscopy scheduling department if any questions or concerns arise.      [facility] Scheduling Department

## 2025-04-12 NOTE — PLAN OF CARE
Referral for procedure from PAT appointment      Spoke to patient to schedule procedure(s) Colonoscopy       Physician to perform procedure(s) Dr. Anne  Date of Procedure (s) 05/06/2025  Arrival Time 11:30 AM   Time of Procedure(s) 12:30 Pm    Location of Procedure(s) [place] 4th Floor  Type of Rx Prep sent to patient: Suprep  Instructions provided to patient via [facility]    Patient was informed on the following information and verbalized understanding. Screening questionnaire reviewed with patient and complete. If procedure requires anesthesia, a responsible adult needs to be present to accompany the patient home, patient cannot drive after receiving anesthesia. Appointment details are tentative, especially check-in time. Patient will receive a prep-op call 7 days prior to confirm check-in time for procedure. If applicable the patient should contact their pharmacy to verify Rx for procedure prep is ready for pick-up. Patient was advised to call the scheduling department at [phone] if pharmacy states no Rx is available. Patient was advised to call the endoscopy scheduling department if any questions or concerns arise.      [facility] Scheduling Department

## 2025-04-20 RX ORDER — AMLODIPINE BESYLATE 10 MG/1
10 TABLET ORAL
Qty: 90 TABLET | Refills: 0 | Status: SHIPPED | OUTPATIENT
Start: 2025-04-20

## 2025-04-28 ENCOUNTER — PATIENT MESSAGE (OUTPATIENT)
Dept: ENDOSCOPY | Facility: HOSPITAL | Age: 65
End: 2025-04-28
Payer: COMMERCIAL

## 2025-05-06 ENCOUNTER — ANESTHESIA (OUTPATIENT)
Dept: ENDOSCOPY | Facility: HOSPITAL | Age: 65
End: 2025-05-06
Payer: COMMERCIAL

## 2025-05-06 ENCOUNTER — ANESTHESIA EVENT (OUTPATIENT)
Dept: ENDOSCOPY | Facility: HOSPITAL | Age: 65
End: 2025-05-06
Payer: COMMERCIAL

## 2025-05-06 ENCOUNTER — HOSPITAL ENCOUNTER (OUTPATIENT)
Facility: HOSPITAL | Age: 65
Discharge: HOME | End: 2025-05-06
Payer: COMMERCIAL

## 2025-05-06 VITALS
RESPIRATION RATE: 18 BRPM | BODY MASS INDEX: 34.49 KG/M2 | DIASTOLIC BLOOD PRESSURE: 62 MMHG | HEIGHT: 65 IN | TEMPERATURE: 98 F | WEIGHT: 207 LBS | SYSTOLIC BLOOD PRESSURE: 114 MMHG | HEART RATE: 75 BPM | OXYGEN SATURATION: 96 %

## 2025-05-06 DIAGNOSIS — Z12.11 SCREEN FOR COLON CANCER: ICD-10-CM

## 2025-05-06 DIAGNOSIS — Z86.0100 HISTORY OF COLON POLYPS: ICD-10-CM

## 2025-05-06 PROCEDURE — 45385 COLONOSCOPY W/LESION REMOVAL: CPT | Mod: 33,,, | Performed by: STUDENT IN AN ORGANIZED HEALTH CARE EDUCATION/TRAINING PROGRAM

## 2025-05-06 PROCEDURE — 45385 COLONOSCOPY W/LESION REMOVAL: CPT | Mod: 33 | Performed by: STUDENT IN AN ORGANIZED HEALTH CARE EDUCATION/TRAINING PROGRAM

## 2025-05-06 PROCEDURE — 88305 TISSUE EXAM BY PATHOLOGIST: CPT | Mod: TC | Performed by: STUDENT IN AN ORGANIZED HEALTH CARE EDUCATION/TRAINING PROGRAM

## 2025-05-06 PROCEDURE — 25000003 PHARM REV CODE 250: Performed by: NURSE ANESTHETIST, CERTIFIED REGISTERED

## 2025-05-06 PROCEDURE — 37000008 HC ANESTHESIA 1ST 15 MINUTES: Performed by: STUDENT IN AN ORGANIZED HEALTH CARE EDUCATION/TRAINING PROGRAM

## 2025-05-06 PROCEDURE — 27201089 HC SNARE, DISP (ANY): Performed by: STUDENT IN AN ORGANIZED HEALTH CARE EDUCATION/TRAINING PROGRAM

## 2025-05-06 PROCEDURE — 63600175 PHARM REV CODE 636 W HCPCS: Performed by: NURSE ANESTHETIST, CERTIFIED REGISTERED

## 2025-05-06 PROCEDURE — 37000009 HC ANESTHESIA EA ADD 15 MINS: Performed by: STUDENT IN AN ORGANIZED HEALTH CARE EDUCATION/TRAINING PROGRAM

## 2025-05-06 RX ORDER — PROPOFOL 10 MG/ML
VIAL (ML) INTRAVENOUS CONTINUOUS PRN
Status: DISCONTINUED | OUTPATIENT
Start: 2025-05-06 | End: 2025-05-06

## 2025-05-06 RX ORDER — SODIUM CHLORIDE 9 MG/ML
INJECTION, SOLUTION INTRAVENOUS CONTINUOUS
Status: DISCONTINUED | OUTPATIENT
Start: 2025-05-06 | End: 2025-05-06 | Stop reason: HOSPADM

## 2025-05-06 RX ORDER — DEXMEDETOMIDINE HYDROCHLORIDE 100 UG/ML
INJECTION, SOLUTION INTRAVENOUS
Status: DISCONTINUED | OUTPATIENT
Start: 2025-05-06 | End: 2025-05-06

## 2025-05-06 RX ORDER — LIDOCAINE HYDROCHLORIDE 20 MG/ML
INJECTION INTRAVENOUS
Status: DISCONTINUED | OUTPATIENT
Start: 2025-05-06 | End: 2025-05-06

## 2025-05-06 RX ADMIN — PROPOFOL 50 MG: 10 INJECTION, EMULSION INTRAVENOUS at 01:05

## 2025-05-06 RX ADMIN — LIDOCAINE HYDROCHLORIDE 50 MG: 20 INJECTION INTRAVENOUS at 01:05

## 2025-05-06 RX ADMIN — PROPOFOL 175 MCG/KG/MIN: 10 INJECTION, EMULSION INTRAVENOUS at 01:05

## 2025-05-06 RX ADMIN — SODIUM CHLORIDE: 0.9 INJECTION, SOLUTION INTRAVENOUS at 12:05

## 2025-05-06 RX ADMIN — DEXMEDETOMIDINE 8 MCG: 100 INJECTION, SOLUTION, CONCENTRATE INTRAVENOUS at 01:05

## 2025-05-06 NOTE — PLAN OF CARE
Pt is AA0X4, vitals are stable, and discharge criteria has been met. Pt was educated on procedure results and discharge instructions. Pt verbalized understanding, discharge home with family member

## 2025-05-06 NOTE — ANESTHESIA POSTPROCEDURE EVALUATION
Anesthesia Post Evaluation    Patient: Lisandra Toussaint    Procedure(s) Performed: Procedure(s) (LRB):  COLONOSCOPY, SCREENING, LOW RISK PATIENT (N/A)    Final Anesthesia Type: general      Patient location during evaluation: PACU  Patient participation: Yes- Able to Participate  Level of consciousness: awake and alert  Post-procedure vital signs: reviewed and stable  Pain management: adequate  Airway patency: patent  LENNIE mitigation strategies: Multimodal analgesia  PONV status at discharge: No PONV  Anesthetic complications: no      Cardiovascular status: blood pressure returned to baseline and hemodynamically stable  Respiratory status: unassisted  Hydration status: euvolemic  Follow-up not needed.              Vitals Value Taken Time   /62 05/06/25 14:20   Temp 36.6 °C (97.9 °F) 05/06/25 13:50   Pulse 75 05/06/25 14:20   Resp 18 05/06/25 14:20   SpO2 96 % 05/06/25 14:20         Event Time   Out of Recovery 14:49:00         Pain/Gonzalez Score: Gonzalez Score: 10 (5/6/2025  2:05 PM)

## 2025-05-06 NOTE — PROVATION PATIENT INSTRUCTIONS
Discharge Summary/Instructions after an Endoscopic Procedure  Patient Name: Lisandra Toussaint  Patient MRN: [id number]  Patient YOB: 1960  Tuesday, May 6, 2025  Roxana Anne MD  Dear patient,  As a result of recent federal legislation (The Federal Cures Act), you may   receive lab or pathology results from your procedure in your [facility]   account before your physician is able to contact you. Your physician or   their representative will relay the results to you with their   recommendations at their soonest availability.  Thank you,  RESTRICTIONS:  During your procedure today, you received medications for sedation.  These   medications may affect your judgment, balance and coordination.  Therefore,   for 24 hours, you have the following restrictions:   - DO NOT drive a car, operate machinery, make legal/financial decisions,   sign important papers or drink alcohol.    ACTIVITY:  Today: no heavy lifting, straining or running due to procedural   sedation/anesthesia.  The following day: return to full activity including work.  DIET:  Eat and drink normally unless instructed otherwise.     TREATMENT FOR COMMON SIDE EFFECTS:  - Mild abdominal pain, nausea, belching, bloating or excessive gas:  rest,   eat lightly and use a heating pad.  - Sore Throat: treat with throat lozenges and/or gargle with warm salt   water.  - Because air was used during the procedure, expelling large amounts of air   from your rectum or belching is normal.  - If a bowel prep was taken, you may not have a bowel movement for 1-3 days.    This is normal.  SYMPTOMS TO WATCH FOR AND REPORT TO YOUR PHYSICIAN:  1. Abdominal pain or bloating, other than gas cramps.  2. Chest pain.  3. Back pain.  4. Signs of infection such as: chills or fever occurring within 24 hours   after the procedure.  5. Rectal bleeding, which would show as bright red, maroon, or black stools.   (A tablespoon of blood from the rectum is not serious, especially if    hemorrhoids are present.)  6. Vomiting.  7. Weakness or dizziness.  GO DIRECTLY TO THE NEAREST EMERGENCY ROOM IF YOU HAVE ANY OF THE FOLLOWING:      Difficulty breathing              Chills and/or fever over 101 F   Persistent vomiting and/or vomiting blood   Severe abdominal pain   Severe chest pain   Black, tarry stools   Bleeding- more than one tablespoon   Any other symptom or condition that you feel may need urgent attention  Your doctor recommends these additional instructions:  If any biopsies were taken, your doctors clinic will contact you in 1 to 2   weeks with any results.  - Discharge patient to home (ambulatory).   - Patient has a contact number available for emergencies.  The signs and   symptoms of potential delayed complications were discussed with the   patient.  Return to normal activities tomorrow.  Written discharge   instructions were provided to the patient.   - Resume previous diet.   - Continue present medications.   - Return to primary care physician as previously scheduled.   - Repeat colonoscopy in 5 years for surveillance.  For questions, problems or results please call your physician - Roxana Anne MD at Work:  [phone].  [facility], EMERGENCY ROOM PHONE NUMBER: [phone]  IF A COMPLICATION OR EMERGENCY SITUATION ARISES AND YOU ARE UNABLE TO REACH   YOUR PHYSICIAN - GO DIRECTLY TO THE EMERGENCY ROOM.  MD Roxana Sue MD  5/6/2025 1:48:39 PM  This report has been verified and signed electronically.  Dear patient,  As a result of recent federal legislation (The Federal Cures Act), you may   receive lab or pathology results from your procedure in your [facility]   account before your physician is able to contact you. Your physician or   their representative will relay the results to you with their   recommendations at their soonest availability.  Thank you,  PROVATION

## 2025-05-06 NOTE — H&P
Short Stay Endoscopy History and Physical    PCP - Sandra Shetty NP  Referring Physician - Sandra Shetty, NP  [address]  [address]  [place]  [place] [address]    Procedure - Colonoscopy  ASA - per anesthesia  Mallampati - per anesthesia  History of Anesthesia problems - no  Family history Anesthesia problems -  no   Plan of anesthesia - General    HPI:  This is a 64 y.o. female here for evaluation of: personal history of colon polyps.    Reflux - no  Dysphagia - no  Abdominal pain - no  Diarrhea - no    ROS:  Constitutional: No fevers, chills, No weight loss  CV: No chest pain  Pulm: No cough, No shortness of breath  GI: see HPI    Medical History:  has a past medical history of Aneurysm, Anxiety, HTN (hypertension), Sleep apnea, and TMJ (dislocation of temporomandibular joint).    Surgical History:  has a past surgical history that includes tarsel tunnel; Eye surgery; Carpal tunnel release; Foot surgery (Bilateral); and Abdominal surgery.    Family History: family history includes Cancer in her maternal grandfather and mother; Heart disease in her maternal grandmother; Hypertension in her maternal grandfather and mother; Learning disabilities in her son and son; Stroke in her maternal grandfather..    Social History:  reports that she has never smoked. She has never used smokeless tobacco. She reports that she does not currently use alcohol. She reports that she does not use drugs.    Review of patient's allergies indicates:   Allergen Reactions    Cinnamon Anaphylaxis    Iodine and iodide containing products Other (See Comments)     Tears up skin    Macrodantin [nitrofurantoin macrocrystalline] Other (See Comments)     Knee and elbow swelling    Povidone-iodine      Other reaction(s): Blister    Sulfa (sulfonamide antibiotics) Hives       Medications:   Prescriptions Prior to Admission[1]    Physical Exam:    Vital Signs:   Vitals:    05/06/25 1133   BP: (!) 144/64   Pulse: 86   Resp: 16    Temp: 99 °F (37.2 °C)       General Appearance: Well appearing in no acute distress  Lungs: no labored breathing  CVS:  regular rate  Abdomen: non tender    Labs:  Lab Results   Component Value Date    WBC 9.70 06/21/2024    HGB 12.3 06/21/2024    HCT 37.3 06/21/2024     06/21/2024    CHOL 243 (H) 04/05/2024    TRIG 97 04/05/2024    HDL 56 04/05/2024    ALT 12 06/21/2024    AST 19 06/21/2024     02/04/2025    K 5.5 (H) 02/04/2025     02/04/2025    CREATININE 1.0 02/04/2025    BUN 28 (H) 02/04/2025    CO2 24 02/04/2025    TSH 3.142 04/05/2024    INR 1.4 (H) 06/21/2024    HGBA1C 5.8 (H) 06/21/2024       I have explained the risks and benefits of this endoscopic procedure to the patient including but not limited to bleeding, inflammation, infection, perforation, and death.      ELKE LEE MD        [1]   Medications Prior to Admission   Medication Sig Dispense Refill Last Dose/Taking    amLODIPine (NORVASC) 10 MG tablet Take 1 tablet by mouth once daily 90 tablet 0 5/6/2025    DAILY MULTI-VITAMIN ORAL Take by mouth.   5/5/2025    DULoxetine (CYMBALTA) 60 MG capsule Take 1 capsule by mouth once daily 90 capsule 0 5/5/2025    hydroCHLOROthiazide (MICROZIDE) 12.5 mg capsule Take 1 capsule by mouth once daily (Patient taking differently: Take by mouth daily as needed. Take 1 capsule by mouth once daily) 90 capsule 0 5/5/2025    losartan (COZAAR) 50 MG tablet Take 1 tablet by mouth twice daily 60 tablet 0 5/5/2025    aspirin 81 MG Chew Take 1 tablet (81 mg total) by mouth once daily. 30 tablet 0     cartilage/collagen/bor/hyalur (JOINT HEALTH ORAL) Take 1 tablet by mouth once daily.       clopidogreL (PLAVIX) 75 mg tablet Take 1 tablet (75 mg total) by mouth once daily. To take with aspirin 325 mg daily (otc) 30 tablet 11     cranberry extract (CRANBERRY CONCENTRATE) 500 mg Cap Take 1 tablet by mouth once daily.   More than a month    diphenhydrAMINE (BENADRYL) 50 MG capsule Pt to take 50 mg  benadryl at 1 hour before procedure   More than a month    EPINEPHrine (EPIPEN) 0.3 mg/0.3 mL AtIn Inject one syringe at first symptoms, may repeat x 1 within 5-15 minutes 2 each 1 More than a month    predniSONE (DELTASONE) 50 MG Tab Pt to take 1 tab (50mg total) 13 hours, 7 hours, 1 hour before procedure in addition take over the counter benadryl 50 mg 1 hr prior to procedure 3 tablet 0     tiZANidine (ZANAFLEX) 4 MG tablet Take 1 tablet by mouth twice daily 180 tablet 0     triamcinolone acetonide 0.1% (KENALOG) 0.1 % cream Apply to affected areas of body BID prn rash. Do not use on face, underarms, or groin. 80 g 1

## 2025-05-06 NOTE — ANESTHESIA PREPROCEDURE EVALUATION
Past Medical History:   Diagnosis Date    Aneurysm     Anxiety     HTN (hypertension)     Sleep apnea     TMJ (dislocation of temporomandibular joint)                                                Past Surgical History:   Procedure Laterality Date    ABDOMINAL SURGERY      CARPAL TUNNEL RELEASE      EYE SURGERY      FOOT SURGERY Bilateral     tarsel tunnel                                                                            05/06/2025  Lisandra Toussaint is a 64 y.o., female.      Pre-op Assessment    I have reviewed the Patient Summary Reports.     I have reviewed the Nursing Notes.    I have reviewed the Medications.     Review of Systems      Physical Exam  General: Well nourished, Alert and Oriented    Dental:  Intact      Anesthesia Plan  Type of Anesthesia, risks & benefits discussed:    Anesthesia Type: Gen Natural Airway  Intra-op Monitoring Plan: Standard ASA Monitors  Post Op Pain Control Plan: multimodal analgesia  Induction:  IV  Informed Consent: Informed consent signed with the Patient and all parties understand the risks and agree with anesthesia plan.  All questions answered.   ASA Score: 2  Day of Surgery Review of History & Physical: H&P Update referred to the surgeon/provider.I have interviewed and examined the patient. I have reviewed the patient's H&P dated: There are no significant changes.     Ready For Surgery From Anesthesia Perspective.     .

## 2025-05-06 NOTE — TRANSFER OF CARE
"Anesthesia Transfer of Care Note    Patient: Lisandra Toussaint    Procedure(s) Performed: Procedure(s) (LRB):  COLONOSCOPY, SCREENING, LOW RISK PATIENT (N/A)    Patient location: PACU    Anesthesia Type: general    Transport from OR: Transported from OR on 6-10 L/min O2 by face mask with adequate spontaneous ventilation    Post pain: adequate analgesia    Post assessment: no apparent anesthetic complications    Post vital signs: stable    Level of consciousness: awake    Nausea/Vomiting: no nausea/vomiting    Complications: none    Transfer of care protocol was followed    Last vitals: Visit Vitals  BP (!) 144/64 (BP Location: Left arm)   Pulse 86   Temp 37.2 °C (99 °F) (Temporal)   Resp 16   Ht 5' 5" (1.651 m)   Wt 93.9 kg (207 lb)   SpO2 99%   Breastfeeding No   BMI 34.45 kg/m²     "

## 2025-05-07 ENCOUNTER — RESULTS FOLLOW-UP (OUTPATIENT)
Dept: PRIMARY CARE CLINIC | Facility: CLINIC | Age: 65
End: 2025-05-07

## 2025-05-08 LAB
ESTROGEN SERPL-MCNC: NORMAL PG/ML
INSULIN SERPL-ACNC: NORMAL U[IU]/ML
LAB AP CLINICAL INFORMATION: NORMAL
LAB AP GROSS DESCRIPTION: NORMAL
LAB AP PERFORMING LOCATION(S): NORMAL
LAB AP REPORT FOOTNOTES: NORMAL

## 2025-05-15 DIAGNOSIS — R52 PAIN: Primary | ICD-10-CM

## 2025-05-16 ENCOUNTER — OFFICE VISIT (OUTPATIENT)
Dept: ORTHOPEDICS | Facility: CLINIC | Age: 65
End: 2025-05-16
Payer: COMMERCIAL

## 2025-05-16 ENCOUNTER — HOSPITAL ENCOUNTER (OUTPATIENT)
Dept: RADIOLOGY | Facility: OTHER | Age: 65
Discharge: HOME | End: 2025-05-16
Attending: SPECIALIST/TECHNOLOGIST
Payer: COMMERCIAL

## 2025-05-16 DIAGNOSIS — R52 PAIN: ICD-10-CM

## 2025-05-16 DIAGNOSIS — M65.322 TRIGGER INDEX FINGER OF LEFT HAND: ICD-10-CM

## 2025-05-16 DIAGNOSIS — M77.10 LATERAL EPICONDYLITIS, UNSPECIFIED LATERALITY: Primary | ICD-10-CM

## 2025-05-16 PROCEDURE — 73130 X-RAY EXAM OF HAND: CPT | Mod: 26,LT,, | Performed by: RADIOLOGY

## 2025-05-16 PROCEDURE — 73130 X-RAY EXAM OF HAND: CPT | Mod: TC,FY,LT

## 2025-05-16 PROCEDURE — 99999 PR PBB SHADOW E&M-EST. PATIENT-LVL III: CPT | Mod: PBBFAC,,, | Performed by: SPECIALIST/TECHNOLOGIST

## 2025-05-16 RX ORDER — DEXAMETHASONE SODIUM PHOSPHATE 4 MG/ML
4 INJECTION, SOLUTION INTRA-ARTICULAR; INTRALESIONAL; INTRAMUSCULAR; INTRAVENOUS; SOFT TISSUE
Status: DISCONTINUED | OUTPATIENT
Start: 2025-05-16 | End: 2025-05-16 | Stop reason: HOSPADM

## 2025-05-16 RX ADMIN — DEXAMETHASONE SODIUM PHOSPHATE 4 MG: 4 INJECTION, SOLUTION INTRA-ARTICULAR; INTRALESIONAL; INTRAMUSCULAR; INTRAVENOUS; SOFT TISSUE at 02:05

## 2025-05-16 NOTE — PROGRESS NOTES
Patient ID: Lisandra Toussaint is a 64 y.o. female.    Chief Complaint: Pain of the Left Hand    History of Present Illness    CHIEF COMPLAINT:  - Left hand pain, specifically in the index finger.    HPI:  Lisandra presents with left hand pain that began on 4/13 after golfing for the first time in approximately 30 years. The following day, her left index finger was stiff and would not bend. Over the next two weeks, the stiffness slightly improved, allowing for some bending, but she avoided using it for typing. In the past two weeks, pain has increased and localized to a specific spot on her finger, which she describes as particularly painful. Pain has since spread across her hand, up her forearm, and to her elbow.    She denies any blistering but notes a small shiny spot on the affected area. She reports no locking or popping of the finger when opening her hand. She is right-handed and has been trying to compensate for the left hand discomfort, potentially affecting her posture.    She mentions a longstanding small protrusion on her hand, unrelated to her current complaint.    She denies numbness and tingling (except potentially in the elbow area).    PREVIOUS TREATMENTS:  - Naproxen: For hand pain    MEDICATIONS:  - Naproxen: For hand pain    WORK STATUS:  - Typing at work  - Job involves computer use      ROS:  ROS as indicated in HPI.          Hand/Wrist Musculoskeletal Exam    Inspection    Right      Erythema: none      Ecchymosis: none      Edema: none      Deformity: none    Left      Erythema: none      Ecchymosis: none      Edema: none      Deformity: none    Palpation    Left      Triggering: index      Index tenderness to palpation: A1 pulley    Palpation additional comments: Tenderness to palpation over the lateral epicondyle of the left elbow    Range of Motion        Range of motion additional comments: Able to make a composite fist.    Strength        Strength additional comments: Pain with resisted wrist  extension    Neurovascular    Right       Radial pulse: normal      Capillary refill: brisk      Ulnar nerve sensory distribution: normal      Median nerve sensory distribution: normal      Superficial radial nerve sensory distribution: normal    Left       Radial pulse: normal      Capillary refill: brisk      Ulnar nerve sensory distribution: normal      Median nerve sensory distribution: normal      Superficial radial nerve sensory distribution: normal    Neurovascular additional comments:         Physical Exam    Musculoskeletal: Tenderness in left forearm. Tenderness in left elbow.           IMAGING  Left hand XR  Personal interpretation of the XR reveals no signs of fractures or dislocations      PLAN  Assessment & Plan    64 year old female with L Index Trigger Finger and L Elbow Lateral Epicondylitis  - Left index finger injection administered today under ultrasound guidance.  - Discussed potential use of finger brace to allow for tendon glides and reduce inflammation.  - Use tendon strap at the elbow to create a different fulcrum point and reduce pressure on the insertion.  - Referred to OT for tennis elbow.          PROCEDURE NOTE  L Index Tendon Sheath    Date/Time: 5/16/2025 2:00 PM    Performed by: Dat Solorio PA-C  Authorized by: Dat Solorio PA-C    Consent Done?:  Yes (Verbal)  Indications:  Pain  Timeout: prior to procedure the correct patient, procedure, and site was verified    Local anesthesia used?: Yes    Anesthesia:  Local infiltration  Local anesthetic:  Lidocaine 1% without epinephrine  Anesthetic total (ml):  0.5    Location:  Index finger  Site:  L index flexor tendon sheath  Ultrasonic guidance for needle placement?: Yes (Ultrasound guidance was utilized for needle localization. Dynamic visualization of the needle was continuous throughout the procedure and maintained accurate placement. Images were saved and stored for documentation.)    Needle size:  25 G  Approach:   Volar  Medications:  4 mg dexAMETHasone 4 mg/mL  Patient tolerance:  Patient tolerated the procedure well with no immediate complications    Aric Solorio PA-C, ATC  Hand and Upper Extremity   [facility]    This note was generated with the assistance of ambient listening technology. Verbal consent was obtained by the patient and accompanying visitor(s) for the recording of patient appointment to facilitate this note. I attest to having reviewed and edited the generated note for accuracy, though some syntax or spelling errors may persist. Please contact the author of this note for any clarification.

## 2025-05-19 ENCOUNTER — PATIENT MESSAGE (OUTPATIENT)
Dept: NEUROSURGERY | Facility: CLINIC | Age: 65
End: 2025-05-19
Payer: COMMERCIAL

## 2025-05-20 ENCOUNTER — TELEPHONE (OUTPATIENT)
Dept: NEUROSURGERY | Facility: CLINIC | Age: 65
End: 2025-05-20
Payer: COMMERCIAL

## 2025-05-20 ENCOUNTER — OFFICE VISIT (OUTPATIENT)
Dept: SLEEP MEDICINE | Facility: CLINIC | Age: 65
End: 2025-05-20
Attending: PSYCHIATRY & NEUROLOGY
Payer: COMMERCIAL

## 2025-05-20 VITALS
DIASTOLIC BLOOD PRESSURE: 69 MMHG | WEIGHT: 202.63 LBS | BODY MASS INDEX: 33.71 KG/M2 | SYSTOLIC BLOOD PRESSURE: 108 MMHG | HEART RATE: 79 BPM

## 2025-05-20 DIAGNOSIS — M62.838 MUSCLE SPASMS OF NECK: ICD-10-CM

## 2025-05-20 DIAGNOSIS — G47.33 OSA (OBSTRUCTIVE SLEEP APNEA): Primary | ICD-10-CM

## 2025-05-20 DIAGNOSIS — Q28.2 ARTERIOVENOUS MALFORMATION OF CEREBRAL VESSELS: ICD-10-CM

## 2025-05-20 DIAGNOSIS — I67.1 CEREBRAL ANEURYSM, NONRUPTURED: Primary | ICD-10-CM

## 2025-05-20 PROCEDURE — 3078F DIAST BP <80 MM HG: CPT | Mod: CPTII,S$GLB,, | Performed by: PSYCHIATRY & NEUROLOGY

## 2025-05-20 PROCEDURE — 1159F MED LIST DOCD IN RCRD: CPT | Mod: CPTII,S$GLB,, | Performed by: PSYCHIATRY & NEUROLOGY

## 2025-05-20 PROCEDURE — 3008F BODY MASS INDEX DOCD: CPT | Mod: CPTII,S$GLB,, | Performed by: PSYCHIATRY & NEUROLOGY

## 2025-05-20 PROCEDURE — 99999 PR PBB SHADOW E&M-EST. PATIENT-LVL III: CPT | Mod: PBBFAC,,, | Performed by: PSYCHIATRY & NEUROLOGY

## 2025-05-20 PROCEDURE — 99214 OFFICE O/P EST MOD 30 MIN: CPT | Mod: S$GLB,,, | Performed by: PSYCHIATRY & NEUROLOGY

## 2025-05-20 PROCEDURE — 3074F SYST BP LT 130 MM HG: CPT | Mod: CPTII,S$GLB,, | Performed by: PSYCHIATRY & NEUROLOGY

## 2025-05-20 PROCEDURE — 4010F ACE/ARB THERAPY RXD/TAKEN: CPT | Mod: CPTII,S$GLB,, | Performed by: PSYCHIATRY & NEUROLOGY

## 2025-05-20 RX ORDER — TIZANIDINE 4 MG/1
4 TABLET ORAL 2 TIMES DAILY
Qty: 180 TABLET | Refills: 3 | Status: SHIPPED | OUTPATIENT
Start: 2025-05-20

## 2025-05-20 NOTE — PROGRESS NOTES
5/13/2025     9:53 AM 12/17/2024    10:02 AM 9/5/2024     7:25 PM   EPWORTH SLEEPINESS SCALE TOTAL SCORE    Total score 2  9  14       Patient-reported       Lisandra Toussaint is a 64 y.o. female seen today for BPAP  follow up. Last seen on 12/18/2024.    CPAP was switched to BPAP since her last visit,.  Likes BPAp , but we set press rue low due to prior aerophagia on cpap  AHI is suboptimal - 5-10/hr  No aerophagia on current settings  Tends to take BPAP off in her sleep  Tried under the nose mask and FFM - prefers nasal  Did not prefer a chin strap  + tizanidine for cervicogenic headaches - also helps her sleep  No dry mouth on BPAP  Met compliance         Current treatment:     []  CPAP [] APAP [x] BPAP [] AutoBPAP [] ASV [] No previous PAP  [] Supplemental oxygen   [] None        Interrogation:  mrpapmachine : Resmed Aircurve 11 BIPAP:  8/4 with under the nose mask        [x] Met [] Failed initial  compliance      [x]  Remains [] Does not stay compliant with PAP therapy      Compliance with the treatment has been [] Improving [x] deteriorating       Response to CPAP Therapy:    Tolerating positive airway pressure [] Well [x] Poorly     Possible obstacles to using CPAP:    Aerophagia                      yes []   no  [x]                Breakthrough Snoring   yes []   no [x]    Pressure Intolerance      yes []   no  [x]               Air Hunger                      yes []   no  [x]    Dry Mouth                      yes []   no  [x]               Nasal dryness                yes []   no  [x]    Condensation                 yes []   no  [x]    Mask Discomfort            yes []   no  [x]              Mask Leaks                    yes []    no  [x]                                             Mouth Leaks:  yes []   no  []     On positive airway pressure therapy, the patient experienced:                                 improved  Sleep continuity [x] Yes [] No  improved  Daytime Sleepiness [x] Yes [] No  improved    Fatigue [x] Yes [] No       improved   Nocturia [] Yes [] No  improved   Restless sleep [] Yes [] No    improved  Periodic limb moveemnts [] Yes [] No  improved  Parasomnia [] Yes [] No      INTERVAL HISTORY:    2024:          Lisandra Toussaint is a 64 y.o. female seen today for the  follow up. Last seen on 2024.  Had HST and started APAP since last time with initial EDS improvement; later developed aerophagia; decreasing the pressure, trying various masks, sleeping upright  did not help.     The patient reports improved sleep continuity and daytime sleepiness on PAP. ESS today is .  Denies break through snoring.  No  dry mouth.  No aerophagia or air hunger. Denies occasional mask leaks and discomfort. Using a nasal  mask (tried FFM).    Had interventional radiology for brain aneurysm .    Also Melatonin ER and Tizanidine      Aerophagia is limiting her compliance       DME: [facility] got machine in 10/2024    Lisandra Toussaint 10/16/2024 - 2024 Patient ID: [id number] : 1960 Age: 64 years Gender: Female [facility] [address] [facility], [address] Compliance Report Compliance Payor Standard Usage 10/16/2024 - 2024 Usage days 37/60 days (62%) >= 4 hours 21 days (35%) < 4 hours 16 days (27%) Usage hours 160 hours 43 minutes Average usage (total days) 2 hours 41 minutes Average usage (days used) 4 hours 21 minutes Median usage (days used) 4 hours 35 minutes Total used hours (value since last reset - 2024) 160 hours AirSense 11 AutoSet Serial number [id number] Mode AutoSet Min Pressure 4 cmH2O Max Pressure 9 cmH2O EPR Fulltime EPR level 3 Response Standard Therapy Pressure - cmH2O Median: 7.1 95th percentile: 9.8 Maximum: 10.4 Leaks - L/min Median: 0.1 95th percentile: 7.3 Maximum: 16.2 Events per hour AI: 4.8 HI: 0.2 AHI: 5.0 Apnea Index Central: 0.5 Obstructive: 4.1 Unknown: 0.1 RERA Index 0.6 Cheyne-Gonzales respiration (average duration per night) 0  minute      9/12/2024: Lisandra Toussaint is a 64 y.o. female is here to be evaluated for a sleep disorder; referred by No ref. provider found.    The patient reports snoring, gasping for air, excessive daytime sleepiness, and excessive daytime fatigue since over 10 years ago.  [place] had aneurysm surgery = nurse in ICU told her to get checked.     Lisandra Toussaint denied  choking  and witnessed apneas.    The patient does not feel rested upon awakening. Takes naps but usually no time    The patient  reports  morning headaches and reports  dry mouth on awakening.   Lisandra Toussaint denies  nasal congestion.    Earring  for TMJ      The patient  reports difficulty with falling and staying asleep.    Lisandra Toussaint  denies symptoms concerning for parasomnia except for occasional somniloquy.  The patient  denies auxiliary symptoms of narcolepsy including sleep onset paralysis, hypnagogic hallucinations, sleep attacks and cataplexy.    Lisandra Toussaint denied symptoms concerning for RLS; nocturnal leg movements have not been noticed.   The patient does not experience sleep related leg cramps.           Medications pertinent to sleep  disorders taken currently: Tizanidine for cervicogenic factors - also helps her sleep. Taking 2-4 mg; Now Tizaidien haleps her sleep and small dose Melatonin  Previous  medications taken  for sleep disorders:  -Melatonin regular and TR    Sleep studies  2014: Mild sleep disordered breathing (AHI=11) is  noted based on a 4% hypopnea desaturation criteria. The patient slept supine 42.2% of the night based on valid recording  time of 6.59 hours and is 1.8 times as likely to have apneas/hypopneas when supine. When considering more subtle  measures of sleep disordered breathing, the overall respiratory disturbance index is severe(RDI=36) based on a 1%  hypopnea desaturation criteria with confirmation by surrogate arousal indicators, predominantly in the supine position  (45  events/hour). The apneas/hypopneas are accompanied by minimal oxygen desaturation (percent time below 90%  SpO2: 0%, Min SpO2: 83.3%)        PHYSICAL EXAM:  /69 (BP Location: Right arm, Patient Position: Sitting)   Pulse 79   Wt 91.9 kg (202 lb 9.6 oz)   BMI 33.71 kg/m²   GENERAL: Normal development, well groomed.        ASSESSMENT:    1. LENNIE - severe based on RDI The patient symptomatically has  snoring, choking , interrupted sleep, excessive daytime sleepiness, and excessive daytime fatigue  with exam findings of crowded airway and elevated BMI. The patient has medical co-morbidities of depression and hypertension, h/o brain aneurysm (managed surgically successfully)  which can be worsened by LENNIE. This warrants treatment. Benefiting from CPAP use in terms of sleep continuity and daytime sleepiness. Later on aerophagia developed - can barely use CPAP now; trying various mask, decreasing pressure, changing EPR, sleeping upright, GAsx did not help. Failing CPAP due to aerophagia.         2. Insomnia NEC. Multi-factorial -  excess time in bed, poor sleep hygiene, and likely paradoxical insomnia play a role        PLAN:    Will increase to autoBPAP IPAP max from 8 ->to 10 due to high residual AHI. Will be increasing IPAP max slowly due to previous h/o aerophagia  Lisandra Toussaint will message me on patient's portal on her progress.  Renewed supplies  Encouraged longer battery.        During our discussion today, we talked about the etiology of  LENNIE as well as the potential ramifications of untreated sleep apnea, which could include daytime sleepiness, hypertension, heart disease and/or stroke.  We discussed potential treatment options, which could include weight loss, body positioning, continuous positive airway pressure (CPAP), or referral for surgical consideration. Meanwhile, she  is urged to avoid supine sleep, weight gain and alcoholic beverages since all of these can worsen LENNIE.     The patient was given  open opportunity to ask questions and/or express concerns about treatment plan. Two point patient identifier confirmed.       Precautions: The patient was advised to abstain from driving should he feel sleepy or drowsy.    Follow up: MD after the sleep study has been completed.     ESS ([place] Sleepiness Scale) and other sleep medicine related questionnaires were reviewed with the patient.      46-minute visit. >50% spent counseling patient and coordination of care.  The patient was  cautioned against drowsy driving.

## 2025-05-23 ENCOUNTER — OFFICE VISIT (OUTPATIENT)
Dept: PSYCHIATRY | Facility: CLINIC | Age: 65
End: 2025-05-23
Payer: COMMERCIAL

## 2025-05-23 DIAGNOSIS — F33.9 DEPRESSION, RECURRENT: ICD-10-CM

## 2025-05-23 RX ORDER — DULOXETIN HYDROCHLORIDE 60 MG/1
60 CAPSULE, DELAYED RELEASE ORAL DAILY
Qty: 90 CAPSULE | Refills: 0 | Status: SHIPPED | OUTPATIENT
Start: 2025-05-23

## 2025-05-23 NOTE — PROGRESS NOTES
"Outpatient Psychiatry Follow-Up Visit (PA)    05/23/2025    Clinical Status of Patient:  Outpatient (Ambulatory)    Chief Complaint:  Lisandra Toussaint is a 64 y.o. female who presents today for follow-up of anxiety and adjustment problems.  Met with patient.     The patient location is: [place], [place] in parked car  The chief complaint leading to consultation is: anxiety and adjustment    Visit type: audiovisual    Face to Face time with patient: 15 minutes  20 minutes of total time spent on the encounter, which includes face to face time and non-face to face time preparing to see the patient (eg, review of tests), Obtaining and/or reviewing separately obtained history, Documenting clinical information in the electronic or other health record, Independently interpreting results (not separately reported) and communicating results to the patient/family/caregiver, or Care coordination (not separately reported).         Each patient to whom he or she provides medical services by telemedicine is:  (1) informed of the relationship between the physician and patient and the respective role of any other health care provider with respect to management of the patient; and (2) notified that he or she may decline to receive medical services by telemedicine and may withdraw from such care at any time.    Notes:      Current Psych Medications:   Cymbalta 60 mg PO daily    Interval History and Content of Current Session:  Patient seen and chart reviewed. Last seen on 2/20/2025    Patient has a psychiatric history of: anxiety and adjustment     Pt reports for follow up today stating that since weaning off of Buspar, she is feeling less anxious. Sometimes feeling sad, not motivated, and lack of concentration. Lots of life stressors overall with mother, sister, and bed bound boyfriend. Does not have enough financially to just leave boyfriend and be on her own.    Mood: Overall mood "I feel like not that motivated to do " "anything"    Anxiety: 2 or 3/10 with 10/10 being the most severe    Pt appears happy and euthymic    Denies adverse effects from medication    Sleep: Sleep has been using BiPap machine in December, sleeping better with this, using melatonin patch to stay sleeping or to fall back asleep, also using tizanidine 4 mg for sleep, having bad head ache the last 3 mornings, since increasing pressure, waking up around 12 am or 1 am for bathroom, trouble falling back asleep    Appetite: Appetite is normal    Denies SI/HI/AVH.     Pt reports taking medications as prescribed and behaving appropriately during interview today.      Psychotherapy:  Target symptoms: anxiety   Why chosen therapy is appropriate versus another modality: relevant to diagnosis  Outcome monitoring methods: self-report, observation  Therapeutic intervention type: insight oriented psychotherapy, supportive psychotherapy  Topics discussed/themes: relationships difficulties, illness/death of a loved one  The patient's response to the intervention is accepting. The patient's progress toward treatment goals is good.   Duration of intervention: 10 minutes.    Review of Systems   PSYCHIATRIC: Pertinant items are noted in the narrative.  CONSTITUTIONAL: No weight gain or loss.   MUSCULOSKELETAL: No pain or stiffness of the joints.  NEUROLOGIC: No weakness, sensory changes, seizures, confusion, memory loss, tremor or other abnormal movements.  ENDOCRINE: No polydipsia or polyuria.  INTEGUMENTARY: No rashes or lacerations.  EYES: No exophthalmos, jaundice or blindness.  ENT: No dizziness, tinnitus or hearing loss.  RESPIRATORY: No shortness of breath.  CARDIOVASCULAR: No tachycardia or chest pain.  GASTROINTESTINAL: No nausea, vomiting, pain, constipation or diarrhea.  GENITOURINARY: No frequency, dysuria or sexual dysfunction.  HEMATOLOGIC/LYMPHATIC: No excessive bleeding, prolonged or excessive bleeding after dental extraction/injury.    Past Medication " Trials:  NONE    Past Medical, Family and Social History: The patient's past medical, family and social history have been reviewed and updated as appropriate within the electronic medical record - see encounter notes.    Compliance: yes    Side effects: None    Risk Parameters:  Patient reports no suicidal ideation  Patient reports no homicidal ideation  Patient reports no self-injurious behavior  Patient reports no violent behavior    Exam (detailed: at least 9 elements; comprehensive: all 15 elements)   Constitutional  Vitals:  Most recent vital signs, dated less than 90 days prior to this appointment, were reviewed.   There were no vitals filed for this visit.     General:  unremarkable, age appropriate, casually dressed     Musculoskeletal  Muscle Strength/Tone:  no spasicity, no rigidity, no cogwheeling, no flaccidity   Gait & Station:  non-ataxic     Psychiatric  Speech:  no latency; no press   Mood & Affect:  steady, happy  congruent and appropriate   Thought Process:  normal and logical   Associations:  intact   Thought Content:  normal, no suicidality, no homicidality, delusions, or paranoia   Insight:  has awareness of illness   Judgement: behavior is adequate to circumstances   Orientation:  person, place, situation, time/date   Memory: intact for content of interview   Language: grossly intact   Attention Span & Concentration:  able to focus   Fund of Knowledge:  intact and appropriate to age and level of education     Assessment and Diagnosis   Status/Progress: Based on the examination today, the patient's problem(s) is/are improved.  New problems have not been presented today.   Co-morbidities are not complicating management of the primary condition.  There are no active rule-out diagnoses for this patient at this time.     General Impression: Lisandra Toussaint is a 63 yo female who presents for follow up stating that her anxiety and mood has been overall pretty good. Will continue Cymbalta 60 mg PO  daily.       ICD-10-CM ICD-9-CM    1. Depression, recurrent  F33.9 296.30 DULoxetine (CYMBALTA) 60 MG capsule            Intervention/Counseling/Treatment Plan   Medication Management: Continue current medications.  Continue Cymbalta 60 mg PO daily  Advised to continue therapy once every 2 weeks  Discussed diagnosis, risk and benefits of proposed treatment above vs alternative treatment vs no treatment, and potential side effects of these treatments, and the inherent unpredictability of individual responses to these treatments. The patient expresses understanding and gives informed consent to pursue treatment at this time, believing that the potential benefits outweigh the potential risks. Patient has no other questions. Risks/adverse effects at this time include but are not limited to: GI side effects, sexual dysfunction, activation vs sedation, triggering of suicidal ideation, and serotonin syndrome.   Patient voices understanding and agreement with this plan  Provided crisis numbers  Encouraged patient to keep future appointments  Instruct patient to call or message with questions  In the event of an emergency, including suicidal ideation, patient was advised to go to the emergency room      Return to Clinic: 3 months    Total time: 20 minutes (which included pts differential diagnosis and prognosis for psychiatric conditions, risks, benefits of treatments, instructions and adherence to treatment plan, risk reduction, reviewing current psychiatric medication regimen, medical problems and social stressors. In addtion to possible discussion with other healthcare provider/s)    Add on Psychotherapy time: 10 minutes    Sabrina Brian PA-C

## 2025-05-30 ENCOUNTER — CLINICAL SUPPORT (OUTPATIENT)
Dept: REHABILITATION | Facility: HOSPITAL | Age: 65
End: 2025-05-30
Payer: COMMERCIAL

## 2025-05-30 DIAGNOSIS — M62.81 MUSCLE WEAKNESS OF LEFT ARM: Primary | ICD-10-CM

## 2025-05-30 DIAGNOSIS — R29.898 REDUCED HAND STRENGTH: ICD-10-CM

## 2025-05-30 DIAGNOSIS — M77.10 LATERAL EPICONDYLITIS, UNSPECIFIED LATERALITY: ICD-10-CM

## 2025-05-30 PROCEDURE — 97530 THERAPEUTIC ACTIVITIES: CPT

## 2025-05-30 PROCEDURE — 97018 PARAFFIN BATH THERAPY: CPT

## 2025-05-30 PROCEDURE — 97165 OT EVAL LOW COMPLEX 30 MIN: CPT

## 2025-05-30 NOTE — PATIENT INSTRUCTIONS
[facility], OCCUPATIONAL THERAPY  HOME EXERCISE PROGRAM      Lateral Epicondylitis Conservative Management    Joint Protection:  Use larger joints and muscles when possible (resting objects in crook of elbow)  Stop activities before the point of discomfort  Avoid activities that put strain on painful or stiff joints  Avoid a tight or prolonged grasp on objects  Avoid any lifting or gripping with elbow in extension  If you must lift, lift with elbows bent at side, object close to you, and hands turned palm up. elbow extension  Balance Rest and Activity:  Take frequent, short breaks to stretch  Avoid staying in one position for a long time  Alternate heavy and light activities  Eliminate unnecessary activities  Reduce the effort:  Avoid excessive loads. Dont be afraid to ask for help. Use carts and tabletops to make tasks easier.  Keep items nearby (such as keyboard)  Helpful Tips: slide objects; use your palms instead of fingers, keeps heavy items close to your body, use larger handles, pens and pencils, look for lightweight options, use wheels or carts to roll objects              [facility]  OCCUPATIONAL THERAPY  HOME EXERCISE PROGRAM   Perform 3 x 30 sec holds each, 2 x daily      Complete the following stretches holding for 10 seconds per stretch. Complete 5 of each stretch, 2 x/day.           Composite Flexion Stretch  Use other hand to bend your finger at all three joints.     Extension (Passive)        Use other hand to straighten finger as shown. Hold 10 seconds.  Repeat 5 times. Do 2 sessions per day.

## 2025-05-30 NOTE — PROGRESS NOTES
"  Outpatient Rehab    Occupational Therapy Evaluation    Patient Name: Lisandra Toussaint  MRN: [id number]  YOB: 1960  Encounter Date: 5/30/2025    Therapy Diagnosis:   Encounter Diagnoses   Name Primary?    Lateral epicondylitis, unspecified laterality     Muscle weakness of left arm Yes    Reduced hand strength      Physician: Dat Solorio PA-C    Physician Orders: Eval and Treat  Medical Diagnosis: Lateral epicondylitis, unspecified laterality    Visit # / Visits Authorized: 1 / 1  Insurance Authorization Period: 5/16/2025 to 5/16/2026  Date of Evaluation: 5/30/2025  Plan of Care Certification: 5/30/2025 to 8/22/25     Time In: 0930   Time Out: 1029  Total Time (in minutes): 59   Total Billable Time (in minutes): 15    Intake Outcome Measure for FOTO Survey    Therapist reviewed FOTO scores for Lisandra Toussaint on 5/30/2025.   FOTO report - see Media section or FOTO account episode details.     Intake Score: 41%    Precautions:     Standard      Subjective   History of Present Illness  Lisandra is a 64 y.o. female who reports to occupational therapy with a chief concern of L hand and elbow pain since 4/13/25.     The patient reports a medical diagnosis of Lateral epicondylitis, unspecified laterality (M77.10).    Diagnostic tests related to this condition: X-ray.   X-Ray Details: Reviewed    Dominant Hand: Right  History of Present Condition/Illness: Per PA note on 5/16/25, "Lisandra presents with left hand pain that began on 4/13 after golfing for the first time in approximately 30 years. The following day, her left index finger was stiff and would not bend. Over the next two weeks, the stiffness slightly improved, allowing for some bending, but she avoided using it for typing. In the past two weeks, pain has increased and localized to a specific spot on her finger, which she describes as particularly painful. Pain has since spread across her hand, up her forearm, and to her elbow. She denies any blistering but " "notes a small shiny spot on the affected area. She reports no locking or popping of the finger when opening her hand. She is right-handed and has been trying to compensate for the left hand discomfort, potentially affecting her posture. She mentions a longstanding protrusion on her hand, unrelated to her current complain. She denies numbness and tingling (except potentially in the elbow area)."     Activities of Daily Living  Social history was obtained from Patient.    General Prior Level of Function Comments: Independent with ADLs/IADLs  General Current Level of Function Comments: Mod I with ADLs/IADLs favoring R arm  Patient Roles: Caregiver for adult  Patient Responsibilities: Personal ADL, Laundry, Shopping, Meal prep, Health management, Home management, Community mobility, Driving, Financial management        Pain     Patient reports a current pain level of 3/10. Pain at best is reported as 2/10. Pain at worst is reported as 4/10.   Location: L hand  Clinical Progression (since onset): Stable  Pain Qualities: Other (Comment)  Other Pain Qualities: Fatigue, weak  Pain-Relieving Factors: Rest, Other (Comment), Medications - over-the-counter, Medications - prescription  Other Pain-Relieving Factors: Bracing  Pain-Aggravating Factors: Lifting         Treatment History  Treatments  Previously Received Treatments: No  Currently Receiving Treatments: Yes  Current Treatments: Bracing, Other (Comment)  Other Current Treatments: Injection    Living Arrangements  Living Situation  Housing: Home independently  Living Arrangements: Spouse/significant other        Employment  Patient reports: Does the patient's condition impact their ability to work?  Employment Status: Employed full-time   [employer] - computer work, clerical paperwork      Past Medical History/Physical Systems Review:   Lisandra Toussaint  has a past medical history of Aneurysm, Anxiety, HTN (hypertension), Sleep apnea, and TMJ (dislocation of " temporomandibular joint).    Lisandra Toussaint  has a past surgical history that includes tarsel tunnel; Eye surgery; Carpal tunnel release; Foot surgery (Bilateral); Abdominal surgery; and colonoscopy, screening, low risk patient (N/A, 5/6/2025).    Lisandra has a current medication list which includes the following prescription(s): amlodipine, aspirin, cartilage/collagen/bor/hyalur, clopidogrel, cranberry extract, multivitamin, diphenhydramine, duloxetine, epinephrine, hydrochlorothiazide, losartan, prednisone, tizanidine, and triamcinolone acetonide 0.1%.    Review of patient's allergies indicates:   Allergen Reactions    Cinnamon Anaphylaxis    Iodine and iodide containing products Other (See Comments)     Tears up skin    Macrodantin [nitrofurantoin macrocrystalline] Other (See Comments)     Knee and elbow swelling    Povidone-iodine      Other reaction(s): Blister    Sulfa (sulfonamide antibiotics) Hives        Objective          Observation/Appearance: Skin warm and dry.     Edema. No significant edema noted.     Elbow and Wrist ROM. WFLs - all planes of motion.     Hand ROM. WFLs - Pt able to make full composite fists with B/L hands and demonstrates thumb opposition touch to SF DPC.      Strength (Dynamometer - Rung II) and Pinch Strength (Pinch Gauge)  Measured in pounds to POP.   5/30/2025 5/30/2025    Left Right   Elbow flexed 7 41   Elbow extended 15 41   Tip Pinch 4 8   3pt Pinch 6 10.5     Sensation. Intact per report. Pt denies numbness and/or tingling in BUE(s).    Manual Muscle Test   5/30/2025    Left   Wrist Extension  4/5   Wrist Flexion 5/5   Radial Deviation 4/5   Ulnar Deviation 5/5     Treatment:     Supervised modalities after being cleared for contradictions: Paraffin bath - with moist heat pack to L hand(s) for 10 minutes to increase blood flow, circulation, pain management, and for tissue elasticity prior to therex.   Hot Pack - Patient received moist heat x 10 min to L elbow to increase  blood flow, circulation, and tissue elasticity prior to therex.    Therapeutic activities to improve functional performance for 15 minutes, including:  - Pt educated on HEP consisting of digit PROM to tolerance, ASTYM stretch positions 1-3, and cross friction massage.  - Pt educated on modality use at home including ice vs heat.  - Pt educated on conservative management of lateral epicondylitis and trigger finger including joint protection, activity modification, brace/splint wear, and energy conservation principles.   - Issued and discussed ASHT patient education resources on Tennis Elbow and trigger finger.     Assessment & Plan   Assessment  Lisandra presents with a condition of Low complexity.   Presentation of Symptoms: Stable  Will Comorbidities Impact Care: No       ADL Limitations : Bathing/showering, Dressing, Feeding, Functional mobility, Personal hygiene and grooming, Toileting and toilet hygiene  IADL Limitations: Health management and maintenance, Home establishment and management, Meal preparation and cleanup, Safety and emergency maintenance, Driving, Financial management, Grocery/shopping, Community mobility, Communication management, Care of others  Work Limitations: Job performance  Leisure Limitations: Leisure participation, Sport participation  Functional Limitations: Activity tolerance, Carrying objects, Fine motor coordination, Manipulating objects, Pain when reaching, Pain with ADLs/IADLs, Reaching                 Anticipated Need for Modification: Min  Evaluation/Treatment Response: Patient responded to treatment well  Patient Goal for Therapy (OT): Maximize L arm function  Prognosis: Good  Assessment Details: Lisandra Toussaint presents with the following therapy deficits: Decreased  strength, Decreased pinch strength, Decreased muscle strength, Decreased functional hand use, Increased pain, and Diminished/Impaired Coordination and demonstrates limitations as described above. Following medical  record review it is determined that pt will benefit from occupational therapy services in order to maximize pain free and/or functional use of left arm. The following goals were discussed with the patient and patient is in agreement with them as to be addressed in the treatment plan. The patient's rehab potential is Good.      Plan  From an occupational therapy perspective, the patient would benefit from: Skilled Rehab Services    Planned therapy interventions include: Therapeutic exercise, Therapeutic activities, Neuromuscular re-education, Manual therapy, ADLs/IADLs, Lymphatic compression wrapping, Orthotic management and training, and Prosthetic management and training.    Planned modalities to include: Contrast bath, Cryotherapy (cold pack), Electrical stimulation - attended, Electrical stimulation - passive/unattended, Fluidotherapy, Infrared light therapy, Paraffin bath, Iontophoresis, Low-level laser therapy, Ultrasound, Whirlpool, and Thermotherapy (hot pack).        Visit Frequency: 1 times Per Week for 12 Weeks.       This plan was discussed with Patient.   Discussion participants: Agreed Upon Plan of Care             The patient's spiritual, cultural, and educational needs were considered, and the patient is agreeable to the plan of care and goals.     Education  Education was done with Patient. The patient's learning style includes Demonstration, Listening, Pictures/video, and Reading. The patient Demonstrates understanding and Verbalizes understanding.         HEP: Pt was advised to perform exercises free of pain, and to stop performing them if pain occurs. Patient/Family Education: plan of care, role of OT, goals for OT, scheduling/cancellations, double booking - pt verbalized understanding. Discussed insurance limitations with patient.        Goals:   Active       Occupational Therapy       Occupational Therapy Goal (Progressing)       Start:  05/30/25    Expected End:  08/22/25       Short Term Goals  (STGs) = Long Term Goals (LTGs); to be met by discharge.  LTG #1: Pt will report a pain level of 1-3 out of 10 at worst with resistive functional use of left arm.      LTG #2: Pt will demo improved FOTO score by 5-10 points.   LTG #3: Pt will return to prior level of function for IADLs, household management, and work tasks.   LTG #4: Pt will have improved LUE strength as shown by an increase in MMT of at least 1 grade in all limited planes in order to perform household management.  LTG #5: Pt will increase left hand  strength in elbow flexed and extended positions by at least 5-10 lbs each needed to carry laundry, carry groceries, and increase functional independence of left arm for ADLs/IADLs.  LTG #6: Pt will demonstrate independence with issued HEP, brace wear as needed, and modalities for pain management.                Sandi Friedman, OTR/L, CHT

## 2025-06-02 ENCOUNTER — CLINICAL SUPPORT (OUTPATIENT)
Dept: REHABILITATION | Facility: HOSPITAL | Age: 65
End: 2025-06-02
Attending: SPECIALIST/TECHNOLOGIST
Payer: COMMERCIAL

## 2025-06-02 DIAGNOSIS — M62.81 MUSCLE WEAKNESS OF LEFT ARM: Primary | ICD-10-CM

## 2025-06-02 DIAGNOSIS — R29.898 REDUCED HAND STRENGTH: ICD-10-CM

## 2025-06-02 PROCEDURE — 97140 MANUAL THERAPY 1/> REGIONS: CPT

## 2025-06-02 PROCEDURE — 97018 PARAFFIN BATH THERAPY: CPT

## 2025-06-02 PROCEDURE — 97530 THERAPEUTIC ACTIVITIES: CPT

## 2025-06-02 PROCEDURE — 97110 THERAPEUTIC EXERCISES: CPT

## 2025-06-12 ENCOUNTER — CLINICAL SUPPORT (OUTPATIENT)
Dept: REHABILITATION | Facility: HOSPITAL | Age: 65
End: 2025-06-12
Payer: COMMERCIAL

## 2025-06-12 DIAGNOSIS — M62.81 MUSCLE WEAKNESS OF LEFT ARM: Primary | ICD-10-CM

## 2025-06-12 DIAGNOSIS — R29.898 REDUCED HAND STRENGTH: ICD-10-CM

## 2025-06-12 PROCEDURE — 97530 THERAPEUTIC ACTIVITIES: CPT

## 2025-06-12 PROCEDURE — 97110 THERAPEUTIC EXERCISES: CPT

## 2025-06-12 PROCEDURE — 97018 PARAFFIN BATH THERAPY: CPT

## 2025-06-12 PROCEDURE — 97140 MANUAL THERAPY 1/> REGIONS: CPT

## 2025-06-12 NOTE — PROGRESS NOTES
"  Outpatient Rehab    Occupational Therapy Visit    Patient Name: Lisandra Toussaint  MRN: [id number]  YOB: 1960  Encounter Date: 6/12/2025    Therapy Diagnosis:   Encounter Diagnoses   Name Primary?    Muscle weakness of left arm Yes    Reduced hand strength      Physician: Dat Solorio PA-C    Physician Orders: Eval and Treat  Medical Diagnosis: Lateral epicondylitis, unspecified laterality    Visit # / Visits Authorized: 2 / 10  Insurance Authorization Period: 5/30/2025 to 12/31/2025  Date of Evaluation: 5/30/2025  Plan of Care Certification: 5/30/2025 to 8/22/2025      Time In: 0100   Time Out: 0154  Total Time (in minutes): 54   Total Billable Time (in minutes): 36    FOTO:  Intake Score:  41%  Survey Score 2:  %  Survey Score 3:  %    Precautions:     Standard      Subjective   Improving flexibility and less pain in palm. "Overall less pain than when I started".  Pain reported as 1/10. L arm    Objective            Treatment:     Supervised modalities after being cleared for contradictions: Paraffin bath - with moist heat pack to L hand(s) for 8 minutes to increase blood flow, circulation, pain management, and for tissue elasticity prior to therex.   Hot Pack - Patient received moist heat x 8 min to L elbow to increase blood flow, circulation, and tissue elasticity prior to therex.    Manual therapy techniques: Soft tissue Mobilization and Friction Massage were applied to the: LUE for 10 minutes, including:  - Performed soft tissue mobilization/massage to L palm and forearm to relieve associated soft tissue tightness, improve joint mobility, decrease pain, and improve lymphatic drainage to increase ROM.   - Performed cross friction massage to L elbow to decrease edema, improve joint mobility, decrease pain, and promote proper healing via increased circulation.     Therapeutic exercises to develop endurance, ROM, flexibility, and posture for 10 minutes, including:  - ASTYM stretch positions 1-3, 2 x " 30 sec holds each   - IF PROM into comp flex and comp ext x 5 min     Therapeutic activities to improve functional performance for 16 minutes, including:  - Flexbar rolling onto palm x 2 min  - BUE pulleys in shoulder flexion and abduction x 2 min each  - UBE on no resistance x 6 min forward/back directions to increase UE strength and endurance.  - Ice massage to palm x 4 min     Assessment & Plan   Assessment: Pt participated well and endorses good adherence to HEP and brace wear. Remains mostly limited by hand and elbow pain but reports improved overall. Pain improved to 1/10 by end of session. Will progress as tolerated.  Evaluation/Treatment Tolerance: Patient tolerated treatment well, Patient limited by fatigue    The patient will continue to benefit from skilled outpatient occupational therapy in order to address the deficits listed in the problem list on the initial evaluation, provide patient and family education, and maximize the patients level of independence in the home and community environments.     The patient's spiritual, cultural, and educational needs were considered, and the patient is agreeable to the plan of care and goals.     Education  Education was done with Patient. The patient's learning style includes Demonstration and Listening. The patient Demonstrates understanding and Verbalizes understanding.         HEP in place, activity modification     Plan: Continue skilled occupational therapy with individualized plan of care focusing on maximizing functional use of patient's left arm. Progress as tolerated.    Goals:   Active       Occupational Therapy       Occupational Therapy Goal (Progressing)       Start:  05/30/25    Expected End:  08/22/25       Short Term Goals (STGs) = Long Term Goals (LTGs); to be met by discharge.  LTG #1: Pt will report a pain level of 1-3 out of 10 at worst with resistive functional use of left arm.      LTG #2: Pt will demo improved FOTO score by 5-10 points.    LTG #3: Pt will return to prior level of function for IADLs, household management, and work tasks.   LTG #4: Pt will have improved LUE strength as shown by an increase in MMT of at least 1 grade in all limited planes in order to perform household management.  LTG #5: Pt will increase left hand  strength in elbow flexed and extended positions by at least 5-10 lbs each needed to carry laundry, carry groceries, and increase functional independence of left arm for ADLs/IADLs.  LTG #6: Pt will demonstrate independence with issued HEP, brace wear as needed, and modalities for pain management.              Sandi Friedman, OTR/L, CHT

## 2025-06-23 ENCOUNTER — CLINICAL SUPPORT (OUTPATIENT)
Dept: REHABILITATION | Facility: HOSPITAL | Age: 65
End: 2025-06-23
Payer: COMMERCIAL

## 2025-06-23 DIAGNOSIS — M62.81 MUSCLE WEAKNESS OF LEFT ARM: Primary | ICD-10-CM

## 2025-06-23 DIAGNOSIS — R29.898 REDUCED HAND STRENGTH: ICD-10-CM

## 2025-06-23 PROCEDURE — 97110 THERAPEUTIC EXERCISES: CPT

## 2025-06-23 PROCEDURE — 97140 MANUAL THERAPY 1/> REGIONS: CPT

## 2025-06-23 PROCEDURE — 97018 PARAFFIN BATH THERAPY: CPT

## 2025-06-23 PROCEDURE — 97530 THERAPEUTIC ACTIVITIES: CPT

## 2025-06-23 NOTE — PROGRESS NOTES
"  Outpatient Rehab    Occupational Therapy Visit    Patient Name: Lisandra Toussaint  MRN: [id number]  YOB: 1960  Encounter Date: 6/23/2025    Therapy Diagnosis:   Encounter Diagnoses   Name Primary?    Muscle weakness of left arm Yes    Reduced hand strength      Physician: Dat Solorio PA-C    Physician Orders: Eval and Treat  Medical Diagnosis: Lateral epicondylitis, unspecified laterality    Visit # / Visits Authorized: 3 / 10  Insurance Authorization Period: 5/30/2025 to 12/31/2025  Date of Evaluation: 5/30/2025  Plan of Care Certification: 5/30/2025 to 8/22/2025      Time In: 0230   Time Out: 0332  Total Time (in minutes): 62   Total Billable Time (in minutes):  43 min    FOTO:  Intake Score:  41%  Survey Score 2:  %  Survey Score 3:  %    Precautions:     Standard      Subjective   "Much better".  Pain reported as 3/10. L elbow and hand    Objective           Strength (Dynamometer - Rung II) and Pinch Strength (Pinch Gauge)  Measured in pounds to POP.    5/30/2025 5/30/2025 6/23/2025     Left Right Left   Elbow flexed 7 41 30  pain   Elbow extended 15 41 42  pain   Tip Pinch 4 8 5.5   3pt Pinch 6 10.5 8      Manual Muscle Test    5/30/2025 6/23/2025     Left Left   Wrist Extension  4/5 5/5   Wrist Flexion 5/5    Radial Deviation 4/5 4+/5   Ulnar Deviation 5/5       Treatment:     Supervised modalities after being cleared for contradictions: Paraffin bath - with moist heat pack to L hand(s) for 10 minutes to increase blood flow, circulation, pain management, and for tissue elasticity prior to therex.   Hot Pack - Patient received moist heat x 10 min to L elbow to increase blood flow, circulation, and tissue elasticity prior to therex.    Manual therapy techniques: Soft tissue Mobilization and Friction Massage were applied to the: LUE for 12 minutes, including:  - Performed soft tissue mobilization/massage to L palm and forearm to relieve associated soft tissue tightness, improve joint mobility, " decrease pain, and improve lymphatic drainage to increase ROM.   - Performed cross friction massage to L elbow to decrease edema, improve joint mobility, decrease pain, and promote proper healing via increased circulation.     Therapeutic exercises to develop endurance, ROM, flexibility, and posture for 15 minutes, including:  - Updated objective measurements, see above.   - ASTYM stretch positions 1-3, 2 x 30 sec holds each   - IF PROM into comp flex and comp ext x 5 min     Therapeutic activities to improve functional performance for 16 minutes, including:  - Flexbar rolling onto palm x 2 min  - BUE pulleys in shoulder flexion and abduction x 2 min each  - UBE on 1.5 resistance x 6 min forward/back directions to increase UE strength and endurance.  - Ice massage to palm x 4 min     Assessment & Plan   Assessment: Pt participated well and endorses good adherence to HEP and brace wear. Good improvements in hand and wrist strength per assessment. Remains mostly limited by hand and elbow pain but reports improved overall. Pain improved to 1/10 by end of session. Will progress as tolerated.  Evaluation/Treatment Tolerance: Patient tolerated treatment well, Patient limited by fatigue, Patient limited by pain    The patient will continue to benefit from skilled outpatient occupational therapy in order to address the deficits listed in the problem list on the initial evaluation, provide patient and family education, and maximize the patients level of independence in the home and community environments.     The patient's spiritual, cultural, and educational needs were considered, and the patient is agreeable to the plan of care and goals.     Education  Education was done with Patient. The patient's learning style includes Demonstration and Listening. The patient Demonstrates understanding and Verbalizes understanding.         HEP in place, activity modification     Plan: Continue skilled occupational therapy with  individualized plan of care focusing on maximizing functional use of patient's left arm. Progress as tolerated.    Goals:   Active       Occupational Therapy       Occupational Therapy Goal (Progressing)       Start:  05/30/25    Expected End:  08/22/25       Short Term Goals (STGs) = Long Term Goals (LTGs); to be met by discharge.  LTG #1: Pt will report a pain level of 1-3 out of 10 at worst with resistive functional use of left arm.      LTG #2: Pt will demo improved FOTO score by 5-10 points.   LTG #3: Pt will return to prior level of function for IADLs, household management, and work tasks.   LTG #4: Pt will have improved LUE strength as shown by an increase in MMT of at least 1 grade in all limited planes in order to perform household management. Met  LTG #5: Pt will increase left hand  strength in elbow flexed and extended positions by at least 5-10 lbs each needed to carry laundry, carry groceries, and increase functional independence of left arm for ADLs/IADLs. Met  LTG #6: Pt will demonstrate independence with issued HEP, brace wear as needed, and modalities for pain management.                Sandi Friedman, OTR/L, CHT

## 2025-06-27 ENCOUNTER — OFFICE VISIT (OUTPATIENT)
Dept: ORTHOPEDICS | Facility: CLINIC | Age: 65
End: 2025-06-27
Payer: COMMERCIAL

## 2025-06-27 DIAGNOSIS — M65.322 TRIGGER INDEX FINGER OF LEFT HAND: Primary | ICD-10-CM

## 2025-06-27 PROCEDURE — 76942 ECHO GUIDE FOR BIOPSY: CPT | Mod: S$GLB,,, | Performed by: SPECIALIST/TECHNOLOGIST

## 2025-06-27 PROCEDURE — 20550 NJX 1 TENDON SHEATH/LIGAMENT: CPT | Mod: LT,S$GLB,, | Performed by: SPECIALIST/TECHNOLOGIST

## 2025-06-27 PROCEDURE — 1159F MED LIST DOCD IN RCRD: CPT | Mod: CPTII,S$GLB,, | Performed by: SPECIALIST/TECHNOLOGIST

## 2025-06-27 PROCEDURE — 99214 OFFICE O/P EST MOD 30 MIN: CPT | Mod: 25,S$GLB,, | Performed by: SPECIALIST/TECHNOLOGIST

## 2025-06-27 PROCEDURE — 4010F ACE/ARB THERAPY RXD/TAKEN: CPT | Mod: CPTII,S$GLB,, | Performed by: SPECIALIST/TECHNOLOGIST

## 2025-06-27 PROCEDURE — 99999 PR PBB SHADOW E&M-EST. PATIENT-LVL III: CPT | Mod: PBBFAC,,, | Performed by: SPECIALIST/TECHNOLOGIST

## 2025-06-27 RX ADMIN — DEXAMETHASONE SODIUM PHOSPHATE 4 MG: 4 INJECTION, SOLUTION INTRA-ARTICULAR; INTRALESIONAL; INTRAMUSCULAR; INTRAVENOUS; SOFT TISSUE at 01:06

## 2025-06-30 NOTE — PROGRESS NOTES
Patient ID: Lisandra Toussaint is a 64 y.o. female.    Chief Complaint: Pain and Numbness of the Left Hand    History of Present Illness    CHIEF COMPLAINT:  - Finger pain and trigger finger    HPI:  Lisandra presents for follow-up of elbow and finger issues. The elbow has improved, but the finger remains problematic. The affected area, previously intolerable to touch, can now be touched, moved, and rubbed with less discomfort. She estimates 50% overall improvement. Pain is localized to the back of the knuckle. She notes possible decreased sensation in the middle of the finger compared to others.    She reports difficulty with tasks requiring finger strength, including inability to use nail clippers and open certain types of packaging.    Six weeks ago, she received an injection, which provided some relief but did not fully resolve the issue.    She denies nighttime hand numbness or tingling similar to previous carpal tunnel symptoms.    PREVIOUS TREATMENTS:  - Corticosteroid injection: 6 weeks ago, provided partial relief (50% improvement)      ROS:  ROS as indicated in HPI.          Hand/Wrist Musculoskeletal Exam    Inspection    Right      Erythema: none      Ecchymosis: none      Edema: none      Deformity: none    Left      Erythema: none      Ecchymosis: none      Edema: none      Deformity: none    Palpation    Left      Triggering: index      Index tenderness to palpation: A1 pulley    Palpation additional comments: Tenderness to palpation over the lateral epicondyle of the left elbow    Range of Motion        Range of motion additional comments: Able to make a composite fist.    Strength        Strength additional comments: Pain with resisted wrist extension    Neurovascular    Right       Radial pulse: normal      Capillary refill: brisk      Ulnar nerve sensory distribution: normal      Median nerve sensory distribution: normal      Superficial radial nerve sensory distribution: normal    Left       Radial  pulse: normal      Capillary refill: brisk      Ulnar nerve sensory distribution: normal      Median nerve sensory distribution: normal      Superficial radial nerve sensory distribution: normal    Neurovascular additional comments:         Physical Exam    MSK: Hand/Wrist - Left: Tenderness in posterior knuckle. Trigger finger present.           IMAGING  Left hand XR  Personal interpretation of the XR reveals no signs of fractures or dislocations      PLAN  Assessment & Plan    64 year old female with L Index Trigger Finger and L Elbow Lateral Epicondylitis  - Left index finger injection administered today under ultrasound guidance.  - Discussed potential use of finger brace to allow for tendon glides and reduce inflammation.  - Use tendon strap at the elbow to create a different fulcrum point and reduce pressure on the insertion.  - Referred to OT for tennis elbow.          PROCEDURE NOTE  L Index Tendon Sheath    Date/Time: 6/27/2025 1:30 PM    Performed by: Dat Solorio PA-C  Authorized by: Dat Solorio PA-C    Consent Done?:  Yes (Verbal)  Indications:  Pain  Timeout: prior to procedure the correct patient, procedure, and site was verified    Local anesthesia used?: Yes    Anesthesia:  Local infiltration  Local anesthetic:  Lidocaine 1% without epinephrine  Anesthetic total (ml):  0.5    Location:  Index finger  Site:  L index flexor tendon sheath  Ultrasonic guidance for needle placement?: Yes (Ultrasound guidance was utilized for needle localization. Dynamic visualization of the needle was continuous throughout the procedure and maintained accurate placement. Images were saved and stored for documentation.)    Needle size:  25 G  Approach:  Volar  Medications:  4 mg dexAMETHasone 4 mg/mL  Patient tolerance:  Patient tolerated the procedure well with no immediate complications    Aric Solorio PA-C, ATC  Hand and Upper Extremity   [facility]    This note was generated with the assistance of ambient listening  technology. Verbal consent was obtained by the patient and accompanying visitor(s) for the recording of patient appointment to facilitate this note. I attest to having reviewed and edited the generated note for accuracy, though some syntax or spelling errors may persist. Please contact the author of this note for any clarification.

## 2025-07-01 RX ORDER — DEXAMETHASONE SODIUM PHOSPHATE 4 MG/ML
4 INJECTION, SOLUTION INTRA-ARTICULAR; INTRALESIONAL; INTRAMUSCULAR; INTRAVENOUS; SOFT TISSUE
Status: DISCONTINUED | OUTPATIENT
Start: 2025-06-27 | End: 2025-07-01 | Stop reason: HOSPADM

## 2025-07-03 ENCOUNTER — PATIENT OUTREACH (OUTPATIENT)
Dept: INTERNAL MEDICINE | Facility: CLINIC | Age: 65
End: 2025-07-03
Payer: COMMERCIAL

## 2025-07-03 VITALS — SYSTOLIC BLOOD PRESSURE: 108 MMHG | DIASTOLIC BLOOD PRESSURE: 69 MMHG

## 2025-07-15 RX ORDER — AMLODIPINE BESYLATE 10 MG/1
10 TABLET ORAL
Qty: 90 TABLET | Refills: 0 | Status: SHIPPED | OUTPATIENT
Start: 2025-07-15

## 2025-07-17 ENCOUNTER — PATIENT MESSAGE (OUTPATIENT)
Dept: PRIMARY CARE CLINIC | Facility: CLINIC | Age: 65
End: 2025-07-17
Payer: COMMERCIAL

## 2025-07-17 ENCOUNTER — HOSPITAL ENCOUNTER (EMERGENCY)
Facility: HOSPITAL | Age: 65
Discharge: HOME | End: 2025-07-17
Attending: EMERGENCY MEDICINE
Payer: COMMERCIAL

## 2025-07-17 ENCOUNTER — TELEPHONE (OUTPATIENT)
Dept: PRIMARY CARE CLINIC | Facility: CLINIC | Age: 65
End: 2025-07-17
Payer: COMMERCIAL

## 2025-07-17 ENCOUNTER — OFFICE VISIT (OUTPATIENT)
Dept: URGENT CARE | Facility: CLINIC | Age: 65
End: 2025-07-17
Payer: COMMERCIAL

## 2025-07-17 ENCOUNTER — HOSPITAL ENCOUNTER (OUTPATIENT)
Dept: RADIOLOGY | Facility: HOSPITAL | Age: 65
Discharge: HOME | End: 2025-07-17
Payer: COMMERCIAL

## 2025-07-17 VITALS
RESPIRATION RATE: 18 BRPM | BODY MASS INDEX: 33.66 KG/M2 | WEIGHT: 202 LBS | HEART RATE: 94 BPM | OXYGEN SATURATION: 99 % | DIASTOLIC BLOOD PRESSURE: 77 MMHG | SYSTOLIC BLOOD PRESSURE: 130 MMHG | HEIGHT: 65 IN | TEMPERATURE: 99 F

## 2025-07-17 VITALS
SYSTOLIC BLOOD PRESSURE: 137 MMHG | DIASTOLIC BLOOD PRESSURE: 63 MMHG | WEIGHT: 210 LBS | HEART RATE: 91 BPM | OXYGEN SATURATION: 97 % | HEIGHT: 65 IN | BODY MASS INDEX: 34.99 KG/M2 | RESPIRATION RATE: 15 BRPM

## 2025-07-17 DIAGNOSIS — M79.89 LEFT LEG SWELLING: ICD-10-CM
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
## 2025-07-17 DIAGNOSIS — M79.605 LEFT LEG PAIN: ICD-10-CM

## 2025-07-17 DIAGNOSIS — S80.12XA HEMATOMA OF LEG, LEFT, INITIAL ENCOUNTER: Primary | ICD-10-CM

## 2025-07-17 DIAGNOSIS — S30.861A TICK BITE OF ABDOMINAL WALL, INITIAL ENCOUNTER: ICD-10-CM

## 2025-07-17 DIAGNOSIS — R21 RASH: ICD-10-CM

## 2025-07-17 DIAGNOSIS — L20.9 ATOPIC DERMATITIS, UNSPECIFIED TYPE: ICD-10-CM

## 2025-07-17 DIAGNOSIS — R06.02 SOB (SHORTNESS OF BREATH): Primary | ICD-10-CM

## 2025-07-17 LAB
ABSOLUTE EOSINOPHIL (OHS): 0.14 K/UL
ABSOLUTE MONOCYTE (OHS): 0.8 K/UL (ref 0.3–1)
ABSOLUTE NEUTROPHIL COUNT (OHS): 6.6 K/UL (ref 1.8–7.7)
ALBUMIN SERPL BCP-MCNC: 4.1 G/DL (ref 3.5–5.2)
ALP SERPL-CCNC: 98 UNIT/L (ref 40–150)
ALT SERPL W/O P-5'-P-CCNC: 23 UNIT/L (ref 10–44)
ANION GAP (OHS): 12 MMOL/L (ref 8–16)
AST SERPL-CCNC: 28 UNIT/L (ref 11–45)
BASOPHILS # BLD AUTO: 0.05 K/UL
BASOPHILS NFR BLD AUTO: 0.5 %
BILIRUB SERPL-MCNC: 0.6 MG/DL (ref 0.1–1)
BNP SERPL-MCNC: <10 PG/ML (ref 0–99)
BUN SERPL-MCNC: 32 MG/DL (ref 8–23)
CALCIUM SERPL-MCNC: 10.3 MG/DL (ref 8.7–10.5)
CHLORIDE SERPL-SCNC: 105 MMOL/L (ref 95–110)
CO2 SERPL-SCNC: 22 MMOL/L (ref 23–29)
CREAT SERPL-MCNC: 1 MG/DL (ref 0.5–1.4)
CREAT SERPL-MCNC: 1 MG/DL (ref 0.5–1.4)
CTP QC/QA: YES
CTP QC/QA: YES
ERYTHROCYTE [DISTWIDTH] IN BLOOD BY AUTOMATED COUNT: 14.1 % (ref 11.5–14.5)
GFR SERPLBLD CREATININE-BSD FMLA CKD-EPI: >60 ML/MIN/1.73/M2
GLUCOSE SERPL-MCNC: 106 MG/DL (ref 70–110)
HCT VFR BLD AUTO: 39.4 % (ref 37–48.5)
HGB BLD-MCNC: 13.2 GM/DL (ref 12–16)
HOLD SPECIMEN: NORMAL
HOLD SPECIMEN: NORMAL
IMM GRANULOCYTES # BLD AUTO: 0.02 K/UL (ref 0–0.04)
IMM GRANULOCYTES NFR BLD AUTO: 0.2 % (ref 0–0.5)
INR PPP: 1.1 (ref 0.8–1.2)
LDH SERPL L TO P-CCNC: 0.9 MMOL/L (ref 0.5–2.2)
LYMPHOCYTES # BLD AUTO: 1.99 K/UL (ref 1–4.8)
MAGNESIUM SERPL-MCNC: 2.2 MG/DL (ref 1.6–2.6)
MCH RBC QN AUTO: 30.9 PG (ref 27–31)
MCHC RBC AUTO-ENTMCNC: 33.5 G/DL (ref 32–36)
MCV RBC AUTO: 92 FL (ref 82–98)
NUCLEATED RBC (/100WBC) (OHS): 0 /100 WBC
PCO2 BLDA: 43.9 MMHG (ref 35–45)
PH SMN: 7.41 [PH] (ref 7.35–7.45)
PLATELET # BLD AUTO: 296 K/UL (ref 150–450)
PMV BLD AUTO: 11 FL (ref 9.2–12.9)
PO2 BLDA: 27.4 MMHG (ref 40–60)
POC BASE DEFICIT: 2.8 MMOL/L (ref -2–2)
POC HCO3: 27.8 MMOL/L (ref 24–28)
POC MOLECULAR INFLUENZA A AGN: NEGATIVE
POC MOLECULAR INFLUENZA B AGN: NEGATIVE
POC PERFORMED BY: ABNORMAL
POC SATURATED O2: 47.8 % (ref 95–100)
POTASSIUM SERPL-SCNC: 3.4 MMOL/L (ref 3.5–5.1)
PROT SERPL-MCNC: 8.1 GM/DL (ref 6–8.4)
PROTHROMBIN TIME: 12.4 SECONDS (ref 9–12.5)
RBC # BLD AUTO: 4.27 M/UL (ref 4–5.4)
RELATIVE EOSINOPHIL (OHS): 1.5 %
RELATIVE LYMPHOCYTE (OHS): 20.7 % (ref 18–48)
RELATIVE MONOCYTE (OHS): 8.3 % (ref 4–15)
RELATIVE NEUTROPHIL (OHS): 68.8 % (ref 38–73)
SAMPLE: NORMAL
SARS-COV-2 RDRP RESP QL NAA+PROBE: NEGATIVE
SODIUM SERPL-SCNC: 139 MMOL/L (ref 136–145)
SPECIMEN SOURCE: ABNORMAL
TROPONIN I SERPL DL<=0.01 NG/ML-MCNC: <0.006 NG/ML
TROPONIN I SERPL DL<=0.01 NG/ML-MCNC: <0.006 NG/ML
WBC # BLD AUTO: 9.6 K/UL (ref 3.9–12.7)

## 2025-07-17 PROCEDURE — 73562 X-RAY EXAM OF KNEE 3: CPT | Mod: LT,S$GLB,, | Performed by: RADIOLOGY

## 2025-07-17 PROCEDURE — 82565 ASSAY OF CREATININE: CPT | Mod: 59

## 2025-07-17 PROCEDURE — 85610 PROTHROMBIN TIME: CPT | Performed by: EMERGENCY MEDICINE

## 2025-07-17 PROCEDURE — 25500020 PHARM REV CODE 255: Performed by: EMERGENCY MEDICINE

## 2025-07-17 PROCEDURE — 84484 ASSAY OF TROPONIN QUANT: CPT | Performed by: EMERGENCY MEDICINE

## 2025-07-17 PROCEDURE — 93005 ELECTROCARDIOGRAM TRACING: CPT

## 2025-07-17 PROCEDURE — 63600175 PHARM REV CODE 636 W HCPCS: Performed by: EMERGENCY MEDICINE

## 2025-07-17 PROCEDURE — 87635 SARS-COV-2 COVID-19 AMP PRB: CPT | Performed by: EMERGENCY MEDICINE

## 2025-07-17 PROCEDURE — 83880 ASSAY OF NATRIURETIC PEPTIDE: CPT | Performed by: EMERGENCY MEDICINE

## 2025-07-17 PROCEDURE — 83735 ASSAY OF MAGNESIUM: CPT | Performed by: EMERGENCY MEDICINE

## 2025-07-17 PROCEDURE — 36415 COLL VENOUS BLD VENIPUNCTURE: CPT

## 2025-07-17 PROCEDURE — 96375 TX/PRO/DX INJ NEW DRUG ADDON: CPT

## 2025-07-17 PROCEDURE — 99214 OFFICE O/P EST MOD 30 MIN: CPT | Mod: S$GLB,,,

## 2025-07-17 PROCEDURE — 82803 BLOOD GASES ANY COMBINATION: CPT

## 2025-07-17 PROCEDURE — 93010 ELECTROCARDIOGRAM REPORT: CPT | Mod: ,,, | Performed by: INTERNAL MEDICINE

## 2025-07-17 PROCEDURE — 85025 COMPLETE CBC W/AUTO DIFF WBC: CPT | Performed by: EMERGENCY MEDICINE

## 2025-07-17 PROCEDURE — 96374 THER/PROPH/DIAG INJ IV PUSH: CPT

## 2025-07-17 PROCEDURE — 87502 INFLUENZA DNA AMP PROBE: CPT

## 2025-07-17 PROCEDURE — 82040 ASSAY OF SERUM ALBUMIN: CPT | Performed by: EMERGENCY MEDICINE

## 2025-07-17 PROCEDURE — 99900035 HC TECH TIME PER 15 MIN (STAT)

## 2025-07-17 PROCEDURE — 99285 EMERGENCY DEPT VISIT HI MDM: CPT | Mod: 25

## 2025-07-17 PROCEDURE — 93971 EXTREMITY STUDY: CPT | Mod: TC,LT

## 2025-07-17 PROCEDURE — 93971 EXTREMITY STUDY: CPT | Mod: 26,LT,, | Performed by: RADIOLOGY

## 2025-07-17 RX ORDER — DOXYCYCLINE 100 MG/1
100 CAPSULE ORAL 2 TIMES DAILY
Qty: 20 CAPSULE | Refills: 0 | Status: SHIPPED | OUTPATIENT
Start: 2025-07-17 | End: 2025-07-17

## 2025-07-17 RX ORDER — DOXYCYCLINE 100 MG/1
100 CAPSULE ORAL 2 TIMES DAILY
Qty: 20 CAPSULE | Refills: 0 | Status: SHIPPED | OUTPATIENT
Start: 2025-07-17 | End: 2025-07-27

## 2025-07-17 RX ORDER — DIPHENHYDRAMINE HYDROCHLORIDE 50 MG/ML
50 INJECTION, SOLUTION INTRAMUSCULAR; INTRAVENOUS
Status: COMPLETED | OUTPATIENT
Start: 2025-07-17 | End: 2025-07-17

## 2025-07-17 RX ORDER — DIPHENHYDRAMINE HYDROCHLORIDE 50 MG/ML
50 INJECTION, SOLUTION INTRAMUSCULAR; INTRAVENOUS ONCE
Status: DISCONTINUED | OUTPATIENT
Start: 2025-07-17 | End: 2025-07-17

## 2025-07-17 RX ADMIN — HYDROCORTISONE SODIUM SUCCINATE 200 MG: 100 INJECTION, POWDER, FOR SOLUTION INTRAMUSCULAR; INTRAVENOUS at 04:07

## 2025-07-17 RX ADMIN — IOHEXOL 100 ML: 350 INJECTION, SOLUTION INTRAVENOUS at 05:07

## 2025-07-17 RX ADMIN — DIPHENHYDRAMINE HYDROCHLORIDE 50 MG: 50 INJECTION INTRAMUSCULAR; INTRAVENOUS at 04:07

## 2025-07-17 NOTE — ED NOTES
2 L NC placed on patient for comfort. Patient satting 100% on RA. Patient breathing shallow. Dyspnea at rest. Unable to speak in short phrases. ED provider notified.

## 2025-07-17 NOTE — ED NOTES
Patient resting in bed with blanket and pillow. Family at bedside. Side rails up x 2. Call light within reach.

## 2025-07-17 NOTE — ED NOTES
"Patient reports her iodine allergy is a skin rash. Patient reports "I've received contrast before, I just take the steroids before." MD notified. Orders received. MD ok for patient to go to CT with contrast following medication administration.   "

## 2025-07-17 NOTE — ED PROVIDER NOTES
Encounter Date: 7/17/2025       History     Chief Complaint   Patient presents with    Shortness of Breath     Pt reports sudden SOB 20mins PTA. Pt in obvious resp distress, SPO2 100% RA, RR 40, lung sounds clear and equal bilaterally.      Patient is a 64-year-old female with a history of anxiety, brain aneurysm, hypertension who presents to the ER with a complaint of shortness of breath.  Patient states approximately 1 week ago she has bitten by tick to her abdomen.  Several days later she developed a blister to the posterior aspect of the left knee.  Family member at bedside noticed this as well and also reports ecchymosis to the medial aspect of the posterior knee that developed today.  The patient was sent for outpatient ultrasound of the bilateral lower extremities for concern of possible DVT as the patient recently has traveled from [place].  The patient underwent this but was found to be acutely short of breath and was sent to the ER for evaluation.  The patient states it prior to coming to the ER, proximally 20 minutes prior, she developed acute shortness of breath feelings of lightheadedness.  She denies any overt chest pain.  She denies any recent fever chills nausea vomiting or abdominal pain.  On arrival patient on 2 L of oxygen via NC.      Review of patient's allergies indicates:   Allergen Reactions    Cinnamon Anaphylaxis    Iodine and iodide containing products Other (See Comments)     Tears up skin    Macrodantin [nitrofurantoin macrocrystalline] Other (See Comments)     Knee and elbow swelling    Povidone-iodine      Other reaction(s): Blister    Sulfa (sulfonamide antibiotics) Hives     Past Medical History:   Diagnosis Date    Aneurysm     Anxiety     HTN (hypertension)     Sleep apnea     TMJ (dislocation of temporomandibular joint)      Past Surgical History:   Procedure Laterality Date    ABDOMINAL SURGERY      CARPAL TUNNEL RELEASE      COLONOSCOPY, SCREENING, LOW RISK PATIENT N/A  5/6/2025    Procedure: COLONOSCOPY, SCREENING, LOW RISK PATIENT;  Surgeon: Roxnaa Anne MD;  Location: [facility] ([address] [place] [address]);  Service: Endoscopy;  Laterality: N/A;  Sandra Shetty, peg, portal,  no longer taking Plavix,ASam    jm/adjusted case lengths  4/28 precall complete, resent instructions w Miralax prep per pt request-MD    EYE SURGERY      FOOT SURGERY Bilateral     tarsel tunnel       Family History   Problem Relation Name Age of Onset    Cancer Mother Giancarlo         Squamous Cell Skin Carcinoma on ear, aggressive went to lymph nodes facial structure 18mo later    Hypertension Mother Giancarlo     Learning disabilities Son Rambo         Dyslexia    Learning disabilities Son Juma         Dyslexia    Heart disease Maternal Grandmother Radha         Dementia and heart attack early 60s    Cancer Maternal Grandfather Servin         Lung cancer    Hypertension Maternal Grandfather Servin     Stroke Maternal Grandfather Servin         Stroke at 80     Social History[1]  Review of Systems   Constitutional:  Negative for fatigue and fever.   HENT:  Negative for dental problem.    Respiratory:  Positive for shortness of breath. Negative for cough, wheezing and stridor.    Cardiovascular:  Negative for chest pain, palpitations and leg swelling.   Gastrointestinal:  Negative for abdominal pain, nausea and vomiting.   Musculoskeletal:  Negative for back pain, joint swelling, neck pain and neck stiffness.   Skin:  Positive for rash.   Neurological:  Negative for dizziness and headaches.   Psychiatric/Behavioral:  Negative for agitation and confusion.        Physical Exam     Initial Vitals [07/17/25 1551]   BP Pulse Resp Temp SpO2   (!) 142/64 100 (!) 40 -- 100 %      MAP       --         Physical Exam    Nursing note and vitals reviewed.  Constitutional: She appears well-developed and well-nourished. She appears distressed.   HENT:   Head: Normocephalic and atraumatic.   Right Ear: External ear normal.   Left Ear:  External ear normal.   Eyes: EOM are normal. Pupils are equal, round, and reactive to light.   Neck:   Normal range of motion.  Cardiovascular:  Normal rate, regular rhythm and normal heart sounds.           Pulmonary/Chest: She is in respiratory distress.   Decreased breath sounds on L; dullness to percussion   Mildly tachypnei   speaking in complete sentences  On 2L via NC    Abdominal: Abdomen is soft. She exhibits distension. She exhibits no fluid wave and no pulsatile midline mass. There is hepatomegaly. There is abdominal tenderness in the right upper quadrant.   Musculoskeletal:         General: No tenderness or edema.      Cervical back: Normal range of motion.      Comments: There is a half-dollar size area of erythema posterior to the left knee with questionable vesicular type lesions; no bull's-eye type lesion; there is also associated ecchymosis in a crescent shape lateral to the posterior left knee as well; no overlying erythema to the area of the gastrocnemius musculature; no palpable cord; the left lower extremity is well perfused     Neurological: She is alert and oriented to person, place, and time. She has normal strength. GCS score is 15. GCS eye subscore is 4. GCS verbal subscore is 5. GCS motor subscore is 6.         ED Course   Procedures  Labs Reviewed   COMPREHENSIVE METABOLIC PANEL - Abnormal       Result Value    Sodium 139      Potassium 3.4 (*)     Chloride 105      CO2 22 (*)     Glucose 106      BUN 32 (*)     Creatinine 1.0      Calcium 10.3      Protein Total 8.1      Albumin 4.1      Bilirubin Total 0.6      ALP 98      AST 28      ALT 23      Anion Gap 12      eGFR >60     TROPONIN I - Normal    Troponin-I <0.006     B-TYPE NATRIURETIC PEPTIDE - Normal    BNP <10     CBC WITH DIFFERENTIAL - Normal    WBC 9.60      RBC 4.27      HGB 13.2      HCT 39.4      MCV 92      MCH 30.9      MCHC 33.5      RDW 14.1      Platelet Count 296      MPV 11.0      Nucleated RBC 0      Neut % 68.8       Lymph % 20.7      Mono % 8.3      Eos % 1.5      Basophil % 0.5      Imm Grans % 0.2      Neut # 6.60      Lymph # 1.99      Mono # 0.80      Eos # 0.14      Baso # 0.05      Imm Grans # 0.02     MAGNESIUM - Normal    Magnesium  2.2     PROTIME-INR - Normal    PT 12.4      INR 1.1     TROPONIN I - Normal    Troponin-I <0.006     CBC W/ AUTO DIFFERENTIAL    Narrative:     The following orders were created for panel order CBC auto differential.  Procedure                               Abnormality         Status                     ---------                               -----------         ------                     CBC with Differential[[id number]]       Normal              Final result                 Please view results for these tests on the individual orders.   EXTRA TUBES    Narrative:     The following orders were created for panel order EXTRA TUBES.  Procedure                               Abnormality         Status                     ---------                               -----------         ------                     Light Blue Top Hold[[id number]]                             Final result               Gold Top Hold[[id number]]                                   Final result                 Please view results for these tests on the individual orders.   LIGHT BLUE TOP HOLD    Extra Tube Hold for add-ons.     GOLD TOP HOLD    Extra Tube Hold for add-ons.     POCT INFLUENZA A/B MOLECULAR    POC Molecular Influenza A Ag Negative      POC Molecular Influenza B Ag Negative       Acceptable Yes     SARS-COV-2 RDRP GENE    POC Rapid COVID Negative       Acceptable Yes     ISTAT CREATININE    POC Creatinine 1.0      Sample VENOUS            Imaging Results              X-Ray Chest AP (Final result)  Result time 07/17/25 18:37:43      Final result by Prabhakar Miramontes MD (07/17/25 18:37:43)                   Impression:      1. No acute cardiopulmonary process.  Please see  separately dictated CTA chest non coronary for more detailed evaluation of the thorax.      Electronically signed by: Prabhakar Miramontes MD  Date:    07/17/2025  Time:    18:37               Narrative:    EXAMINATION:  XR CHEST AP PORTABLE    CLINICAL HISTORY:  shortness of breath;    TECHNIQUE:  Single frontal view of the chest was performed.    COMPARISON:  06/20/2024    FINDINGS:  The cardiomediastinal silhouette is not enlarged noting calcification of the aorta..  There is no pleural effusion.  The trachea is midline.  The lungs are symmetrically expanded bilaterally with minimally coarse interstitial attenuation, accentuated by habitus..  No large focal consolidation seen.  There is no pneumothorax.  The osseous structures are remarkable for degenerative change..                                       CTA Chest Non-Coronary (PE Studies) (Final result)  Result time 07/17/25 17:35:42      Final result by Jan Watters MD (07/17/25 17:35:42)                   Impression:      1. No evidence of acute pulmonary thromboembolism.  2. Multiple parapelvic cysts versus hydronephrosis of the right kidney.  Consider renal imaging and renal function evaluation as clinically warranted.      Electronically signed by: Jan Watters  Date:    07/17/2025  Time:    17:35               Narrative:    EXAMINATION:  CTA CHEST NON CORONARY (PE STUDIES)    CLINICAL HISTORY:  Pulmonary embolism (PE) suspected, high prob;    TECHNIQUE:  Following the intravenous administration of 75 cc of Omnipaque 350 contrast material, 1.25 mm contiguous axial images were acquired through the chest utilizing the CT pulmonary angiogram protocol.  2-D coronal and sagittal reconstructed images of the chest and sagittal oblique MIP images of the pulmonary arterial system were generated from the source data.    COMPARISON:  None.    FINDINGS:  Pulmonary arterial system: This is a good quality examination for the evaluation of pulmonary thromboembolism  with good opacification of the pulmonary arteries to the level of the segmental branches of the pulmonary arterial system. There is no evidence of acute pulmonary thromboembolism. No large saddle pulmonary embolism, enlargement of the main pulmonary artery, or secondary findings of right ventricular strain.    Aorta and heart: The heart is normal in size.  No pericardial fluid.  No thoracic aortic aneurysm.  No thoracic aortic dissection.    Airways: Unremarkable.    Lymph nodes: No pathologically enlarged lymph nodes in the chest or axilla.    Lungs: The lungs are clear with no evidence of airspace consolidation, mass, or bronchiectasis.  No emphysematous lung architecture.    Pleura: No significant volume of pleural fluid or pneumothorax.  No pleural based masses.    Visualized upper abdominal soft tissues: Marked hydronephrosis versus multiple parapelvic cysts in the right kidney incompletely imaged..    Bones: There is degenerative change of the thoracic spine.                                       Medications   hydrocortisone sodium succinate injection 200 mg (200 mg Intravenous Given 7/17/25 1649)   diphenhydrAMINE injection 50 mg (50 mg Intravenous Given 7/17/25 1649)   iohexoL (OMNIPAQUE 350) injection 100 mL (100 mLs Intravenous Given 7/17/25 1700)     Medical Decision Making  Amount and/or Complexity of Data Reviewed  Labs: ordered. Decision-making details documented in ED Course.  Radiology: ordered.    Risk  Prescription drug management.               ED Course as of 07/17/25 2056   Thu Jul 17, 2025 1653 Patient reports rash associated with iodine products.  She states she has been pretreated in the past with steroids in his head contrasted studies done subsequently without significant abnormality. [LC]   1708 Potassium(!): 3.4 [LC]   1708 CO2(!): 22 [LC]   1708 BUN(!): 32 [LC]   1708 WBC: 9.60 [LC]   1708 Hemoglobin: 13.2 [LC]   2042 Troponin I: <0.006 [LC]      ED Course User Index  [LC] Estephania  Clif THOMPSON MD                Medical Decision Making:   Initial Assessment:   See HPI  Clinical Tests:   Lab Tests: Reviewed and Ordered  Radiological Study: Ordered and Reviewed  ED Management:  - patient presented with the acute onset of shortness of breath shortly after undergoing ultrasound of the left lower extremity; patient reportedly tachypneic with a respiratory rate in 40s however maintaining O2 sat of 100% on room air.  Patient was placed on 2 L of oxygen for comfort.  Concern for acute pulmonary embolism in the context of normal vital signs other than the aforementioned tachypnea.  CTA of the chest demonstrates no acute pulmonary thromboembolism.  No other acute abnormality.  Chest x-ray also unremarkable.  Routine labs including troponin x2 negative.  COVID and influenza swabs negative.  Patient observed for several hours without untoward event or true hypoxia.  She does have findings of potential new onset rash in the context of recent tick bite.  I will start her on doxycycline as outpatient.  Her ECG is reassuring without any findings of blocks in the context of the aforementioned tick bite.  There was no findings of STEMI or acute ischemic change.  She is not septic or febrile.  She was able to ambulate in the ER without decompensation.  She is comfortable plan for discharge at this time.  The patient was given strict ER return precautions for any new or worsening symptoms.  Additionally, the patient's formal ultrasound conducted a demonstrated no findings of DVT.  The patient was made aware of this as well.  Again, she is comfortable with the plan for discharge and outpatient follow up.  She was instructed to return to the ER for any new or worsening symptoms.  - - No further intervention is indicated at this time after having taken into account the patient's history, physical exam findings, and empirical and objective data obtained during the patient's emergency department workup.   - The patient is  at low risk for an emergent medical condition at this time, and I am of the belief that that it is safe to discharge the patient from the emergency department.   - The patient is instructed to follow up as outpatient as indicated on the discharge paperwork.    - I have discussed the specifics of the workup with the patient and the patient has verbalized understanding of the details of the workup, the diagnosis, the treatment plan, and the need for outpatient follow-up.    - Although the patient has no emergent etiology today this does not preclude the development of an emergent condition so, in addition, I have advised the patient that they can return to the ED and/or activate EMS at any time with worsening of their symptoms, change of their symptoms, or with any other medical complaint.    - The patient remained comfortable and stable during their visit in the ED.    - Discharge and follow-up instructions discussed with the patient who expressed understanding and willingness to comply with my recommendations.  - Results of all emergency department tests  discussed thoroughly with patient; all patient questions answered; pt in agreement with plan  - Pt instructed to follow up with PCP in 2-3 days for recheck of today's complaints  - Pt given strict emergency department return precautions for any new or worsening of symptoms  - Pt discharged from the emergency department in stable condition, in no acute distress                   Clinical Impression:  Final diagnoses:  [R06.02] SOB (shortness of breath) (Primary)  [R21] Rash          ED Disposition Condition    Discharge Stable          ED Prescriptions       Medication Sig Dispense Start Date End Date Auth. Provider    doxycycline (VIBRAMYCIN) 100 MG Cap  (Status: Discontinued) Take 1 capsule (100 mg total) by mouth 2 (two) times daily. for 10 days 20 capsule 7/17/2025 7/17/2025 Clif Best MD    doxycycline (VIBRAMYCIN) 100 MG Cap  (Status: Discontinued)  Take 1 capsule (100 mg total) by mouth 2 (two) times daily. for 10 days 20 capsule 7/17/2025 7/17/2025 Clif Best MD    doxycycline (VIBRAMYCIN) 100 MG Cap Take 1 capsule (100 mg total) by mouth 2 (two) times daily. for 10 days 20 capsule 7/17/2025 7/27/2025 Clif Best MD          Follow-up Information       Follow up With Specialties Details Why Contact Info    Sandra Shetty NP Family Medicine Schedule an appointment as soon as possible for a visit   [address]  [address]  [facility] [address]  [phone]                     [1]   Social History  Tobacco Use    Smoking status: Never    Smokeless tobacco: Never    Tobacco comments:     2nd hand smoke when it was not banned   Substance Use Topics    Alcohol use: Not Currently    Drug use: Never        Clif Best MD  07/17/25 2059

## 2025-07-17 NOTE — TELEPHONE ENCOUNTER
Copied from CRM #[id number]. Topic: Escalation - Escalation To Clinic  >> Jul 17, 2025 11:12 AM Karly wrote:  Type:  Needs Medical Advice    Who Called: Lisandra Toussaint    Symptom: Rash or Redness on One Body Area Only  Outcome: Transfer to a nurse or provider NOW!  Reason: Dark red or purple spots    The caller accepted this outcome.    How long has patient had these symptoms:  since yesterday     Pharmacy name and phone #:  [facility] [id number]  [place] [address]  [facility] [place] [address]  Phone: [phone] Fax: [phone]      Would the patient rather a call back or a response via [facility]? Call back     Best Call Back Number: [phone]    Additional Information: pt states she would like to speak with someone in the providers office in regards to a rash on the back of her knee. Pt states she was in [place] and had found a tick bite on her abdomen but no rash. Pt states the rash is only on the back of her knee. Pt states she used some cream on the rash and it helped a bit but as of yesterday her leg started cramping. Pt states she now has a bruise on the leg and is walking with crutches. Messaged MA and was advised to send message for call back. Pt states she uploaded a picture of her leg into the portal as she wants someone to see what it looks like.   Please call back with further assistance as soon as possible.

## 2025-07-17 NOTE — ED NOTES
Patient reports improvement in breathing. No increased WOB. Breathing unlabored and even. Patient able to speak in full sentences. Calm, relaxed in bed. Oxygen taken off patient. Resting in bed, side rails up x 2. Call light within reach. Family at bedside. Updated on plan of care.

## 2025-07-17 NOTE — ED NOTES
Patient presents to ED from outpatient Ultrasound. Was here for ultrasound for evaluation of left leg swelling but became suddenly very short of breath and tachypneic and was brought to the ER. Patient recently had travel to [facility] via motor vehicle to and from. Is short of breath in the stretcher and very breathless.     Family member at the bedside, showed pictures of leg that has bruising and very red large diameter [place] behind the knee and into the calf that had inexplicably appeared yesterday.

## 2025-07-17 NOTE — PATIENT INSTRUCTIONS
- Go to [facility] for ultrasound of the left leg. I will call you with results and we will go over a plan then.     - You must understand that you have received an Urgent Care treatment only and that you may be released before all of your medical problems are known or treated.   - You, the patient, will arrange for follow up care as instructed.   - If your condition worsens or fails to improve we recommend that you receive another evaluation at the ER immediately or contact your PCP to discuss your concerns or return here.   - Follow up with your PCP or specialty clinic as directed in the next 1-2 weeks if not improved or as needed.  You can call [phone] to schedule an appointment with the appropriate provider.    If your symptoms do not improve or worsen, go to the emergency room immediately.

## 2025-07-18 NOTE — ED NOTES
Pt ambulated in ER with 02 sensor in place.  Pt maintain steady gait with no increased WOB noted.  02 93%.  Pt assisted back in bed and reconnected to cardiac monitor.  ERP updated

## 2025-07-19 ENCOUNTER — PATIENT MESSAGE (OUTPATIENT)
Dept: PRIMARY CARE CLINIC | Facility: CLINIC | Age: 65
End: 2025-07-19
Payer: COMMERCIAL

## 2025-07-19 DIAGNOSIS — S80.11XS: Primary | ICD-10-CM

## 2025-07-19 DIAGNOSIS — N28.89 RENAL MASS: ICD-10-CM

## 2025-07-19 LAB
OHS QRS DURATION: 72 MS
OHS QRS DURATION: 80 MS
OHS QTC CALCULATION: 438 MS
OHS QTC CALCULATION: 452 MS

## 2025-07-21 ENCOUNTER — LAB VISIT (OUTPATIENT)
Dept: LAB | Facility: HOSPITAL | Age: 65
End: 2025-07-21
Attending: INTERNAL MEDICINE
Payer: COMMERCIAL

## 2025-07-21 ENCOUNTER — HOSPITAL ENCOUNTER (OUTPATIENT)
Dept: RADIOLOGY | Facility: HOSPITAL | Age: 65
Discharge: HOME | End: 2025-07-21
Attending: INTERNAL MEDICINE
Payer: COMMERCIAL

## 2025-07-21 ENCOUNTER — RESULTS FOLLOW-UP (OUTPATIENT)
Dept: PRIMARY CARE CLINIC | Facility: CLINIC | Age: 65
End: 2025-07-21
Payer: COMMERCIAL

## 2025-07-21 DIAGNOSIS — N28.89 RENAL MASS: ICD-10-CM

## 2025-07-21 DIAGNOSIS — S30.861D TICK BITE OF ABDOMEN, SUBSEQUENT ENCOUNTER: Primary | ICD-10-CM

## 2025-07-21 DIAGNOSIS — S80.11XS: ICD-10-CM

## 2025-07-21 DIAGNOSIS — R23.3 SPONTANEOUS ECCHYMOSES: ICD-10-CM
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
## 2025-07-21 LAB
BILIRUB UR QL STRIP.AUTO: NEGATIVE
CLARITY UR: CLEAR
COLOR UR AUTO: YELLOW
GLUCOSE UR QL STRIP: NEGATIVE
HGB UR QL STRIP: NEGATIVE
KETONES UR QL STRIP: NEGATIVE
LEUKOCYTE ESTERASE UR QL STRIP: NEGATIVE
NITRITE UR QL STRIP: NEGATIVE
PH UR STRIP: 7 [PH]
PROT UR QL STRIP: NEGATIVE
SP GR UR STRIP: 1.02
UROBILINOGEN UR STRIP-ACNC: NEGATIVE EU/DL

## 2025-07-21 PROCEDURE — 81003 URINALYSIS AUTO W/O SCOPE: CPT

## 2025-07-21 PROCEDURE — 76770 US EXAM ABDO BACK WALL COMP: CPT | Mod: 26,,, | Performed by: RADIOLOGY

## 2025-07-21 PROCEDURE — 76770 US EXAM ABDO BACK WALL COMP: CPT | Mod: TC

## 2025-07-21 PROCEDURE — 76882 US LMTD JT/FCL EVL NVASC XTR: CPT | Mod: TC,LT

## 2025-07-21 PROCEDURE — 99452 NTRPROF PH1/NTRNET/EHR RFRL: CPT | Mod: S$GLB,,, | Performed by: INTERNAL MEDICINE

## 2025-07-21 NOTE — TELEPHONE ENCOUNTER
Elevate leg as much as possible.  Ordering soft tissue US to evaluate extent of hematoma (US that was done was to rule out blood clot) this was ordered STAT    Also had some abnormalities seen on right kidney on CT scan in ER done to look for blood clot in lungs.  Ordering Urine sample and kidney US to eval this further

## 2025-07-22 LAB — HOLD SPECIMEN: NORMAL

## 2025-07-23 ENCOUNTER — OFFICE VISIT (OUTPATIENT)
Dept: UROLOGY | Facility: CLINIC | Age: 65
End: 2025-07-23
Payer: COMMERCIAL

## 2025-07-23 VITALS
BODY MASS INDEX: 35.64 KG/M2 | WEIGHT: 213.94 LBS | DIASTOLIC BLOOD PRESSURE: 80 MMHG | SYSTOLIC BLOOD PRESSURE: 130 MMHG | HEIGHT: 65 IN | HEART RATE: 101 BPM

## 2025-07-23 DIAGNOSIS — N13.30 HYDRONEPHROSIS, UNSPECIFIED HYDRONEPHROSIS TYPE: Primary | ICD-10-CM

## 2025-07-23 DIAGNOSIS — N13.30 HYDRONEPHROSIS OF RIGHT KIDNEY: ICD-10-CM

## 2025-07-23 PROCEDURE — 1160F RVW MEDS BY RX/DR IN RCRD: CPT | Mod: CPTII,S$GLB,,

## 2025-07-23 PROCEDURE — 3008F BODY MASS INDEX DOCD: CPT | Mod: CPTII,S$GLB,,

## 2025-07-23 PROCEDURE — 4010F ACE/ARB THERAPY RXD/TAKEN: CPT | Mod: CPTII,S$GLB,,

## 2025-07-23 PROCEDURE — 99203 OFFICE O/P NEW LOW 30 MIN: CPT | Mod: S$GLB,,,

## 2025-07-23 PROCEDURE — 99999 PR PBB SHADOW E&M-EST. PATIENT-LVL V: CPT | Mod: PBBFAC,,,

## 2025-07-23 PROCEDURE — 3075F SYST BP GE 130 - 139MM HG: CPT | Mod: CPTII,S$GLB,,

## 2025-07-23 PROCEDURE — 3079F DIAST BP 80-89 MM HG: CPT | Mod: CPTII,S$GLB,,

## 2025-07-23 PROCEDURE — 1159F MED LIST DOCD IN RCRD: CPT | Mod: CPTII,S$GLB,,

## 2025-07-23 RX ORDER — DIPHENHYDRAMINE HCL 50 MG
CAPSULE ORAL
Qty: 1 CAPSULE | Refills: 0 | Status: SHIPPED | OUTPATIENT
Start: 2025-07-23

## 2025-07-23 RX ORDER — PREDNISONE 50 MG/1
TABLET ORAL
Qty: 3 TABLET | Refills: 0 | Status: SHIPPED | OUTPATIENT
Start: 2025-07-23

## 2025-07-23 NOTE — PROGRESS NOTES
Subjective:       Patient ID: Lisandra Toussaint is a 64 y.o. female.    Chief Complaint: CHARMAINE results     This is a 64 y.o.  female patient that is new to me.  The patient was referred to me by Dr. Amato for hydronephrosis.  CHARMAINE 7/21/25-- right kidney with severe hydronephrosis. Left kidney with 7mm stone at the lower pole, no hydronephrosis.  Today reports symptoms of generalized back pain, SOB, dizziness. Denies LUTS, dysuria, gross hematuria, fevers, chills, n/v. Denies history of kidney and UTIs.          Lab Results   Component Value Date    CREATININE 1.0 07/17/2025       ---  PMH/PSH/Medications/Allergies/Social history reviewed and as in chart.    Review of Systems   Constitutional:  Negative for chills and fever.   Respiratory:  Positive for shortness of breath. Negative for cough, wheezing and stridor.    Cardiovascular:  Negative for chest pain and palpitations.   Gastrointestinal:  Negative for abdominal pain, constipation and diarrhea.   Genitourinary:  Negative for difficulty urinating, dysuria, flank pain, frequency, hematuria, pelvic pain, urgency and vaginal pain.   Musculoskeletal:  Positive for back pain.   Skin:  Positive for color change (bruising throughout left leg).   Neurological:  Positive for dizziness. Negative for weakness.   Psychiatric/Behavioral:  Negative for agitation, confusion and sleep disturbance.        Objective:      Physical Exam  HENT:      Head: Normocephalic.   Pulmonary:      Effort: Respiratory distress present.   Musculoskeletal:      Cervical back: Normal range of motion.   Skin:     General: Skin is warm and dry.   Neurological:      Mental Status: She is alert and oriented to person, place, and time.         Assessment:     Problem Noted   Hydronephrosis of Right Kidney 7/23/2025       Plan:     Reviewed CHARMAINE, recommended further evaluation with CTU to rule out other kidney stones, strictures throughout the urinary tract. Patient agreed to this.   CTU scheduled with  prednisone and benadryl ordered prior to test  Advised to go back to the ER if dizziness and SOB continue. Patient voiced understanding  Follow-up to review imaging    GRACE Thompson    I spent a total of 30 minutes on the day of the visit.This includes face to face time and non-face to face time preparing to see the patient (eg, review of tests), obtaining and/or reviewing separately obtained history, documenting clinical information in the electronic or other health record, independently interpreting results and communicating results to the patient/family/caregiver, or care coordinator.

## 2025-07-24 ENCOUNTER — E-CONSULT (OUTPATIENT)
Dept: INFECTIOUS DISEASES | Facility: HOSPITAL | Age: 65
End: 2025-07-24
Payer: COMMERCIAL

## 2025-07-24 DIAGNOSIS — R58 ECCHYMOSIS: Primary | ICD-10-CM

## 2025-07-24 NOTE — CONSULTS
[facility] INFECTIOUS DISEASE  Response for E-Consult     Patient Name: Lisandra Toussaint  MRN: [id number]  Primary Care Provider: Sandra Shetty NP   Requesting Provider: Marley Amato MD  E-Consult to Infectious Disease  Consult performed by: Wilda Butcher DO  Consult ordered by: Marley Amato MD        Recommendation: Tick-borne infections can certainly be associated w/ coagulopathies, most commonly thrombocytopenia and DIC, however typically these patients are extremely ill with significant lab derangements. I would suggest further evaluation of her ecchymosis w/ additional coag studies (PT/INR, PTT, fibrinogen, VWF) if patient denies hx of significant trauma and is not on anticoagulation.Complete 10d course of doxycycline as prescribed    Additional future steps to consider: repeat e consult as needed    Total time of Consultation: 15 minute    I did not speak to the requesting provider verbally about this.     *This eConsult is based on the clinical data available to me and is furnished without benefit of a physical examination. The eConsult will need to be interpreted in light of any clinical issues or changes in patient status not available to me at the time of filing this eConsults. Significant changes in patient condition or level of acuity should result in immediate formal consultation and reevaluation. Please alert me if you have further questions.    Thank you for this eConsult referral.     Kathy Butcher DO  Transplant Infectious Disease

## 2025-07-28 ENCOUNTER — PATIENT MESSAGE (OUTPATIENT)
Dept: PRIMARY CARE CLINIC | Facility: CLINIC | Age: 65
End: 2025-07-28
Payer: COMMERCIAL

## 2025-07-28 ENCOUNTER — E-CONSULT (OUTPATIENT)
Dept: HEMATOLOGY/ONCOLOGY | Facility: CLINIC | Age: 65
End: 2025-07-28
Payer: COMMERCIAL

## 2025-07-28 ENCOUNTER — HOSPITAL ENCOUNTER (EMERGENCY)
Facility: HOSPITAL | Age: 65
Discharge: HOME | End: 2025-07-29
Attending: STUDENT IN AN ORGANIZED HEALTH CARE EDUCATION/TRAINING PROGRAM
Payer: COMMERCIAL

## 2025-07-28 DIAGNOSIS — R06.09 DYSPNEA ON EXERTION: Primary | ICD-10-CM

## 2025-07-28 DIAGNOSIS — R23.3 SPONTANEOUS ECCHYMOSES: ICD-10-CM

## 2025-07-28 DIAGNOSIS — S80.12XD CONTUSION OF LEFT LOWER LEG, SUBSEQUENT ENCOUNTER: ICD-10-CM

## 2025-07-28 DIAGNOSIS — T14.8XXA BRUISING: Primary | ICD-10-CM

## 2025-07-28 DIAGNOSIS — S80.11XS: ICD-10-CM

## 2025-07-28 DIAGNOSIS — R07.9 CHEST PAIN: ICD-10-CM

## 2025-07-28 DIAGNOSIS — N13.30 HYDRONEPHROSIS OF RIGHT KIDNEY: ICD-10-CM
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
## 2025-07-28 DIAGNOSIS — S30.861D TICK BITE OF ABDOMEN, SUBSEQUENT ENCOUNTER: Primary | ICD-10-CM

## 2025-07-28 PROBLEM — I10 ESSENTIAL HYPERTENSION: Status: ACTIVE | Noted: 2024-06-20

## 2025-07-28 PROBLEM — F32.A DEPRESSIVE DISORDER: Status: ACTIVE | Noted: 2023-11-14

## 2025-07-28 LAB
ABSOLUTE EOSINOPHIL (OHS): 0.17 K/UL
ABSOLUTE MONOCYTE (OHS): 0.69 K/UL (ref 0.3–1)
ABSOLUTE NEUTROPHIL COUNT (OHS): 5.72 K/UL (ref 1.8–7.7)
ALBUMIN SERPL BCP-MCNC: 4.5 G/DL (ref 3.5–5.2)
ALP SERPL-CCNC: 111 UNIT/L (ref 40–150)
ALT SERPL W/O P-5'-P-CCNC: 18 UNIT/L (ref 0–55)
ANION GAP (OHS): 12 MMOL/L (ref 8–16)
AST SERPL-CCNC: 39 UNIT/L (ref 0–50)
BASOPHILS # BLD AUTO: 0.06 K/UL
BASOPHILS NFR BLD AUTO: 0.7 %
BILIRUB SERPL-MCNC: 0.7 MG/DL (ref 0.1–1)
BUN SERPL-MCNC: 27 MG/DL (ref 8–23)
CALCIUM SERPL-MCNC: 9.8 MG/DL (ref 8.7–10.5)
CHLORIDE SERPL-SCNC: 103 MMOL/L (ref 95–110)
CK SERPL-CCNC: 169 U/L (ref 20–180)
CO2 SERPL-SCNC: 23 MMOL/L (ref 23–29)
CREAT SERPL-MCNC: 1 MG/DL (ref 0.5–1.4)
ERYTHROCYTE [DISTWIDTH] IN BLOOD BY AUTOMATED COUNT: 14.4 % (ref 11.5–14.5)
GFR SERPLBLD CREATININE-BSD FMLA CKD-EPI: >60 ML/MIN/1.73/M2
GLUCOSE SERPL-MCNC: 111 MG/DL (ref 70–110)
HCT VFR BLD AUTO: 43.1 % (ref 37–48.5)
HCV AB SERPL QL IA: NORMAL
HGB BLD-MCNC: 14.2 GM/DL (ref 12–16)
HIV 1+2 AB+HIV1 P24 AG SERPL QL IA: NORMAL
IMM GRANULOCYTES # BLD AUTO: 0.04 K/UL (ref 0–0.04)
IMM GRANULOCYTES NFR BLD AUTO: 0.4 % (ref 0–0.5)
LYMPHOCYTES # BLD AUTO: 2.53 K/UL (ref 1–4.8)
MAGNESIUM SERPL-MCNC: 2.3 MG/DL (ref 1.6–2.6)
MCH RBC QN AUTO: 31.3 PG (ref 27–31)
MCHC RBC AUTO-ENTMCNC: 32.9 G/DL (ref 32–36)
MCV RBC AUTO: 95 FL (ref 82–98)
NT-PROBNP SERPL-MCNC: 35 PG/ML
NUCLEATED RBC (/100WBC) (OHS): 0 /100 WBC
PLATELET # BLD AUTO: 345 K/UL (ref 150–450)
PMV BLD AUTO: 10.4 FL (ref 9.2–12.9)
POTASSIUM SERPL-SCNC: 3.8 MMOL/L (ref 3.5–5.1)
PROT SERPL-MCNC: 8.4 GM/DL (ref 6–8.4)
RBC # BLD AUTO: 4.53 M/UL (ref 4–5.4)
RELATIVE EOSINOPHIL (OHS): 1.8 %
RELATIVE LYMPHOCYTE (OHS): 27.5 % (ref 18–48)
RELATIVE MONOCYTE (OHS): 7.5 % (ref 4–15)
RELATIVE NEUTROPHIL (OHS): 62.1 % (ref 38–73)
SODIUM SERPL-SCNC: 138 MMOL/L (ref 136–145)
T4 FREE SERPL-MCNC: 0.99 NG/DL (ref 0.71–1.51)
TROPONIN I SERPL HS-MCNC: <3 NG/L
TSH SERPL-ACNC: 6.6 UIU/ML (ref 0.4–4)
WBC # BLD AUTO: 9.21 K/UL (ref 3.9–12.7)

## 2025-07-28 PROCEDURE — 86803 HEPATITIS C AB TEST: CPT | Performed by: PHYSICIAN ASSISTANT

## 2025-07-28 PROCEDURE — 99451 NTRPROF PH1/NTRNET/EHR 5/>: CPT | Mod: S$GLB,,, | Performed by: INTERNAL MEDICINE

## 2025-07-28 PROCEDURE — 80053 COMPREHEN METABOLIC PANEL: CPT | Performed by: EMERGENCY MEDICINE

## 2025-07-28 PROCEDURE — 84443 ASSAY THYROID STIM HORMONE: CPT | Performed by: EMERGENCY MEDICINE

## 2025-07-28 PROCEDURE — 93010 ELECTROCARDIOGRAM REPORT: CPT | Mod: ,,, | Performed by: INTERNAL MEDICINE

## 2025-07-28 PROCEDURE — 87389 HIV-1 AG W/HIV-1&-2 AB AG IA: CPT | Performed by: PHYSICIAN ASSISTANT

## 2025-07-28 PROCEDURE — 93005 ELECTROCARDIOGRAM TRACING: CPT

## 2025-07-28 PROCEDURE — 84439 ASSAY OF FREE THYROXINE: CPT | Performed by: EMERGENCY MEDICINE

## 2025-07-28 PROCEDURE — 25000003 PHARM REV CODE 250: Performed by: PHYSICIAN ASSISTANT

## 2025-07-28 PROCEDURE — 84484 ASSAY OF TROPONIN QUANT: CPT | Performed by: EMERGENCY MEDICINE

## 2025-07-28 PROCEDURE — 82550 ASSAY OF CK (CPK): CPT | Performed by: PHYSICIAN ASSISTANT

## 2025-07-28 PROCEDURE — 83880 ASSAY OF NATRIURETIC PEPTIDE: CPT | Performed by: EMERGENCY MEDICINE

## 2025-07-28 PROCEDURE — 94010 BREATHING CAPACITY TEST: CPT

## 2025-07-28 PROCEDURE — 85025 COMPLETE CBC W/AUTO DIFF WBC: CPT | Performed by: EMERGENCY MEDICINE

## 2025-07-28 PROCEDURE — 99285 EMERGENCY DEPT VISIT HI MDM: CPT | Mod: 25

## 2025-07-28 PROCEDURE — 83735 ASSAY OF MAGNESIUM: CPT | Performed by: EMERGENCY MEDICINE

## 2025-07-28 RX ORDER — LORAZEPAM 1 MG/1
1 TABLET ORAL
Status: COMPLETED | OUTPATIENT
Start: 2025-07-28 | End: 2025-07-28

## 2025-07-28 RX ADMIN — LORAZEPAM 1 MG: 1 TABLET ORAL at 08:07

## 2025-07-28 NOTE — FIRST PROVIDER EVALUATION
"Medical screening examination initiated.  I have conducted a focused provider triage encounter, findings are as follows:    Brief history of present illness:      +SOB  OSH visit 7/17 for same - w/u benign  +Stuttering speech  "Feet feel huge"  +syncope on couch today  +shakiness    Vitals:    07/28/25 1739   BP: (!) 161/74   Pulse: 106   Resp: (!) 30   Temp: 98.4 °F (36.9 °C)   TempSrc: Oral   SpO2: 100%   Weight: 97 kg (213 lb 13.5 oz)   Height: 5' 5" (1.651 m)       Pertinent physical exam:      Flailing movements  Hyperventilating  Using strange grammar  In wheelchair    5:49 PM  After patient was triaged, observed to be breathing normally and no longer with shaking movements while in waiting room.     Brief workup plan:  Labs, CXR, EKG, CT head    Preliminary workup initiated; this workup will be continued and followed by the physician or advanced practice provider that is assigned to the patient when roomed.  "

## 2025-07-28 NOTE — CONSULTS
[facility] - Hematology 5th Fl  Response for E-Consult     Patient Name: Lisandra Toussaint  MRN: [id number]  Primary Care Provider: Sandra Shetty NP   Requesting Provider: Marley Amato MD  Consults    Recommendation: Thank you for the e-consult.    Would also check PTT for the rest of the clotting cascade.  Since prior testing was normal last year, this would most likely be reactive to the inflammation/infection from the tick bite.  Should still heal in the time frame of normal bruising- over 6-8 weeks.  If progresses or fails to show signs of improvement in that time frame, would refer to hematology.    Additional future steps to consider: none at this time    Total time of Consultation: 5 minute    I did not speak to the requesting provider verbally about this.     *This eConsult is based on the clinical data available to me and is furnished without benefit of a physical examination. The eConsult will need to be interpreted in light of any clinical issues or changes in patient status not available to me at the time of filing this eConsults. Significant changes in patient condition or level of acuity should result in immediate formal consultation and reevaluation. Please alert me if you have further questions.    Thank you for this eConsult referral.     Radha Jimenez MD  [facility] - Hematology [facility]

## 2025-07-28 NOTE — FIRST PROVIDER EVALUATION
"Medical screening examination initiated.  I have conducted a focused provider triage encounter, findings are as follows:    Brief history of present illness:  Patient presents with severe shortness of breath and leg weakness over the past 2 weeks.  Started after a tick bite.  She is having difficulty forming words because she is so weak and short of breath.    Vitals:    07/28/25 1739   BP: (!) 161/74   Pulse: 106   Resp: (!) 30   Temp: 98.4 °F (36.9 °C)   TempSrc: Oral   SpO2: 100%   Weight: 97 kg (213 lb 13.5 oz)   Height: 5' 5" (1.651 m)       Pertinent physical exam:  Patient with labored breathing.  Lungs are clear.  Moves arms equally.  Legs are extremely weak.    Brief workup plan:  Will transfer care from intake to ED. cardiac evaluation has already been initiated.  Head CT is already been ordered.  Will add negative inspiratory force testing.    Preliminary workup initiated; this workup will be continued and followed by the physician or advanced practice provider that is assigned to the patient when roomed.  "

## 2025-07-28 NOTE — PROVIDER PROGRESS NOTES - EMERGENCY DEPT.
Encounter Date: 7/28/2025    ED Physician Progress Notes         EKG - STEMI Decision  Initial Reading: No STEMI present.  Response: No Action Needed.

## 2025-07-29 ENCOUNTER — TELEPHONE (OUTPATIENT)
Dept: PULMONOLOGY | Facility: CLINIC | Age: 65
End: 2025-07-29
Payer: COMMERCIAL

## 2025-07-29 ENCOUNTER — PATIENT MESSAGE (OUTPATIENT)
Dept: PSYCHIATRY | Facility: CLINIC | Age: 65
End: 2025-07-29
Payer: COMMERCIAL

## 2025-07-29 VITALS
WEIGHT: 213.88 LBS | BODY MASS INDEX: 35.63 KG/M2 | SYSTOLIC BLOOD PRESSURE: 114 MMHG | TEMPERATURE: 99 F | HEART RATE: 95 BPM | HEIGHT: 65 IN | OXYGEN SATURATION: 97 % | DIASTOLIC BLOOD PRESSURE: 57 MMHG | RESPIRATION RATE: 18 BRPM

## 2025-07-29 DIAGNOSIS — R06.02 SOB (SHORTNESS OF BREATH): Primary | ICD-10-CM

## 2025-07-29 DIAGNOSIS — F43.22 ADJUSTMENT DISORDER WITH ANXIETY: Primary | ICD-10-CM

## 2025-07-29 LAB
OHS QRS DURATION: 74 MS
OHS QTC CALCULATION: 485 MS

## 2025-07-29 RX ORDER — HYDROXYZINE PAMOATE 50 MG/1
50 CAPSULE ORAL 2 TIMES DAILY PRN
Qty: 30 CAPSULE | Refills: 1 | Status: ON HOLD | OUTPATIENT
Start: 2025-07-29

## 2025-07-29 NOTE — ED NOTES
I assumed care of this patient at this time. Report received from ИРИНА Trevino. Pt is resting comfortably in ED stretcher + no acute distress observed. Pt is AAOx4. RR is even, unlabored, and spontaneous + pt's oxygen saturation is 95% on room air at this time. Skin is warm, dry and intact. Pt denies pain or needs at this time. Pt remains on continuous cardiac monitor, pulse ox, and BP cuff to cycle. Bed low and locked; side rails up x2; call light within reach. Family member at bedside.

## 2025-07-29 NOTE — ED PROVIDER NOTES
"Encounter Date: 7/28/2025       History     Chief Complaint   Patient presents with    Shortness of Breath    Anxiety     Seen at [facility] recently for same, canales was negative. Feeling hot and anxious today, feet "feel heavy"     64-year-old female with hypertension, anxiety, history of brain aneurysm s/p coiling presents for multiple complaints.  Her initial presenting complaint is for dyspnea on exertion for several weeks.  Seen at [facility] on 07/17 for the same complaint and had a negative workup but her symptoms have persisted.  Interestingly, she notes that the onset of this dyspnea on exertion coincided with some bruising on her left leg and a rash on her posterior knee which she associated with finding a tick on her abdomen.  She thinks that she got this tick while she was visiting a family member in [place].  She was treated with doxycycline at [facility] and had an ultrasound of her leg which was negative for DVT in his CTA chest which was negative for PE.  She reports some pain and muscle spasming of her thigh and has had some issues walking but feels like this is improving.  She also reports headache which is both frontal and occipital with associated photophobia.  She denies fever, chest pain, cough wheezing.  No history of blood clots or coagulopathy, does not take any blood thinners.      Review of patient's allergies indicates:   Allergen Reactions    Cinnamon Anaphylaxis    Iodine and iodide containing products Other (See Comments)     Tears up skin    Macrodantin [nitrofurantoin macrocrystalline] Other (See Comments)     Knee and elbow swelling    Povidone-iodine      Other reaction(s): Blister    Sulfa (sulfonamide antibiotics) Hives     Past Medical History:   Diagnosis Date    Allergy     Aneurysm     Anxiety     HTN (hypertension)     Sleep apnea     TMJ (dislocation of temporomandibular joint)     Urinary tract infection      Past Surgical History:   Procedure Laterality Date    " ABDOMINAL SURGERY      CARPAL TUNNEL RELEASE      COLONOSCOPY, SCREENING, LOW RISK PATIENT N/A 5/6/2025    Procedure: COLONOSCOPY, SCREENING, LOW RISK PATIENT;  Surgeon: Roxana Anne MD;  Location: [facility] ([address] [place] [address]);  Service: Endoscopy;  Laterality: N/A;  Sandra Shetty, peg, portal,  no longer taking Plavix,ASam    jm/adjusted case lengths  4/28 precall complete, resent instructions w Miralax prep per pt request-MD    EYE SURGERY      FOOT SURGERY Bilateral     tarsel tunnel       Family History   Problem Relation Name Age of Onset    Cancer Mother Giancarlo         Squamous Cell Skin Carcinoma on ear, aggressive went to lymph nodes facial structure 18mo later    Hypertension Mother Giancarlo     Learning disabilities Son Rambo         Dyslexia    Learning disabilities Son Juma         Dyslexia    Heart disease Maternal Grandmother Radha         Dementia and heart attack early 60s    Cancer Maternal Grandfather Servin         Lung cancer    Hypertension Maternal Grandfather Servin     Stroke Maternal Grandfather Servin         Stroke at 80     Social History[1]  Review of Systems    Physical Exam     Initial Vitals [07/28/25 1739]   BP Pulse Resp Temp SpO2   (!) 161/74 106 (!) 30 98.4 °F (36.9 °C) 100 %      MAP       --         Physical Exam    Nursing note and vitals reviewed.  Constitutional: She appears well-developed and well-nourished. She is not diaphoretic. No distress.   HENT:   Head: Normocephalic and atraumatic.   Eyes: EOM are normal. Pupils are equal, round, and reactive to light.   Neck:   Normal range of motion.  Cardiovascular:  Normal rate, regular rhythm, normal heart sounds and intact distal pulses.     Exam reveals no gallop and no friction rub.       No murmur heard.  Pulmonary/Chest: Breath sounds normal. No respiratory distress. She has no wheezes. She has no rhonchi. She has no rales. She exhibits no tenderness.   Abdominal: Abdomen is soft. Bowel sounds are normal. She exhibits no  distension and no mass. There is no abdominal tenderness. There is no rebound and no guarding.   Musculoskeletal:         General: Normal range of motion.      Cervical back: Normal range of motion.     Neurological: She is alert and oriented to person, place, and time. She has normal strength. No cranial nerve deficit or sensory deficit. Coordination and gait normal. GCS score is 15. GCS eye subscore is 4. GCS verbal subscore is 5. GCS motor subscore is 6.   Reflex Scores:       Tricep reflexes are 2+ on the right side and 2+ on the left side.       Patellar reflexes are 2+ on the right side and 2+ on the left side.       Achilles reflexes are 2+ on the right side and 2+ on the left side.  Normal strength and sensation bilateral arms and legs.  2+ bilateral patellar and Achilles reflexes, 2+ triceps reflexes    Normal gait   Skin: Skin is warm and dry.   Extensive old appearing bruising on the posterior and medial left leg.  There is also some erythematous marks on the anterior leg.  Please see photos below.    I examined her skin and did not find any ticks.   Psychiatric: She has a normal mood and affect.               ED Course   Procedures  Labs Reviewed   COMPREHENSIVE METABOLIC PANEL - Abnormal       Result Value    Sodium 138      Potassium 3.8      Chloride 103      CO2 23      Glucose 111 (*)     BUN 27 (*)     Creatinine 1.0      Calcium 9.8      Protein Total 8.4      Albumin 4.5      Bilirubin Total 0.7            AST 39      ALT 18      Anion Gap 12      eGFR >60     TSH - Abnormal    TSH 6.603 (*)    CBC WITH DIFFERENTIAL - Abnormal    WBC 9.21      RBC 4.53      HGB 14.2      HCT 43.1      MCV 95      MCH 31.3 (*)     MCHC 32.9      RDW 14.4      Platelet Count 345      MPV 10.4      Nucleated RBC 0      Neut % 62.1      Lymph % 27.5      Mono % 7.5      Eos % 1.8      Basophil % 0.7      Imm Grans % 0.4      Neut # 5.72      Lymph # 2.53      Mono # 0.69      Eos # 0.17      Baso # 0.06       Imm Grans # 0.04     HEPATITIS C ANTIBODY - Normal    Hep C Ab Interp Non-Reactive     HIV 1 / 2 ANTIBODY - Normal    HIV 1/2 Ag/Ab Non-Reactive     TROPONIN I HIGH SENSITIVITY - Normal    Troponin High Sensitive <3     NT-PRO NATRIURETIC PEPTIDE - Normal    NT-proBNP 35      Narrative:     NOTE:  Access complete set of age - and/or gender-specific reference intervals for this test in the [facility] Collection Manual.   MAGNESIUM - Normal    Magnesium  2.3     CK - Normal         T4, FREE - Normal    Free T4 0.99     CBC W/ AUTO DIFFERENTIAL    Narrative:     The following orders were created for panel order CBC auto differential.  Procedure                               Abnormality         Status                     ---------                               -----------         ------                     CBC with Differential[[id number]]       Abnormal            Final result                 Please view results for these tests on the individual orders.   HEP C VIRUS HOLD SPECIMEN     EKG Readings: (Independently Interpreted)   Initial Reading: No STEMI. Rhythm: Normal Sinus Rhythm. Heart Rate: 95. Ectopy: No Ectopy. ST Segments: Normal ST Segments. T Waves: Normal. Clinical Impression: Normal Sinus Rhythm       Imaging Results              X-Ray Chest AP Portable (In process)                      Medications   LORazepam tablet 1 mg (1 mg Oral Given 7/28/25 2012)     Medical Decision Making  64-year-old female for multiple complaints, dyspnea on exertion, leg bruising and headache.  Hypertensive and tachypneic, mildly tachycardic with otherwise normal vitals.  She appears uncomfortable but is in no acute distress.    Differential Diagnosis:  Differential is wide, concerns include CHF, low suspicion for ACS, doubt pneumonia or bronchitis as lungs sound normal.  I did consider DVT/PE, however she had a recent negative CTA and an ultrasound of her leg.  I did consider tick paralysis, however her clinical  presentation is not consistent with this.  She has normal strength and sensation on exam, able to ambulate without difficulty and no evidence of more ticks on her skin.  I did consider inflammatory neurologic cause such as GBS but again I do not think her presentation is consistent with this as she has intact reflexes and normal strength and sensation on exam.  She was appropriately treated for Lyme or other tick-borne illness with doxycycline previously.  Additional concerns include ICH or space-occupying lesion    Will check labs, give anxiolytics, chest x-ray, head CT, EKG and reassess.    Dispo pending results of head CT workup is reassuring.  Patient is feeling better after anxiolytics.  I discussed this patient with my supervising physician and I am signing out to Stas Banks MD.    9:35 PM      Amount and/or Complexity of Data Reviewed  Labs: ordered. Decision-making details documented in ED Course.  Radiology: ordered and independent interpretation performed.  ECG/medicine tests: ordered and independent interpretation performed.    Risk  Prescription drug management.               ED Course as of 07/28/25 2135 Mon Jul 28, 2025 1917 WBC: 9.21 [CC]   1917 Hemoglobin: 14.2 [CC]   2024 Free T4: 0.99 [CC]      ED Course User Index  [CC] Linda Burrell PA-C                               Clinical Impression:  Final diagnoses:  [R07.9] Chest pain  [R06.09] Dyspnea on exertion (Primary)  [S80.12XD] Contusion of left lower leg, subsequent encounter                       [1]   Social History  Tobacco Use    Smoking status: Never     Passive exposure: Past    Smokeless tobacco: Never    Tobacco comments:     2nd hand smoke when it was not banned   Vaping Use    Vaping status: Never Used   Substance Use Topics    Alcohol use: Not Currently    Drug use: Never        Linda Burrell PA-C  07/28/25 2135

## 2025-07-29 NOTE — PROVIDER PROGRESS NOTES - EMERGENCY DEPT.
Encounter Date: 7/28/2025    ED Physician Progress Notes        Physician Note:   Patient care assumed from Dr. Newberry and Linda Burrell PA-C at shift change. Briefly, patient presents with dyspnea on exertion intermittent leg weakness x several weeks, which she reports started after a tick exposure in [place]. She was seen at [facility] [place] by [facility] on 7/17/25 and had workup including CTA head, which was ultimately negative. She was then seen in the outpatient setting by infectious disease, with no further recommendations from their standpoint. At time of handoff, we are pending CT head and repeat evaluation.    I evaluated patient at 2215 after handoff.  She is resting comfortably in bed and has no complaints.  We are pending CT head results and disposition but she feels comfortable with discharge home if CT is negative.      2340  CT results as below, with evidence of age indeterminate lacunar infarct.  Discussed with the patient.  She is agreeable to undergoing MRI to evaluate the chronicity of this finding.  She does have coils from prior aneurysm coiling but it has had MRI with this without issue.

## 2025-07-29 NOTE — PROVIDER PROGRESS NOTES - EMERGENCY DEPT.
Encounter Date: 7/28/2025    ED Physician Progress Notes        Physician Note:   AOC @ 0600 I just do that to all the. Patient presented w/ rash and headache in the setting of recent travel and possible tick-borne illness. Concern for possible stroke given CT with lacunar infarct of undetermined age    Pending studies include MRI brain    Pending actions include follow up MRI    Likely disposition is discharged home given patient's improvement    Herson Martin    7:29 AM  MRI shows no acute abnormality.  No lacunar infarct seen.

## 2025-07-29 NOTE — CARE UPDATE
07/28/25 1902   Negative Inspiratory Force   $ Negative Inspiratory Force Charges Given   Negative Inspiratory Force (cm H2O) -40   Effort (NIF) good   Patient Position (NIF) Jairo

## 2025-07-29 NOTE — DISCHARGE INSTRUCTIONS
The cardiology and pulmonology clinic will contact you to help arrange follow up for your shortness of breath on exertion     Your MRI was normal     Return to the emergency department if any new or concerning symptoms occur

## 2025-07-29 NOTE — ED TRIAGE NOTES
"Lisandra Toussaint, a 64 y.o. female presents to the ED w/ complaint of sob and anxiety    Triage note:  Chief Complaint   Patient presents with    Shortness of Breath    Anxiety     Seen at [place] recently for same, canales was negative. Feeling hot and anxious today, feet "feel heavy"     Review of patient's allergies indicates:   Allergen Reactions    Cinnamon Anaphylaxis    Iodine and iodide containing products Other (See Comments)     Tears up skin    Macrodantin [nitrofurantoin macrocrystalline] Other (See Comments)     Knee and elbow swelling    Povidone-iodine      Other reaction(s): Blister    Sulfa (sulfonamide antibiotics) Hives     Past Medical History:   Diagnosis Date    Allergy     Aneurysm     Anxiety     HTN (hypertension)     Sleep apnea     TMJ (dislocation of temporomandibular joint)     Urinary tract infection        "

## 2025-07-30 ENCOUNTER — HOSPITAL ENCOUNTER (OUTPATIENT)
Dept: RADIOLOGY | Facility: HOSPITAL | Age: 65
Discharge: HOME | End: 2025-07-30
Payer: COMMERCIAL

## 2025-07-30 DIAGNOSIS — N13.30 HYDRONEPHROSIS, UNSPECIFIED HYDRONEPHROSIS TYPE: ICD-10-CM

## 2025-07-30 LAB
APTT PPP: 27 SEC (ref 23–32)
FIBRINOGEN PPP-MCNC: 422 MG/DL (ref 175–425)
VWF AG ACT/NOR PPP IA: 195 % (ref 50–217)

## 2025-07-30 PROCEDURE — 74178 CT ABD&PLV WO CNTR FLWD CNTR: CPT | Mod: TC

## 2025-07-30 PROCEDURE — 25500020 PHARM REV CODE 255

## 2025-07-30 PROCEDURE — 74178 CT ABD&PLV WO CNTR FLWD CNTR: CPT | Mod: 26,,, | Performed by: INTERNAL MEDICINE

## 2025-07-30 RX ADMIN — IOHEXOL 150 ML: 350 INJECTION, SOLUTION INTRAVENOUS at 11:07

## 2025-07-31 ENCOUNTER — TELEPHONE (OUTPATIENT)
Dept: CARDIOLOGY | Facility: CLINIC | Age: 65
End: 2025-07-31
Payer: COMMERCIAL

## 2025-07-31 LAB — HOLD SPECIMEN: NORMAL

## 2025-08-01 ENCOUNTER — OFFICE VISIT (OUTPATIENT)
Dept: CARDIOLOGY | Facility: CLINIC | Age: 65
End: 2025-08-01
Payer: COMMERCIAL

## 2025-08-01 VITALS
WEIGHT: 216.5 LBS | SYSTOLIC BLOOD PRESSURE: 120 MMHG | DIASTOLIC BLOOD PRESSURE: 79 MMHG | BODY MASS INDEX: 36.07 KG/M2 | HEIGHT: 65 IN | HEART RATE: 102 BPM

## 2025-08-01 DIAGNOSIS — I10 ESSENTIAL HYPERTENSION: ICD-10-CM

## 2025-08-01 DIAGNOSIS — I10 BENIGN ESSENTIAL HTN: ICD-10-CM

## 2025-08-01 DIAGNOSIS — I20.89 ANGINAL EQUIVALENT: Primary | ICD-10-CM

## 2025-08-01 DIAGNOSIS — F43.22 ADJUSTMENT DISORDER WITH ANXIETY: ICD-10-CM

## 2025-08-01 DIAGNOSIS — R06.09 DYSPNEA ON EXERTION: ICD-10-CM

## 2025-08-01 DIAGNOSIS — E66.01 SEVERE OBESITY (BMI 35.0-39.9) WITH COMORBIDITY: ICD-10-CM

## 2025-08-01 PROCEDURE — 99999 PR PBB SHADOW E&M-EST. PATIENT-LVL IV: CPT | Mod: PBBFAC,,, | Performed by: INTERNAL MEDICINE

## 2025-08-01 RX ORDER — LOSARTAN POTASSIUM 50 MG/1
50 TABLET ORAL DAILY
Status: ON HOLD
Start: 2025-08-01

## 2025-08-01 NOTE — PROGRESS NOTES
Subjective:   08/01/2025     Patient ID:  Lisandra Toussaint is a 64 y.o. female who presents for evaulation of Dyspnea on exertion       History of Present Illness    CHIEF COMPLAINT:  Patient presents for cardiac clearance following recent ED visits for dyspnea.    HPI:  Patient reports two recent ED visits, on 07/17/2025 and 07/28/2025, due to dyspnea. The dyspnea occurs during exercise (such as walking in heat or riding a bike), when talking excessively, and when walking from the parking lot to the clinic. During these ED visits, she was given the option to stay in the hospital for tests or pursue outpatient care, which she chose.    She describes a recent incident possibly related to an arthropod bite. She found a tick on her abdomen after returning from [place], associated with an open, exudative, rash-like lesion on the back of her knee. This led to an urgent care visit, which then referred her to the ED due to concerns about a possible embolism. While ruling out the embolism, imaging revealed a kidney issue - urine backing up in the right kidney. She was prescribed doxycycline for 10 days, which she completed.    Following the antibiotic treatment, she developed significant purpura of the left leg, both anterior and posterior aspects, leading to another ED visit where embolism was again ruled out. The purpura has mostly resolved, with only a small area remaining.    She reports ongoing issues with dyspnea, particularly during physical activity or extended talking. A hematology consult was ordered by the PCP, and she completed labs at [facility] on the Monday prior to this visit.    She has a history of brain aneurysm, for which she has three stents. She was taken off Plavix in February due to complications with the stent placement and in hopes of promoting clotting around the aneurysm.    Her son has noticed slurred speech, in addition to the dyspnea.    She denies current leg swelling, chest pain or tightness,  and additional dyspnea.    CARDIAC HISTORY:  Brain aneurysm 3 stents and coiling. First basket collapsed, second basket filled with coils, 3 stents placed. Still blood flow around aneurysm.  CT chest 07/28/2025: normal EKG 07/28/2025: NSR, normal ST segments    MEDICATIONS:  Losartan 50 mg daily for HTN  Amlodipine 10 mg daily for HTN  HCTZ PRN (she has not taken it this year)  Discontinued Plavix in February, previously for brain stent/aneurysm Patient is on Prednisone, which she takes only when undergoing iodine tests due to her iodine allergy. She has also recently started Hydroxyzine, prescribed for anxiety related to dyspnea.    ALLERGIES:  Patient is allergic to iodine. She requires prednisone premedication when undergoing iodine tests.    TEST RESULTS:  Patient's recent lab results show a normal complete metabolic profile and CBC. Her recent Troponin level was less than 3, and BNP was 35, which is within normal range. The Magnesium level was also normal.    IMAGING:  A recent echocardiogram was performed, and the results were normal.    MEDICAL HISTORY:  Patient has a history of hypertension, anxiety, and kidney stones. She also has a history of brain aneurysm, which was treated with coiling.   She has an iodine allergy.    ROS:  General: -fever, -chills, -fatigue, -weight gain, -weight loss  Eyes: -vision changes, -redness, -discharge  ENT: -ear pain, -nasal congestion, -sore throat  Cardiovascular: -chest pain, -palpitations, -lower extremity edema  Respiratory: -cough, +shortness of breath, +exertional dyspnea  Gastrointestinal: -abdominal pain, -nausea, -vomiting, -diarrhea, -constipation, -blood in stool  Genitourinary: -dysuria, -hematuria, -frequency  Musculoskeletal: +joint pain, -muscle pain  Skin: +rash, +lesion, +insect bites  Neurological: -headache, -dizziness, -numbness, -tingling, +speech difficulty  Psychiatric: +anxiety, -depression, -sleep difficulty  Allergic: +allergic reactions           Problem List[1]     Review of patient's allergies indicates:   Allergen Reactions    Cinnamon Anaphylaxis    Iodine and iodide containing products Other (See Comments)     Tears up skin    Macrodantin [nitrofurantoin macrocrystalline] Other (See Comments)     Knee and elbow swelling    Povidone-iodine      Other reaction(s): Blister    Sulfa (sulfonamide antibiotics) Hives       Current Medications[2]     Objective:   Physical Exam    General: No acute distress. Well-developed. Well-nourished.  Eyes: EOMI. Sclerae anicteric.  HENT: Normocephalic. Atraumatic. Nares patent. Moist oral mucosa.  Cardiovascular: Regular rate. Regular rhythm. No murmurs. No rubs. No gallops. Normal S1, S2.  Respiratory: Normal respiratory effort. Clear to auscultation bilaterally. No rales. No rhonchi. No wheezing.  Musculoskeletal: No  obvious deformity.  Extremities: No lower extremity edema.  Neurological: Alert & oriented x3. No slurred speech. Normal gait.  Psychiatric: Normal mood. Normal affect. Good insight. Good judgment.  Skin: Warm. Dry. No rash.          Vitals:    08/01/25 1417   BP: 120/79   Pulse: 102     Wt Readings from Last 3 Encounters:   08/01/25 98.2 kg (216 lb 7.9 oz)   07/28/25 97 kg (213 lb 13.5 oz)   07/23/25 97 kg (213 lb 15.3 oz)     Temp Readings from Last 3 Encounters:   07/29/25 98.5 °F (36.9 °C)   07/17/25 98.5 °F (36.9 °C) (Oral)   05/06/25 97.9 °F (36.6 °C)     BP Readings from Last 3 Encounters:   08/01/25 120/79   07/29/25 (!) 114/57   07/23/25 130/80     Pulse Readings from Last 3 Encounters:   08/01/25 102   07/29/25 95   07/23/25 101               Lab Results   Component Value Date    CHOL 243 (H) 04/05/2024    CHOL 223 (H) 12/08/2022    CHOL 246 (H) 11/12/2021     Lab Results   Component Value Date    HDL 56 04/05/2024    HDL 56 12/08/2022    HDL 62 11/12/2021     Lab Results   Component Value Date    LDLCALC 167.6 (H) 04/05/2024    LDLCALC 147.4 12/08/2022    LDLCALC 163.8 (H) 11/12/2021     Lab  Results   Component Value Date    ALT 18 07/28/2025    AST 39 07/28/2025    AST 28 07/17/2025    AST 19 06/21/2024     Lab Results   Component Value Date    CREATININE 1.0 07/28/2025    BUN 27 (H) 07/28/2025     07/28/2025    K 3.8 07/28/2025    CO2 23 07/28/2025    CO2 22 (L) 07/17/2025    CO2 24 02/04/2025     Lab Results   Component Value Date    HGB 14.2 07/28/2025    HCT 43.1 07/28/2025    HCT 39.4 07/17/2025    HCT 37.3 06/21/2024       Assessment and Plan:   Assessment & Plan    E66.01 Severe obesity (BMI 35.0-39.9) with comorbidity  R06.09 Dyspnea on exertion  F43.22 Adjustment disorder with anxiety  I10 Benign essential HTN  I20.89 Anginal equivalent    IMPRESSION:  - Cardiac evaluation necessary given symptoms and history.    SEVERE OBESITY (BMI 35.0-39.9) WITH COMORBIDITY:  - Ordered PET stress test due to inability to run on a treadmill, requires IV but does not involve iodine.    DYSPNEA ON EXERTION:  - Ordered PET stress test due to inability to run on a treadmill, requires IV but does not involve iodine.    BENIGN ESSENTIAL HTN:  - Ordered echocardiogram.    ANGINAL EQUIVALENT:  - Ordered PET stress test due to inability to run on a treadmill, requires IV but does not involve iodine.  - Ordered echocardiogram.          No follow-ups on file.        Future Appointments   Date Time Provider Department Center   8/1/2025  4:00 PM Batsheva Pringle FNP [facility] UROLOGY [facility]   8/11/2025 10:00 AM CARDIAC, PET IMAGING [facility]   8/22/2025  1:00 PM Sabrina Brian PA-C [facility] PSYCH [facility]   8/27/2025  9:30 AM CV [facility] ECHO [facility] [place]   9/17/2025  1:00 PM SIX, MINUTE WALK [facility] PUL WLK [facility]   9/17/2025  1:30 PM PULMONARY FUNCTION [facility] PULMLAB [facility]   9/17/2025  1:45 PM PULMONARY FUNCTION [facility] PULML[facility]   9/17/2025  2:00 PM PULMONARY FUNCTION [facility] PULMLAB [facility]   9/17/2025  2:30 PM Ziggy Ruano MD [facility] PULMSVC [facility]   9/22/2025  3:30 PM [facility]-MRI1  [facility]   9/23/2025 10:30 AM Vlad Green MD [facility] NEUROS8 [facility]       This note was generated with the assistance of ambient listening technology. Verbal consent was obtained by the patient and accompanying visitor(s) for the recording of patient appointment to facilitate this note. I attest to having reviewed and edited the generated note for accuracy, though some syntax or spelling errors may persist. Please contact the author of this note for any clarification.                      [1]   Patient Active Problem List  Diagnosis    Constipation    Severe obesity (BMI 35.0-39.9) with comorbidity    Depressive disorder    Decreased ROM of neck    Cervicogenic headache    Cerebral aneurysm, nonruptured    Tinnitus of both ears    Arteriovenous malformation of brain    Essential hypertension    Presence of arterial stent    Cervical myofascial pain syndrome    Sleep apnea    LENNIE (obstructive sleep apnea)    Adjustment disorder with anxiety    Muscle weakness of left arm    Reduced hand strength    Hydronephrosis of right kidney   [2]   Current Outpatient Medications:     amLODIPine (NORVASC) 10 MG tablet, Take 1 tablet by mouth once daily, Disp: 90 tablet, Rfl: 0    cartilage/collagen/bor/hyalur (JO[employer] HEALTH ORAL), Take 1 tablet by mouth once daily., Disp: , Rfl:     cranberry extract (CRANBERRY CONCENTRATE) 500 mg Cap, Take 1 tablet by mouth once daily., Disp: , Rfl:     DAILY MULTI-VITAMIN ORAL, Take by mouth., Disp: , Rfl:     diphenhydrAMINE (BENADRYL) 50 MG capsule, Pt to take 50 mg benadryl at 1 hour before procedure, Disp: , Rfl:     diphenhydrAMINE (BENADRYL) 50 MG capsule, Take 50mg by mouth 1 hour before contrast administration., Disp: 1 capsule, Rfl: 0    DULoxetine (CYMBALTA) 60 MG capsule, Take 1 capsule (60 mg total) by mouth once daily., Disp: 90 capsule, Rfl: 0    EPINEPHrine (EPIPEN) 0.3 mg/0.3 mL AtIn, Inject one syringe at first symptoms, may repeat x 1 within 5-15 minutes,  Disp: 2 each, Rfl: 1    hydroCHLOROthiazide (MICROZIDE) 12.5 mg capsule, Take 1 capsule by mouth once daily (Patient taking differently: Take by mouth daily as needed. Take 1 capsule by mouth once daily), Disp: 90 capsule, Rfl: 0    hydrOXYzine pamoate (VISTARIL) 50 MG Cap, Take 1 capsule (50 mg total) by mouth 2 (two) times daily as needed (for anxiety)., Disp: 30 capsule, Rfl: 1    predniSONE (DELTASONE) 50 MG Tab, Pt to take 1 tab (50mg total) 13 hours, 7 hours, 1 hour before procedure in addition take over the counter benadryl 50 mg 1 hr prior to procedure, Disp: 3 tablet, Rfl: 0    predniSONE (DELTASONE) 50 MG Tab, Take 50mg by mouth at 13 hours, 7 hours, and 1 hour before contrast administration., Disp: 3 tablet, Rfl: 0    tiZANidine (ZANAFLEX) 4 MG tablet, Take 1 tablet (4 mg total) by mouth 2 (two) times daily., Disp: 180 tablet, Rfl: 3    triamcinolone acetonide 0.1% (KENALOG) 0.1 % cream, Apply to affected areas of body BID prn rash. Do not use on face, underarms, or groin., Disp: 80 g, Rfl: 1    aspirin 81 MG Chew, Take 1 tablet (81 mg total) by mouth once daily., Disp: 30 tablet, Rfl: 0    losartan (COZAAR) 50 MG tablet, Take 1 tablet (50 mg total) by mouth once daily., Disp: , Rfl:

## 2025-08-02 ENCOUNTER — PATIENT MESSAGE (OUTPATIENT)
Dept: UROLOGY | Facility: CLINIC | Age: 65
End: 2025-08-02
Payer: COMMERCIAL

## 2025-08-02 ENCOUNTER — HOSPITAL ENCOUNTER (OUTPATIENT)
Facility: HOSPITAL | Age: 65
Discharge: HOME | End: 2025-08-03
Attending: EMERGENCY MEDICINE | Admitting: UROLOGY
Payer: COMMERCIAL

## 2025-08-02 DIAGNOSIS — N20.1 URETERAL STONE: ICD-10-CM

## 2025-08-02 DIAGNOSIS — N20.1 RIGHT URETERAL STONE: Primary | ICD-10-CM

## 2025-08-02 DIAGNOSIS — Z01.818 PREOP TESTING: ICD-10-CM

## 2025-08-02 LAB
ABSOLUTE EOSINOPHIL (OHS): 0.17 K/UL
ABSOLUTE MONOCYTE (OHS): 0.61 K/UL (ref 0.3–1)
ABSOLUTE NEUTROPHIL COUNT (OHS): 5.66 K/UL (ref 1.8–7.7)
ALBUMIN SERPL BCP-MCNC: 4.3 G/DL (ref 3.5–5.2)
ALP SERPL-CCNC: 101 UNIT/L (ref 40–150)
ALT SERPL W/O P-5'-P-CCNC: 21 UNIT/L (ref 0–55)
ANION GAP (OHS): 8 MMOL/L (ref 8–16)
AST SERPL-CCNC: 35 UNIT/L (ref 0–50)
BASOPHILS # BLD AUTO: 0.05 K/UL
BASOPHILS NFR BLD AUTO: 0.6 %
BILIRUB SERPL-MCNC: 0.3 MG/DL (ref 0.1–1)
BILIRUB UR QL STRIP.AUTO: NEGATIVE
BUN SERPL-MCNC: 29 MG/DL (ref 8–23)
CALCIUM SERPL-MCNC: 9.8 MG/DL (ref 8.7–10.5)
CHLORIDE SERPL-SCNC: 104 MMOL/L (ref 95–110)
CLARITY UR: CLEAR
CO2 SERPL-SCNC: 29 MMOL/L (ref 23–29)
COLOR UR AUTO: YELLOW
CREAT SERPL-MCNC: 1.1 MG/DL (ref 0.5–1.4)
ERYTHROCYTE [DISTWIDTH] IN BLOOD BY AUTOMATED COUNT: 14.1 % (ref 11.5–14.5)
GFR SERPLBLD CREATININE-BSD FMLA CKD-EPI: 57 ML/MIN/1.73/M2
GLUCOSE SERPL-MCNC: 111 MG/DL (ref 70–110)
GLUCOSE UR QL STRIP: NEGATIVE
HCT VFR BLD AUTO: 41.1 % (ref 37–48.5)
HGB BLD-MCNC: 13.6 GM/DL (ref 12–16)
HGB UR QL STRIP: NEGATIVE
IMM GRANULOCYTES # BLD AUTO: 0.03 K/UL (ref 0–0.04)
IMM GRANULOCYTES NFR BLD AUTO: 0.4 % (ref 0–0.5)
KETONES UR QL STRIP: NEGATIVE
LACTATE SERPL-SCNC: 1.6 MMOL/L (ref 0.5–2.2)
LEUKOCYTE ESTERASE UR QL STRIP: NEGATIVE
LIPASE SERPL-CCNC: 22 U/L (ref 4–60)
LYMPHOCYTES # BLD AUTO: 1.7 K/UL (ref 1–4.8)
MCH RBC QN AUTO: 32.2 PG (ref 27–31)
MCHC RBC AUTO-ENTMCNC: 33.1 G/DL (ref 32–36)
MCV RBC AUTO: 97 FL (ref 82–98)
NITRITE UR QL STRIP: NEGATIVE
NUCLEATED RBC (/100WBC) (OHS): 0 /100 WBC
PH UR STRIP: 7 [PH]
PLATELET # BLD AUTO: 288 K/UL (ref 150–450)
PMV BLD AUTO: 10.7 FL (ref 9.2–12.9)
POTASSIUM SERPL-SCNC: 4.9 MMOL/L (ref 3.5–5.1)
PROT SERPL-MCNC: 7.9 GM/DL (ref 6–8.4)
PROT UR QL STRIP: NEGATIVE
RBC # BLD AUTO: 4.23 M/UL (ref 4–5.4)
RELATIVE EOSINOPHIL (OHS): 2.1 %
RELATIVE LYMPHOCYTE (OHS): 20.7 % (ref 18–48)
RELATIVE MONOCYTE (OHS): 7.4 % (ref 4–15)
RELATIVE NEUTROPHIL (OHS): 68.8 % (ref 38–73)
SODIUM SERPL-SCNC: 141 MMOL/L (ref 136–145)
SP GR UR STRIP: 1.02
UROBILINOGEN UR STRIP-ACNC: NEGATIVE EU/DL
WBC # BLD AUTO: 8.22 K/UL (ref 3.9–12.7)

## 2025-08-02 PROCEDURE — 96361 HYDRATE IV INFUSION ADD-ON: CPT

## 2025-08-02 PROCEDURE — 83690 ASSAY OF LIPASE: CPT | Performed by: PHYSICIAN ASSISTANT

## 2025-08-02 PROCEDURE — G0378 HOSPITAL OBSERVATION PER HR: HCPCS

## 2025-08-02 PROCEDURE — 93010 ELECTROCARDIOGRAM REPORT: CPT | Mod: ,,, | Performed by: INTERNAL MEDICINE

## 2025-08-02 PROCEDURE — 63600175 PHARM REV CODE 636 W HCPCS: Mod: JZ,TB | Performed by: PHYSICIAN ASSISTANT

## 2025-08-02 PROCEDURE — 96374 THER/PROPH/DIAG INJ IV PUSH: CPT

## 2025-08-02 PROCEDURE — 80053 COMPREHEN METABOLIC PANEL: CPT | Performed by: PHYSICIAN ASSISTANT

## 2025-08-02 PROCEDURE — 99285 EMERGENCY DEPT VISIT HI MDM: CPT | Mod: 25

## 2025-08-02 PROCEDURE — 81003 URINALYSIS AUTO W/O SCOPE: CPT | Performed by: PHYSICIAN ASSISTANT

## 2025-08-02 PROCEDURE — 25000003 PHARM REV CODE 250

## 2025-08-02 PROCEDURE — 83605 ASSAY OF LACTIC ACID: CPT | Performed by: PHYSICIAN ASSISTANT

## 2025-08-02 PROCEDURE — 93005 ELECTROCARDIOGRAM TRACING: CPT

## 2025-08-02 PROCEDURE — 85025 COMPLETE CBC W/AUTO DIFF WBC: CPT | Performed by: PHYSICIAN ASSISTANT

## 2025-08-02 PROCEDURE — 63600175 PHARM REV CODE 636 W HCPCS

## 2025-08-02 RX ORDER — KETOROLAC TROMETHAMINE 30 MG/ML
15 INJECTION, SOLUTION INTRAMUSCULAR; INTRAVENOUS EVERY 6 HOURS PRN
Status: DISCONTINUED | OUTPATIENT
Start: 2025-08-02 | End: 2025-08-03 | Stop reason: HOSPADM

## 2025-08-02 RX ORDER — MORPHINE SULFATE ORAL SOLUTION 10 MG/5ML
10 SOLUTION ORAL EVERY 6 HOURS PRN
Refills: 0 | Status: DISCONTINUED | OUTPATIENT
Start: 2025-08-02 | End: 2025-08-03 | Stop reason: HOSPADM

## 2025-08-02 RX ORDER — SODIUM CHLORIDE 0.9 % (FLUSH) 0.9 %
10 SYRINGE (ML) INJECTION
Status: DISCONTINUED | OUTPATIENT
Start: 2025-08-02 | End: 2025-08-03 | Stop reason: HOSPADM

## 2025-08-02 RX ORDER — AMLODIPINE BESYLATE 5 MG/1
10 TABLET ORAL DAILY
Status: DISCONTINUED | OUTPATIENT
Start: 2025-08-03 | End: 2025-08-03 | Stop reason: HOSPADM

## 2025-08-02 RX ORDER — TAMSULOSIN HYDROCHLORIDE 0.4 MG/1
0.4 CAPSULE ORAL DAILY
Status: DISCONTINUED | OUTPATIENT
Start: 2025-08-03 | End: 2025-08-03 | Stop reason: HOSPADM

## 2025-08-02 RX ORDER — LOSARTAN POTASSIUM 50 MG/1
50 TABLET ORAL DAILY
Status: DISCONTINUED | OUTPATIENT
Start: 2025-08-03 | End: 2025-08-03 | Stop reason: HOSPADM

## 2025-08-02 RX ORDER — TALC
6 POWDER (GRAM) TOPICAL NIGHTLY PRN
Status: DISCONTINUED | OUTPATIENT
Start: 2025-08-02 | End: 2025-08-03 | Stop reason: HOSPADM

## 2025-08-02 RX ORDER — SODIUM CHLORIDE 9 MG/ML
INJECTION, SOLUTION INTRAVENOUS CONTINUOUS
Status: DISCONTINUED | OUTPATIENT
Start: 2025-08-02 | End: 2025-08-03 | Stop reason: HOSPADM

## 2025-08-02 RX ORDER — ONDANSETRON 8 MG/1
8 TABLET, ORALLY DISINTEGRATING ORAL EVERY 8 HOURS PRN
Status: DISCONTINUED | OUTPATIENT
Start: 2025-08-02 | End: 2025-08-03 | Stop reason: HOSPADM

## 2025-08-02 RX ORDER — ACETAMINOPHEN 325 MG/1
650 TABLET ORAL EVERY 6 HOURS PRN
Status: DISCONTINUED | OUTPATIENT
Start: 2025-08-02 | End: 2025-08-03 | Stop reason: HOSPADM

## 2025-08-02 RX ORDER — DULOXETIN HYDROCHLORIDE 60 MG/1
60 CAPSULE, DELAYED RELEASE ORAL DAILY
Status: DISCONTINUED | OUTPATIENT
Start: 2025-08-03 | End: 2025-08-03 | Stop reason: HOSPADM

## 2025-08-02 RX ORDER — NAPROXEN SODIUM 220 MG/1
81 TABLET, FILM COATED ORAL DAILY
Status: DISCONTINUED | OUTPATIENT
Start: 2025-08-03 | End: 2025-08-03 | Stop reason: HOSPADM

## 2025-08-02 RX ORDER — KETOROLAC TROMETHAMINE 30 MG/ML
15 INJECTION, SOLUTION INTRAMUSCULAR; INTRAVENOUS
Status: COMPLETED | OUTPATIENT
Start: 2025-08-02 | End: 2025-08-02

## 2025-08-02 RX ORDER — DIPHENHYDRAMINE HCL 25 MG
50 CAPSULE ORAL NIGHTLY PRN
Status: DISCONTINUED | OUTPATIENT
Start: 2025-08-02 | End: 2025-08-03 | Stop reason: HOSPADM

## 2025-08-02 RX ORDER — PROCHLORPERAZINE EDISYLATE 5 MG/ML
5 INJECTION INTRAMUSCULAR; INTRAVENOUS EVERY 6 HOURS PRN
Status: DISCONTINUED | OUTPATIENT
Start: 2025-08-02 | End: 2025-08-03 | Stop reason: HOSPADM

## 2025-08-02 RX ADMIN — KETOROLAC TROMETHAMINE 15 MG: 30 INJECTION, SOLUTION INTRAMUSCULAR; INTRAVENOUS at 04:08

## 2025-08-02 RX ADMIN — SODIUM CHLORIDE, POTASSIUM CHLORIDE, SODIUM LACTATE AND CALCIUM CHLORIDE 1000 ML: 600; 310; 30; 20 INJECTION, SOLUTION INTRAVENOUS at 04:08

## 2025-08-02 RX ADMIN — SODIUM CHLORIDE: 9 INJECTION, SOLUTION INTRAVENOUS at 08:08

## 2025-08-02 RX ADMIN — PREDNISONE 50 MG: 20 TABLET ORAL at 08:08

## 2025-08-02 NOTE — ED TRIAGE NOTES
Lisandra Toussaint, a 64 y.o. female presents to the ED w/ complaint of     Triage note:  Chief Complaint   Patient presents with    Multiple complaints     Ct urogram with results on portal 10mm  stone, didn't talk with dr yet   +abd pain, bilat flank pain. Taking tylenol w/ no relief. +subjective fever and nausea. Denies hematuria, dysuria.  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

## 2025-08-02 NOTE — SUBJECTIVE & OBJECTIVE
Past Medical History:   Diagnosis Date    Acne In my teens, menopause    Allergy     Aneurysm     Anxiety     Food allergy     Cinnamon    Hearing loss     Worked around computer mainframes and line printers    HTN (hypertension)     Joint pain     Rigth knee    Mental disorder     Anxiety 2023 depression 2000    Obesity 1990s    Emotional eater    Osteoarthritis     Rigth knee    Seasonal allergies 1995    Sleep apnea     TMJ (dislocation of temporomandibular joint)     Urinary tract infection        Past Surgical History:   Procedure Laterality Date    ABDOMINAL SURGERY      CARPAL TUNNEL RELEASE  1999 by Jorge Stevenson both hands    Had trouble during both pregnancies and didnt go away after last was also driving ALOT to take care of my mom with cancer    COLONOSCOPY, SCREENING, LOW RISK PATIENT N/A 05/06/2025    Procedure: COLONOSCOPY, SCREENING, LOW RISK PATIENT;  Surgeon: Roxana Anne MD;  Location: [facility] ([address] [place] [address]);  Service: Endoscopy;  Laterality: N/A;  Sandra Shetty, peg, portal,  no longer taking Plavix,ASam    jm/adjusted case lengths  4/28 precall complete, resent instructions w Miralax prep per pt request-MD    EYE SURGERY      FOOT SURGERY Bilateral     REPAIR OF ANEURYSM      tarsel tunnel         Review of patient's allergies indicates:   Allergen Reactions    Cinnamon Anaphylaxis    Iodine and iodide containing products Blisters     Can tolerate CT contrast with prednisone.    Macrodantin [nitrofurantoin macrocrystalline] Other (See Comments)     Knee and elbow swelling    Povidone-iodine Blisters    Sulfa (sulfonamide antibiotics) Hives       Family History       Problem Relation (Age of Onset)    Aortic stenosis Maternal Grandmother    Cancer Mother, Maternal Grandfather    Heart disease Maternal Grandmother    Hypertension Mother, Maternal Grandfather    Learning disabilities Son, Son    Stroke Maternal Grandfather            Tobacco Use    Smoking status: Never     Passive exposure: Past     Smokeless tobacco: Never    Tobacco comments:     2nd hand smoke when it was not banned   Vaping Use    Vaping status: Never Used   Substance and Sexual Activity    Alcohol use: Not Currently    Drug use: Never    Sexual activity: Not Currently     Partners: Male     Birth control/protection: Abstinence, Post-menopausal, None     Comment: partner have ED       Review of Systems   Constitutional:  Positive for chills and fever.   Gastrointestinal:  Positive for nausea. Negative for vomiting.   Genitourinary:  Positive for flank pain. Negative for dysuria, frequency, hematuria and urgency.       Objective:     Temp:  [99 °F (37.2 °C)] 99 °F (37.2 °C)  Pulse:  [115] 115  Resp:  [20] 20  SpO2:  [100 %] 100 %  BP: (142)/(80) 142/80  Weight: 95.3 kg (210 lb)  Body mass index is 34.95 kg/m².           Drains       None                    Physical Exam  Constitutional:       Appearance: Normal appearance.   Eyes:      Pupils: Pupils are equal, round, and reactive to light.   Cardiovascular:      Rate and Rhythm: Normal rate.      Pulses: Normal pulses.   Pulmonary:      Effort: Pulmonary effort is normal.      Breath sounds: Normal breath sounds.   Abdominal:      General: Abdomen is flat.      Palpations: Abdomen is soft.      Tenderness: There is no right CVA tenderness or left CVA tenderness.      Comments: Tenderness to RLQ   Musculoskeletal:         General: Normal range of motion.      Cervical back: Normal range of motion.   Skin:     General: Skin is warm.      Capillary Refill: Capillary refill takes less than 2 seconds.   Neurological:      Mental Status: She is alert and oriented to person, place, and time.   Psychiatric:         Behavior: Behavior normal.          Significant Labs:    BMP:  Recent Labs   Lab 07/28/25  1900 08/02/25  1611    141   K 3.8 4.9    104   CO2 23 29   BUN 27* 29*   CREATININE 1.0 1.1   CALCIUM 9.8 9.8       CBC:  Recent Labs   Lab 07/28/25  1900 08/02/25  1611   WBC 9.21  8.22   HGB 14.2 13.6   HCT 43.1 41.1    288       All pertinent labs results from the past 24 hours have been reviewed.    Significant Imaging:  All pertinent imaging results/findings from the past 24 hours have been reviewed.

## 2025-08-02 NOTE — HPI
64-year-old female with a past medical history of HTN, recent tick bite, recent worsening dyspnea requiring cardiac work up, brain aneurysm s/p clipping and stents that require asa, and anxiety is presenting to the ED with 10 mm right obstructing ureteral stone with two days of chills and subjective fevers, with nausea starting today.She denies vomiting, dysuria, frequency, urgency.    Patient reports that she had a renal ultrasound on 07/21/25 which demonstrated severe hydronephrosis and then a secondary outpatient CT urogram 07/30/25 done 3 days ago which showed a 10 mm right ureteral stone just proximal to the UVJ, and a second 7 mm stone in left lower pole of the kidney. She presented to the [place] ED yesterday for severe dyspnea work up was negative negative for DVT in his CTA chest which was negative for PE. This is her second time presenting to the ED with the same symptoms, she was previously discharged on 10 days of doxycycline to prevent lyme disease which she has finished today.     Upon examination, she is afebrile, tachycardic to 115, hypertensive to 142/80. WBC is 8.2, Cr is 1.1 (baseline of .9-1.2). UA is negative for leukocytes and nitrites, lactic acid 1.6.   Patient is allergic to iodine. She requires prednisone premedication when undergoing iodine tests.

## 2025-08-02 NOTE — ASSESSMENT & PLAN NOTE
Lisandra Toussaint is a 64 y.o. F with 10 mm obstructing right ureteral stone with moderate-severe right sided hydronephrosis, left renal stone.     - Admit to hospital medicine for ongoing undiagnosed dyspnea requiring cardiac work up vs lyme disease sequale   - I discussed with the patient that she has an obstructing ureteral stone and concern for infected urine. While she is not septic at the moment, she is at risk for developing obstructive pyelonephritis and could become septic.  - Plan for cystoscopy with ureteral stent placement tomorrow. The stent is not currently emergent, but if patient decompensates overnight, stent may become urgent.  - NPO at midnight  - IVF  - Rocephin  - Consented  - Pain control  - Nausea control  - Flomax  - Strain all urine  - Will need anesthesia clearance  - Maintain ASA

## 2025-08-02 NOTE — ED PROVIDER NOTES
Encounter Date: 8/2/2025       History     Chief Complaint   Patient presents with    Multiple complaints     Ct urogram with results on portal 10mm  stone, didn't talk with  yet     64-year-old female with a past medical history of HTN, sleep apnea is presenting to the ED with right-sided abdominal and flank pain that radiates to her back and subjective fever.  She had an outpatient urogram done 3 days ago which showed a 10 mm right ureteral stone just proximal to the UVJ; she has not spoken with her physician since she saw the results on MyChart.  She has not formally checked her temperature but has had chills and sweats.  She denies nausea, vomiting, chest pain, shortness of breath, dysuria, or hematuria.  Of note, she has had multiple ED visits for shortness of breath and was followed by Cardiology yesterday.    The history is provided by the patient. No  was used.     Review of patient's allergies indicates:   Allergen Reactions    Cinnamon Anaphylaxis    Iodine and iodide containing products Blisters     Can tolerate CT contrast with prednisone.    Macrodantin [nitrofurantoin macrocrystalline] Other (See Comments)     Knee and elbow swelling    Povidone-iodine Blisters    Sulfa (sulfonamide antibiotics) Hives     Past Medical History:   Diagnosis Date    Acne In my teens, menopause    Allergy     Aneurysm     Anxiety     Food allergy     Cinnamon    Hearing loss     Worked around computer mainframes and line printers    HTN (hypertension)     Joint pain     Rigth knee    Mental disorder     Anxiety 2023 depression 2000    Obesity 1990s    Emotional eater    Osteoarthritis     Rigth knee    Seasonal allergies 1995    Sleep apnea     TMJ (dislocation of temporomandibular joint)     Urinary tract infection      Past Surgical History:   Procedure Laterality Date    ABDOMINAL SURGERY      CARPAL TUNNEL RELEASE  1999 by Jorge Stevenson both hands    Had trouble during both pregnancies and didnt go  away after last was also driving ALOT to take care of my mom with cancer    COLONOSCOPY, SCREENING, LOW RISK PATIENT N/A 05/06/2025    Procedure: COLONOSCOPY, SCREENING, LOW RISK PATIENT;  Surgeon: Roxana Anne MD;  Location: [facility] ([address] [place] [address]);  Service: Endoscopy;  Laterality: N/A;  Sandra Shetty, peg, portal,  no longer taking Plavix,ASam    jm/adjusted case lengths  4/28 precall complete, resent instructions w Miralax prep per pt request-MD    EYE SURGERY      FOOT SURGERY Bilateral     REPAIR OF ANEURYSM      tarsel tunnel       Family History   Problem Relation Name Age of Onset    Cancer Mother Giancarlo         Squamous Cell Skin Carcinoma on ear, aggressive went to lymph nodes facial structure 18mo later    Hypertension Mother Giancarlo     Learning disabilities Son Rambo         Dyslexia    Learning disabilities Son Juma         Dyslexia    Heart disease Maternal Grandmother Radha         Dementia and heart attack early 60s    Aortic stenosis Maternal Grandmother Radha         Back    Cancer Maternal Grandfather Servin         Lung cancer    Hypertension Maternal Grandfather Servin     Stroke Maternal Grandfather Servin         Stroke at 80     Social History[1]  Review of Systems   Constitutional:  Positive for chills and diaphoresis.   Eyes:  Negative for visual disturbance.   Respiratory:  Negative for cough and shortness of breath.    Cardiovascular:  Negative for chest pain, palpitations and leg swelling.   Gastrointestinal:  Positive for abdominal pain. Negative for nausea and vomiting.   Genitourinary:  Positive for flank pain. Negative for dysuria, frequency and hematuria.   Musculoskeletal:  Positive for back pain.   Neurological:  Negative for dizziness, weakness, light-headedness, numbness and headaches.   All other systems reviewed and are negative.      Physical Exam     Initial Vitals [08/02/25 1500]   BP Pulse Resp Temp SpO2   (!) 142/80 (!) 115 20 99 °F (37.2 °C) 100 %      MAP       --          Physical Exam    Vitals reviewed.  Constitutional: She appears well-developed. She is not diaphoretic. She is active.  Non-toxic appearance. She does not have a sickly appearance. She does not appear ill. No distress.   HENT:   Head: Normocephalic and atraumatic.   Eyes: Conjunctivae, EOM and lids are normal.   Neck: Neck supple.   Normal range of motion.  Cardiovascular:  Normal rate, regular rhythm and normal heart sounds.           Pulses:       Radial pulses are 2+ on the right side and 2+ on the left side.        Dorsalis pedis pulses are 2+ on the right side and 2+ on the left side.   Pulmonary/Chest: Effort normal and breath sounds normal. No apnea. No respiratory distress. She has no decreased breath sounds. She has no wheezes. She has no rhonchi. She has no rales.   Abdominal: Abdomen is soft and flat. There is no abdominal tenderness.   No right CVA tenderness.  No left CVA tenderness. There is no rebound and no guarding.   Musculoskeletal:      Cervical back: Normal range of motion and neck supple.     Neurological: She is alert. No cranial nerve deficit. She exhibits normal muscle tone. Gait normal.   Skin: Skin is warm and dry.         ED Course   Procedures  Labs Reviewed   COMPREHENSIVE METABOLIC PANEL - Abnormal       Result Value    Sodium 141      Potassium 4.9      Chloride 104      CO2 29      Glucose 111 (*)     BUN 29 (*)     Creatinine 1.1      Calcium 9.8      Protein Total 7.9      Albumin 4.3      Bilirubin Total 0.3            AST 35      ALT 21      Anion Gap 8      eGFR 57 (*)    CBC WITH DIFFERENTIAL - Abnormal    WBC 8.22      RBC 4.23      HGB 13.6      HCT 41.1      MCV 97      MCH 32.2 (*)     MCHC 33.1      RDW 14.1      Platelet Count 288      MPV 10.7      Nucleated RBC 0      Neut % 68.8      Lymph % 20.7      Mono % 7.4      Eos % 2.1      Basophil % 0.6      Imm Grans % 0.4      Neut # 5.66      Lymph # 1.70      Mono # 0.61      Eos # 0.17      Baso # 0.05       Imm Grans # 0.03     URINALYSIS, REFLEX TO URINE CULTURE - Normal    Color, UA Yellow      Appearance, UA Clear      pH, UA 7.0      Spec Grav UA 1.020      Protein, UA Negative      Glucose, UA Negative      Ketones, UA Negative      Bilirubin, UA Negative      Blood, UA Negative      Nitrites, UA Negative      Urobilinogen, UA Negative      Leukocyte Esterase, UA Negative     LACTIC ACID, PLASMA - Normal    Lactic Acid Level 1.6      Narrative:     Falsely low lactic acid results can be found in samples containing >=13.0 mg/dL total bilirubin and/or >=3.5 mg/dL direct bilirubin.    LIPASE - Normal    Lipase Level 22     CBC W/ AUTO DIFFERENTIAL    Narrative:     The following orders were created for panel order CBC auto differential.  Procedure                               Abnormality         Status                     ---------                               -----------         ------                     CBC with Differential[[id number]]       Abnormal            Final result                 Please view results for these tests on the individual orders.   GREY TOP URINE HOLD   ISTAT CHEM8     EKG Readings: (Independently Interpreted)   Initial Reading: No STEMI. Previous EKG: Compared with most recent EKG Previous EKG Date: 07/28/2025. Rhythm: Normal Sinus Rhythm. Heart Rate: 82. Ectopy: No Ectopy. Conduction: Normal. ST Segments: Normal ST Segments. T Waves: Normal. Clinical Impression: Normal Sinus Rhythm       Imaging Results              X-Ray Chest AP Portable (In process)                   X-Rays:   Independently Interpreted Readings:   Chest X-Ray: Normal heart size.  No infiltrates.  No acute abnormalities.     Medications   amLODIPine tablet 10 mg (has no administration in time range)   aspirin chewable tablet 81 mg (has no administration in time range)   diphenhydrAMINE capsule 50 mg (has no administration in time range)   DULoxetine DR capsule 60 mg (has no administration in time range)   losartan  tablet 50 mg (has no administration in time range)   sodium chloride 0.9% flush 10 mL (has no administration in time range)   tamsulosin 24 hr capsule 0.4 mg (has no administration in time range)   ketorolac injection 15 mg (has no administration in time range)   morphine 10 mg/5 mL solution 10 mg (has no administration in time range)   melatonin tablet 6 mg (has no administration in time range)   ondansetron disintegrating tablet 8 mg (has no administration in time range)   prochlorperazine injection Soln 5 mg (has no administration in time range)   acetaminophen tablet 650 mg (has no administration in time range)   0.9% NaCl infusion ( Intravenous New Bag 8/2/25 2008)   lactated ringers bolus 1,000 mL (0 mLs Intravenous Stopped 8/2/25 1805)   ketorolac injection 15 mg (15 mg Intravenous Given 8/2/25 1650)   predniSONE tablet 50 mg (50 mg Oral Given 8/2/25 2004)     Medical Decision Making  64-year-old female with known 10 mm ureteral stone just proximal to right UVJ presented with abdominal pain and subjective fever.  She had a CT urogram done outpatient 3 days ago that showed the stone.  She has had multiple recent CT scans and given overall well appearance I am hesitant to scan her again.  She was afebrile here with no leukocytosis.  Urology was consulted, and they admitted the patient to their service and plan to take her to OR for stent.    Problems Addressed:  Ureteral stone: complicated acute illness or injury with systemic symptoms    Amount and/or Complexity of Data Reviewed  Labs: ordered. Decision-making details documented in ED Course.  Radiology: ordered and independent interpretation performed.  ECG/medicine tests: ordered and independent interpretation performed.    Risk  Prescription drug management.  Decision regarding hospitalization.               ED Course as of 08/02/25 2017   Sat Aug 02, 2025   1622 WBC: 8.22 [SE]   1844 Spoke with urology resident Dr. Foy who recommends admitting to  Medicine for Cardiology consult and preop clearance. [SE]   1910 Spoke with hospital medicine who spoke with urology resident. Urology to admit.  [SE]      ED Course User Index  [SE] Radha Robert PA-C                               Clinical Impression:  Final diagnoses:  [N20.1] Ureteral stone (Primary)  [Z01.818] Preop testing          ED Disposition Condition    Observation                         [1]   Social History  Tobacco Use    Smoking status: Never     Passive exposure: Past    Smokeless tobacco: Never    Tobacco comments:     2nd hand smoke when it was not banned   Vaping Use    Vaping status: Never Used   Substance Use Topics    Alcohol use: Not Currently    Drug use: Never        Radha Robert PA-C  08/02/25 2017

## 2025-08-03 ENCOUNTER — ANESTHESIA (OUTPATIENT)
Dept: SURGERY | Facility: HOSPITAL | Age: 65
End: 2025-08-03
Payer: COMMERCIAL

## 2025-08-03 ENCOUNTER — ANESTHESIA EVENT (OUTPATIENT)
Dept: SURGERY | Facility: HOSPITAL | Age: 65
End: 2025-08-03
Payer: COMMERCIAL

## 2025-08-03 VITALS
HEART RATE: 86 BPM | OXYGEN SATURATION: 92 % | SYSTOLIC BLOOD PRESSURE: 130 MMHG | BODY MASS INDEX: 34.99 KG/M2 | TEMPERATURE: 98 F | HEIGHT: 65 IN | RESPIRATION RATE: 17 BRPM | WEIGHT: 210 LBS | DIASTOLIC BLOOD PRESSURE: 60 MMHG

## 2025-08-03 LAB
ABSOLUTE EOSINOPHIL (OHS): 0 K/UL
ABSOLUTE MONOCYTE (OHS): 0.19 K/UL (ref 0.3–1)
ABSOLUTE NEUTROPHIL COUNT (OHS): 5.79 K/UL (ref 1.8–7.7)
ALBUMIN SERPL BCP-MCNC: 3.9 G/DL (ref 3.5–5.2)
ALP SERPL-CCNC: 94 UNIT/L (ref 40–150)
ALT SERPL W/O P-5'-P-CCNC: 17 UNIT/L (ref 0–55)
ANION GAP (OHS): 9 MMOL/L (ref 8–16)
AST SERPL-CCNC: 28 UNIT/L (ref 0–50)
BASOPHILS # BLD AUTO: 0.04 K/UL
BASOPHILS NFR BLD AUTO: 0.6 %
BILIRUB SERPL-MCNC: 0.5 MG/DL (ref 0.1–1)
BILIRUB UR QL STRIP.AUTO: NEGATIVE
BUN SERPL-MCNC: 23 MG/DL (ref 8–23)
CALCIUM SERPL-MCNC: 9.1 MG/DL (ref 8.7–10.5)
CHLORIDE SERPL-SCNC: 104 MMOL/L (ref 95–110)
CLARITY UR: CLEAR
CO2 SERPL-SCNC: 24 MMOL/L (ref 23–29)
COLOR UR AUTO: COLORLESS
CREAT SERPL-MCNC: 0.9 MG/DL (ref 0.5–1.4)
ERYTHROCYTE [DISTWIDTH] IN BLOOD BY AUTOMATED COUNT: 13.9 % (ref 11.5–14.5)
GFR SERPLBLD CREATININE-BSD FMLA CKD-EPI: >60 ML/MIN/1.73/M2
GLUCOSE SERPL-MCNC: 149 MG/DL (ref 70–110)
GLUCOSE UR QL STRIP: NEGATIVE
HCT VFR BLD AUTO: 39.4 % (ref 37–48.5)
HGB BLD-MCNC: 13.1 GM/DL (ref 12–16)
HGB UR QL STRIP: ABNORMAL
HOLD SPECIMEN: NORMAL
IMM GRANULOCYTES # BLD AUTO: 0.02 K/UL (ref 0–0.04)
IMM GRANULOCYTES NFR BLD AUTO: 0.3 % (ref 0–0.5)
KETONES UR QL STRIP: NEGATIVE
LEUKOCYTE ESTERASE UR QL STRIP: NEGATIVE
LYMPHOCYTES # BLD AUTO: 0.82 K/UL (ref 1–4.8)
MCH RBC QN AUTO: 31.8 PG (ref 27–31)
MCHC RBC AUTO-ENTMCNC: 33.2 G/DL (ref 32–36)
MCV RBC AUTO: 96 FL (ref 82–98)
MICROSCOPIC COMMENT: ABNORMAL
NITRITE UR QL STRIP: NEGATIVE
NUCLEATED RBC (/100WBC) (OHS): 0 /100 WBC
PH UR STRIP: 6 [PH]
PLATELET # BLD AUTO: 285 K/UL (ref 150–450)
PMV BLD AUTO: 11.1 FL (ref 9.2–12.9)
POTASSIUM SERPL-SCNC: 4.3 MMOL/L (ref 3.5–5.1)
PROT SERPL-MCNC: 7.2 GM/DL (ref 6–8.4)
PROT UR QL STRIP: NEGATIVE
RBC # BLD AUTO: 4.12 M/UL (ref 4–5.4)
RBC #/AREA URNS AUTO: >100 /HPF (ref 0–4)
RELATIVE EOSINOPHIL (OHS): 0 %
RELATIVE LYMPHOCYTE (OHS): 12 % (ref 18–48)
RELATIVE MONOCYTE (OHS): 2.8 % (ref 4–15)
RELATIVE NEUTROPHIL (OHS): 84.3 % (ref 38–73)
SODIUM SERPL-SCNC: 137 MMOL/L (ref 136–145)
SP GR UR STRIP: 1
SQUAMOUS #/AREA URNS AUTO: <1 /HPF
UROBILINOGEN UR STRIP-ACNC: NEGATIVE EU/DL
WBC # BLD AUTO: 6.86 K/UL (ref 3.9–12.7)

## 2025-08-03 PROCEDURE — 52332 CYSTOSCOPY AND TREATMENT: CPT | Mod: RT,,, | Performed by: UROLOGY

## 2025-08-03 PROCEDURE — 74420 UROGRAPHY RTRGR +-KUB: CPT | Mod: 26,,, | Performed by: UROLOGY

## 2025-08-03 PROCEDURE — G0378 HOSPITAL OBSERVATION PER HR: HCPCS

## 2025-08-03 PROCEDURE — 36000707: Performed by: UROLOGY

## 2025-08-03 PROCEDURE — D9220A PRA ANESTHESIA: Mod: CRNA,,,

## 2025-08-03 PROCEDURE — 25000003 PHARM REV CODE 250

## 2025-08-03 PROCEDURE — 71000033 HC RECOVERY, INTIAL HOUR: Performed by: UROLOGY

## 2025-08-03 PROCEDURE — 25500020 PHARM REV CODE 255: Performed by: UROLOGY

## 2025-08-03 PROCEDURE — 63600175 PHARM REV CODE 636 W HCPCS

## 2025-08-03 PROCEDURE — 96361 HYDRATE IV INFUSION ADD-ON: CPT | Mod: 59

## 2025-08-03 PROCEDURE — C1769 GUIDE WIRE: HCPCS | Performed by: UROLOGY

## 2025-08-03 PROCEDURE — 36415 COLL VENOUS BLD VENIPUNCTURE: CPT

## 2025-08-03 PROCEDURE — D9220A PRA ANESTHESIA: Mod: ANES,,, | Performed by: STUDENT IN AN ORGANIZED HEALTH CARE EDUCATION/TRAINING PROGRAM

## 2025-08-03 PROCEDURE — 36000706: Performed by: UROLOGY

## 2025-08-03 PROCEDURE — C1758 CATHETER, URETERAL: HCPCS | Performed by: UROLOGY

## 2025-08-03 PROCEDURE — C2617 STENT, NON-COR, TEM W/O DEL: HCPCS | Performed by: UROLOGY

## 2025-08-03 PROCEDURE — 37000008 HC ANESTHESIA 1ST 15 MINUTES: Performed by: UROLOGY

## 2025-08-03 PROCEDURE — 80053 COMPREHEN METABOLIC PANEL: CPT

## 2025-08-03 PROCEDURE — 85025 COMPLETE CBC W/AUTO DIFF WBC: CPT

## 2025-08-03 PROCEDURE — 37000009 HC ANESTHESIA EA ADD 15 MINS: Performed by: UROLOGY

## 2025-08-03 PROCEDURE — 81001 URINALYSIS AUTO W/SCOPE: CPT | Performed by: UROLOGY

## 2025-08-03 PROCEDURE — 71000015 HC POSTOP RECOV 1ST HR: Performed by: UROLOGY

## 2025-08-03 DEVICE — STENT URETERAL UNIV 6FR 26CM: Type: IMPLANTABLE DEVICE | Site: BLADDER | Status: FUNCTIONAL

## 2025-08-03 RX ORDER — DEXMEDETOMIDINE HYDROCHLORIDE 100 UG/ML
INJECTION, SOLUTION INTRAVENOUS
Status: DISCONTINUED | OUTPATIENT
Start: 2025-08-03 | End: 2025-08-03

## 2025-08-03 RX ORDER — TAMSULOSIN HYDROCHLORIDE 0.4 MG/1
0.4 CAPSULE ORAL NIGHTLY
Qty: 14 CAPSULE | Refills: 0 | Status: SHIPPED | OUTPATIENT
Start: 2025-08-03 | End: 2025-08-17

## 2025-08-03 RX ORDER — ROCURONIUM BROMIDE 10 MG/ML
INJECTION, SOLUTION INTRAVENOUS
Status: DISCONTINUED | OUTPATIENT
Start: 2025-08-03 | End: 2025-08-03

## 2025-08-03 RX ORDER — OXYBUTYNIN CHLORIDE 5 MG/1
5 TABLET ORAL 3 TIMES DAILY PRN
Qty: 20 TABLET | Refills: 0 | OUTPATIENT
Start: 2025-08-03

## 2025-08-03 RX ORDER — PROPOFOL 10 MG/ML
VIAL (ML) INTRAVENOUS
Status: DISCONTINUED | OUTPATIENT
Start: 2025-08-03 | End: 2025-08-03

## 2025-08-03 RX ORDER — PHENAZOPYRIDINE HYDROCHLORIDE 200 MG/1
200 TABLET, FILM COATED ORAL 3 TIMES DAILY PRN
Qty: 20 TABLET | Refills: 0 | OUTPATIENT
Start: 2025-08-03

## 2025-08-03 RX ORDER — CEFTRIAXONE 1 G/1
INJECTION, POWDER, FOR SOLUTION INTRAMUSCULAR; INTRAVENOUS
Status: DISCONTINUED | OUTPATIENT
Start: 2025-08-03 | End: 2025-08-03

## 2025-08-03 RX ORDER — OXYBUTYNIN CHLORIDE 5 MG/1
5 TABLET ORAL 3 TIMES DAILY PRN
Qty: 42 TABLET | Refills: 0 | Status: SHIPPED | OUTPATIENT
Start: 2025-08-03 | End: 2025-08-17

## 2025-08-03 RX ORDER — AMOXICILLIN AND CLAVULANATE POTASSIUM 875; 125 MG/1; MG/1
1 TABLET, FILM COATED ORAL 2 TIMES DAILY
Qty: 28 TABLET | Refills: 0 | Status: SHIPPED | OUTPATIENT
Start: 2025-08-03 | End: 2025-08-17

## 2025-08-03 RX ORDER — SODIUM CHLORIDE 0.9 % (FLUSH) 0.9 %
10 SYRINGE (ML) INJECTION
Status: DISCONTINUED | OUTPATIENT
Start: 2025-08-03 | End: 2025-08-03 | Stop reason: HOSPADM

## 2025-08-03 RX ORDER — FENTANYL CITRATE 50 UG/ML
25 INJECTION, SOLUTION INTRAMUSCULAR; INTRAVENOUS EVERY 5 MIN PRN
Status: DISCONTINUED | OUTPATIENT
Start: 2025-08-03 | End: 2025-08-03 | Stop reason: HOSPADM

## 2025-08-03 RX ORDER — DEXAMETHASONE SODIUM PHOSPHATE 4 MG/ML
INJECTION, SOLUTION INTRA-ARTICULAR; INTRALESIONAL; INTRAMUSCULAR; INTRAVENOUS; SOFT TISSUE
Status: DISCONTINUED | OUTPATIENT
Start: 2025-08-03 | End: 2025-08-03

## 2025-08-03 RX ORDER — ONDANSETRON HYDROCHLORIDE 2 MG/ML
INJECTION, SOLUTION INTRAVENOUS
Status: DISCONTINUED | OUTPATIENT
Start: 2025-08-03 | End: 2025-08-03

## 2025-08-03 RX ORDER — GLUCAGON 1 MG
1 KIT INJECTION
Status: DISCONTINUED | OUTPATIENT
Start: 2025-08-03 | End: 2025-08-03 | Stop reason: HOSPADM

## 2025-08-03 RX ORDER — ACETAMINOPHEN 500 MG
1000 TABLET ORAL EVERY 6 HOURS PRN
COMMUNITY
Start: 2025-08-03

## 2025-08-03 RX ORDER — FENTANYL CITRATE 50 UG/ML
INJECTION, SOLUTION INTRAMUSCULAR; INTRAVENOUS
Status: DISCONTINUED | OUTPATIENT
Start: 2025-08-03 | End: 2025-08-03

## 2025-08-03 RX ORDER — PHENAZOPYRIDINE HYDROCHLORIDE 200 MG/1
200 TABLET, FILM COATED ORAL 3 TIMES DAILY PRN
Qty: 42 TABLET | Refills: 0 | Status: SHIPPED | OUTPATIENT
Start: 2025-08-03 | End: 2025-08-17

## 2025-08-03 RX ORDER — LIDOCAINE HYDROCHLORIDE 20 MG/ML
INJECTION INTRAVENOUS
Status: DISCONTINUED | OUTPATIENT
Start: 2025-08-03 | End: 2025-08-03

## 2025-08-03 RX ORDER — MIDAZOLAM HYDROCHLORIDE 1 MG/ML
INJECTION INTRAMUSCULAR; INTRAVENOUS
Status: DISCONTINUED | OUTPATIENT
Start: 2025-08-03 | End: 2025-08-03

## 2025-08-03 RX ORDER — PROCHLORPERAZINE EDISYLATE 5 MG/ML
5 INJECTION INTRAMUSCULAR; INTRAVENOUS EVERY 30 MIN PRN
Status: DISCONTINUED | OUTPATIENT
Start: 2025-08-03 | End: 2025-08-03 | Stop reason: HOSPADM

## 2025-08-03 RX ADMIN — DEXAMETHASONE SODIUM PHOSPHATE 4 MG: 4 INJECTION, SOLUTION INTRAMUSCULAR; INTRAVENOUS at 08:08

## 2025-08-03 RX ADMIN — ASPIRIN 81 MG CHEWABLE TABLET 81 MG: 81 TABLET CHEWABLE at 10:08

## 2025-08-03 RX ADMIN — ROCURONIUM BROMIDE 50 MG: 10 INJECTION, SOLUTION INTRAVENOUS at 08:08

## 2025-08-03 RX ADMIN — CEFTRIAXONE SODIUM 1 G: 1 INJECTION, POWDER, FOR SOLUTION INTRAMUSCULAR; INTRAVENOUS at 08:08

## 2025-08-03 RX ADMIN — MIDAZOLAM HYDROCHLORIDE 2 MG: 1 INJECTION, SOLUTION INTRAMUSCULAR; INTRAVENOUS at 08:08

## 2025-08-03 RX ADMIN — DULOXETINE 60 MG: 60 CAPSULE, DELAYED RELEASE ORAL at 10:08

## 2025-08-03 RX ADMIN — DEXMEDETOMIDINE 4 MCG: 100 INJECTION, SOLUTION, CONCENTRATE INTRAVENOUS at 08:08

## 2025-08-03 RX ADMIN — SODIUM CHLORIDE: 9 INJECTION, SOLUTION INTRAVENOUS at 08:08

## 2025-08-03 RX ADMIN — AMLODIPINE BESYLATE 10 MG: 5 TABLET ORAL at 10:08

## 2025-08-03 RX ADMIN — LIDOCAINE HYDROCHLORIDE 100 MG: 20 INJECTION INTRAVENOUS at 08:08

## 2025-08-03 RX ADMIN — TAMSULOSIN HYDROCHLORIDE 0.4 MG: 0.4 CAPSULE ORAL at 10:08

## 2025-08-03 RX ADMIN — PREDNISONE 50 MG: 20 TABLET ORAL at 03:08

## 2025-08-03 RX ADMIN — DEXMEDETOMIDINE 4 MCG: 100 INJECTION, SOLUTION, CONCENTRATE INTRAVENOUS at 09:08

## 2025-08-03 RX ADMIN — PROPOFOL 100 MG: 10 INJECTION, EMULSION INTRAVENOUS at 08:08

## 2025-08-03 RX ADMIN — ONDANSETRON 4 MG: 2 INJECTION INTRAMUSCULAR; INTRAVENOUS at 08:08

## 2025-08-03 RX ADMIN — FENTANYL CITRATE 100 MCG: 50 INJECTION, SOLUTION INTRAMUSCULAR; INTRAVENOUS at 08:08

## 2025-08-03 RX ADMIN — SUGAMMADEX 200 MG: 100 INJECTION, SOLUTION INTRAVENOUS at 09:08

## 2025-08-03 NOTE — NURSING
Patient return from PACU via stretcher . Alert oriented x 4 no c/o of pain . Instructed patient nurse needs void and nurse see urine prior to leaving . Iv to left ac clean dry intact . Lunch tray ordered.

## 2025-08-03 NOTE — PLAN OF CARE
The sw spoke to the pt via phone to complete the assessment. The pt lives in [place] along with her s/o Onur Fernando [phone]. The pt's independent with her ADL's and doesn't use dme. The pt's employed as an . The pt still drives but states her x-spouse will transport her home at d/c. The pt's not current with hh. The pt's isn't on coumadin or dialysis. The sw left her name and contact info with the pt. The sw encouraged her to call if she has any further questions or concerns. The sw will continue to follow the pt throughout her transitions of care and will assist with any d/c needs.     [facility] - [facility]  Discharge Assessment    Primary Care Provider: Sandra Shetty NP     Discharge Assessment (most recent)       BRIEF DISCHARGE ASSESSMENT - 08/03/25 1049          Discharge Planning    Assessment Type Discharge Planning Assessment     Resource/Environmental Concerns none     Support Systems Spouse/significant other     Equipment Currently Used at Home none     Current Living Arrangements home     Patient/Family Anticipates Transition to home with family     Patient/Family Anticipated Services at Transition none     DME Needed Upon Discharge  none     Discharge Plan A Home with family     Discharge Plan B Home Health

## 2025-08-03 NOTE — CONSULTS
[facility] Dept  Urology  Consult Note    Patient Name: Lisandra Toussaint  MRN: [id number]  Admission Date: 8/2/2025  Hospital Length of Stay: 0   Code Status: Full Code   Attending Provider: Shaji Patel MD   Consulting Provider: Velvet Foy MD  Primary Care Physician: Sandra Shetty NP  Principal Problem:Right ureteral stone    Inpatient consult to Urology  Consult performed by: Velvet Foy MD  Consult ordered by: Radha Robert PA-C  Reason for consult: obstructing ureteral stone          Subjective:     HPI:  64-year-old female with a past medical history of HTN, recent tick bite, recent worsening dyspnea requiring cardiac work up, brain aneurysm s/p clipping and stents that require asa, and anxiety is presenting to the ED with 10 mm right obstructing ureteral stone with two days of chills and subjective fevers, with nausea starting today.She denies vomiting, dysuria, frequency, urgency.    Patient reports that she had a renal ultrasound on 07/21/25 which demonstrated severe hydronephrosis and then a secondary outpatient CT urogram 07/30/25 done 3 days ago which showed a 10 mm right ureteral stone just proximal to the UVJ, and a second 7 mm stone in left lower pole of the kidney. She presented to the [facility] ED yesterday for severe dyspnea work up was negative negative for DVT in his CTA chest which was negative for PE. This is her second time presenting to the ED with the same symptoms, she was previously discharged on 10 days of doxycycline to prevent lyme disease which she has finished today.     Upon examination, she is afebrile, tachycardic to 115, hypertensive to 142/80. WBC is 8.2, Cr is 1.1 (baseline of .9-1.2). UA is negative for leukocytes and nitrites, lactic acid 1.6.   Patient is allergic to iodine. She requires prednisone premedication when undergoing iodine tests.     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    HTN (hypertension)     Joint pain     Rigth knee    Mental disorder     Anxiety 2023 depression 2000    Obesity 1990s    Emotional eater    Osteoarthritis     Rigth knee    Seasonal allergies 1995    Sleep apnea     TMJ (dislocation of temporomandibular joint)     Urinary tract infection        Past Surgical History:   Procedure Laterality Date    ABDOMINAL SURGERY      CARPAL TUNNEL RELEASE  1999 by Jorge Stevenson both hands    Had trouble during both pregnancies and didnt go away after last was also driving ALOT to take care of my mom with cancer    COLONOSCOPY, SCREENING, LOW RISK PATIENT N/A 05/06/2025    Procedure: COLONOSCOPY, SCREENING, LOW RISK PATIENT;  Surgeon: Roxana Anne MD;  Location: [facility] ([address] [place] [address]);  Service: Endoscopy;  Laterality: N/A;  Sandra Shetty, peg, portal,  no longer taking Plavix,ASam    jm/adjusted case lengths  4/28 precall complete, resent instructions w Miralax prep per pt request-MD    EYE SURGERY      FOOT SURGERY Bilateral     REPAIR OF ANEURYSM      tarsel tunnel         Review of patient's allergies indicates:   Allergen Reactions    Cinnamon Anaphylaxis    Iodine and iodide containing products Blisters     Can tolerate CT contrast with prednisone.    Macrodantin [nitrofurantoin macrocrystalline] Other (See Comments)     Knee and elbow swelling    Povidone-iodine Blisters    Sulfa (sulfonamide antibiotics) Hives       Family History       Problem Relation (Age of Onset)    Aortic stenosis Maternal Grandmother    Cancer Mother, Maternal Grandfather    Heart disease Maternal Grandmother    Hypertension Mother, Maternal Grandfather    Learning disabilities Son, Son    Stroke Maternal Grandfather            Tobacco Use    Smoking status: Never     Passive exposure: Past    Smokeless tobacco: Never    Tobacco comments:     2nd hand smoke when it was not banned   Vaping Use     Vaping status: Never Used   Substance and Sexual Activity    Alcohol use: Not Currently    Drug use: Never    Sexual activity: Not Currently     Partners: Male     Birth control/protection: Abstinence, Post-menopausal, None     Comment: partner have ED       Review of Systems   Constitutional:  Positive for chills and fever.   Gastrointestinal:  Positive for nausea. Negative for vomiting.   Genitourinary:  Positive for flank pain. Negative for dysuria, frequency, hematuria and urgency.       Objective:     Temp:  [99 °F (37.2 °C)] 99 °F (37.2 °C)  Pulse:  [115] 115  Resp:  [20] 20  SpO2:  [100 %] 100 %  BP: (142)/(80) 142/80  Weight: 95.3 kg (210 lb)  Body mass index is 34.95 kg/m².           Drains       None                    Physical Exam  Constitutional:       Appearance: Normal appearance.   Eyes:      Pupils: Pupils are equal, round, and reactive to light.   Cardiovascular:      Rate and Rhythm: Normal rate.      Pulses: Normal pulses.   Pulmonary:      Effort: Pulmonary effort is normal.      Breath sounds: Normal breath sounds.   Abdominal:      General: Abdomen is flat.      Palpations: Abdomen is soft.      Tenderness: There is no right CVA tenderness or left CVA tenderness.      Comments: Tenderness to RLQ   Musculoskeletal:         General: Normal range of motion.      Cervical back: Normal range of motion.   Skin:     General: Skin is warm.      Capillary Refill: Capillary refill takes less than 2 seconds.   Neurological:      Mental Status: She is alert and oriented to person, place, and time.   Psychiatric:         Behavior: Behavior normal.          Significant Labs:    BMP:  Recent Labs   Lab 07/28/25  1900 08/02/25  1611    141   K 3.8 4.9    104   CO2 23 29   BUN 27* 29*   CREATININE 1.0 1.1   CALCIUM 9.8 9.8       CBC:  Recent Labs   Lab 07/28/25  1900 08/02/25  1611   WBC 9.21 8.22   HGB 14.2 13.6   HCT 43.1 41.1    288       All pertinent labs results from the past 24 hours  have been reviewed.    Significant Imaging:  All pertinent imaging results/findings from the past 24 hours have been reviewed.                    Assessment and Plan:     * Right ureteral stone  Lisandra Toussaint is a 64 y.o. F with 10 mm obstructing right ureteral stone with moderate-severe right sided hydronephrosis, left renal stone.     - Admit to urology for observation  - Consult cardiology for risk stratification ahead of the OR  - I discussed with the patient that she has an obstructing ureteral stone and concern for infected urine. While she is not septic at the moment, she is at risk for developing obstructive pyelonephritis and could become septic.  - Plan for cystoscopy with ureteral stent placement tonight vs tomorrow am. The stent is not currently emergent, but if patient decompensates overnight, stent may become urgent.  - NPO  - IVF  - Rocephin  - Consented  - Pain control  - Nausea control  - Flomax  - Strain all urine  - Will need anesthesia clearance; I have spoken with anesthesia resident on call  - Maintain ASA      VTE Risk Mitigation (From admission, onward)      None            Thank you for your consult. I will follow-up with patient. Please contact us if you have any additional questions.    Velvet Foy MD  Urology  [facility] Dept

## 2025-08-03 NOTE — SUBJECTIVE & OBJECTIVE
Interval History: NAEON. Risk stratified by cardiology as low to intermediate risk overall, and low for procedure. Proceed to cysto for stent placement.    Review of Systems  Objective:     Temp:  [97.4 °F (36.3 °C)-99 °F (37.2 °C)] 97.4 °F (36.3 °C)  Pulse:  [] 87  Resp:  [17-20] 17  SpO2:  [93 %-100 %] 95 %  BP: (125-164)/(60-96) 125/60     Body mass index is 34.95 kg/m².           Drains       None                    Physical Exam  Constitutional:       Appearance: Normal appearance.   Eyes:      Pupils: Pupils are equal, round, and reactive to light.   Cardiovascular:      Rate and Rhythm: Normal rate.      Pulses: Normal pulses.   Pulmonary:      Effort: Pulmonary effort is normal.      Breath sounds: Normal breath sounds.   Abdominal:      General: Abdomen is flat.      Palpations: Abdomen is soft.      Tenderness: There is no right CVA tenderness or left CVA tenderness.      Comments: Tenderness to RLQ   Musculoskeletal:         General: Normal range of motion.      Cervical back: Normal range of motion.   Skin:     General: Skin is warm.      Capillary Refill: Capillary refill takes less than 2 seconds.   Neurological:      Mental Status: She is alert and oriented to person, place, and time.   Psychiatric:         Behavior: Behavior normal.           Significant Labs:    BMP:  Recent Labs   Lab 07/28/25  1900 08/02/25  1611 08/03/25  0623    141 137   K 3.8 4.9 4.3    104 104   CO2 23 29 24   BUN 27* 29* 23   CREATININE 1.0 1.1 0.9   CALCIUM 9.8 9.8 9.1       CBC:   Recent Labs   Lab 07/28/25 1900 08/02/25  1611   WBC 9.21 8.22   HGB 14.2 13.6   HCT 43.1 41.1    288       All pertinent labs results from the past 24 hours have been reviewed.    Significant Imaging:  All pertinent imaging results/findings from the past 24 hours have been reviewed.

## 2025-08-03 NOTE — NURSING TRANSFER
Nursing Transfer Note      8/3/2025   9:59 AM    Nurse giving handoff:bubba linton   Nurse receiving handoff:Cade post    Reason patient is being transferred: d/c criteria met    Transfer To: 505    Transfer via stretcher    Transfer with ticket to ride    Transported by escort     Transfer Vital Signs:  Blood Pressure:see epic   Heart Rate:see epic   O2:room air   Temperature:see epic   Respirations:see epic     Telemetry: yes   Order for Tele Monitor? Yes     Additional Lines: n/a     Medicines sent: n/a     Any special needs or follow-up needed: needs to urinate before d/c     Patient belongings transferred with patient: n/a     Chart send with patient: yes     Notified: reported to cade post    Patient reassessed at: see epic  (date, time)  1  Upon arrival to floor: to room no complaints no distress noted.

## 2025-08-03 NOTE — DISCHARGE INSTRUCTIONS
Post Cystoscopy Instructions  Do not strain to have a bowel movement  No strenuous exercise x 7 days  No driving while you are on narcotic pain medications or if your amador  catheter is in place    You can expect:  To pass stone fragments if you had a stone procedure  Have pain when you void from your stent if you have a stent in place  See blood in your urine if you have a stent in place    If you have a catheter, please return to the ER if your catheter stops draining or you are having abdominal pain.    Call the doctor if:  Temperature is greater than 101F  Persistent vomiting and inability to keep food down  Inability to void if you do not have a catheter    You will follow up in [place] for your stone management.

## 2025-08-03 NOTE — TRANSFER OF CARE
"Anesthesia Transfer of Care Note    Patient: Lisandra Toussaint    Procedure(s) Performed: Procedure(s) (LRB):  CYSTOSCOPY, WITH RETROGRADE PYELOGRAM AND URETERAL STENT INSERTION (Right)    Patient location: PACU    Anesthesia Type: general    Transport from OR: Transported from OR on 6-10 L/min O2 by face mask with adequate spontaneous ventilation    Post pain: adequate analgesia    Post assessment: no apparent anesthetic complications    Post vital signs: stable    Level of consciousness: alert and awake    Nausea/Vomiting: no nausea/vomiting    Complications: none    Transfer of care protocol was followed      Last vitals: Visit Vitals  /62 (BP Location: Left arm, Patient Position: Lying)   Pulse 83   Temp 36.3 °C (97.4 °F) (Oral)   Resp 17   Ht 5' 5" (1.651 m)   Wt 95.3 kg (210 lb)   SpO2 100%   Breastfeeding No   BMI 34.95 kg/m²     "

## 2025-08-03 NOTE — ANESTHESIA PREPROCEDURE EVALUATION
[facility]  Anesthesia Pre-Operative Evaluation         Patient Name: Lisandra Toussaint  YOB: 1960  MRN: [id number]    SUBJECTIVE:     Pre-operative evaluation for Procedure(s) (LRB):  CYSTOSCOPY, WITH RETROGRADE PYELOGRAM AND URETERAL STENT INSERTION (Right)     08/03/2025    Lisandra Toussaint is a 64 y.o. female w/ a significant PMHx of HTN, recent tick bite, recent worsening dyspnea requiring cardiac work up, brain aneurysm s/p clipping and stents that require asa, and anxiety is presenting to the ED with 10 mm right obstructing ureteral stone with two days of chills and subjective fevers, with nausea starting today.     Patient now presents for the above procedure(s).      LDA:   Peripheral IV Single Lumen 08/02/25 1612 20 G Left Antecubital (Active)   Site Assessment Clean;Intact;Dry;No redness;No swelling 08/02/25 2200   Line Securement Device Secured with sutureless device 08/02/25 2200   Extremity Assessment Distal to IV No abnormal discoloration;No redness;No warmth;No swelling 08/02/25 2200   Line Status Infusing 08/02/25 2200   Dressing Status Clean;Dry;Intact 08/02/25 2200   Dressing Intervention Integrity maintained 08/02/25 2200   Number of days: 0       Prev airway:     Intubation     Date/Time: 6/20/2024 9:50 AM     Performed by: Rogers Atwood CRNA  Authorized by: Quinton Guerin MD    Intubation:     Induction:  Intravenous    Intubated:  Postinduction    Mask Ventilation:  Easy mask    Attempts:  1    Attempted By:  CRNA    Blade:  [place] 3    Laryngeal View Grade: Grade I - full view of cords      Difficult Airway Encountered?: No      Complications:  None    Airway Device:  Oral endotracheal tube    Airway Device Size:  7.0    Style/Cuff Inflation:  Cuffed (inflated to minimal occlusive pressure)    Tube secured:  18    Secured at:  The teeth    Placement Verified By:  Capnometry    Complicating Factors:  None    Findings Post-Intubation:  BS equal bilateral              Drips:   0.9% NaCl   Intravenous Continuous 50 mL/hr at 08/02/25 2008 New Bag at 08/02/25 2008       Problem List[1]    Review of patient's allergies indicates:   Allergen Reactions    Cinnamon Anaphylaxis    Iodine and iodide containing products Blisters     Can tolerate CT contrast with prednisone.    Macrodantin [nitrofurantoin macrocrystalline] Other (See Comments)     Knee and elbow swelling    Povidone-iodine Blisters    Sulfa (sulfonamide antibiotics) Hives       Current Inpatient Medications:   amLODIPine  10 mg Oral Daily    aspirin  81 mg Oral Daily    DULoxetine  60 mg Oral Daily    losartan  50 mg Oral Daily    predniSONE  50 mg Oral Once    tamsulosin  0.4 mg Oral Daily       Medications Ordered Prior to Encounter[2]    Past Surgical History:   Procedure Laterality Date    ABDOMINAL SURGERY      CARPAL TUNNEL RELEASE  1999 by Jorge Stevenson both hands    Had trouble during both pregnancies and didnt go away after last was also driving ALOT to take care of my mom with cancer    COLONOSCOPY, SCREENING, LOW RISK PATIENT N/A 05/06/2025    Procedure: COLONOSCOPY, SCREENING, LOW RISK PATIENT;  Surgeon: Roxana Anne MD;  Location: [facility] ([address] [place] [address]);  Service: Endoscopy;  Laterality: N/A;  Sandra Shetty, peg, portal,  no longer taking Plavix,ASam    jm/adjusted case lengths  4/28 precall complete, resent instructions w Miralax prep per pt request-MD    EYE SURGERY      FOOT SURGERY Bilateral     REPAIR OF ANEURYSM      tarsel tunnel         Social History:  Tobacco Use: Low Risk  (8/2/2025)    Patient History     Smoking Tobacco Use: Never     Smokeless Tobacco Use: Never     Passive Exposure: Past      Alcohol Use: Not At Risk (2/20/2025)    AUDIT-C     Frequency of Alcohol Consumption: Never     Average Number of Drinks: Patient does not drink     Frequency of Binge Drinking: Never        OBJECTIVE:     Vital Signs Range (Last 24H):  Temp:  [36.6 °C (97.8 °F)-37.2 °C (99 °F)]   Pulse:  []    Resp:  [17-20]   BP: (128-164)/(68-96)   SpO2:  [93 %-100 %]       Significant Labs:  Lab Results   Component Value Date    WBC 8.22 08/02/2025    HGB 13.6 08/02/2025    HCT 41.1 08/02/2025     08/02/2025    CHOL 243 (H) 04/05/2024    TRIG 97 04/05/2024    HDL 56 04/05/2024    ALT 21 08/02/2025    AST 35 08/02/2025     08/02/2025    K 4.9 08/02/2025     08/02/2025    CREATININE 1.1 08/02/2025    BUN 29 (H) 08/02/2025    CO2 29 08/02/2025    TSH 6.603 (H) 07/28/2025    INR 1.1 07/17/2025    HGBA1C 5.8 (H) 06/21/2024       Diagnostic Studies: No relevant studies.    EKG:   Results for orders placed or performed during the hospital encounter of 07/28/25   EKG 12-lead    Collection Time: 07/28/25  5:55 PM   Result Value Ref Range    QRS Duration 74 ms    [facility] QTC Calculation 485 ms    Narrative    Test Reason : R07.9,    Vent. Rate :  95 BPM     Atrial Rate :  95 BPM     P-R Int : 150 ms          QRS Dur :  74 ms      QT Int : 386 ms       P-R-T Axes :  73  24  58 degrees    QTcB Int : 485 ms    Normal sinus rhythm  Normal ECG  When compared with ECG of 17-Jul-2025 16:20,  No significant change was found  Confirmed by Alistair Calixto ([id number]) on 7/29/2025 8:09:12 AM    Referred By: AAAREFERRAL SELF           Confirmed By: Alistair Calixto       2D ECHO:  TTE:  No results found for this or any previous visit.    SUKHI:  No results found for this or any previous visit.    ASSESSMENT/PLAN:       Pre-op Assessment    I have reviewed the Patient Summary Reports.     I have reviewed the Nursing Notes. I have reviewed the NPO Status.   I have reviewed the Medications.     Review of Systems  Anesthesia Hx:  No problems with previous Anesthesia               Denies Personal Hx of Anesthesia complications.                    Cardiovascular:     Hypertension                                          Pulmonary:        Sleep Apnea                Renal/:  Chronic Renal Disease                Hepatic/GI:  Hepatic/GI  Normal                    Neurological:    Neuromuscular Disease,  Headaches                                 Psych:  Psychiatric History                  Physical Exam  General: Well nourished, Cooperative, Alert and Oriented    Airway:  Mallampati: III / II  Mouth Opening: Normal  Tongue: Normal  Neck ROM: Normal ROM    Dental:  Intact    Chest/Lungs:  Clear to auscultation    Heart:  Rate: Normal        Anesthesia Plan  Type of Anesthesia, risks & benefits discussed:    Anesthesia Type: Gen ETT, Gen Natural Airway, MAC  Intra-op Monitoring Plan: Standard ASA Monitors  Post Op Pain Control Plan: multimodal analgesia and IV/PO Opioids PRN  Induction:  IV  Airway Plan: Direct, Post-Induction  Informed Consent: Informed consent signed with the Patient and all parties understand the risks and agree with anesthesia plan.  All questions answered.   ASA Score: 3  Day of Surgery Review of History & Physical: H&P Update referred to the surgeon/provider.  Anesthesia Plan Notes: Previously nauseas. If nausea persist, will plan for general with ETT. No PRN for nausea overnight. Will discuss with patient and if no longer nauseated, will proceed with general natural airway.     Ready For Surgery From Anesthesia Perspective.     .           [1]   Patient Active Problem List  Diagnosis    Constipation    Severe obesity (BMI 35.0-39.9) with comorbidity    Depressive disorder    Decreased ROM of neck    Cervicogenic headache    Cerebral aneurysm, nonruptured    Tinnitus of both ears    Arteriovenous malformation of brain    Essential hypertension    Presence of arterial stent    Cervical myofascial pain syndrome    Sleep apnea    LENNIE (obstructive sleep apnea)    Adjustment disorder with anxiety    Muscle weakness of left arm    Reduced hand strength    Hydronephrosis of right kidney    Right ureteral stone   [2]   No current facility-administered medications on file prior to encounter.     Current Outpatient Medications on File  Prior to Encounter   Medication Sig Dispense Refill    amLODIPine (NORVASC) 10 MG tablet Take 1 tablet by mouth once daily 90 tablet 0    aspirin 81 MG Chew Take 1 tablet (81 mg total) by mouth once daily. 30 tablet 0    cartilage/collagen/bor/hyalur (JOINT HEALTH ORAL) Take 1 tablet by mouth once daily.      cranberry extract (CRANBERRY CONCENTRATE) 500 mg Cap Take 1 tablet by mouth once daily.      DAILY MULTI-VITAMIN ORAL Take by mouth.      diphenhydrAMINE (BENADRYL) 50 MG capsule Pt to take 50 mg benadryl at 1 hour before procedure      diphenhydrAMINE (BENADRYL) 50 MG capsule Take 50mg by mouth 1 hour before contrast administration. 1 capsule 0    DULoxetine (CYMBALTA) 60 MG capsule Take 1 capsule (60 mg total) by mouth once daily. 90 capsule 0    EPINEPHrine (EPIPEN) 0.3 mg/0.3 mL AtIn Inject one syringe at first symptoms, may repeat x 1 within 5-15 minutes 2 each 1    hydroCHLOROthiazide (MICROZIDE) 12.5 mg capsule Take 1 capsule by mouth once daily (Patient taking differently: Take by mouth daily as needed. Take 1 capsule by mouth once daily) 90 capsule 0    hydrOXYzine pamoate (VISTARIL) 50 MG Cap Take 1 capsule (50 mg total) by mouth 2 (two) times daily as needed (for anxiety). 30 capsule 1    losartan (COZAAR) 50 MG tablet Take 1 tablet (50 mg total) by mouth once daily.      predniSONE (DELTASONE) 50 MG Tab Pt to take 1 tab (50mg total) 13 hours, 7 hours, 1 hour before procedure in addition take over the counter benadryl 50 mg 1 hr prior to procedure 3 tablet 0    predniSONE (DELTASONE) 50 MG Tab Take 50mg by mouth at 13 hours, 7 hours, and 1 hour before contrast administration. 3 tablet 0    tiZANidine (ZANAFLEX) 4 MG tablet Take 1 tablet (4 mg total) by mouth 2 (two) times daily. 180 tablet 3    triamcinolone acetonide 0.1% (KENALOG) 0.1 % cream Apply to affected areas of body BID prn rash. Do not use on face, underarms, or groin. 80 g 1

## 2025-08-03 NOTE — CONSULTS
Reason for consult: Consulted by urology team for cardiac risk stratification prior to procedure deemed as urgent: cystoscopy with ureteral stent placement for nephrolithiasis (10 mm stone).     Cardiovascular Risk Assessment:  Active cardiac issues:  Active decompensated heart failure? No   Unstable angina?  No   Significant uncontrolled arrhythmias? No   Severe valvular heart disease-Aortic or Mitral Stenosis? No   Recent MI or coronary revascularization < 30 days? No     Other Issues: Dyspnea on exertion over the last month      Anticoagulation:  none      Coronary Stenting: none    Revised Cardiac Risk Index   High risk surgery: No  History of ischemic heart disease: No  History of congestive heart failure: No  History of stroke: No  Insulin dependent diabetes: No  Cr > 2: No  0 points, class I risk, 3.9% risk of cardiac event 2014 ACC/AHA Perioperative Guidelines  Known or risk factors for CAD: No  High risk of MACE: No  Functional capacity < 4 METs: No, proceed without further testing     RCRI Calculator Class and Risk percentage: Class I risk, 3.9% risk of cardiac event    Pt has no active cardiac condition (ACS/USA, decompenstated CHF, significant arrhythmias or severe valvular disease) and can easily achieve 4 METS.  As such, pt does not require further cardiac evaluation prior to undergoing surgery. Cardiac meds can be held as needed but should also be restarted after the procedure. These recommendations follow the most current Guideline on Perioperative Cardiovascular Evaluation and Management of Patients Undergoing Noncardiac Surgery released by the ACC/AHA (JACC 2014.07.944).    Patient is euvolemic at bedside. Vitals are stable. No active dyspnea or chest pain at rest. Bedside Echo performed showing normal EF, no significant valvular abnormalities, no evidence of elevated RVSP/PASP (no TR jet present).     Recommendations:  - Patient is low to intermediate risk for cardiac event jose-operatively, and  procedure is deemed low-risk.   - Patient should obtain PET Stress (currently scheduled for 8/11/25) outpatient for further evaluation of her EDMONDSON  - Rest of management per primary and anesthesia teams    Discussed with Dr. Nicholson.    Maicol Ivory MD  Cardiovascular Disease Fellow  [facility] [place]

## 2025-08-03 NOTE — PROGRESS NOTES
[facility]  Urology  Progress Note    Patient Name: Lisandra Toussaint  MRN: [id number]  Admission Date: 8/2/2025  Hospital Length of Stay: 0 days  Code Status: Full Code   Attending Provider: Shaji Patel MD   Primary Care Physician: Sandra Shetty NP    Subjective:     HPI:  64-year-old female with a past medical history of HTN, recent tick bite, recent worsening dyspnea requiring cardiac work up, brain aneurysm s/p clipping and stents that require asa, and anxiety is presenting to the ED with 10 mm right obstructing ureteral stone with two days of chills and subjective fevers, with nausea starting today.She denies vomiting, dysuria, frequency, urgency.    Patient reports that she had a renal ultrasound on 07/21/25 which demonstrated severe hydronephrosis and then a secondary outpatient CT urogram 07/30/25 done 3 days ago which showed a 10 mm right ureteral stone just proximal to the UVJ, and a second 7 mm stone in left lower pole of the kidney. She presented to the [place] ED yesterday for severe dyspnea work up was negative negative for DVT in his CTA chest which was negative for PE. This is her second time presenting to the ED with the same symptoms, she was previously discharged on 10 days of doxycycline to prevent lyme disease which she has finished today.     Upon examination, she is afebrile, tachycardic to 115, hypertensive to 142/80. WBC is 8.2, Cr is 1.1 (baseline of .9-1.2). UA is negative for leukocytes and nitrites, lactic acid 1.6.   Patient is allergic to iodine. She requires prednisone premedication when undergoing iodine tests.     Interval History: NAEON. Risk stratified by cardiology as low to intermediate risk overall, and low for procedure. Proceed to cysto for stent placement.    Review of Systems  Objective:     Temp:  [97.4 °F (36.3 °C)-99 °F (37.2 °C)] 97.4 °F (36.3 °C)  Pulse:  [] 87  Resp:  [17-20] 17  SpO2:  [93 %-100 %] 95 %  BP: (125-164)/(60-96) 125/60     Body mass index  is 34.95 kg/m².           Drains       None                    Physical Exam  Constitutional:       Appearance: Normal appearance.   Eyes:      Pupils: Pupils are equal, round, and reactive to light.   Cardiovascular:      Rate and Rhythm: Normal rate.      Pulses: Normal pulses.   Pulmonary:      Effort: Pulmonary effort is normal.      Breath sounds: Normal breath sounds.   Abdominal:      General: Abdomen is flat.      Palpations: Abdomen is soft.      Tenderness: There is no right CVA tenderness or left CVA tenderness.      Comments: Tenderness to RLQ   Musculoskeletal:         General: Normal range of motion.      Cervical back: Normal range of motion.   Skin:     General: Skin is warm.      Capillary Refill: Capillary refill takes less than 2 seconds.   Neurological:      Mental Status: She is alert and oriented to person, place, and time.   Psychiatric:         Behavior: Behavior normal.           Significant Labs:    BMP:  Recent Labs   Lab 07/28/25  1900 08/02/25  1611 08/03/25  0623    141 137   K 3.8 4.9 4.3    104 104   CO2 23 29 24   BUN 27* 29* 23   CREATININE 1.0 1.1 0.9   CALCIUM 9.8 9.8 9.1       CBC:   Recent Labs   Lab 07/28/25 1900 08/02/25  1611   WBC 9.21 8.22   HGB 14.2 13.6   HCT 43.1 41.1    288       All pertinent labs results from the past 24 hours have been reviewed.    Significant Imaging:  All pertinent imaging results/findings from the past 24 hours have been reviewed.                  Assessment/Plan:     * Right ureteral stone  Lisandra Toussaint is a 64 y.o. F with 10 mm obstructing right ureteral stone with moderate-severe right sided hydronephrosis, left renal stone.     - To cysto today for right stent placement    Dispo: Home afterwards barring any concerns during procedure          VTE Risk Mitigation (From admission, onward)      None            Velvet Foy MD  Urology  [facility] - Surgery

## 2025-08-03 NOTE — DISCHARGE SUMMARY
[facility]  Urology  Discharge Summary      Patient Name: Lisandra Toussaint  MRN: [id number]  Admission Date: 8/2/2025  Hospital Length of Stay: 0 days  Discharge Date and Time: 08/03/2025 9:10 AM  Attending Physician: Shaji Patel MD   Discharging Provider: Jonathan Aguero MD  Primary Care Physician: Sandra Shetty NP    HPI:   64-year-old female with a past medical history of HTN, recent tick bite, recent worsening dyspnea requiring cardiac work up, brain aneurysm s/p clipping and stents that require asa, and anxiety is presenting to the ED with 10 mm right obstructing ureteral stone with two days of chills and subjective fevers, with nausea starting today.She denies vomiting, dysuria, frequency, urgency.    Patient reports that she had a renal ultrasound on 07/21/25 which demonstrated severe hydronephrosis and then a secondary outpatient CT urogram 07/30/25 done 3 days ago which showed a 10 mm right ureteral stone just proximal to the UVJ, and a second 7 mm stone in left lower pole of the kidney. She presented to the [facility] ED yesterday for severe dyspnea work up was negative negative for DVT in his CTA chest which was negative for PE. This is her second time presenting to the ED with the same symptoms, she was previously discharged on 10 days of doxycycline to prevent lyme disease which she has finished today.     Upon examination, she is afebrile, tachycardic to 115, hypertensive to 142/80. WBC is 8.2, Cr is 1.1 (baseline of .9-1.2). UA is negative for leukocytes and nitrites, lactic acid 1.6.   Patient is allergic to iodine. She requires prednisone premedication when undergoing iodine tests.     Procedure(s) (LRB):  CYSTOSCOPY, WITH RETROGRADE PYELOGRAM AND URETERAL STENT INSERTION (Right)     Indwelling Lines/Drains at time of discharge:   Lines/Drains/Airways       None                   Hospital Course (synopsis of major diagnoses, care, treatment, and services provided during the course of the  hospital stay): Urology was consulted on this patient from the emergency department. Workup revealed a Right distal ureteral stone with hydronephrosis. After discussing alternatives, risks and benefits with the patient, they elected to undergo cystoscopy for ureteral stent placement. Ureteroscopy was not attempted . The patient tolerated the above procedure well, please see the op note for further details. A ureteral stent was placed without strings. The patient's pain was well controlled after the procedure and she was able to freely void. The patient was deemed suitable for discharge on 08/03/2025.      Goals of Care Treatment Preferences:  Code Status: Full Code      Consults:   Consults (From admission, onward)          Status Ordering Provider     Inpatient consult to Cardiology  Once        Provider:  (Not yet assigned)    Completed HAKAN ALCANTARA     Inpatient consult to Urology  Once        Provider:  (Not yet assigned)    Completed ALANIS ROMERO            Significant Diagnostic Studies: As per notes    Pending Diagnostic Studies:       Procedure Component Value Units Date/Time    GREY TOP URINE HOLD [[id number]] Collected: 08/02/25 1625    Order Status: Sent Lab Status: In process Updated: 08/02/25 1630    Specimen: Urine     Urinalysis, Reflex to Urine Culture Urine, Clean Catch [[id number]] Collected: 08/03/25 0902    Order Status: Sent Lab Status: No result     Specimen: Urine, Catheterized             Final Active Diagnoses:    Diagnosis Date Noted POA    PRINCIPAL PROBLEM:  Right ureteral stone [N20.1] 08/02/2025 Yes      Problems Resolved During this Admission:         Discharged Condition: good    Disposition: Home or Self Care    Follow Up:   Follow-up Information       Carlton Bender MD Follow up in 2 week(s).    Specialty: Urology  Why: Post-Op Follow-Up  Contact information:  [address]  [place] [address] [phone]                           Patient Instructions:       Notify your health care provider if you experience any of the following:  temperature >100.4     Notify your health care provider if you experience any of the following:  persistent nausea and vomiting or diarrhea     Notify your health care provider if you experience any of the following:  severe uncontrolled pain     Notify your health care provider if you experience any of the following:  redness, tenderness, or signs of infection (pain, swelling, redness, odor or green/yellow discharge around incision site)     Notify your health care provider if you experience any of the following:  worsening rash     Notify your health care provider if you experience any of the following:  persistent dizziness, light-headedness, or visual disturbances     Medications:  Reconciled Home Medications:      Medication List        START taking these medications      amoxicillin-clavulanate 875-125mg 875-125 mg per tablet  Commonly known as: AUGMENTIN  Take 1 tablet by mouth 2 (two) times daily. for 14 days     oxybutynin 5 MG Tab  Commonly known as: DITROPAN  Take 1 tablet (5 mg total) by mouth 3 (three) times daily as needed.     phenazopyridine 200 MG tablet  Commonly known as: PYRIDIUM  Take 1 tablet (200 mg total) by mouth 3 (three) times daily as needed for Pain.     tamsulosin 0.4 mg Cap  Commonly known as: FLOMAX  Take 1 capsule (0.4 mg total) by mouth every evening. for 14 days            CHANGE how you take these medications      hydroCHLOROthiazide 12.5 mg capsule  Commonly known as: MICROZIDE  Take 1 capsule by mouth once daily  What changed:   how to take this  when to take this  reasons to take this            CONTINUE taking these medications      amLODIPine 10 MG tablet  Commonly known as: NORVASC  Take 1 tablet by mouth once daily     aspirin 81 MG Chew  Take 1 tablet (81 mg total) by mouth once daily.     CRANBERRY CONCENTRATE 500 mg Cap  Generic drug: cranberry extract  Take 1 tablet by mouth once daily.      DAILY MULTI-VITAMIN ORAL  Take by mouth.     * diphenhydrAMINE 50 MG capsule  Commonly known as: BENADRYL  Pt to take 50 mg benadryl at 1 hour before procedure     * diphenhydrAMINE 50 MG capsule  Commonly known as: BENADRYL  Take 50mg by mouth 1 hour before contrast administration.     DULoxetine 60 MG capsule  Commonly known as: CYMBALTA  Take 1 capsule (60 mg total) by mouth once daily.     EPINEPHrine 0.3 mg/0.3 mL Atin  Commonly known as: EPIPEN  Inject one syringe at first symptoms, may repeat x 1 within 5-15 minutes     hydrOXYzine pamoate 50 MG Cap  Commonly known as: VISTARIL  Take 1 capsule (50 mg total) by mouth 2 (two) times daily as needed (for anxiety).     JOINT HEALTH ORAL  Take 1 tablet by mouth once daily.     losartan 50 MG tablet  Commonly known as: COZAAR  Take 1 tablet (50 mg total) by mouth once daily.     * predniSONE 50 MG Tab  Commonly known as: DELTASONE  Pt to take 1 tab (50mg total) 13 hours, 7 hours, 1 hour before procedure in addition take over the counter benadryl 50 mg 1 hr prior to procedure     * predniSONE 50 MG Tab  Commonly known as: DELTASONE  Take 50mg by mouth at 13 hours, 7 hours, and 1 hour before contrast administration.     tiZANidine 4 MG tablet  Commonly known as: ZANAFLEX  Take 1 tablet (4 mg total) by mouth 2 (two) times daily.     triamcinolone acetonide 0.1% 0.1 % cream  Commonly known as: KENALOG  Apply to affected areas of body BID prn rash. Do not use on face, underarms, or groin.           * This list has 4 medication(s) that are the same as other medications prescribed for you. Read the directions carefully, and ask your doctor or other care provider to review them with you.                  Time spent on the discharge of patient: 10 minutes    Jonathan Aguero MD  Urology  [facility] - Surgery

## 2025-08-03 NOTE — OP NOTE
[facility] - Surgery  Urology Department  Operative Note    Date: 08/03/2025    Pre-Op Diagnosis: Right Distal ureteral stone    Post-Op Diagnosis: same    Procedure(s) Performed:    1. Cystoscopy with right ureteral JJ stent placement  2. right retrograde pyelogram    Specimen(s):   Urine for culture    Staff Surgeon: Shaji Patel MD    Assistant Surgeon: Jonathan Aguero MD (); Velvet Foy MD    Circulator: Jane Gonsales RN     Anesthesia: Monitored Local Anesthesia with Sedation    Indications: Lisandra Toussaint is a 64 y.o. female with right distal ureteral stone with large size.    Findings:    Large distal right ureteral stone, initially difficult to pass 5 Fr ureteral catheter beyond  Right 6 Fr x 26 cm stent placed in standard fashion    Estimated Blood Loss: min    Drains:  6 Polish x 26 cm right JJ ureteral stent without strings    Procedure in Detail:  After risks, benefits and possible complications of the procedure were explained, the patient elected to undergo the procedure and informed consent was obtained. All questions were answered in the jose-operative area. The patient was transferred to the cystoscopy suite and placed on the fluoroscopy table in the supine position.  SCDs were applied and working. Time out was performed, jose-procedural antibiotics were given. Anesthesia was administered.  After adequate anesthesia the patient was placed in dorsal lithotomy position and prepped and draped in the usual sterile fashion.     A rigid cystoscope in a 22 Fr sheath was introduced into the patients bladder per urethra. This passed easily.  The entire urethra was visualized and revealed no strictures or masses.  Cystoscopy was performed which showed the right and left ureteral orifices in the normal anatomic position.  There were no bladder tumors, no trabeculations, and no bladder stones.      The right UO was identified and a 5 Fr  open-ended ureteral catheter was advanced over a  motion wire and placement was confirmed using fluoroscopy. A retrograde pyelogram (RPG) was then performed which showed hydronephrosis and a tortuous proximal right ureter. The wire was then inserted via the ureteral catheter to the level of the renal pelvis. This was confirmed with fluoroscopy.    We initially passed a 6 Fr x 24 cm stent which was too small. This was grasped with alligator stent graspers and brought distal to the meatus. A motion wire was replaced under fluoro. We then passed a 6 Fr x 26 cm JJ ureteral stent without strings over the wire to the level of the renal pelvis under direct vision as well as flouroscopy. The guide wire was removed.  A 180 degree coil was observed in the renal pelvis as well as the bladder using fluoroscopy.  A 180 degree coil was also seen using direct visualization in the bladder.     There was return of cloudy urine upon ureteral stent placement. Urine was collected via the side port of the rigid cystoscope and passed off the sterile field for urine culture.    The bladder was drained and the cystoscope removed from the patient. The patient tolerated the procedure well and was transferred to the recovery room in stable condition.      Disposition: The patient will be discharged after monitoring her vitals.      Jonathan Aguero MD

## 2025-08-03 NOTE — ANESTHESIA POSTPROCEDURE EVALUATION
Anesthesia Post Evaluation    Patient: Lisandra Toussaint    Procedure(s) Performed: Procedure(s) (LRB):  CYSTOSCOPY, WITH RETROGRADE PYELOGRAM AND URETERAL STENT INSERTION (Right)    Final Anesthesia Type: general      Patient location during evaluation: PACU  Patient participation: Yes- Able to Participate  Level of consciousness: oriented and awake and alert  Post-procedure vital signs: reviewed and stable  Pain management: adequate  Airway patency: patent    PONV status at discharge: No PONV  Anesthetic complications: no      Cardiovascular status: blood pressure returned to baseline  Respiratory status: unassisted, spontaneous ventilation and room air  Hydration status: euvolemic  Follow-up not needed.              Vitals Value Taken Time   /58 08/03/25 09:47   Temp 36.1 °C (97 °F) 08/03/25 09:22   Pulse 77 08/03/25 09:49   Resp 13 08/03/25 09:49   SpO2 93 % 08/03/25 09:49   Vitals shown include unfiled device data.      Event Time   Out of Recovery 08/03/2025 09:30:00         Pain/Gonzalez Score: Pain Rating Prior to Med Admin: 5 (8/2/2025  4:50 PM)  Gonzalez Score: 9 (8/3/2025  9:30 AM)

## 2025-08-03 NOTE — PLAN OF CARE
Problem: Adult Inpatient Plan of Care  Goal: Plan of Care Review  Outcome: Progressing  Goal: Patient-Specific Goal (Individualized)  Outcome: Progressing  Goal: Absence of Hospital-Acquired Illness or Injury  Outcome: Progressing  Intervention: Identify and Manage Fall Risk  Flowsheets (Taken 8/3/2025 0548)  Safety Promotion/Fall Prevention:   assistive device/personal item within reach   side rails raised x 2   nonskid shoes/socks when out of bed   instructed to call staff for mobility   patient expresses understanding of fall risk and prevention  Intervention: Prevent Skin Injury  Flowsheets (Taken 8/3/2025 0548)  Body Position: position changed independently  Skin Protection: incontinence pads utilized  Device Skin Pressure Protection: adhesive use limited  Goal: Optimal Comfort and Wellbeing  Outcome: Progressing

## 2025-08-03 NOTE — NURSING
Patient arrived to room 505 via wheelchair. AAOx4, calm. Transferred to bed without incident. VS stable. Bed at lowest position, side rails up and call light within reach.

## 2025-08-03 NOTE — ASSESSMENT & PLAN NOTE
Lisandra Toussaint is a 64 y.o. F with 10 mm obstructing right ureteral stone with moderate-severe right sided hydronephrosis, left renal stone.     - To cysto today for right stent placement    Dispo: Home afterwards barring any concerns during procedure

## 2025-08-03 NOTE — PROGRESS NOTES
The sw spoke to the pt and her x-spouse will transport her home at d/c. The sw stressed the importance of the pt going to her hsp f/u's and taking her medications. The pt acknowledged understanding and states she will comply. The pt has no further questions or Case Management needs. The pt's clear to d/c once her transport arrives.     Future Appointments   Date Time Provider Department Center   8/11/2025 10:00 AM CARDIAC, PET IMAGING [facility] CARDPET [facility]   8/22/2025  1:00 PM Sabrina Brian PA-C [facility] PSYCH [facility]   8/27/2025  9:30 AM CV [facility] ECHO [facility] CARDIA [place]   9/17/2025  1:00 PM SIX, MINUTE WALK [facility] PUL WLK [facility]   9/17/2025  1[facility]   9/17/2025  1:45 PM [facility]ULMONARY FUNCTION [facility] PULMLAB [facility]   9/17/2025  2:00 PM PULMONARY FUNCTION [facility] PULMLAB [facility]   9/17/2025  2:30 PM Ziggy Ruano MD [facility] PULMSVC [facility]   9/22/2025  3:30 PM [facility] OIC-MRI1 [facility]   9/23/2025 10:30 AM Vlad Green MD [facility] NEUROS8 [facility] - Surgery  Discharge Final Note    Primary Care Provider: Sandra Shetty NP    Expected Discharge Date: 8/3/2025    Final Discharge Note (most recent)       Final Note - 08/03/25 1049          Final Note    Assessment Type Discharge Planning Assessment                     Important Message from Medicare             Contact Info       Carlton Bender MD   Specialty: Urology    [address]  [address]  [place] [address]   Phone: [phone]       Next Steps: Follow up in 2 week(s)    Instructions: Post-Op Follow-Up  The sw sent an IB to the provider to listed above. The pt can call the number listed above to check on the status of the appt.    Isacc Mancilla Jr., MD   Specialty: Interventional Cardiology, Cardiology    [address]  [address]  [place] [address]   Phone: [phone]       Next Steps: Follow up on 8/11/2025    Instructions: 10am    Sabrina Brian PA-C    Specialty: Psychiatry    [address] [facility] [address]   Phone: [phone]       Next Steps: Follow up on 8/22/2025    Instructions: 1pm    Isacc Mancilla Jr., MD   Specialty: Interventional Cardiology, Cardiology    [address] [facility]  [address] [place] [address]   Phone: [phone]       Next Steps: Follow up on 8/27/2025    Instructions: 9:30am

## 2025-08-03 NOTE — NURSING
Pt discharged to home via wheelchair. Pt received bedside medications with instructios and copy of discharged papers instructions. Pt denies pain at this time. Pt educated on signs and symptoms of when to call the doctor. All belongings with the patient . Pt verbalized understanding.

## 2025-08-03 NOTE — ANESTHESIA PROCEDURE NOTES
Intubation    Date/Time: 8/3/2025 8:29 AM    Performed by: Carla Trent CRNA  Authorized by: Gareth Pierce DO    Intubation:     Induction:  Intravenous    Intubated:  Postinduction    Mask Ventilation:  Easy mask    Attempts:  1    Attempted By:  CRNA    Method of Intubation:  Video laryngoscopy    Blade:  Mast 3    Laryngeal View Grade: Grade I - full view of cords      Difficult Airway Encountered?: No      Complications:  None    Airway Device:  Oral endotracheal tube    Airway Device Size:  7.0    Style/Cuff Inflation:  Cuffed (inflated to minimal occlusive pressure)    Tube secured:  21    Secured at:  The lips    Placement Verified By:  Capnometry and Revisualization with laryngoscopy    Complicating Factors:  None    Findings Post-Intubation:  BS equal bilateral and atraumatic/condition of teeth unchanged

## 2025-08-03 NOTE — PROGRESS NOTES
Patient admitted to recovery see [facility] for complete assessment pacu bcg's maintained safety measures verified patient instructed on pain scale and patient verbalized understanding.no complaints no distress noted.

## 2025-08-04 ENCOUNTER — PATIENT OUTREACH (OUTPATIENT)
Dept: ADMINISTRATIVE | Facility: CLINIC | Age: 65
End: 2025-08-04
Payer: COMMERCIAL

## 2025-08-04 ENCOUNTER — PATIENT MESSAGE (OUTPATIENT)
Dept: CARDIOLOGY | Facility: CLINIC | Age: 65
End: 2025-08-04
Payer: COMMERCIAL

## 2025-08-04 LAB
HOLD SPECIMEN: NORMAL
OHS QRS DURATION: 84 MS
OHS QTC CALCULATION: 425 MS

## 2025-08-04 NOTE — PROGRESS NOTES
C3 nurse spoke with Lisandra Toussaint for a TCC post hospital discharge follow up call. The patient does not have a scheduled HOSFU appointment with Sandra Shetty NP within 5-7 days post hospital discharge date 08/03/2025. C3 nurse was unable to schedule HOSFU appointment in [facility] as the patient declines a PCP HOSFU appointment.

## 2025-08-05 ENCOUNTER — PATIENT MESSAGE (OUTPATIENT)
Dept: PRIMARY CARE CLINIC | Facility: CLINIC | Age: 65
End: 2025-08-05
Payer: COMMERCIAL

## 2025-08-05 DIAGNOSIS — N13.30 HYDRONEPHROSIS OF RIGHT KIDNEY: Primary | ICD-10-CM

## 2025-08-05 NOTE — TELEPHONE ENCOUNTER
LOV with Sandra Shetty NP , 6/28/2024 (Hosp f/u)  Labs collected 7/28/25 that were ordered by Sandra Shetty NP.  8/2/25 results f/u - feedback provided. WNL     Pt reporting fall 8/2/25. Attachment includes photo of large purple bruise Left hip, purple bruising to medial aspect left ankle.

## 2025-08-07 ENCOUNTER — OFFICE VISIT (OUTPATIENT)
Dept: UROLOGY | Facility: CLINIC | Age: 65
End: 2025-08-07
Payer: COMMERCIAL

## 2025-08-07 VITALS
WEIGHT: 215.75 LBS | HEIGHT: 65 IN | HEART RATE: 121 BPM | DIASTOLIC BLOOD PRESSURE: 44 MMHG | SYSTOLIC BLOOD PRESSURE: 72 MMHG | BODY MASS INDEX: 35.94 KG/M2

## 2025-08-07 DIAGNOSIS — N20.1 RIGHT URETERAL STONE: ICD-10-CM

## 2025-08-07 DIAGNOSIS — N13.30 HYDRONEPHROSIS OF RIGHT KIDNEY: Primary | ICD-10-CM

## 2025-08-07 LAB
BACTERIA #/AREA URNS AUTO: ABNORMAL /HPF
BILIRUB UR QL STRIP.AUTO: ABNORMAL
CLARITY UR: ABNORMAL
COLOR UR AUTO: ABNORMAL
GLUCOSE UR QL STRIP: NEGATIVE
HGB UR QL STRIP: ABNORMAL
HYALINE CASTS UR QL AUTO: 0 /LPF (ref 0–1)
KETONES UR QL STRIP: NEGATIVE
LEUKOCYTE ESTERASE UR QL STRIP: ABNORMAL
MICROSCOPIC COMMENT: ABNORMAL
NITRITE UR QL STRIP: POSITIVE
PH UR STRIP: 7 [PH]
PROT UR QL STRIP: ABNORMAL
RBC #/AREA URNS AUTO: >100 /HPF (ref 0–4)
SP GR UR STRIP: 1.02
SQUAMOUS #/AREA URNS AUTO: 4 /HPF
UROBILINOGEN UR STRIP-ACNC: ABNORMAL EU/DL
WBC #/AREA URNS AUTO: 10 /HPF (ref 0–5)
YEAST UR QL AUTO: ABNORMAL /HPF

## 2025-08-07 PROCEDURE — 1159F MED LIST DOCD IN RCRD: CPT | Mod: CPTII,S$GLB,, | Performed by: UROLOGY

## 2025-08-07 PROCEDURE — 81001 URINALYSIS AUTO W/SCOPE: CPT | Performed by: UROLOGY

## 2025-08-07 PROCEDURE — 3078F DIAST BP <80 MM HG: CPT | Mod: CPTII,S$GLB,, | Performed by: UROLOGY

## 2025-08-07 PROCEDURE — 3074F SYST BP LT 130 MM HG: CPT | Mod: CPTII,S$GLB,, | Performed by: UROLOGY

## 2025-08-07 PROCEDURE — 3008F BODY MASS INDEX DOCD: CPT | Mod: CPTII,S$GLB,, | Performed by: UROLOGY

## 2025-08-07 PROCEDURE — G2211 COMPLEX E/M VISIT ADD ON: HCPCS | Mod: S$GLB,,, | Performed by: UROLOGY

## 2025-08-07 PROCEDURE — 1160F RVW MEDS BY RX/DR IN RCRD: CPT | Mod: CPTII,S$GLB,, | Performed by: UROLOGY

## 2025-08-07 PROCEDURE — 4010F ACE/ARB THERAPY RXD/TAKEN: CPT | Mod: CPTII,S$GLB,, | Performed by: UROLOGY

## 2025-08-07 PROCEDURE — 99214 OFFICE O/P EST MOD 30 MIN: CPT | Mod: S$GLB,,, | Performed by: UROLOGY

## 2025-08-07 PROCEDURE — 99999 PR PBB SHADOW E&M-EST. PATIENT-LVL V: CPT | Mod: PBBFAC,,, | Performed by: UROLOGY

## 2025-08-07 PROCEDURE — 87086 URINE CULTURE/COLONY COUNT: CPT | Performed by: UROLOGY

## 2025-08-07 RX ORDER — CEFAZOLIN SODIUM 2 G/50ML
2 SOLUTION INTRAVENOUS
OUTPATIENT
Start: 2025-08-07

## 2025-08-07 NOTE — PROGRESS NOTES
[facility] - Urology   Clinic Note    SUBJECTIVE:     Chief Complaint   Patient presents with    Consult    Nephrolithiasis       Referral from: No ref. provider found.    History of Present Illness:  Lisandra Toussaint is a 64 y.o. female who presents to clinic for nephrolithiasis.    She underwent right ureteral stent placement 8/3/2025 for a large right distal ureteral stone with severe hydronephrosis and tachycardia. UA at that time non-concerning for infection. She did not have an NANCY. She is currently taking Augmentin. She also has a 7 mm left lower pole stone.     Patient endorses no additional complaints at this time.    Past Medical History:   Diagnosis Date    Acne In my teens, menopause    Allergy     Aneurysm     Anxiety     Food allergy     Cinnamon    Hearing loss     Worked around computer mainframes and line printers    HTN (hypertension)     Joint pain     Rigth knee    Mental disorder     Anxiety 2023 depression 2000    Obesity 1990s    Emotional eater    Osteoarthritis     Rigth knee    Seasonal allergies 1995    Sleep apnea     TMJ (dislocation of temporomandibular joint)     Urinary tract infection        Past Surgical History:   Procedure Laterality Date    ABDOMINAL SURGERY      CARPAL TUNNEL RELEASE  1999 by Jorge Stevenson both hands    Had trouble during both pregnancies and didnt go away after last was also driving ALOT to take care of my mom with cancer    COLONOSCOPY, SCREENING, LOW RISK PATIENT N/A 05/06/2025    Procedure: COLONOSCOPY, SCREENING, LOW RISK PATIENT;  Surgeon: Roxana Anne MD;  Location: [facility] ([address] [place] [address]);  Service: Endoscopy;  Laterality: N/A;  Sandra Shetty, peg, portal,  no longer taking Plavix,ASam    jm/adjusted case lengths  4/28 precall complete, resent instructions w Miralax prep per pt request-MD    CYSTOSCOPY WITH URETEROSCOPY, RETROGRADE PYELOGRAPHY, AND INSERTION OF STENT Right 8/3/2025    Procedure: CYSTOSCOPY, WITH RETROGRADE PYELOGRAM AND URETERAL STENT  "INSERTION;  Surgeon: Shaji Patel MD;  Location: [facility] OR [address] [place] [address];  Service: Urology;  Laterality: Right;    EYE SURGERY      FOOT SURGERY Bilateral     REPAIR OF ANEURYSM      tarsel tunnel         Family History   Problem Relation Name Age of Onset    Cancer Mother Giancarlo         Squamous Cell Skin Carcinoma on ear, aggressive went to lymph nodes facial structure 18mo later    Hypertension Mother Giancarlo     Learning disabilities Son Rambo         Dyslexia    Learning disabilities Son Juma         Dyslexia    Heart disease Maternal Grandmother Radha         Dementia and heart attack early 60s    Aortic stenosis Maternal Grandmother Radha         Back    Cancer Maternal Grandfather Servin         Lung cancer    Hypertension Maternal Grandfather Servin     Stroke Maternal Grandfather Servin         Stroke at 80       Social History[1]    Medications Ordered Prior to Encounter[2]    Review of patient's allergies indicates:   Allergen Reactions    Cinnamon Anaphylaxis    Iodine and iodide containing products Blisters     Can tolerate CT contrast with prednisone.    Macrodantin [nitrofurantoin macrocrystalline] Other (See Comments)     Knee and elbow swelling    Povidone-iodine Blisters    Sulfa (sulfonamide antibiotics) Hives       Review of Systems:  A review of 10+ systems was conducted with pertinent positive and negative findings documented in HPI with all other systems reviewed and negative.    OBJECTIVE:     Estimated body mass index is 35.9 kg/m² as calculated from the following:    Height as of this encounter: 5' 5" (1.651 m).    Weight as of this encounter: 97.8 kg (215 lb 11.5 oz).    Vital Signs (Most Recent)  Vitals:    08/07/25 1105   BP: (!) 72/44   Pulse: (!) 121       Physical Exam:  GENERAL: patient sitting comfortably  HEENT: normocephalic  NECK: supple, no JVD  PULM: normal chest rise, no increased WOB  HEART: non-diaphoretic  ABDO: soft, nondistended, nontender  BACK: no CVA tenderness " bilaterally  SKIN: warm, dry, well perfused  EXT: no bruising or edema  NEURO: grossly normal with no focal deficits  PSYCH: appropriate mood and affect    Genitourinary Exam:  deferred    Lab Results   Component Value Date    BUN 23 08/03/2025    CREATININE 0.9 08/03/2025    WBC 6.86 08/03/2025    HGB 13.1 08/03/2025    HCT 39.4 08/03/2025     08/03/2025    AST 28 08/03/2025    ALT 17 08/03/2025    ALKPHOS 94 08/03/2025    ALBUMIN 3.9 08/03/2025    HGBA1C 5.8 (H) 06/21/2024    INR 1.1 07/17/2025        Imaging:  I have personally reviewed all relevant imaging studies.    Results for orders placed or performed during the hospital encounter of 07/30/25 (from the past 2160 hours)   CT Urogram Abd Pelvis W WO    Narrative    EXAMINATION:  CT UROGRAM ABD PELVIS W WO    CLINICAL HISTORY:  Hydronephrosis;Unspecified hydronephrosis    TECHNIQUE:  Low dose axial, sagittal and coronal reformations were obtained from the lung bases to the pubic symphysis before and following the IV administration of 150 mL of Omnipaque 350.  Timing was optimized for parenchymal and excretory renal phases.    COMPARISON:  Ultrasound July 2025    FINDINGS:  There are coronary artery calcifications.  The lung bases demonstrate no significant abnormality.    The noncontrast images demonstrate a punctate right kidney calculus.   there is severe hydronephrosis and hydroureter to the distal aspect of the right ureter where there is a 10 mm calculus just proximal to the ureterovesical junction.    On the left, there is a 7 mm calculus.  There is no hydronephrosis and the ureter is normal in course and caliber.    The kidneys concentrate and excrete contrast satisfactorily.  While the right kidney does excrete contrast, it does not extend into the ureter.  Left kidney demonstrates no significant abnormality of the collecting system or ureter noting only partial opacification of the ureter.  Bladder is only partially distended, grossly  unremarkable.    The liver demonstrates no focal abnormality.  The spleen is not enlarged.    There is a small hiatal hernia.  Pancreas demonstrates no significant abnormality.    The gallbladder demonstrates no gallstone.  There is no biliary ductal dilatation.    There is no adrenal mass.    There is tapering of the abdominal aorta without aneurysm or significant periaortic lymphadenopathy.  No significant pelvic lymphadenopathy noted.    Uterus unremarkable.  Adnexal regions demonstrate no significant abnormality.    Colonic diverticulosis is noted.  Stool present throughout the colon.  There is no bowel distension or inflammatory change around the bowel.    The osseous structures show degenerative change.      Impression    Severe right hydronephrosis and hydroureter to the distal ureter where there is a 10 mm calculus just proximal to the ureterovesical junction.    Additional bilateral renal calculi.    This report was flagged in Epic as abnormal.      Electronically signed by: Deja Culp MD  Date:    08/01/2025  Time:    16:12   Results for orders placed or performed during the hospital encounter of 07/28/25 (from the past 2160 hours)   CT Head Without Contrast    Narrative    EXAMINATION:  CT HEAD WITHOUT CONTRAST    CLINICAL HISTORY:  Mental status change, unknown cause;    Additional history, from the current ED provider note:    Frontal and occipital headache with associated photophobia.  Anxiety.  Pain and muscle spasming in the thigh.  Recent tick bite and rash.  Several weeks of dyspnea on exertion.  History of intracranial aneurysm status post coiling.    TECHNIQUE:  Low dose axial images were obtained through the head.  Coronal and sagittal reformations were also performed. Contrast was not administered.    COMPARISON:  MRI brain and MRA brain.  11/06/2024    CTA head and neck (including noncontrast head CT images), 12/28/2023    FINDINGS:  Artifacts related to beam hardening and/or motion  degrade portions of the scan, including images through the region of the prior stent-assisted coil embolization of an aneurysm in the region of the anterior communicating artery.    Allowing for the artifacts, there is no scan evidence of acute intracranial hemorrhage, intracranial mass effect, large vascular territory brain infarct, discrete pathologic extra-axial fluid collection, midline shift, brain herniation, pneumocephalus, or acute hydrocephalus.    Tiny parenchymal hypodensity along the superior aspect of the right caudate head likely corresponds to an area of focal signal abnormality on the prior scan, compatible with an old lacunar infarct or prominent perivascular space.    A similar hypodensity in the anterior superior aspect of the left thalamus is possibly new since the comparison images, but is otherwise of uncertain precise age no (series 2, image 29).    There is some pre-existing cerebral and cerebellar parenchymal volume loss, with a proportionate degree of pre-existing presumed ex vacuo prominence of the ventricles, cisterns, sulci, and fissures.  These findings are similar to the comparison images.    There are multiple small faint areas of decreased attenuation within the cerebral white matter, corresponding to some of the T2/FLAIR signal abnormalities demonstrated on the most recent MRI.  These are nonspecific, with chronic small vessel ischemic changes a leading consideration.    No depressed calvarial fracture.    Some mild parietal bone thinning is suggested bilaterally, possibly representing biparietal osteodystrophy (as may be seen in association with age and/or osteoporosis).    Mastoid air cells, pneumatized portions of the petrous and squamous temporal bones, and visualized portions of the paranasal sinuses are well aerated.  Orbits and visualized extracranial soft tissues demonstrate no acute CT abnormalities.     imaging: No additional contributory findings.      Impression     No acute intracranial hemorrhage or large vascular territory brain infarct.    Left anterior thalamus lacunar infarct, new since 11/06/2024 but otherwise of uncertain precise age.  Correlate clinically.  If there is strong clinical suspicion for acute infarct, consider MRI.    This report was flagged in Epic as abnormal.    Jordan Hedrick MD, first observed the critical findings on 07/28/2025 at 23:30, and sent the results to Carolyn Newberry MD, and Stas Banks MD, via [employer] Secure Chat message, on 07/28/2025 at 02:58. Patient name and medical record number were specified/linked in the message.      Electronically signed by: Jordan Hedrick  Date:    07/28/2025  Time:    23:40   Results for orders placed or performed during the hospital encounter of 07/17/25 (from the past 2160 hours)   CTA Chest Non-Coronary (PE Studies)    Narrative    EXAMINATION:  CTA CHEST NON CORONARY (PE STUDIES)    CLINICAL HISTORY:  Pulmonary embolism (PE) suspected, high prob;    TECHNIQUE:  Following the intravenous administration of 75 cc of Omnipaque 350 contrast material, 1.25 mm contiguous axial images were acquired through the chest utilizing the CT pulmonary angiogram protocol.  2-D coronal and sagittal reconstructed images of the chest and sagittal oblique MIP images of the pulmonary arterial system were generated from the source data.    COMPARISON:  None.    FINDINGS:  Pulmonary arterial system: This is a good quality examination for the evaluation of pulmonary thromboembolism with good opacification of the pulmonary arteries to the level of the segmental branches of the pulmonary arterial system. There is no evidence of acute pulmonary thromboembolism. No large saddle pulmonary embolism, enlargement of the main pulmonary artery, or secondary findings of right ventricular strain.    Aorta and heart: The heart is normal in size.  No pericardial fluid.  No thoracic aortic aneurysm.  No thoracic aortic  "dissection.    Airways: Unremarkable.    Lymph nodes: No pathologically enlarged lymph nodes in the chest or axilla.    Lungs: The lungs are clear with no evidence of airspace consolidation, mass, or bronchiectasis.  No emphysematous lung architecture.    Pleura: No significant volume of pleural fluid or pneumothorax.  No pleural based masses.    Visualized upper abdominal soft tissues: Marked hydronephrosis versus multiple parapelvic cysts in the right kidney incompletely imaged..    Bones: There is degenerative change of the thoracic spine.      Impression    1. No evidence of acute pulmonary thromboembolism.  2. Multiple parapelvic cysts versus hydronephrosis of the right kidney.  Consider renal imaging and renal function evaluation as clinically warranted.      Electronically signed by: Jan Watters  Date:    07/17/2025  Time:    17:35     Results for orders placed or performed during the hospital encounter of 07/28/25 (from the past 2160 hours)   MRI Brain Without Contrast    Impression    No acute intracranial process.  No evidence of acute thalamic infarct.      Electronically signed by: Reymundo Torres  Date:    07/29/2025  Time:    07:15     SURG FL Surgery Fluoro Usage  See OP Notes for results.     IMPRESSION: See OP Notes for results.     This procedure was auto-finalized by: Virtual [employer]       PSA:  No results found for: "PSA", "PSADIAG", "PSATOTAL", "PSAFREE"    Testosterone:  No results found for: "TOTALTESTOST", "TESTOSTERONE"     ASSESSMENT     1. Hydronephrosis of right kidney    2. Right ureteral stone        PLAN:     I discussed the indication, risks, benefits, alternatives, and expectations of the procedure in detail.  We reviewed the rationale for the ureteral stent and its side effects. We reviewed the risks of bleeding, infection, pain, injury to the urinary tract, inability, or incomplete removal of kidney stones. She voiced understanding and all questions were answered to her " satisfaction. She agrees to proceed as planned with right ureteroscopy, laser lithotripsy, and possible ureteral stent placement.     - Visit today included increased complexity associated with the ongoing care of the episodic problem nephrolithiasis which has been addressed and requires the longitudinal care of the patient due to the serious and/or complex managed problem of nephrolithiasis.     Carlton Bender MD  Urology  [facility]    Disclaimer: This note has been generated using voice-recognition software. There may be typographical errors that have been missed during proof-reading.            [1]   Social History  Tobacco Use    Smoking status: Never     Passive exposure: Past    Smokeless tobacco: Never    Tobacco comments:     2nd hand smoke when it was not banned   Vaping Use    Vaping status: Never Used   Substance Use Topics    Alcohol use: Not Currently    Drug use: Never   [2]   Current Outpatient Medications on File Prior to Visit   Medication Sig Dispense Refill    amLODIPine (NORVASC) 10 MG tablet Take 1 tablet by mouth once daily 90 tablet 0    amoxicillin-clavulanate 875-125mg (AUGMENTIN) 875-125 mg per tablet Take 1 tablet by mouth 2 (two) times daily. for 14 days 28 tablet 0    cartilage/collagen/bor/hyalur (JOINT HEALTH ORAL) Take 1 tablet by mouth once daily.      cranberry extract (CRANBERRY CONCENTRATE) 500 mg Cap Take 1 tablet by mouth once daily.      DAILY MULTI-VITAMIN ORAL Take by mouth.      DULoxetine (CYMBALTA) 60 MG capsule Take 1 capsule (60 mg total) by mouth once daily. 90 capsule 0    EPINEPHrine (EPIPEN) 0.3 mg/0.3 mL AtIn Inject one syringe at first symptoms, may repeat x 1 within 5-15 minutes 2 each 1    hydroCHLOROthiazide (MICROZIDE) 12.5 mg capsule Take 1 capsule by mouth once daily 90 capsule 0    hydrOXYzine pamoate (VISTARIL) 50 MG Cap Take 1 capsule (50 mg total) by mouth 2 (two) times daily as needed (for anxiety). 30 capsule 1    losartan  (COZAAR) 50 MG tablet Take 1 tablet (50 mg total) by mouth once daily.      oxybutynin (DITROPAN) 5 MG Tab Take 1 tablet (5 mg total) by mouth 3 (three) times daily as needed. 42 tablet 0    phenazopyridine (PYRIDIUM) 200 MG tablet Take 1 tablet (200 mg total) by mouth 3 (three) times daily as needed for Pain. 42 tablet 0    tamsulosin (FLOMAX) 0.4 mg Cap Take 1 capsule (0.4 mg total) by mouth every evening. for 14 days 14 capsule 0    tiZANidine (ZANAFLEX) 4 MG tablet Take 1 tablet (4 mg total) by mouth 2 (two) times daily. 180 tablet 3    aspirin 81 MG Chew Take 1 tablet (81 mg total) by mouth once daily. 30 tablet 0    diphenhydrAMINE (BENADRYL) 50 MG capsule Pt to take 50 mg benadryl at 1 hour before procedure (Patient not taking: Reported on 8/7/2025)      diphenhydrAMINE (BENADRYL) 50 MG capsule Take 50mg by mouth 1 hour before contrast administration. (Patient not taking: Reported on 8/7/2025) 1 capsule 0    predniSONE (DELTASONE) 50 MG Tab Pt to take 1 tab (50mg total) 13 hours, 7 hours, 1 hour before procedure in addition take over the counter benadryl 50 mg 1 hr prior to procedure (Patient not taking: Reported on 8/7/2025) 3 tablet 0    predniSONE (DELTASONE) 50 MG Tab Take 50mg by mouth at 13 hours, 7 hours, and 1 hour before contrast administration. (Patient not taking: Reported on 8/7/2025) 3 tablet 0    triamcinolone acetonide 0.1% (KENALOG) 0.1 % cream Apply to affected areas of body BID prn rash. Do not use on face, underarms, or groin. (Patient not taking: Reported on 8/7/2025) 80 g 1     No current facility-administered medications on file prior to visit.

## 2025-08-08 LAB — BACTERIA UR CULT: NO GROWTH

## 2025-08-09 ENCOUNTER — PATIENT MESSAGE (OUTPATIENT)
Dept: UROLOGY | Facility: CLINIC | Age: 65
End: 2025-08-09
Payer: COMMERCIAL

## 2025-08-09 DIAGNOSIS — Q28.2 ARTERIOVENOUS MALFORMATION OF CEREBRAL VESSELS: ICD-10-CM

## 2025-08-09 DIAGNOSIS — N20.1 RIGHT URETERAL STONE: ICD-10-CM

## 2025-08-09 DIAGNOSIS — N13.30 HYDRONEPHROSIS OF RIGHT KIDNEY: Primary | ICD-10-CM

## 2025-08-11 ENCOUNTER — HOSPITAL ENCOUNTER (OUTPATIENT)
Dept: CARDIOLOGY | Facility: HOSPITAL | Age: 65
Discharge: HOME | End: 2025-08-11
Attending: INTERNAL MEDICINE
Payer: COMMERCIAL

## 2025-08-11 VITALS
WEIGHT: 210 LBS | BODY MASS INDEX: 34.99 KG/M2 | HEIGHT: 65 IN | HEART RATE: 100 BPM | DIASTOLIC BLOOD PRESSURE: 61 MMHG | SYSTOLIC BLOOD PRESSURE: 123 MMHG | RESPIRATION RATE: 14 BRPM

## 2025-08-11 DIAGNOSIS — I20.89 ANGINAL EQUIVALENT: ICD-10-CM

## 2025-08-11 LAB
CFR FLOW - ANTERIOR: 1.74
CFR FLOW - INFERIOR: 1.7
CFR FLOW - LATERAL: 1.74
CFR FLOW - MAX: 2.48
CFR FLOW - MIN: 1.29
CFR FLOW - SEPTAL: 1.93
CFR FLOW - WHOLE HEART: 1.78
CV STRESS BASE HR: 87 BPM
DIASTOLIC BLOOD PRESSURE: 75 MMHG
EJECTION FRACTION- HIGH: 65 %
END DIASTOLIC INDEX-HIGH: 153 ML/M2
END DIASTOLIC INDEX-LOW: 93 ML/M2
END SYSTOLIC INDEX-HIGH: 71 ML/M2
END SYSTOLIC INDEX-LOW: 31 ML/M2
NUC REST DIASTOLIC VOLUME INDEX: 79
NUC REST EJECTION FRACTION: 71
NUC REST SYSTOLIC VOLUME INDEX: 23
NUC STRESS DIASTOLIC VOLUME INDEX: 63
NUC STRESS EJECTION FRACTION: 73 %
NUC STRESS SYSTOLIC VOLUME INDEX: 17
OHS CV CPX 1 MINUTE RECOVERY HEART RATE: 126 BPM
OHS CV CPX 85 PERCENT MAX PREDICTED HEART RATE MALE: 134
OHS CV CPX MAX PREDICTED HEART RATE: 158
OHS CV CPX PATIENT HEIGHT IN: 65
OHS CV CPX PATIENT IS FEMALE: 1
OHS CV CPX PATIENT IS MALE: 0
OHS CV CPX PEAK DIASTOLIC BLOOD PRESSURE: 63 MMHG
OHS CV CPX PEAK HEAR RATE: 96 BPM
OHS CV CPX PEAK RATE PRESSURE PRODUCT: NORMAL
OHS CV CPX PEAK SYSTOLIC BLOOD PRESSURE: 117 MMHG
OHS CV CPX PERCENT MAX PREDICTED HEART RATE ACHIEVED: 63
OHS CV CPX RATE PRESSURE PRODUCT PRESENTING: NORMAL
OHS CV INITIAL DOSE: 29.9 MCG/KG/MIN
OHS CV MODERATELY REDUCED FLOW CAPACITY: 0 %
OHS CV NO ISCHEMIA MILDLY REDUCED FLOW CAPACTY: 50 %
OHS CV NO ISCHEMIA MINIMALLY REDUCED FLOW CAPACITY: 33 %
OHS CV NORMAL FLOW CAPACITY COMPARABLE TO HEALTHY YOUNG VOLUNTEERS: 18 %
OHS CV PEAK DOSE: 29.8 MCG/KG/MIN
OHS CV PET ID: 9496
OHS CV SEVERELY REDUCED FLOW CAPACITY: 0 %
OHS CV TOTAL EXAM DLP: 486.45 MGY-CM
REST FLOW - ANTERIOR: 1.08 CC/MIN/G
REST FLOW - INFERIOR: 1.03 CC/MIN/G
REST FLOW - LATERAL: 1.34 CC/MIN/G
REST FLOW - MAX: 1.63 CC/MIN/G
REST FLOW - MIN: 0.66 CC/MIN/G
REST FLOW - SEPTAL: 1 CC/MIN/G
REST FLOW - WHOLE HEART: 1.11 CC/MIN/G
RETIRED EF AND QEF - SEE NOTES: 53 %
STRESS FLOW - ANTERIOR: 1.82 CC/MIN/G
STRESS FLOW - INFERIOR: 1.73 CC/MIN/G
STRESS FLOW - LATERAL: 2.32 CC/MIN/G
STRESS FLOW - MAX: 2.78 CC/MIN/G
STRESS FLOW - MIN: 1.47 CC/MIN/G
STRESS FLOW - SEPTAL: 1.89 CC/MIN/G
STRESS FLOW - WHOLE HEART: 1.94 CC/MIN/G
SYSTOLIC BLOOD PRESSURE: 133 MMHG

## 2025-08-11 PROCEDURE — 78434 AQMBF PET REST & RX STRESS: CPT | Mod: 26,,, | Performed by: INTERNAL MEDICINE

## 2025-08-11 PROCEDURE — A9555 RB82 RUBIDIUM: HCPCS | Performed by: INTERNAL MEDICINE

## 2025-08-11 PROCEDURE — 93016 CV STRESS TEST SUPVJ ONLY: CPT | Mod: ,,, | Performed by: INTERNAL MEDICINE

## 2025-08-11 PROCEDURE — 78434 AQMBF PET REST & RX STRESS: CPT

## 2025-08-11 PROCEDURE — 63600175 PHARM REV CODE 636 W HCPCS: Performed by: INTERNAL MEDICINE

## 2025-08-11 PROCEDURE — 78431 MYOCRD IMG PET RST&STRS CT: CPT | Mod: 26,,, | Performed by: INTERNAL MEDICINE

## 2025-08-11 PROCEDURE — 93018 CV STRESS TEST I&R ONLY: CPT | Mod: ,,, | Performed by: INTERNAL MEDICINE

## 2025-08-11 RX ORDER — PREDNISONE 50 MG/1
TABLET ORAL
Qty: 3 TABLET | Refills: 0 | Status: SHIPPED | OUTPATIENT
Start: 2025-08-11

## 2025-08-11 RX ORDER — REGADENOSON 0.08 MG/ML
0.4 INJECTION, SOLUTION INTRAVENOUS
Status: COMPLETED | OUTPATIENT
Start: 2025-08-11 | End: 2025-08-11

## 2025-08-11 RX ORDER — AMINOPHYLLINE 25 MG/ML
75 INJECTION, SOLUTION INTRAVENOUS
Status: COMPLETED | OUTPATIENT
Start: 2025-08-11 | End: 2025-08-11

## 2025-08-11 RX ADMIN — REGADENOSON 0.4 MG: 0.08 INJECTION, SOLUTION INTRAVENOUS at 10:08

## 2025-08-11 RX ADMIN — RUBIDIUM CHLORIDE RB-82 29.9 MILLICURIE: 150 INJECTION, SOLUTION INTRAVENOUS at 10:08

## 2025-08-11 RX ADMIN — AMINOPHYLLINE 75 MG: 25 INJECTION, SOLUTION INTRAVENOUS at 10:08

## 2025-08-11 RX ADMIN — RUBIDIUM CHLORIDE RB-82 29.8 MILLICURIE: 150 INJECTION, SOLUTION INTRAVENOUS at 10:08

## 2025-08-12 ENCOUNTER — HOSPITAL ENCOUNTER (OUTPATIENT)
Facility: HOSPITAL | Age: 65
Discharge: HOME | End: 2025-08-12
Attending: UROLOGY | Admitting: UROLOGY
Payer: COMMERCIAL

## 2025-08-12 ENCOUNTER — ANESTHESIA EVENT (OUTPATIENT)
Dept: SURGERY | Facility: HOSPITAL | Age: 65
End: 2025-08-12
Payer: COMMERCIAL

## 2025-08-12 ENCOUNTER — ANESTHESIA (OUTPATIENT)
Dept: SURGERY | Facility: HOSPITAL | Age: 65
End: 2025-08-12
Payer: COMMERCIAL

## 2025-08-12 VITALS
SYSTOLIC BLOOD PRESSURE: 133 MMHG | OXYGEN SATURATION: 95 % | DIASTOLIC BLOOD PRESSURE: 61 MMHG | BODY MASS INDEX: 34.99 KG/M2 | WEIGHT: 210 LBS | TEMPERATURE: 98 F | RESPIRATION RATE: 17 BRPM | HEART RATE: 93 BPM | HEIGHT: 65 IN

## 2025-08-12 DIAGNOSIS — N20.1 RIGHT URETERAL STONE: ICD-10-CM

## 2025-08-12 DIAGNOSIS — N13.30 HYDRONEPHROSIS OF RIGHT KIDNEY: ICD-10-CM

## 2025-08-12 LAB — POCT GLUCOSE: 187 MG/DL (ref 70–110)

## 2025-08-12 PROCEDURE — 25500020 PHARM REV CODE 255: Performed by: UROLOGY

## 2025-08-12 PROCEDURE — 36000707: Performed by: UROLOGY

## 2025-08-12 PROCEDURE — C1769 GUIDE WIRE: HCPCS | Performed by: UROLOGY

## 2025-08-12 PROCEDURE — 37000009 HC ANESTHESIA EA ADD 15 MINS: Performed by: UROLOGY

## 2025-08-12 PROCEDURE — 63600175 PHARM REV CODE 636 W HCPCS: Performed by: UROLOGY

## 2025-08-12 PROCEDURE — 82365 CALCULUS SPECTROSCOPY: CPT | Performed by: UROLOGY

## 2025-08-12 PROCEDURE — 36000706: Performed by: UROLOGY

## 2025-08-12 PROCEDURE — C2617 STENT, NON-COR, TEM W/O DEL: HCPCS | Performed by: UROLOGY

## 2025-08-12 PROCEDURE — 52356 CYSTO/URETERO W/LITHOTRIPSY: CPT | Mod: RT,,, | Performed by: UROLOGY

## 2025-08-12 PROCEDURE — 63600175 PHARM REV CODE 636 W HCPCS

## 2025-08-12 PROCEDURE — C1758 CATHETER, URETERAL: HCPCS | Performed by: UROLOGY

## 2025-08-12 PROCEDURE — 71000033 HC RECOVERY, INTIAL HOUR: Performed by: UROLOGY

## 2025-08-12 PROCEDURE — 27201423 OPTIME MED/SURG SUP & DEVICES STERILE SUPPLY: Performed by: UROLOGY

## 2025-08-12 PROCEDURE — 71000015 HC POSTOP RECOV 1ST HR: Performed by: UROLOGY

## 2025-08-12 PROCEDURE — 25000003 PHARM REV CODE 250

## 2025-08-12 PROCEDURE — 37000008 HC ANESTHESIA 1ST 15 MINUTES: Performed by: UROLOGY

## 2025-08-12 PROCEDURE — 74420 UROGRAPHY RTRGR +-KUB: CPT | Mod: 26,,, | Performed by: UROLOGY

## 2025-08-12 PROCEDURE — C1773 RET DEV, INSERTABLE: HCPCS | Performed by: UROLOGY

## 2025-08-12 DEVICE — STENT URET POLARIS ULT 6F 26CM: Type: IMPLANTABLE DEVICE | Site: URETER | Status: FUNCTIONAL

## 2025-08-12 RX ORDER — GLUCAGON 1 MG
1 KIT INJECTION
Status: DISCONTINUED | OUTPATIENT
Start: 2025-08-12 | End: 2025-08-12 | Stop reason: HOSPADM

## 2025-08-12 RX ORDER — IBUPROFEN 800 MG/1
800 TABLET, FILM COATED ORAL 3 TIMES DAILY PRN
Qty: 30 TABLET | Refills: 0 | Status: SHIPPED | OUTPATIENT
Start: 2025-08-12

## 2025-08-12 RX ORDER — TAMSULOSIN HYDROCHLORIDE 0.4 MG/1
0.4 CAPSULE ORAL DAILY
Qty: 14 CAPSULE | Refills: 0 | Status: SHIPPED | OUTPATIENT
Start: 2025-08-12 | End: 2025-08-29

## 2025-08-12 RX ORDER — MIDAZOLAM HYDROCHLORIDE 1 MG/ML
INJECTION INTRAMUSCULAR; INTRAVENOUS
Status: DISCONTINUED | OUTPATIENT
Start: 2025-08-12 | End: 2025-08-12

## 2025-08-12 RX ORDER — FENTANYL CITRATE 50 UG/ML
INJECTION, SOLUTION INTRAMUSCULAR; INTRAVENOUS
Status: DISCONTINUED | OUTPATIENT
Start: 2025-08-12 | End: 2025-08-12

## 2025-08-12 RX ORDER — OXYCODONE HYDROCHLORIDE 10 MG/1
10 TABLET ORAL EVERY 4 HOURS PRN
Status: DISCONTINUED | OUTPATIENT
Start: 2025-08-12 | End: 2025-08-12 | Stop reason: HOSPADM

## 2025-08-12 RX ORDER — DEXAMETHASONE SODIUM PHOSPHATE 4 MG/ML
INJECTION, SOLUTION INTRA-ARTICULAR; INTRALESIONAL; INTRAMUSCULAR; INTRAVENOUS; SOFT TISSUE
Status: DISCONTINUED | OUTPATIENT
Start: 2025-08-12 | End: 2025-08-12

## 2025-08-12 RX ORDER — CEFAZOLIN 2 G/1
2 INJECTION, POWDER, FOR SOLUTION INTRAMUSCULAR; INTRAVENOUS
Status: COMPLETED | OUTPATIENT
Start: 2025-08-12 | End: 2025-08-12

## 2025-08-12 RX ORDER — PROPOFOL 10 MG/ML
VIAL (ML) INTRAVENOUS
Status: DISCONTINUED | OUTPATIENT
Start: 2025-08-12 | End: 2025-08-12

## 2025-08-12 RX ORDER — LIDOCAINE HYDROCHLORIDE 10 MG/ML
INJECTION, SOLUTION INTRAVENOUS
Status: DISCONTINUED | OUTPATIENT
Start: 2025-08-12 | End: 2025-08-12

## 2025-08-12 RX ORDER — ONDANSETRON HYDROCHLORIDE 2 MG/ML
INJECTION, SOLUTION INTRAVENOUS
Status: DISCONTINUED | OUTPATIENT
Start: 2025-08-12 | End: 2025-08-12

## 2025-08-12 RX ORDER — ROCURONIUM BROMIDE 10 MG/ML
INJECTION, SOLUTION INTRAVENOUS
Status: DISCONTINUED | OUTPATIENT
Start: 2025-08-12 | End: 2025-08-12

## 2025-08-12 RX ORDER — ONDANSETRON HYDROCHLORIDE 2 MG/ML
4 INJECTION, SOLUTION INTRAVENOUS DAILY PRN
Status: DISCONTINUED | OUTPATIENT
Start: 2025-08-12 | End: 2025-08-12 | Stop reason: HOSPADM

## 2025-08-12 RX ORDER — ACETAMINOPHEN 10 MG/ML
INJECTION, SOLUTION INTRAVENOUS
Status: DISCONTINUED | OUTPATIENT
Start: 2025-08-12 | End: 2025-08-12

## 2025-08-12 RX ORDER — OXYCODONE HYDROCHLORIDE 5 MG/1
5 TABLET ORAL
Status: DISCONTINUED | OUTPATIENT
Start: 2025-08-12 | End: 2025-08-12 | Stop reason: HOSPADM

## 2025-08-12 RX ORDER — OXYBUTYNIN CHLORIDE 5 MG/1
5 TABLET ORAL 3 TIMES DAILY PRN
Qty: 15 TABLET | Refills: 0 | Status: SHIPPED | OUTPATIENT
Start: 2025-08-12

## 2025-08-12 RX ORDER — HYDROMORPHONE HYDROCHLORIDE 2 MG/ML
0.2 INJECTION, SOLUTION INTRAMUSCULAR; INTRAVENOUS; SUBCUTANEOUS EVERY 5 MIN PRN
Status: DISCONTINUED | OUTPATIENT
Start: 2025-08-12 | End: 2025-08-12 | Stop reason: HOSPADM

## 2025-08-12 RX ORDER — PHENAZOPYRIDINE HYDROCHLORIDE 100 MG/1
200 TABLET, FILM COATED ORAL 3 TIMES DAILY PRN
Qty: 30 TABLET | Refills: 0 | Status: SHIPPED | OUTPATIENT
Start: 2025-08-12 | End: 2025-08-22

## 2025-08-12 RX ORDER — PROCHLORPERAZINE EDISYLATE 5 MG/ML
5 INJECTION INTRAMUSCULAR; INTRAVENOUS EVERY 30 MIN PRN
Status: DISCONTINUED | OUTPATIENT
Start: 2025-08-12 | End: 2025-08-12 | Stop reason: HOSPADM

## 2025-08-12 RX ADMIN — ONDANSETRON 8 MG: 2 INJECTION, SOLUTION INTRAMUSCULAR; INTRAVENOUS at 09:08

## 2025-08-12 RX ADMIN — MIDAZOLAM HYDROCHLORIDE 2 MG: 1 INJECTION, SOLUTION INTRAMUSCULAR; INTRAVENOUS at 08:08

## 2025-08-12 RX ADMIN — FENTANYL CITRATE 100 MCG: 50 INJECTION INTRAMUSCULAR; INTRAVENOUS at 08:08

## 2025-08-12 RX ADMIN — SUGAMMADEX 200 MG: 100 INJECTION, SOLUTION INTRAVENOUS at 09:08

## 2025-08-12 RX ADMIN — LIDOCAINE HYDROCHLORIDE 100 MG: 10 INJECTION, SOLUTION INTRAVENOUS at 08:08

## 2025-08-12 RX ADMIN — ACETAMINOPHEN 1000 MG: 10 INJECTION, SOLUTION INTRAVENOUS at 09:08

## 2025-08-12 RX ADMIN — SODIUM CHLORIDE, SODIUM LACTATE, POTASSIUM CHLORIDE, AND CALCIUM CHLORIDE: .6; .31; .03; .02 INJECTION, SOLUTION INTRAVENOUS at 08:08

## 2025-08-12 RX ADMIN — DEXAMETHASONE SODIUM PHOSPHATE 4 MG: 4 INJECTION, SOLUTION INTRA-ARTICULAR; INTRALESIONAL; INTRAMUSCULAR; INTRAVENOUS; SOFT TISSUE at 08:08

## 2025-08-12 RX ADMIN — CEFAZOLIN 2 G: 2 INJECTION, POWDER, FOR SOLUTION INTRAMUSCULAR; INTRAVENOUS at 08:08

## 2025-08-12 RX ADMIN — PROPOFOL 200 MG: 10 INJECTION, EMULSION INTRAVENOUS at 08:08

## 2025-08-12 RX ADMIN — ROCURONIUM BROMIDE 50 MG: 10 INJECTION, SOLUTION INTRAVENOUS at 08:08

## 2025-08-16 DIAGNOSIS — F33.9 DEPRESSION, RECURRENT: ICD-10-CM

## 2025-08-18 RX ORDER — DULOXETIN HYDROCHLORIDE 60 MG/1
60 CAPSULE, DELAYED RELEASE ORAL DAILY
Qty: 90 CAPSULE | Refills: 0 | Status: SHIPPED | OUTPATIENT
Start: 2025-08-18

## 2025-08-22 ENCOUNTER — OFFICE VISIT (OUTPATIENT)
Dept: PSYCHIATRY | Facility: CLINIC | Age: 65
End: 2025-08-22
Payer: COMMERCIAL

## 2025-08-22 DIAGNOSIS — F43.22 ADJUSTMENT DISORDER WITH ANXIETY: Primary | ICD-10-CM

## 2025-08-22 DIAGNOSIS — F33.9 DEPRESSION, RECURRENT: ICD-10-CM

## 2025-08-27 ENCOUNTER — HOSPITAL ENCOUNTER (OUTPATIENT)
Dept: CARDIOLOGY | Facility: HOSPITAL | Age: 65
Discharge: HOME | End: 2025-08-27
Attending: INTERNAL MEDICINE
Payer: COMMERCIAL

## 2025-08-27 VITALS
BODY MASS INDEX: 34.99 KG/M2 | HEIGHT: 65 IN | HEART RATE: 81 BPM | SYSTOLIC BLOOD PRESSURE: 134 MMHG | WEIGHT: 210 LBS | DIASTOLIC BLOOD PRESSURE: 73 MMHG

## 2025-08-27 DIAGNOSIS — R06.09 DYSPNEA ON EXERTION: ICD-10-CM

## 2025-08-27 LAB
AORTIC SIZE INDEX (SOV): 1.3 CM/M2
AORTIC SIZE INDEX: 1.4 CM/M2
ASCENDING AORTA: 2.9 CM
AV AREA BY CONTINUOUS VTI: 2 CM2
AV INDEX (PROSTH): 0.71
AV LVOT MEAN GRADIENT: 3 MMHG
AV LVOT PEAK GRADIENT: 5 MMHG
AV MEAN GRADIENT: 7 MMHG
AV PEAK GRADIENT: 14 MMHG
AV VALVE AREA BY VELOCITY RATIO: 1.6 CM²
AV VALVE AREA: 2 CM2
AV VELOCITY RATIO: 0.58
BSA FOR ECHO PROCEDURE: 2.09 M2
CV ECHO LV RWT: 0.42 CM
DOP CALC AO PEAK VEL: 1.9 M/S
DOP CALC AO VTI: 34.2 CM
DOP CALC LVOT AREA: 2.8 CM2
DOP CALC LVOT DIAMETER: 1.9 CM
DOP CALC LVOT PEAK VEL: 1.1 M/S
DOP CALC RVOT AREA: 2.72 CM2
DOP CALC RVOT DIAMETER: 1.86 CM
DOP CALCLVOT PEAK VEL VTI: 24.2 CM
E WAVE DECELERATION TIME: 310 MS
E/A RATIO: 0.57
E/E' RATIO: 6 M/S
ECHO EF ESTIMATED: 63 %
ECHO LV POSTERIOR WALL: 0.9 CM (ref 0.6–1.1)
FRACTIONAL SHORTENING: 32.6 % (ref 28–44)
HR MV ECHO: 81 BPM
INTERVENTRICULAR SEPTUM: 0.6 CM (ref 0.6–1.1)
IVC DIAMETER: 0.94 CM
IVRT: 83 MS
LA MAJOR: 5.7 CM
LA MINOR: 5.7 CM
LA WIDTH: 3.7 CM
LEFT ATRIUM SIZE: 3.7 CM
LEFT ATRIUM VOLUME INDEX MOD: 26 ML/M2
LEFT ATRIUM VOLUME INDEX: 33 ML/M2
LEFT ATRIUM VOLUME MOD: 52 ML
LEFT ATRIUM VOLUME: 66 CM3
LEFT INTERNAL DIMENSION IN SYSTOLE: 2.9 CM (ref 2.1–4)
LEFT VENTRICLE DIASTOLIC VOLUME INDEX: 42.08 ML/M2
LEFT VENTRICLE DIASTOLIC VOLUME: 85 ML
LEFT VENTRICLE MASS INDEX: 47.9 G/M2
LEFT VENTRICLE SYSTOLIC VOLUME INDEX: 15.3 ML/M2
LEFT VENTRICLE SYSTOLIC VOLUME: 31 ML
LEFT VENTRICULAR INTERNAL DIMENSION IN DIASTOLE: 4.3 CM (ref 3.5–6)
LEFT VENTRICULAR MASS: 96.8 G
LV LATERAL E/E' RATIO: 5.3
LV SEPTAL E/E' RATIO: 6.7
Lab: 1.6 CM/M
Lab: 1.8 CM/M
MV A" WAVE DURATION": 108.47 MS
MV MEAN GRADIENT: 2 MMHG
MV PEAK A VEL: 1.3 M/S
MV PEAK E VEL: 0.74 M/S
MV PEAK GRADIENT: 5 MMHG
OHS CV CPX PATIENT HEIGHT IN: 65
OHS CV RV/LV RATIO: 0.74 CM
PISA TR MAX VEL: 1.5 M/S
PULM VEIN A" WAVE DURATION": 108.47 MS
PULM VEIN S/D RATIO: 2.27
PULMONIC VEIN PEAK A VELOCITY: 0.3 M/S
PV PEAK D VEL: 0.37 M/S
PV PEAK S VEL: 0.84 M/S
RA MAJOR: 5.62 CM
RA PRESSURE ESTIMATED: 3 MMHG
RA WIDTH: 3.96 CM
RIGHT ATRIAL AREA: 16.2 CM2
RIGHT VENTRICLE DIASTOLIC BASEL DIMENSION: 3.2 CM
RV TB RVSP: 5 MMHG
RV TISSUE DOPPLER FREE WALL SYSTOLIC VELOCITY 1 (APICAL 4 CHAMBER VIEW): 16.49 CM/S
SINUS: 2.6 CM
STJ: 2.4 CM
TDI LATERAL: 0.14 M/S
TDI SEPTAL: 0.11 M/S
TDI: 0.13 M/S
TRICUSPID ANNULAR PLANE SYSTOLIC EXCURSION: 2.3 CM
TV PEAK GRADIENT: 9 MMHG
TV REST PULMONARY ARTERY PRESSURE: 12 MMHG
Z-SCORE OF LEFT VENTRICULAR DIMENSION IN END DIASTOLE: -3.25
Z-SCORE OF LEFT VENTRICULAR DIMENSION IN END SYSTOLE: -1.82

## 2025-08-27 PROCEDURE — 93306 TTE W/DOPPLER COMPLETE: CPT

## 2025-08-27 PROCEDURE — 93306 TTE W/DOPPLER COMPLETE: CPT | Mod: 26,,, | Performed by: INTERNAL MEDICINE

## 2025-09-02 ENCOUNTER — HOSPITAL ENCOUNTER (OUTPATIENT)
Dept: RADIOLOGY | Facility: HOSPITAL | Age: 65
Discharge: HOME | End: 2025-09-02
Attending: UROLOGY
Payer: COMMERCIAL

## 2025-09-02 ENCOUNTER — HOSPITAL ENCOUNTER (OUTPATIENT)
Dept: RADIOLOGY | Facility: HOSPITAL | Age: 65
Discharge: HOME | End: 2025-09-02
Payer: COMMERCIAL

## 2025-09-02 DIAGNOSIS — N13.30 HYDRONEPHROSIS OF RIGHT KIDNEY: ICD-10-CM

## 2025-09-02 PROCEDURE — 76775 US EXAM ABDO BACK WALL LIM: CPT | Mod: 26,,, | Performed by: RADIOLOGY

## 2025-09-02 PROCEDURE — 76775 US EXAM ABDO BACK WALL LIM: CPT | Mod: TC

## 2025-09-02 PROCEDURE — 74018 RADEX ABDOMEN 1 VIEW: CPT | Mod: 26,,, | Performed by: RADIOLOGY

## 2025-09-02 PROCEDURE — 74018 RADEX ABDOMEN 1 VIEW: CPT | Mod: TC

## (undated) DEVICE — SOL NACL IRR 3000ML

## (undated) DEVICE — SYR 10CC LUER LOCK

## (undated) DEVICE — GUIDE WIRE MOTION .035 X 150CM

## (undated) DEVICE — SPONGE COTTON TRAY 4X4IN

## (undated) DEVICE — PACK CYSTOSCOPY III SIRUS

## (undated) DEVICE — TRAY CYSTO BASIN OMC

## (undated) DEVICE — SENSOR DUAL FLEX STR 150CM

## (undated) DEVICE — CONTAINER MULTIPURPOSE/SPECIME

## (undated) DEVICE — STENT URETERAL UNIV 6FR 24CM
Type: IMPLANTABLE DEVICE | Site: BLADDER | Status: NON-FUNCTIONAL
Removed: 2025-08-03

## (undated) DEVICE — UNDERGLOVES BIOGEL PI SIZE 7.5

## (undated) DEVICE — GLOVE BIOGEL SKINSENSE PI 7.0

## (undated) DEVICE — Device

## (undated) DEVICE — BAG PRESSURE INFUSER 3000CC

## (undated) DEVICE — UNDERGLOVES BIOGEL PI SZ 7 LF

## (undated) DEVICE — ADAPTER HOSE 10FT 8MM

## (undated) DEVICE — FIBER MOSES 200 DFL

## (undated) DEVICE — BAG LINGEMAN DRAIN UROLOGY

## (undated) DEVICE — CATH POLLACK OPEN-END FLEXI-TI

## (undated) DEVICE — SCRUB DYNA-HEX LIQ 4% CHG 4OZ

## (undated) DEVICE — BASKET STONE RETRV 1.9FRX120CM

## (undated) DEVICE — SET IRR URLGY 2LINE UNIV SPIKE

## (undated) DEVICE — GOWN POLY REINF X-LONG 2XL

## (undated) DEVICE — COVER TABLE HVY DTY 60X90IN

## (undated) DEVICE — SYR ONLY LUER LOCK 20CC

## (undated) DEVICE — JELLY LUBRICANT STERILE 4 OZ

## (undated) DEVICE — SOL IRR NACL .9% 3000ML

## (undated) DEVICE — UNDERGLOVE BIOGEL PI SZ 5.5

## (undated) DEVICE — ADHESIVE MASTISOL VIAL 48/BX

## (undated) DEVICE — KIT CATH URTRL FLEXIMA 5FR

## (undated) DEVICE — DRAPE T CYSTOSCOPY STERILE

## (undated) DEVICE — DRESSING TEGADERM 2 3/8 X 2.75